# Patient Record
Sex: FEMALE | Race: WHITE | NOT HISPANIC OR LATINO | Employment: OTHER | URBAN - METROPOLITAN AREA
[De-identification: names, ages, dates, MRNs, and addresses within clinical notes are randomized per-mention and may not be internally consistent; named-entity substitution may affect disease eponyms.]

---

## 2017-01-13 ENCOUNTER — ALLSCRIPTS OFFICE VISIT (OUTPATIENT)
Dept: OTHER | Facility: OTHER | Age: 62
End: 2017-01-13

## 2017-01-13 ENCOUNTER — LAB REQUISITION (OUTPATIENT)
Dept: LAB | Facility: HOSPITAL | Age: 62
End: 2017-01-13
Payer: COMMERCIAL

## 2017-01-13 DIAGNOSIS — Z01.419 ENCOUNTER FOR GYNECOLOGICAL EXAMINATION WITHOUT ABNORMAL FINDING: ICD-10-CM

## 2017-01-13 PROCEDURE — 88175 CYTOPATH C/V AUTO FLUID REDO: CPT | Performed by: OBSTETRICS & GYNECOLOGY

## 2017-01-13 PROCEDURE — 87624 HPV HI-RISK TYP POOLED RSLT: CPT | Performed by: OBSTETRICS & GYNECOLOGY

## 2017-01-19 LAB
HPV RRNA GENITAL QL NAA+PROBE: NORMAL
LAB AP GYN PRIMARY INTERPRETATION: NORMAL
Lab: NORMAL

## 2017-03-31 ENCOUNTER — APPOINTMENT (OUTPATIENT)
Dept: LAB | Facility: CLINIC | Age: 62
End: 2017-03-31
Payer: COMMERCIAL

## 2017-03-31 DIAGNOSIS — Z01.419 ENCOUNTER FOR GYNECOLOGICAL EXAMINATION WITHOUT ABNORMAL FINDING: ICD-10-CM

## 2017-03-31 LAB
EST. AVERAGE GLUCOSE BLD GHB EST-MCNC: 123 MG/DL
HBA1C MFR BLD: 5.9 % (ref 4.2–6.3)
TSH SERPL DL<=0.05 MIU/L-ACNC: 2.97 UIU/ML (ref 0.36–3.74)

## 2017-03-31 PROCEDURE — 83036 HEMOGLOBIN GLYCOSYLATED A1C: CPT

## 2017-03-31 PROCEDURE — 84443 ASSAY THYROID STIM HORMONE: CPT

## 2017-03-31 PROCEDURE — 36415 COLL VENOUS BLD VENIPUNCTURE: CPT

## 2017-06-01 ENCOUNTER — APPOINTMENT (OUTPATIENT)
Dept: LAB | Facility: CLINIC | Age: 62
End: 2017-06-01
Payer: COMMERCIAL

## 2017-06-01 ENCOUNTER — ALLSCRIPTS OFFICE VISIT (OUTPATIENT)
Dept: OTHER | Facility: OTHER | Age: 62
End: 2017-06-01

## 2017-06-01 DIAGNOSIS — L40.9 PSORIASIS: ICD-10-CM

## 2017-06-01 DIAGNOSIS — Z12.31 ENCOUNTER FOR SCREENING MAMMOGRAM FOR MALIGNANT NEOPLASM OF BREAST: ICD-10-CM

## 2017-06-01 LAB
CRP SERPL QL: 8.2 MG/L
ERYTHROCYTE [SEDIMENTATION RATE] IN BLOOD: 17 MM/HOUR (ref 0–20)

## 2017-06-01 PROCEDURE — 86140 C-REACTIVE PROTEIN: CPT

## 2017-06-01 PROCEDURE — 86038 ANTINUCLEAR ANTIBODIES: CPT

## 2017-06-01 PROCEDURE — 86430 RHEUMATOID FACTOR TEST QUAL: CPT

## 2017-06-01 PROCEDURE — 36415 COLL VENOUS BLD VENIPUNCTURE: CPT

## 2017-06-01 PROCEDURE — 85652 RBC SED RATE AUTOMATED: CPT

## 2017-06-02 LAB
RHEUMATOID FACT SER QL LA: NEGATIVE
RYE IGE QN: NEGATIVE

## 2017-06-13 DIAGNOSIS — Z12.31 ENCOUNTER FOR SCREENING MAMMOGRAM FOR MALIGNANT NEOPLASM OF BREAST: ICD-10-CM

## 2017-06-13 DIAGNOSIS — Z01.419 ENCOUNTER FOR GYNECOLOGICAL EXAMINATION WITHOUT ABNORMAL FINDING: ICD-10-CM

## 2017-06-15 ENCOUNTER — HOSPITAL ENCOUNTER (OUTPATIENT)
Dept: RADIOLOGY | Facility: HOSPITAL | Age: 62
Discharge: HOME | End: 2017-06-15
Payer: COMMERCIAL

## 2017-06-15 DIAGNOSIS — Z01.419 ENCOUNTER FOR GYNECOLOGICAL EXAMINATION WITHOUT ABNORMAL FINDING: ICD-10-CM

## 2017-06-15 DIAGNOSIS — Z12.31 ENCOUNTER FOR SCREENING MAMMOGRAM FOR MALIGNANT NEOPLASM OF BREAST: ICD-10-CM

## 2017-06-15 PROCEDURE — G0202 SCR MAMMO BI INCL CAD: HCPCS

## 2017-07-26 ENCOUNTER — HOSPITAL ENCOUNTER (OUTPATIENT)
Dept: SLEEP CENTER | Facility: CLINIC | Age: 62
Discharge: HOME | End: 2017-07-26
Payer: COMMERCIAL

## 2017-07-26 ENCOUNTER — TRANSCRIBE ORDERS (OUTPATIENT)
Dept: SLEEP CENTER | Facility: CLINIC | Age: 62
End: 2017-07-26

## 2017-07-26 DIAGNOSIS — J44.9 OSA AND COPD OVERLAP SYNDROME (HCC): Primary | ICD-10-CM

## 2017-07-26 DIAGNOSIS — G47.33 OSA AND COPD OVERLAP SYNDROME (HCC): Primary | ICD-10-CM

## 2017-07-26 DIAGNOSIS — G47.33 OSA (OBSTRUCTIVE SLEEP APNEA): ICD-10-CM

## 2017-09-11 ENCOUNTER — ALLSCRIPTS OFFICE VISIT (OUTPATIENT)
Dept: OTHER | Facility: OTHER | Age: 62
End: 2017-09-11

## 2017-09-11 DIAGNOSIS — L40.50 ARTHROPATHIC PSORIASIS (HCC): ICD-10-CM

## 2017-11-17 ENCOUNTER — ALLSCRIPTS OFFICE VISIT (OUTPATIENT)
Dept: OTHER | Facility: OTHER | Age: 62
End: 2017-11-17

## 2017-11-18 NOTE — PROGRESS NOTES
Assessment    1  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) (L40 50)   2  Chronic diastolic congestive heart failure (428 32,428 0) (I50 32)   3  Dyslipidemia (272 4) (E78 5)   · Atorvastatin 40mg   4  Hypertension (401 9) (I10)   · Controlled, with Amlodipine, Metoprolol, and Quinapril  5  Obesity (BMI 30-39 9) (278 00) (E66 9)   6  Edema, pitting (782 3) (R60 9)   7  Hydrocodone use disorder, moderate, in controlled environment (305 50) (F11 20)    Plan  Arthropathic psoriasis    · Ibuprofen 400 MG Oral Tablet; TAKE 1 TABLET 3 TIMES DAILY AS NEEDED   · Follow-up visit in 4 Months Evaluation and Treatment  Follow-up  Status: Hold For - Scheduling Requested for: [id number]  Chronic diastolic congestive heart failure, Dyslipidemia    · Aspirin 81 MG Oral Tablet Delayed Release; TAKE 1 TABLET DAILY AS DIRECTED  Dyslipidemia    · Atorvastatin Calcium 40 MG Oral Tablet; TAKE (1) TABLET IN THE EVENING FORCHOLESTEROL  Hypertension    · AmLODIPine Besylate 5 MG Oral Tablet (Norvasc); TAKE 1 TABLET TWICE DAILY   · Metoprolol Tartrate 50 MG Oral Tablet; TAKE 1 TABLET TWICE DAILY   · Quinapril HCl - 40 MG Oral Tablet; take (1) tablet daily  Need for prophylactic vaccination and inoculation against influenza    · Flulaval Quadrivalent Intramuscular Suspension  Osteoarthritis of knee    · Hydrocodone-Acetaminophen 5-325 MG Oral Tablet; take 1 tablet every 12 hours asneeded for pain; Do Not Fill Before: [id number]    Discussion/Summary  Discussion Summary:   1   Psoriatic arthritis, improvedpain asymmetric on right elbow, hands and kneehelps although counseled pt on need to take only occasionally due to her history of heart failure and chance of renal injuryXR bilat ordered to assess severity of arthropathy  HLDcont atorvastatin  XUO576/90, stableamlodipine, quinipril, also on metoprolol for hx of HF  Pitting edema R>Gilda past with negative workup for DVTand wanesamlodipine which could be causemonitor on next visit  Chronic opioid use disorder in setting of controlled environmentmaintained on low dose hydrocodone with q12 schedulingonly taking evening dose when neededRx given this visit, pt instructed to use sparingly  Obesity BMI >30pound weightloss since last visitand counseled pt on dietary changes and maintaining 150 mins exercise (walking) per week  Diastolic heart failure without recent exacerbationpt has no symptoms to suggest worsening statusmonitorher against the use of overuse of ibuprofen given hx of HF  HMvaccine given this visitdone 2014UTDup in 4 months or sooner if issues arise  Counseling Documentation With Imm: The patient was counseled regarding diagnostic results,-- instructions for management,-- risk factor reductions,-- prognosis,-- patient and family education,-- risks and benefits of treatment options,-- importance of compliance with treatment  total time of encounter was 55 minutes  Patient Education: Educational resources provided:   Medication SE Review and Pt Understands Tx: Possible side effects of new medications were reviewed with the patient/guardian today  The treatment plan was reviewed with the patient/guardian  The patient/guardian understands and agrees with the treatment plan      Chief Complaint  Chief Complaint Chronic Condition [facility] Distance: Patient is here today for follow up of chronic conditions described in HPI  History of Present Illness  HPI: 58 yo F here for follow up, psoriatic arthritis stable with use of tylenol and occasional ibuprofen  Has occasional right elbow pain, unchanged with activity and waxes and wanes  Uses elbow brace for relief  Severity of sx is mild to moderate  Hospital Based Practices Required Assessment:  Pain Assessment  the patient states they have pain  The pain is located in the right elbow  The patient describes the pain as burning  (on a scale of 0 to 10, the patient rates the pain at 6 )   Prefered Language is  english  Primary Language is  english     Statin Usage Guidelines Clinical Pathway: The patient is being seen for a routine clinic follow-up of hyperlipidemia  No associated symptoms are reported  no chest pain no exertional calf cramps no myalgias no nausea skin lesions Current treatment includes statins  Pertinent medical history:  hypertension, but-- no diabetes-- and-- no vitamin D deficiency  Risk factors:  obesity-- and-- poor diet, but-- no inactivity  Obesity (Follow-Up): The patient is being seen for follow-up of obesity  The patient reports doing well and a weight loss of 2 pounds  She has had no significant interval events  Interval symptoms:  stable poor eating habits,-- denies depressed mood,-- denies anxiety,-- denies dyspnea,-- denies fatigue,-- denies back pain-- and-- improved joint pain  Medications:  the patient is adherent to her medication regimen, but-- she denies medication side effects  Disease management:  the patient is doing well with her goals  Diet plan: low carbohydrate diet, limited portion sizes, limited junk food and limited high sugar drinks  Hypertension (Follow-Up): The patient presents for follow-up of essential hypertension  The patient states she has been doing well with her blood pressure control since the last visit  Comorbid Illnesses: cardiac failure  She has no significant interval events  Symptoms: denies impaired vision,-- denies dyspnea,-- denies chest pain,-- denies intermittent leg claudication-- and-- stable lower extremity edema  Associated symptoms include no focal neurologic deficits  Home monitoring: The patient is not checking blood pressure at home  Blood pressure control has been good  Lifestyle: Weight Issues: She has weight concerns  Exercise: She exercises regularly  Arthritis, Psoriatic (Brief): Symptoms:  joint pain-- and-- tenderness, but-- no joint swelling,-- no warmth,-- no dactylitis-- and-- no restricted movement  Associated symptoms:  no fever,-- no fatigue-- and-- no malaise   Current treatment includes nonsteroidal anti-inflammatory drugs  By report, there is good compliance with treatment and good tolerance of treatment  Initial presentation included joint pain  Since diagnosis the disease has been stable  Recently, the disease has been stable  Associated conditions include dermal psoriasis  Disease complications:  no arthritis mutilans,-- no atlantoaxial subluxation,-- no disability,-- no enthesitis-- and-- no uveitis  She was previously evaluated in this clinic  Previous presentation included joint pain and joint swelling  Past evaluation has included C-reactive protein and negative LACEY  Review of Systems  Complete-Female:  Constitutional: recent weight loss, but-- No fever, no chills, feels well, no tiredness, no recent weight gain or weight loss,-- no fever,-- not feeling poorly,-- no recent weight gain,-- no chills-- and-- not feeling tired  Eyes: no eye pain,-- no eyesight problems,-- eyes not red-- and-- no purulent discharge from the eyes  ENT: no sore throat,-- no hearing loss,-- no nasal discharge-- and-- no hoarseness  Cardiovascular: lower extremity edema, but-- no chest pain,-- the heart rate was not fast-- and-- no palpitations  Respiratory: no shortness of breath,-- no cough,-- no wheezing-- and-- no shortness of breath during exertion  Gastrointestinal: no abdominal pain,-- no nausea,-- no vomiting-- and-- no diarrhea  Genitourinary: No complaints of dysuria, no incontinence, no pelvic pain, no dysmenorrhea, no vaginal discharge or bleeding-- and-- no pelvic pain  Musculoskeletal: arthralgias,-- limb pain-- and-- limb swelling, but-- no joint swelling,-- no myalgias-- and-- no joint stiffness  Neurological: No complaints of headache, no confusion, no convulsions, no numbness, no dizziness or fainting, no tingling, no limb weakness, no difficulty walking,-- no headache,-- no numbness,-- no confusion,-- no limb weakness-- and-- no difficulty walking    Endocrine: no muscle weakness  no feelings of weakness   ROS Reviewed:   ROS reviewed  Active Problems  1  Allergic rhinitis (477 9) (J30 9)   2  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) (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)   3  Chronic diastolic congestive heart failure (428 32,428 0) (I50 32)   4  Dyslipidemia (272 4) (E78 5)   5  Edema, pitting (782 3) (R60 9)   6  Encounter for screening mammogram for malignant neoplasm of breast (V76 12) (Z12 31)   7  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)   8  Hydrocodone use disorder, moderate, in controlled environment (305 50) (F11 20)   9  Hypertension (401 9) (I10)   10  Need for prophylactic vaccination and inoculation against influenza (V04 81) (Z23)   11  Obesity (BMI 30-39 9) (278 00) (E66 9)   12  Obstructive sleep apnea, adult (327 23) (G47 33)   13  Women's annual routine gynecological examination (V72 31) (Z01 419)    Past Medical History  1  History of Chronic ankle pain, unspecified laterality (294 55,854 88) (M25 579,G89 29)   2  History of Chronic hepatitis C (070 54) (B18 2)   3  History of Complete Left Bundle Branch Block (426 3)   4  History of Encounter for vaccination (V05 9) (Z23)   5  History of colonoscopy (V45 89) (Z98 890)   6  History of eczema (V13 3) (Z87 2)   7  History of hyperlipidemia (V12 29) (Z86 39)   8  History of hypertension (V12 59) (Z86 79)   9  History of sleep apnea (V13 89) (Z86 69)   10  History of Need for diphtheria-tetanus-pertussis (Tdap) vaccine (V06 1) (Z23)   11  Need for prophylactic vaccination and inoculation against influenza (V04 81) (Z23)   12  History of Need for Zostavax administration (V04 89) (Z23)   13  History of Osteoarthritis of knee (715 36) (M17 10)   14  History of Rectal polyp (569 0) (K62 1)  Active Problems And Past Medical History Reviewed: The active problems and past medical history were reviewed and updated today  Surgical History  Surgical History Reviewed: The surgical history was reviewed and updated today         Family History  Mother    1  Family history of Heart disease   2  Family history of Hypertension   3  Family history of Pacemaker  Father    4  Family history of Heart disease  Sister    5  Family history of Heart disease  Brother    6  Family history of Alcohol abuse   7  Family history of Heart disease   8  Family history of Liver cirrhosis  Family History Reviewed: The family history was reviewed and updated today  Social History     · Former smoker (D07 73) (Y65 641)   · History of Former smoker (V15 82) (Q06 974)   · Home Environment (V60 9)  Social History Reviewed: The social history was reviewed and updated today  The social history was reviewed and is unchanged  Current Meds   1  AmLODIPine Besylate 5 MG Oral Tablet; TAKE 1 TABLET TWICE DAILY; Therapy: 00Kmo9175 to (Evaluate:09Dev2141)  Requested for: 32Xyn0439; Last Rx:37Nxd9010 Ordered   2  Aspirin 81 MG Oral Tablet Delayed Release; TAKE 1 TABLET DAILY AS DIRECTED; Therapy: 10Dxq6430 to (Evaluate:10Mar2018)  Requested for: 60AAG0693 Recorded   3  Atorvastatin Calcium 40 MG Oral Tablet; TAKE (1) TABLET IN THE EVENING FOR CHOLESTEROL; Therapy: 80Emd7855 to (Last MJ:21SKP4700)  Requested for: 90Zxt6709 Ordered   4  Fluticasone Propionate 50 MCG/ACT Nasal Suspension; USE 2 SPRAYS IN EACH NOSTRIL ONCE DAILY; Therapy: 29KMV8760 to (Evaluate:62Usq6524); Last Rx:19Sak4610 Ordered   5  Hydrocodone-Acetaminophen 5-325 MG Oral Tablet; take 1 tablet every 12 hours as needed for pain; Therapy: 27Dvf5955 to (Evaluate:93Rjg9240)  Requested for: 48YTW9123; Last Rx:06Nov2017 Ordered   6  Ibuprofen 400 MG Oral Tablet; TAKE 1 TABLET 3 TIMES DAILY AS NEEDED; Therapy: 93ARR3663 to (Evaluate:59Gko3099)  Requested for: 39TJJ5789 Recorded   7  Loratadine 10 MG Oral Tablet; TAKE 1 TABLET DAILY; Therapy: 39FAB6855 to (Last Rx:28Apr2016)  Requested for: 28Apr2016 Ordered   8  Metoprolol Tartrate 50 MG Oral Tablet; TAKE 1 TABLET TWICE DAILY;  Therapy: 79Fxh6282 to (Evaluate:71Fhk6546)  Requested for: 85Uge3557; Last Rx:91Cap2469 Ordered   9  Quinapril HCl - 40 MG Oral Tablet; take (1) tablet daily; Therapy: 96Xoq7676 to (Last [place])  Requested for: 00Jgh6327 Ordered  Medication List Reviewed: The medication list was reviewed and updated today  Allergies  1  No Known Drug Allergies    Vitals  Vital Signs    Recorded: 05ASP8587 08:56AM   Temperature 97 2 F   Heart Rate 60   Systolic 959   Diastolic 90   Height 5 ft    Weight 199 lb 4 72 oz   BMI Calculated 38 92   BSA Calculated 1 86       Physical Exam   Constitutional  General appearance: No acute distress, well appearing and well nourished  well developed,-- appears healthy,-- comfortable,-- well nourished,-- rested,-- not exhausted-- and-- well hydrated  Eyes  Conjunctiva and lids: No swelling, erythema or discharge  Conjunctiva Findings: normal in both eyes,-- no hyperemia-- and-- no watery discharge  -- wears corrective lenses  Ears, Nose, Mouth, and Throat  External inspection of ears and nose: Normal   The right external ear was normal  The left external ear was normal   Pulmonary  Respiratory effort: No increased work of breathing or signs of respiratory distress  Respiratory rate: normal  Assessment of respiratory effort revealed normal rhythm and effort,-- no accessory muscle use-- and-- respirations were not shallow  Auscultation of lungs: Clear to auscultation  Auscultation of the lungs revealed no expiratory wheezing,-- normal expiratory time-- and-- no inspiratory wheezing  no rales or crackles were heard bilaterally  no rhonchi  no friction rub  no wheezing  Cardiovascular  Auscultation of heart: Normal rate and rhythm, normal S1 and S2, without murmurs  The heart rate was normal  The rhythm was regular  Heart sounds: normal S1,-- normal S2-- and-- no gallop heard  no murmurs were heard    Examination of extremities for edema and/or varicosities: Normal   right ankle pitting edema-- and-- left ankle pitting edema, but-- pitting edema present  pitting edema R>L +1-2  -- no JVD  Abdomen  Abdomen: Non-tender, no masses  The abdomen was flat  Bowel sounds were normal  The abdomen was soft and nontender  Musculoskeletal  Gait and station: Normal   Gait evaluation demonstrated a normal gait,-- no a wide-based and ataxic gait,-- no shuffling,-- no inusfficiencies for an exercise program-- and-- no antalgia  Digits and nails: Normal without clubbing or cyanosis  Examination of the extremities revealed no fingernail clubbing,-- well perfused fingers,-- no upper extremity petechiae,-- no lower extremity petechiae,-- no syndactyly of the toes,-- no syndactyly of the fingers,-- no upper extremity ischemia-- and-- no lower extremity ischemia  -- brachiaradialis tendon insertion point is nontender, no swelling of MCP or PIP or DIPJ, distal neurovascular status intact, no nail pitting present  Skin  Skin and subcutaneous tissue: Abnormal   dry skin, but-- no cyanosis  Clinical impression:  small <2cm dry patches of erythema over extensor surfaces over upper extremities and elbow c/w psoriasis  Scalp and Hair:  normal hair texture and distribution  -- see above  Neurologic  Cranial nerves: Cranial nerves 2-12 intact  Sensation: No sensory loss  Sensory exam: intact to light touch-- and-- normal proprioception  Psychiatric  Orientation to person, place, and time: Normal   Mental Status: oriented to person, place, and time,-- oriented to person,-- oriented to place,-- oriented to time,-- normal attention skills,-- normal language skills-- and-- normal fund of knowledge  Mood and affect: Normal   Mood and Affect: appropriate mood-- and-- appropriate affect          Provider Comments  Provider Comments:   follow up hand XR on next visit      Attending Note  Attending Note: Attending Note: I interviewed and examined the patient,-- I discussed the case with the Resident and reviewed the Resident's note,-- I supervised the Resident-- and-- I agree with the Resident management plan as it was presented to me  Level of Participation: I was present in clinic, but did not examine the patient  Patient's History: well, denies dyspnea  Uses NSAID very infrequently, understands potential negative impact on HFpEF sx/outcomes  Has a small amount of leg edema, no major change here, however  Key Parts of the Exam: pleasant as usualno [id number]  Comments/Additional Findings: again, cautioned about NSAID use, pt understands  I agree with the Resident's note        Signatures   Electronically signed by : Cyril Madera DO; Nov 17 2017 10:17AM EST                       (Author)    Electronically signed by : Maggie Hooper DO; Nov 17 2017 10:37AM EST                       (Co-participant)

## 2018-01-12 VITALS
DIASTOLIC BLOOD PRESSURE: 74 MMHG | BODY MASS INDEX: 32.96 KG/M2 | HEIGHT: 63 IN | WEIGHT: 186 LBS | HEART RATE: 103 BPM | SYSTOLIC BLOOD PRESSURE: 112 MMHG

## 2018-01-13 VITALS
TEMPERATURE: 97.4 F | HEIGHT: 62 IN | DIASTOLIC BLOOD PRESSURE: 82 MMHG | SYSTOLIC BLOOD PRESSURE: 126 MMHG | BODY MASS INDEX: 36.19 KG/M2 | HEART RATE: 88 BPM | WEIGHT: 196.65 LBS

## 2018-01-13 VITALS
SYSTOLIC BLOOD PRESSURE: 130 MMHG | HEIGHT: 60 IN | TEMPERATURE: 97.2 F | WEIGHT: 199.3 LBS | BODY MASS INDEX: 39.13 KG/M2 | HEART RATE: 60 BPM | DIASTOLIC BLOOD PRESSURE: 90 MMHG

## 2018-01-14 VITALS
HEART RATE: 70 BPM | SYSTOLIC BLOOD PRESSURE: 112 MMHG | TEMPERATURE: 97.7 F | BODY MASS INDEX: 37.08 KG/M2 | DIASTOLIC BLOOD PRESSURE: 80 MMHG | WEIGHT: 201.5 LBS | HEIGHT: 62 IN

## 2018-02-08 ENCOUNTER — TELEPHONE (OUTPATIENT)
Dept: INTERNAL MEDICINE CLINIC | Facility: CLINIC | Age: 63
End: 2018-02-08

## 2018-02-08 DIAGNOSIS — F11.90 CHRONIC, CONTINUOUS USE OF OPIOIDS: Primary | ICD-10-CM

## 2018-02-08 DIAGNOSIS — F11.90 CHRONIC, CONTINUOUS USE OF OPIOIDS: ICD-10-CM

## 2018-02-08 RX ORDER — HYDROCODONE BITARTRATE AND ACETAMINOPHEN 5; 325 MG/1; MG/1
1 TABLET ORAL 2 TIMES DAILY PRN
Qty: 60 TABLET | Refills: 0 | Status: SHIPPED | OUTPATIENT
Start: 2018-02-08 | End: 2018-02-08 | Stop reason: SDUPTHER

## 2018-02-08 RX ORDER — HYDROCODONE BITARTRATE AND ACETAMINOPHEN 5; 325 MG/1; MG/1
1 TABLET ORAL 2 TIMES DAILY PRN
Qty: 60 TABLET | Refills: 0 | Status: SHIPPED | OUTPATIENT
Start: 2018-02-08 | End: 2018-03-05 | Stop reason: SDUPTHER

## 2018-02-08 NOTE — TELEPHONE ENCOUNTER
DISREGARD THIS MESSAGE, REFILL CANNOT BE DONE THROUGHT THIS MESSAGE SINCE IT IS NOT LINKED TO THE MED  I CREATED NEW REFIL REQUEST

## 2018-03-02 RX ORDER — METOPROLOL TARTRATE 50 MG/1
1 TABLET, FILM COATED ORAL 2 TIMES DAILY
COMMUNITY
Start: 2012-02-23 | End: 2018-03-05 | Stop reason: SDUPTHER

## 2018-03-02 RX ORDER — LORATADINE 10 MG/1
1 TABLET ORAL DAILY
COMMUNITY
Start: 2015-10-26 | End: 2018-03-05 | Stop reason: SDUPTHER

## 2018-03-02 RX ORDER — IBUPROFEN 400 MG/1
1 TABLET ORAL 3 TIMES DAILY PRN
COMMUNITY
Start: 2017-06-01 | End: 2018-03-05 | Stop reason: SDUPTHER

## 2018-03-02 RX ORDER — QUINAPRIL 40 MG/1
1 TABLET ORAL DAILY
COMMUNITY
Start: 2012-02-23 | End: 2018-03-05 | Stop reason: SDUPTHER

## 2018-03-02 RX ORDER — AMLODIPINE BESYLATE 5 MG/1
1 TABLET ORAL 2 TIMES DAILY
COMMUNITY
Start: 2013-08-07 | End: 2018-03-05 | Stop reason: SDUPTHER

## 2018-03-02 RX ORDER — FLUTICASONE PROPIONATE 50 MCG
2 SPRAY, SUSPENSION (ML) NASAL DAILY
COMMUNITY
Start: 2015-04-06 | End: 2018-03-05 | Stop reason: SDUPTHER

## 2018-03-02 RX ORDER — ATORVASTATIN CALCIUM 40 MG/1
TABLET, FILM COATED ORAL
COMMUNITY
Start: 2013-04-24 | End: 2018-03-05 | Stop reason: SDUPTHER

## 2018-03-04 PROBLEM — E66.9 OBESITY (BMI 30-39.9): Status: ACTIVE | Noted: 2017-09-11

## 2018-03-04 PROBLEM — I50.32 CHRONIC DIASTOLIC CONGESTIVE HEART FAILURE (HCC): Status: ACTIVE | Noted: 2017-09-11

## 2018-03-04 PROBLEM — L40.50 ARTHROPATHIC PSORIASIS (HCC): Status: ACTIVE | Noted: 2017-09-11

## 2018-03-05 ENCOUNTER — OFFICE VISIT (OUTPATIENT)
Dept: INTERNAL MEDICINE CLINIC | Facility: CLINIC | Age: 63
End: 2018-03-05
Payer: COMMERCIAL

## 2018-03-05 VITALS
TEMPERATURE: 97.3 F | SYSTOLIC BLOOD PRESSURE: 130 MMHG | HEIGHT: 62 IN | WEIGHT: 200.62 LBS | BODY MASS INDEX: 36.92 KG/M2 | DIASTOLIC BLOOD PRESSURE: 90 MMHG | HEART RATE: 84 BPM

## 2018-03-05 DIAGNOSIS — F11.90 CHRONIC, CONTINUOUS USE OF OPIOIDS: ICD-10-CM

## 2018-03-05 DIAGNOSIS — Z12.11 SCREEN FOR COLON CANCER: ICD-10-CM

## 2018-03-05 DIAGNOSIS — T78.40XA ALLERGIC: ICD-10-CM

## 2018-03-05 DIAGNOSIS — L40.50 ARTHROPATHIC PSORIASIS (HCC): Primary | ICD-10-CM

## 2018-03-05 DIAGNOSIS — E66.9 OBESITY (BMI 30-39.9): ICD-10-CM

## 2018-03-05 DIAGNOSIS — F11.20: ICD-10-CM

## 2018-03-05 DIAGNOSIS — I10 ESSENTIAL HYPERTENSION: ICD-10-CM

## 2018-03-05 DIAGNOSIS — I50.32 CHRONIC DIASTOLIC CONGESTIVE HEART FAILURE (HCC): ICD-10-CM

## 2018-03-05 PROCEDURE — 99213 OFFICE O/P EST LOW 20 MIN: CPT | Performed by: INTERNAL MEDICINE

## 2018-03-05 PROCEDURE — 3008F BODY MASS INDEX DOCD: CPT | Performed by: INTERNAL MEDICINE

## 2018-03-05 RX ORDER — FLUTICASONE PROPIONATE 50 MCG
2 SPRAY, SUSPENSION (ML) NASAL DAILY
Qty: 16 G | Refills: 0 | Status: SHIPPED | OUTPATIENT
Start: 2018-03-05 | End: 2020-10-23

## 2018-03-05 RX ORDER — IBUPROFEN 400 MG/1
400 TABLET ORAL EVERY 8 HOURS PRN
Qty: 30 TABLET | Refills: 0 | Status: SHIPPED | OUTPATIENT
Start: 2018-03-05 | End: 2019-08-16

## 2018-03-05 RX ORDER — HYDROCODONE BITARTRATE AND ACETAMINOPHEN 5; 325 MG/1; MG/1
1 TABLET ORAL 2 TIMES DAILY PRN
Qty: 60 TABLET | Refills: 0 | Status: SHIPPED | OUTPATIENT
Start: 2018-03-08 | End: 2018-03-05 | Stop reason: SDUPTHER

## 2018-03-05 RX ORDER — QUINAPRIL 40 MG/1
40 TABLET ORAL DAILY
Qty: 90 TABLET | Refills: 1 | Status: SHIPPED | OUTPATIENT
Start: 2018-03-05 | End: 2018-09-19 | Stop reason: SDUPTHER

## 2018-03-05 RX ORDER — HYDROCODONE BITARTRATE AND ACETAMINOPHEN 5; 325 MG/1; MG/1
1 TABLET ORAL 2 TIMES DAILY PRN
Qty: 60 TABLET | Refills: 0 | Status: SHIPPED | OUTPATIENT
Start: 2018-03-08 | End: 2018-04-06 | Stop reason: SDUPTHER

## 2018-03-05 RX ORDER — AMLODIPINE BESYLATE 5 MG/1
5 TABLET ORAL 2 TIMES DAILY
Qty: 180 TABLET | Refills: 1 | Status: SHIPPED | OUTPATIENT
Start: 2018-03-05 | End: 2019-08-02 | Stop reason: SDUPTHER

## 2018-03-05 RX ORDER — METOPROLOL TARTRATE 50 MG/1
50 TABLET, FILM COATED ORAL 2 TIMES DAILY
Qty: 180 TABLET | Refills: 1 | Status: SHIPPED | OUTPATIENT
Start: 2018-03-05 | End: 2019-08-02 | Stop reason: SDUPTHER

## 2018-03-05 RX ORDER — ATORVASTATIN CALCIUM 40 MG/1
40 TABLET, FILM COATED ORAL DAILY
Qty: 90 TABLET | Refills: 1 | Status: SHIPPED | OUTPATIENT
Start: 2018-03-05 | End: 2018-09-19 | Stop reason: SDUPTHER

## 2018-03-05 RX ORDER — HYDROCODONE BITARTRATE AND ACETAMINOPHEN 5; 325 MG/1; MG/1
1 TABLET ORAL 2 TIMES DAILY PRN
Qty: 60 TABLET | Refills: 0 | Status: CANCELLED | OUTPATIENT
Start: 2018-03-08 | End: 2018-04-07

## 2018-03-05 RX ORDER — LORATADINE 10 MG/1
10 TABLET ORAL DAILY
Qty: 90 TABLET | Refills: 0 | Status: SHIPPED | OUTPATIENT
Start: 2018-03-05 | End: 2019-08-02 | Stop reason: SDUPTHER

## 2018-03-05 NOTE — PATIENT INSTRUCTIONS
Arthritis   AMBULATORY CARE:   Arthritis  is a disease that causes inflammation in one or more joints  There are many types of arthritis, such as osteoarthritis, rheumatoid arthritis, and septic arthritis  Some types cause inflammation in the joints  Other types wear away the cartilage between joints  This makes the bones of the joint rub together when you move the joint  Your symptoms may be constant, or symptoms may come and go  Arthritis often gets worse over time and can cause permanent joint damage  Common signs and symptoms of arthritis:   · Pain, swelling, or stiffness in the joint    · Limited range of motion in the joint    · Warmth or redness over the joint    · Tenderness when you touch the joint    · Stiff joints in the morning that loosen with movement    · A creaking or grinding sound when you move the joint    · Fever  Seek care immediately if:   · You have a fever and severe joint pain or swelling  · You cannot move the affected joint  · You have severe joint pain you cannot tolerate  Contact your healthcare provider if:   · Your pain or swelling does not get better with treatment  · You have questions or concerns about your condition or care  Treatment  will depend on the type of arthritis you have and if it is severe  You may need any of the following:  · Acetaminophen  decreases pain and fever  It is available without a doctor's order  Ask how much to take and how often to take it  Follow directions  Acetaminophen can cause liver damage if not taken correctly  · NSAIDs , such as ibuprofen, help decrease swelling, pain, and fever  This medicine is available with or without a doctor's order  NSAIDs can cause stomach bleeding or kidney problems in certain people  If you take blood thinner medicine, always ask your healthcare provider if NSAIDs are safe for you  Always read the medicine label and follow directions  · Steroid medicine  helps reduce swelling and pain       · Surgery may be needed to repair or replace a damaged joint  Manage arthritis:   · Rest your painful joint so it can heal   Your healthcare provider may recommend crutches or a walker if the affected joint is in a leg  · Apply ice or heat to the joint  Both can help decrease swelling and pain  Ice may also help prevent tissue damage  Use an ice pack, or put crushed ice in a plastic bag  Cover it with a towel and place it on your joint for 15 to 20 minutes every hour or as directed  You can apply heat for 20 minutes every 2 hours  Heat treatment includes hot packs or heat lamps  · Elevate your joint  Elevation helps reduce swelling and pain  Raise your joint above the level of your heart as often as you can  Prop your painful joint on pillows to keep it above your heart comfortably  · Go to physical or occupational therapy as directed  A physical therapist can teach you exercises to improve flexibility and range of motion  You may also be shown non-weight-bearing exercises that are safe for your joints, such as swimming  Exercise can help keep your joints flexible and reduce pain  An occupational therapist can help you learn to do your daily activities when your joints are stiff or sore  · Maintain a healthy weight  Extra weight puts increased pressure on your joints  Ask your healthcare provider what you should weigh  If you need to lose weight, he can help you create a weight loss program  Weight loss can help reduce pain and increase your ability to do your activities  The amount of exercise you do may vary each day, depending on your symptoms  · Wear flat or low-heeled shoes  This will help decrease pain and reduce pressure on your ankle, knee, and hip joints  · Use support devices as directed  You may be given splints to wear on your hands to help your joints rest and to decrease inflammation  While you sleep, use a pillow that is firm enough to support your neck and head    Other equipment  that may help you move and prevent falls:  · Orthotic shoes or insoles  help support your feet when you walk  · Crutches, a cane, or a walker  may help decrease your risk for falling  They also decrease stress on affected joints  · Devices to prevent falls  include raised toilet seats and bathtub bars to help you get up from sitting  Handrails can be placed in areas where you need balance and support  Follow up with your healthcare provider or rheumatologist as directed:  Write down your questions so you remember to ask them during your visits  © 2017 [facility] Information is for End User's use only and may not be sold, redistributed or otherwise used for commercial purposes  All illustrations and images included in CareNotes® are the copyrighted property of [employer] , [employer]  or Efren Noland  The above information is an  only  It is not intended as medical advice for individual conditions or treatments  Talk to your doctor, nurse or pharmacist before following any medical regimen to see if it is safe and effective for you

## 2018-03-05 NOTE — PROGRESS NOTES
INTERNAL MEDICINE FOLLOW-UP OFFICE VISIT  [facility]  [address] [place] [address]    NAME: William Rockwell  AGE: 57 y o  SEX: female    DATE OF ENCOUNTER: 3/5/2018    Assessment and Plan   Will give a refill for hydrocodone with attending to sign-to be filled on March 8 2018, 30 day supply given  Patient states she uses this medication approximately every other day when needed  Counseled patient on increasing her exercise and avoiding high salt diet  Likely cause of her lower extremity edema is increased dietary salt intake and venous insufficiency versus amlodipine  Diagnoses and all orders for this visit:    Arthropathic psoriasis ([address] )  -     ibuprofen (MOTRIN) 400 mg tablet; Take 1 tablet (400 mg total) by mouth every 8 (eight) hours as needed for moderate pain for up to 30 days    Chronic diastolic congestive heart failure (HCC)  -     amLODIPine (NORVASC) 5 mg tablet; Take 1 tablet (5 mg total) by mouth 2 (two) times a day for 90 days  -     aspirin 81 MG tablet; Take 1 tablet (81 mg total) by mouth daily for 90 days  -     atorvastatin (LIPITOR) 40 mg tablet; Take 1 tablet (40 mg total) by mouth daily for 90 days    Essential hypertension  -     metoprolol tartrate (LOPRESSOR) 50 mg tablet; Take 1 tablet (50 mg total) by mouth 2 (two) times a day for 90 days  -     quinapril (ACCUPRIL) 40 MG tablet; Take 1 tablet (40 mg total) by mouth daily for 90 days    Hydrocodone use disorder, moderate, in controlled environment (HCC)    Obesity (BMI 30-39  9)    Allergic  -     fluticasone (FLONASE) 50 mcg/act nasal spray; 2 sprays into each nostril daily  -     loratadine (CLARITIN) 10 mg tablet; Take 1 tablet (10 mg total) by mouth daily for 90 days    Chronic, continuous use of opioids  -     HYDROcodone-acetaminophen (NORCO) 5-325 mg per tablet;  Take 1 tablet by mouth 2 (two) times a day as needed for pain for up to 30 days Earliest Fill Date: 3/8/18 [facility] checked 2/8/18 Max Daily Amount: 2 tablets    Screen for colon cancer    Other orders  -     Discontinue: amLODIPine (NORVASC) 5 mg tablet; Take 1 tablet by mouth 2 (two) times a day  -     Discontinue: aspirin 81 MG tablet; Take 1 tablet by mouth daily  -     Discontinue: atorvastatin (LIPITOR) 40 mg tablet; Take by mouth  -     Discontinue: fluticasone (FLONASE) 50 mcg/act nasal spray; 2 sprays into each nostril daily  -     Discontinue: ibuprofen (MOTRIN) 400 mg tablet; Take 1 tablet by mouth 3 (three) times a day as needed  -     Discontinue: loratadine (CLARITIN) 10 mg tablet; Take 1 tablet by mouth daily  -     Discontinue: metoprolol tartrate (LOPRESSOR) 50 mg tablet; Take 1 tablet by mouth 2 (two) times a day  -     Discontinue: quinapril (ACCUPRIL) 40 MG tablet; Take 1 tablet by mouth daily          - Counseling Documentation: patient was counseled regarding: instructions for management    Chief Complaint     Chief Complaint   Patient presents with    Follow-up     routine       History of Present Illness     58-year-old female history psoriatic arthritis, lower extremity edema, chronic CHF with diastolic dysfunction, hypertension, presents for follow-up of her chronic conditions  Patient overall doing well denies any issues breathing, no chest pain, no dyspnea on exertion  She does report increased right hand and located at the 1st MCP J   Pain is worse progressively throughout the day  She has taken Norco and ibuprofen infrequently, only when pain is bothering her  Associated symptoms include right elbow pain  There is no pain in the left upper extremity  Does note exacerbation of her lower extremity edema with right greater than left  Negative workup for DVT in the past   Compliant with her medications  Requesting refill of all her meds           The following portions of the patient's history were reviewed and updated as appropriate: allergies, current medications, past family history, past medical history, past social history, past surgical history and problem list     Review of Systems     Review of Systems   Constitutional: Negative for activity change, chills and fever  HENT: Negative for rhinorrhea  Eyes: Negative for discharge  Respiratory: Negative for cough, shortness of breath and wheezing  Cardiovascular: Positive for leg swelling  Negative for chest pain  Gastrointestinal: Negative  Musculoskeletal: Positive for arthralgias  Negative for gait problem, joint swelling and myalgias  Skin: Positive for rash  Neurological: Positive for numbness  Psychiatric/Behavioral: Negative  Active Problem List     Patient Active Problem List   Diagnosis    Allergic rhinitis    Arthropathic psoriasis (HCC)    Chronic diastolic congestive heart failure (HCC)    Dyslipidemia    Hydrocodone use disorder, moderate, in controlled environment ([facility] [address] )    Hypertension    Obesity (BMI 30-39  9)    Obstructive sleep apnea, adult       Objective     /90   Pulse 84   Temp (!) 97 3 °F (36 3 °C)   Ht 5' 2" (1 575 m)   Wt 91 kg (200 lb 9 9 oz)   BMI 36 69 kg/m²     Physical Exam   Constitutional: She is oriented to person, place, and time  She appears well-developed and well-nourished  No distress  HENT:   Head: Normocephalic  Eyes: Conjunctivae and EOM are normal  Pupils are equal, round, and reactive to light  Right eye exhibits no discharge  Left eye exhibits no discharge  Neck: Normal range of motion  Neck supple  Cardiovascular: Normal rate, regular rhythm, normal heart sounds and intact distal pulses  Exam reveals no gallop and no friction rub  No murmur heard  Pulmonary/Chest: Effort normal and breath sounds normal  No respiratory distress  She has no wheezes  She has no rales  Abdominal: She exhibits no distension  Musculoskeletal: She exhibits edema     Plus two nonpitting lower extremity edema right greater than left    Tenderness at 1st metacarpal of right hand, mild tenderness with palpation at brachioradialis tendon    Neurological: She is alert and oriented to person, place, and time  Coordination normal    Skin: Skin is warm and dry  Rash noted  She is not diaphoretic  Small psoriatic plaques over extensor surfaces of former bilateral, several plaques of lower extremity   Psychiatric: She has a normal mood and affect   Her behavior is normal        Pertinent Laboratory/Diagnostic Studies:  Rheumatologic Studies:   Lab Results   Component Value Date/Time    ESR 17 06/01/2017 09:08 AM    CRP 8 2 (H) 06/01/2017 09:08 AM    LACEY Negative 06/01/2017 09:08 AM    RF Negative 06/01/2017 09:08 AM         Current Medications     Current Outpatient Prescriptions:     amLODIPine (NORVASC) 5 mg tablet, Take 1 tablet (5 mg total) by mouth 2 (two) times a day for 90 days, Disp: 180 tablet, Rfl: 1    aspirin 81 MG tablet, Take 1 tablet (81 mg total) by mouth daily for 90 days, Disp: 90 tablet, Rfl: 1    atorvastatin (LIPITOR) 40 mg tablet, Take 1 tablet (40 mg total) by mouth daily for 90 days, Disp: 90 tablet, Rfl: 1    fluticasone (FLONASE) 50 mcg/act nasal spray, 2 sprays into each nostril daily, Disp: 16 g, Rfl: 0    [START ON 3/8/2018] HYDROcodone-acetaminophen (NORCO) 5-325 mg per tablet, Take 1 tablet by mouth 2 (two) times a day as needed for pain for up to 30 days Earliest Fill Date: 3/8/18 PDMP checked 2/8/18 Max Daily Amount: 2 tablets, Disp: 60 tablet, Rfl: 0    ibuprofen (MOTRIN) 400 mg tablet, Take 1 tablet (400 mg total) by mouth every 8 (eight) hours as needed for moderate pain for up to 30 days, Disp: 30 tablet, Rfl: 0    loratadine (CLARITIN) 10 mg tablet, Take 1 tablet (10 mg total) by mouth daily for 90 days, Disp: 90 tablet, Rfl: 0    metoprolol tartrate (LOPRESSOR) 50 mg tablet, Take 1 tablet (50 mg total) by mouth 2 (two) times a day for 90 days, Disp: 180 tablet, Rfl: 1    quinapril (ACCUPRIL) 40 MG tablet, Take 1 tablet (40 mg total) by mouth daily for 90 days, Disp: 90 tablet, Rfl: 1    Health Maintenance     Health Maintenance   Topic Date Due    Hepatitis C Screening  1955    COLONOSCOPY  1955    Depression Screening PHQ-9  05/07/1965    DTaP,Tdap,and Td Vaccines (2 - Td) 05/26/2016    HIV SCREENING  06/18/2017    MAMMOGRAM  06/15/2019    PAP SMEAR  01/13/2020    INFLUENZA VACCINE  Completed     Immunization History   Administered Date(s) Administered    Influenza Quadrivalent, 6-35 Months IM 12/05/2016, 11/17/2017    Influenza TIV (IM) 01/15/2014, 11/13/2014, 10/26/2015    Tdap 04/28/2016    Zoster 10/26/2015       Citlaly Oteroum    3/5/2018 9:09 AM

## 2018-04-06 DIAGNOSIS — F11.90 CHRONIC, CONTINUOUS USE OF OPIOIDS: ICD-10-CM

## 2018-04-06 RX ORDER — HYDROCODONE BITARTRATE AND ACETAMINOPHEN 5; 325 MG/1; MG/1
1 TABLET ORAL 2 TIMES DAILY PRN
Qty: 60 TABLET | Refills: 0 | Status: SHIPPED | OUTPATIENT
Start: 2018-04-07 | End: 2018-05-07 | Stop reason: SDUPTHER

## 2018-05-07 DIAGNOSIS — F11.90 CHRONIC, CONTINUOUS USE OF OPIOIDS: ICD-10-CM

## 2018-05-07 RX ORDER — HYDROCODONE BITARTRATE AND ACETAMINOPHEN 5; 325 MG/1; MG/1
TABLET ORAL
Qty: 60 TABLET | Refills: 0 | Status: SHIPPED | OUTPATIENT
Start: 2018-05-07 | End: 2018-06-08 | Stop reason: SDUPTHER

## 2018-05-07 RX ORDER — HYDROCODONE BITARTRATE AND ACETAMINOPHEN 5; 325 MG/1; MG/1
1 TABLET ORAL EVERY 12 HOURS PRN
COMMUNITY
Start: 2012-02-23 | End: 2018-05-07 | Stop reason: SDUPTHER

## 2018-05-07 RX ORDER — HYDROCODONE BITARTRATE AND ACETAMINOPHEN 5; 325 MG/1; MG/1
1 TABLET ORAL 2 TIMES DAILY PRN
Qty: 60 TABLET | Refills: 0 | Status: CANCELLED | OUTPATIENT
Start: 2018-05-07 | End: 2018-06-06

## 2018-06-08 ENCOUNTER — TELEPHONE (OUTPATIENT)
Dept: INTERNAL MEDICINE CLINIC | Facility: CLINIC | Age: 63
End: 2018-06-08

## 2018-06-08 DIAGNOSIS — F11.90 CHRONIC, CONTINUOUS USE OF OPIOIDS: ICD-10-CM

## 2018-06-08 RX ORDER — HYDROCODONE BITARTRATE AND ACETAMINOPHEN 5; 325 MG/1; MG/1
TABLET ORAL
Qty: 60 TABLET | Refills: 0 | Status: SHIPPED | OUTPATIENT
Start: 2018-06-08 | End: 2018-07-06 | Stop reason: SDUPTHER

## 2018-06-08 NOTE — TELEPHONE ENCOUNTER
PT CALLED FOR REFILL        Medication   HYDROcodone-acetaminophen (NORCO) 5-325 mg per tablet [[id number]]   HYDROcodone-acetaminophen (NORCO) 5-325 mg per tablet [[id number]]   Order Details   Dose, Route, Frequency: As Directed Note to Pharmacy:   PDMP checked      Dispense Quantity:  60 tablet Refills:  0 Fills remaining:  --           Sig: One q 12hrs prn pain

## 2018-06-11 DIAGNOSIS — F11.90 CHRONIC, CONTINUOUS USE OF OPIOIDS: ICD-10-CM

## 2018-06-11 RX ORDER — HYDROCODONE BITARTRATE AND ACETAMINOPHEN 5; 325 MG/1; MG/1
TABLET ORAL
Qty: 60 TABLET | Refills: 0 | Status: CANCELLED | OUTPATIENT
Start: 2018-06-11

## 2018-06-11 NOTE — TELEPHONE ENCOUNTER
THIS RX WAS SENT ELECTRONICALLY ON 6/8/18   I CONFIRMED WITH THE PHARMACIST AT [facility] AND I MADE PT  AWARE

## 2018-06-15 ENCOUNTER — TRANSCRIBE ORDERS (OUTPATIENT)
Dept: RADIOLOGY | Facility: HOSPITAL | Age: 63
End: 2018-06-15

## 2018-06-15 ENCOUNTER — HOSPITAL ENCOUNTER (OUTPATIENT)
Dept: RADIOLOGY | Facility: HOSPITAL | Age: 63
Discharge: HOME | End: 2018-06-15
Payer: COMMERCIAL

## 2018-06-15 DIAGNOSIS — L40.50 ARTHROPATHIC PSORIASIS (HCC): ICD-10-CM

## 2018-06-15 PROCEDURE — 73130 X-RAY EXAM OF HAND: CPT

## 2018-06-26 ENCOUNTER — HOSPITAL ENCOUNTER (OUTPATIENT)
Dept: RADIOLOGY | Facility: HOSPITAL | Age: 63
Discharge: HOME | End: 2018-06-26
Payer: COMMERCIAL

## 2018-06-26 DIAGNOSIS — Z12.31 ENCOUNTER FOR SCREENING MAMMOGRAM FOR MALIGNANT NEOPLASM OF BREAST: ICD-10-CM

## 2018-06-26 PROCEDURE — 77067 SCR MAMMO BI INCL CAD: CPT

## 2018-07-06 ENCOUNTER — TELEPHONE (OUTPATIENT)
Dept: INTERNAL MEDICINE CLINIC | Facility: CLINIC | Age: 63
End: 2018-07-06

## 2018-07-06 DIAGNOSIS — F11.90 CHRONIC, CONTINUOUS USE OF OPIOIDS: ICD-10-CM

## 2018-07-06 RX ORDER — HYDROCODONE BITARTRATE AND ACETAMINOPHEN 5; 325 MG/1; MG/1
TABLET ORAL
Qty: 60 TABLET | Refills: 0 | Status: SHIPPED | OUTPATIENT
Start: 2018-07-08 | End: 2018-08-07 | Stop reason: SDUPTHER

## 2018-07-06 RX ORDER — HYDROCODONE BITARTRATE AND ACETAMINOPHEN 5; 325 MG/1; MG/1
TABLET ORAL
Qty: 60 TABLET | Refills: 0 | Status: CANCELLED | OUTPATIENT
Start: 2018-07-06

## 2018-07-06 NOTE — TELEPHONE ENCOUNTER
PT CALLED FOR REFILL- PLEASE SEND ELECTRONICALLY IF POSSIBLE      HYDROcodone-acetaminophen (NORCO) 5-325 mg per tablet

## 2018-07-06 NOTE — TELEPHONE ENCOUNTER
[facility] IS NOT OPEN ON SUNDAYS SO I GAVE THE PHARMACIST VERBAL ORDER THAT PT   CAN  ON SAT  7/7/18

## 2018-07-24 ENCOUNTER — OFFICE VISIT (OUTPATIENT)
Dept: SLEEP CENTER | Facility: CLINIC | Age: 63
End: 2018-07-24
Payer: COMMERCIAL

## 2018-07-24 VITALS
BODY MASS INDEX: 35.19 KG/M2 | WEIGHT: 198.6 LBS | DIASTOLIC BLOOD PRESSURE: 88 MMHG | HEIGHT: 63 IN | HEART RATE: 88 BPM | SYSTOLIC BLOOD PRESSURE: 134 MMHG

## 2018-07-24 DIAGNOSIS — G47.33 OSA AND COPD OVERLAP SYNDROME (HCC): ICD-10-CM

## 2018-07-24 DIAGNOSIS — J44.9 OSA AND COPD OVERLAP SYNDROME (HCC): ICD-10-CM

## 2018-07-24 PROCEDURE — 99214 OFFICE O/P EST MOD 30 MIN: CPT | Performed by: INTERNAL MEDICINE

## 2018-07-24 NOTE — PROGRESS NOTES
Progress Note - Sleep Center   Cheryl Chase GENE:[birth year] MRN: [id number]      Reason for Visit:  70 y  o female here for annual follow-up    Assessment:  Doing well on current therapy of 6 cm for mild LUIZA (AHI = 5)  Her allergic rhinitis has improved as well  She no longer requires fluticasone nasal spray  Plan:  Continue same  I encouraged her to continue her efforts at weight loss and increased exercise  Follow up: One year    History of Present Illness:  History of LUIZA on PAP therapy  Fully compliant and deriving benefit  Review of Systems      Genitourinary need to urinate more than twice a night   Cardiology ankle/leg swelling   Gastrointestinal none   Neurology none   Constitutional none   Integumentary rash or dry skin   Psychiatry none   Musculoskeletal none   Pulmonary none   ENT none   Endocrine none   Hematological none         Historical Information    Past Medical History:   Past Medical History:   Diagnosis Date    Chronic ankle pain     Unspec laterality, Last assessed - 6/22/16    Chronic hepatitis C (HCC)     Complete left bundle branch block (LBBB)     COPD (chronic obstructive pulmonary disease) (HCC)     Eczema     B/L elbows, start Hydrocortisone;  Last assessed - 8/5/15    HLD (hyperlipidemia)     Hypertension     Osteoarthritis, knee     Rectal polyp     Sleep apnea          Past Surgical History:   Past Surgical History:   Procedure Laterality Date    COLONOSCOPY         Social History:   Social History     Social History    Marital status: Single     Spouse name: N/A    Number of children: N/A    Years of education: N/A     Social History Main Topics    Smoking status: Former Smoker     Quit date: 2/25/1999    Smokeless tobacco: Never Used    Alcohol use No    Drug use: No    Sexual activity: Yes     Other Topics Concern    None     Social History Narrative    Home environment-safe               Family History:   Family History   Problem Relation Age of Onset    Heart disease Mother         Pacemaker    Hypertension Mother     Heart disease Father     Heart disease Sister     Hypertension Sister     Alcohol abuse Brother     Heart disease Brother     Cirrhosis Brother         Liver     Hypertension Brother        Medications/Allergies:      Current Outpatient Prescriptions:     fluticasone (FLONASE) 50 mcg/act nasal spray, 2 sprays into each nostril daily, Disp: 16 g, Rfl: 0    HYDROcodone-acetaminophen (NORCO) 5-325 mg per tablet, One q 12hrs prn pain, Disp: 60 tablet, Rfl: 0    amLODIPine (NORVASC) 5 mg tablet, Take 1 tablet (5 mg total) by mouth 2 (two) times a day for 90 days, Disp: 180 tablet, Rfl: 1    aspirin 81 MG tablet, Take 1 tablet (81 mg total) by mouth daily for 90 days, Disp: 90 tablet, Rfl: 1    atorvastatin (LIPITOR) 40 mg tablet, Take 1 tablet (40 mg total) by mouth daily for 90 days, Disp: 90 tablet, Rfl: 1    ibuprofen (MOTRIN) 400 mg tablet, Take 1 tablet (400 mg total) by mouth every 8 (eight) hours as needed for moderate pain for up to 30 days, Disp: 30 tablet, Rfl: 0    loratadine (CLARITIN) 10 mg tablet, Take 1 tablet (10 mg total) by mouth daily for 90 days, Disp: 90 tablet, Rfl: 0    metoprolol tartrate (LOPRESSOR) 50 mg tablet, Take 1 tablet (50 mg total) by mouth 2 (two) times a day for 90 days, Disp: 180 tablet, Rfl: 1    quinapril (ACCUPRIL) 40 MG tablet, Take 1 tablet (40 mg total) by mouth daily for 90 days, Disp: 90 tablet, Rfl: 1          Objective      Vital Signs:   Vitals:    07/24/18 0800   BP: 134/88   Pulse: 88     [place] Sleepiness Scale: Total score: 3        Physical Exam:    General: Alert, appropriate, cooperative, overweight    Head: NC/AT    Skin: Warm, dry    Neuro: No motor abnormalities, cranial nerves appear intact    Extremity: No clubbing, cyanosis    PAP setting:  As above  DME Provider: [employer]    Counseling / Coordination of Care  Total clinic time spent today 15 minutes  Greater than 50% of total time was spent with the patient and / or family counseling and / or coordination of care  A description of the counseling / coordination of care: Discussed equipment and response to treatment  TRUNG Field    Board Certified Sleep Specialist

## 2018-08-07 DIAGNOSIS — F11.90 CHRONIC, CONTINUOUS USE OF OPIOIDS: ICD-10-CM

## 2018-08-07 DIAGNOSIS — G47.33 OSA (OBSTRUCTIVE SLEEP APNEA): Primary | ICD-10-CM

## 2018-08-08 RX ORDER — HYDROCODONE BITARTRATE AND ACETAMINOPHEN 5; 325 MG/1; MG/1
TABLET ORAL
Qty: 60 TABLET | Refills: 0 | Status: SHIPPED | OUTPATIENT
Start: 2018-08-08 | End: 2018-09-07 | Stop reason: SDUPTHER

## 2018-09-07 DIAGNOSIS — F11.90 CHRONIC, CONTINUOUS USE OF OPIOIDS: ICD-10-CM

## 2018-09-07 DIAGNOSIS — L40.50 PSORIATIC ARTHRITIS (HCC): Primary | ICD-10-CM

## 2018-09-07 DIAGNOSIS — L40.50 PSORIATIC ARTHRITIS (HCC): ICD-10-CM

## 2018-09-07 RX ORDER — HYDROCODONE BITARTRATE AND ACETAMINOPHEN 5; 325 MG/1; MG/1
TABLET ORAL
Qty: 60 TABLET | Refills: 0 | Status: SHIPPED | OUTPATIENT
Start: 2018-09-07 | End: 2018-09-19 | Stop reason: SDUPTHER

## 2018-09-07 RX ORDER — HYDROCODONE BITARTRATE AND ACETAMINOPHEN 5; 325 MG/1; MG/1
TABLET ORAL
Qty: 60 TABLET | Refills: 0 | Status: CANCELLED | OUTPATIENT
Start: 2018-09-07

## 2018-09-07 RX ORDER — HYDROCODONE BITARTRATE AND ACETAMINOPHEN 5; 325 MG/1; MG/1
TABLET ORAL
Qty: 60 TABLET | Refills: 0 | Status: SHIPPED | OUTPATIENT
Start: 2018-09-07 | End: 2018-09-07 | Stop reason: SDUPTHER

## 2018-09-07 NOTE — TELEPHONE ENCOUNTER
SPOKE TO PT  ADVISED HER TO HAVE [facility] CALL [facility] AND HAVE THE SCRIPT TRANSFERRED OVER      THIS IS FOR HER [place] SCRIPT THAT WAS SENT TODAY

## 2018-09-07 NOTE — TELEPHONE ENCOUNTER
PT  CALLED BACK AND SAID [facility] DOES NOT HAVE ANY [address] RIGHT NOW AND THEY CANNOT FORWARD THE SCRIPT TO ANOTHER PHARMACY   PT  WOULD LIKE IT SENT TO [id number] [employer]

## 2018-09-19 DIAGNOSIS — I10 ESSENTIAL HYPERTENSION: ICD-10-CM

## 2018-09-19 DIAGNOSIS — I50.32 CHRONIC DIASTOLIC CONGESTIVE HEART FAILURE (HCC): ICD-10-CM

## 2018-09-19 RX ORDER — HYDROCODONE BITARTRATE AND ACETAMINOPHEN 5; 325 MG/1; MG/1
TABLET ORAL
Qty: 60 TABLET | Refills: 0 | Status: SHIPPED | OUTPATIENT
Start: 2018-09-19 | End: 2018-10-18 | Stop reason: SDUPTHER

## 2018-09-19 NOTE — TELEPHONE ENCOUNTER
DR Suhail Dee THIS SCRIPT WAS NOT FILLED EARLIER  CAN YOU PLEASE PRINT   PT  IS NOW REQUESTING TO PICK IT UP IN THE OFFICE

## 2018-09-19 NOTE — TELEPHONE ENCOUNTER
CAN YOU PLEASE REFILL THIS SCRIPT TO [facility] [address]  I CALLED AND CANCELLED BOTH SCRIPTS AT [facility] AND [facility]

## 2018-09-19 NOTE — TELEPHONE ENCOUNTER
Oralia Jamison called saying [facility] doesn't have any Hydrocodone either   They would [facility] [address]  Please let patient know when this is done

## 2018-09-24 RX ORDER — QUINAPRIL 40 MG/1
40 TABLET ORAL DAILY
Qty: 90 TABLET | Refills: 0 | Status: SHIPPED | OUTPATIENT
Start: 2018-09-24 | End: 2018-09-26 | Stop reason: SDUPTHER

## 2018-09-24 RX ORDER — ATORVASTATIN CALCIUM 40 MG/1
40 TABLET, FILM COATED ORAL DAILY
Qty: 90 TABLET | Refills: 0 | Status: SHIPPED | OUTPATIENT
Start: 2018-09-24 | End: 2018-09-26 | Stop reason: SDUPTHER

## 2018-09-26 DIAGNOSIS — I10 ESSENTIAL HYPERTENSION: ICD-10-CM

## 2018-09-26 DIAGNOSIS — I50.32 CHRONIC DIASTOLIC CONGESTIVE HEART FAILURE (HCC): ICD-10-CM

## 2018-09-26 RX ORDER — ATORVASTATIN CALCIUM 40 MG/1
TABLET, FILM COATED ORAL
Qty: 90 TABLET | Refills: 1 | Status: SHIPPED | OUTPATIENT
Start: 2018-09-26 | End: 2019-08-02 | Stop reason: SDUPTHER

## 2018-09-26 RX ORDER — QUINAPRIL 40 MG/1
TABLET ORAL
Qty: 90 TABLET | Refills: 1 | Status: SHIPPED | OUTPATIENT
Start: 2018-09-26 | End: 2019-08-02 | Stop reason: SDUPTHER

## 2018-10-18 DIAGNOSIS — L40.50 PSORIATIC ARTHRITIS (HCC): ICD-10-CM

## 2018-10-18 NOTE — TELEPHONE ENCOUNTER
Patient is requesting a refill  Also requested for the medication to be sent to [facility] instead of her coming to pick it up here?

## 2018-10-19 RX ORDER — HYDROCODONE BITARTRATE AND ACETAMINOPHEN 5; 325 MG/1; MG/1
TABLET ORAL
Qty: 60 TABLET | Refills: 0 | Status: SHIPPED | OUTPATIENT
Start: 2018-10-19 | End: 2018-11-16 | Stop reason: SDUPTHER

## 2018-10-19 NOTE — TELEPHONE ENCOUNTER
PT  MADE AWARE SCRIPT READY, IT WOULD NOT ALLOW US TO SEND ELECTRONICALLY   PT  OK AND WILL COME HERE TO P/U

## 2018-10-23 ENCOUNTER — IMMUNIZATION (OUTPATIENT)
Dept: INTERNAL MEDICINE CLINIC | Facility: CLINIC | Age: 63
End: 2018-10-23
Payer: COMMERCIAL

## 2018-10-23 DIAGNOSIS — Z23 NEED FOR INFLUENZA VACCINATION: Primary | ICD-10-CM

## 2018-10-23 PROCEDURE — G0008 ADMIN INFLUENZA VIRUS VAC: HCPCS | Performed by: INTERNAL MEDICINE

## 2018-10-23 PROCEDURE — 90662 IIV NO PRSV INCREASED AG IM: CPT | Performed by: INTERNAL MEDICINE

## 2018-10-23 NOTE — PROGRESS NOTES
PT  CAME INTO THE OFFICE FOR A NURSE VISIT FOR FLU VACCINE   FLUZONE HIGH DOSE GIVEN IN RIGHT DELTOID PER PROTOCOL

## 2018-11-16 DIAGNOSIS — L40.50 PSORIATIC ARTHRITIS (HCC): ICD-10-CM

## 2018-11-16 NOTE — TELEPHONE ENCOUNTER
Pt called to request her refill that is due on Monday  She is aware that it will not be refilled until Monday

## 2018-11-19 DIAGNOSIS — Z02.83 ENCOUNTER FOR DRUG SCREENING: Primary | ICD-10-CM

## 2018-11-19 RX ORDER — HYDROCODONE BITARTRATE AND ACETAMINOPHEN 5; 325 MG/1; MG/1
TABLET ORAL
Qty: 60 TABLET | Refills: 0 | Status: SHIPPED | OUTPATIENT
Start: 2018-11-19 | End: 2018-12-19 | Stop reason: SDUPTHER

## 2018-11-19 NOTE — TELEPHONE ENCOUNTER
PT  MADE AWARE SCRIPT READY FOR P/U IN THE CLINIC  WILL ALSO DO NEW NARCOTICS CONTRACT AND UDS   PT  FINE WITH THAT

## 2018-11-21 ENCOUNTER — APPOINTMENT (OUTPATIENT)
Dept: LAB | Facility: CLINIC | Age: 63
End: 2018-11-21
Payer: COMMERCIAL

## 2018-11-21 PROCEDURE — 80307 DRUG TEST PRSMV CHEM ANLYZR: CPT

## 2018-11-22 LAB — OXYCODONE+OXYMORPHONE UR QL SCN: NEGATIVE NG/ML

## 2018-11-27 LAB
AMPHETAMINES UR QL SCN: NEGATIVE NG/ML
BARBITURATES UR QL SCN: NEGATIVE NG/ML
BENZODIAZ UR QL SCN: NEGATIVE NG/ML
BZE UR QL: NEGATIVE NG/ML
CANNABINOIDS UR QL SCN: NEGATIVE NG/ML
METHADONE UR QL SCN: NEGATIVE NG/ML
OPIATES UR QL: NEGATIVE
PCP UR QL: NEGATIVE NG/ML
PROPOXYPH UR QL: NEGATIVE NG/ML

## 2018-11-28 DIAGNOSIS — Z02.83 ENCOUNTER FOR DRUG SCREENING: Primary | ICD-10-CM

## 2018-12-19 ENCOUNTER — PATIENT OUTREACH (OUTPATIENT)
Dept: OTHER | Facility: HOSPITAL | Age: 63
End: 2018-12-19

## 2018-12-19 ENCOUNTER — OFFICE VISIT (OUTPATIENT)
Dept: SLEEP CENTER | Facility: CLINIC | Age: 63
End: 2018-12-19
Payer: COMMERCIAL

## 2018-12-19 VITALS
BODY MASS INDEX: 36.32 KG/M2 | DIASTOLIC BLOOD PRESSURE: 82 MMHG | HEART RATE: 72 BPM | HEIGHT: 63 IN | SYSTOLIC BLOOD PRESSURE: 130 MMHG | WEIGHT: 205 LBS

## 2018-12-19 DIAGNOSIS — G47.33 OSA (OBSTRUCTIVE SLEEP APNEA): Primary | ICD-10-CM

## 2018-12-19 DIAGNOSIS — L40.50 PSORIATIC ARTHRITIS (HCC): ICD-10-CM

## 2018-12-19 PROCEDURE — 99214 OFFICE O/P EST MOD 30 MIN: CPT | Performed by: INTERNAL MEDICINE

## 2018-12-19 RX ORDER — HYDROCODONE BITARTRATE AND ACETAMINOPHEN 5; 325 MG/1; MG/1
TABLET ORAL
Qty: 60 TABLET | Refills: 0 | Status: SHIPPED | OUTPATIENT
Start: 2018-12-19 | End: 2019-01-18 | Stop reason: SDUPTHER

## 2018-12-19 RX ORDER — HYDROCODONE BITARTRATE AND ACETAMINOPHEN 5; 325 MG/1; MG/1
TABLET ORAL
Qty: 60 TABLET | Refills: 0 | Status: SHIPPED | OUTPATIENT
Start: 2018-12-19 | End: 2018-12-19 | Stop reason: SDUPTHER

## 2018-12-19 NOTE — PROGRESS NOTES
Progress Note - Sleep Center   Inell Button HCK:[birth year] MRN: [id number]      Reason for Visit:  70 y  o female here for PAP compliance check    Assessment:  Having good results with new PAP device  Sleep quality is improved  Compliance data show utilization for greater than 70% of nights, for greater than or equal to 4 hours per night  The patient has had significant weight gain and lower extremity edema consistent with heart failure  I discussed the case with her primary physician in [address] [place] [address], Dr Isabelle Perez  Plan:  Adequate compliance    Follow up: One year    History of Present Illness:  History of LUIZA on PAP therapy  Difficulty with compliance  Review of Systems      Genitourinary need to urinate more than twice a night   Cardiology ankle/leg swelling   Gastrointestinal none   Neurology none   Constitutional weight change   Integumentary none   Psychiatry none   Musculoskeletal joint pain, muscle aches and back pain   Pulmonary none   ENT none   Endocrine frequent urination   Hematological none         I have reviewed and updated the review of systems as necessary    Historical Information    Past Medical History:   Past Medical History:   Diagnosis Date    Chronic ankle pain     Unspec laterality, Last assessed - 6/22/16    Chronic hepatitis C (HCC)     Complete left bundle branch block (LBBB)     COPD (chronic obstructive pulmonary disease) (HCC)     Eczema     B/L elbows, start Hydrocortisone;  Last assessed - 8/5/15    HLD (hyperlipidemia)     Hypertension     Osteoarthritis, knee     Rectal polyp     Sleep apnea          Past Surgical History:   Past Surgical History:   Procedure Laterality Date    COLONOSCOPY               Family History:   Family History   Problem Relation Age of Onset    Heart disease Mother         Pacemaker    Hypertension Mother     Heart disease Father     Heart disease Sister     Hypertension Sister     Alcohol abuse Brother     Heart disease Brother     Cirrhosis Brother         Liver     Hypertension Brother        Medications/Allergies:      Current Outpatient Prescriptions:     amLODIPine (NORVASC) 5 mg tablet, Take 1 tablet (5 mg total) by mouth 2 (two) times a day for 90 days, Disp: 180 tablet, Rfl: 1    aspirin 81 MG tablet, Take 1 tablet (81 mg total) by mouth daily for 90 days, Disp: 90 tablet, Rfl: 1    atorvastatin (LIPITOR) 40 mg tablet, TAKE (1) TABLET DAILY (FOR CHOLESTEROL), Disp: 90 tablet, Rfl: 1    fluticasone (FLONASE) 50 mcg/act nasal spray, 2 sprays into each nostril daily, Disp: 16 g, Rfl: 0    HYDROcodone-acetaminophen (NORCO) 5-325 mg per tablet, One q 12hrs prn pain, Disp: 60 tablet, Rfl: 0    ibuprofen (MOTRIN) 400 mg tablet, Take 1 tablet (400 mg total) by mouth every 8 (eight) hours as needed for moderate pain for up to 30 days, Disp: 30 tablet, Rfl: 0    metoprolol tartrate (LOPRESSOR) 50 mg tablet, Take 1 tablet (50 mg total) by mouth 2 (two) times a day for 90 days, Disp: 180 tablet, Rfl: 1    quinapril (ACCUPRIL) 40 MG tablet, TAKE (1) TABLET DAILY, Disp: 90 tablet, Rfl: 1    loratadine (CLARITIN) 10 mg tablet, Take 1 tablet (10 mg total) by mouth daily for 90 days, Disp: 90 tablet, Rfl: 0      Objective    Vital Signs:   Vitals:    18 1100   BP: 130/82   Pulse: 72     [place] Sleepiness Scale: Total score: 3        Physical Exam:    General: Alert, appropriate, cooperative, overweight    Head: NC/AT    Skin: Warm, dry    Neuro: No motor abnormalities, cranial nerves appear intact    Extremity: No clubbing, cyanosis    PAP settin cm  DME Provider: [employer]  Test results:  Mild LUIZA (AHI = 5)    Counseling / Coordination of Care  Total clinic time spent today 20 min  A description of the counseling / coordination of care: Discussed means to improve compliance  TRUNG Martin    Board Certified Sleep Specialist

## 2019-01-18 DIAGNOSIS — L40.50 PSORIATIC ARTHRITIS (HCC): ICD-10-CM

## 2019-01-18 RX ORDER — HYDROCODONE BITARTRATE AND ACETAMINOPHEN 5; 325 MG/1; MG/1
TABLET ORAL
Qty: 60 TABLET | Refills: 0 | Status: SHIPPED | OUTPATIENT
Start: 2019-01-18 | End: 2019-02-18 | Stop reason: SDUPTHER

## 2019-01-25 RX ORDER — AMLODIPINE BESYLATE 5 MG/1
5 TABLET ORAL 2 TIMES DAILY
Refills: 0 | COMMUNITY
Start: 2019-01-03 | End: 2019-08-16

## 2019-02-01 ENCOUNTER — OFFICE VISIT (OUTPATIENT)
Dept: INTERNAL MEDICINE CLINIC | Facility: CLINIC | Age: 64
End: 2019-02-01

## 2019-02-01 VITALS
BODY MASS INDEX: 35.94 KG/M2 | TEMPERATURE: 97.8 F | DIASTOLIC BLOOD PRESSURE: 84 MMHG | SYSTOLIC BLOOD PRESSURE: 122 MMHG | HEIGHT: 63 IN | HEART RATE: 72 BPM | WEIGHT: 202.82 LBS

## 2019-02-01 DIAGNOSIS — I10 ESSENTIAL HYPERTENSION: ICD-10-CM

## 2019-02-01 DIAGNOSIS — Z12.2 ENCOUNTER FOR SCREENING FOR LUNG CANCER: ICD-10-CM

## 2019-02-01 DIAGNOSIS — M79.89 SWELLING OF LOWER EXTREMITY: Primary | ICD-10-CM

## 2019-02-01 DIAGNOSIS — Z23 NEED FOR PNEUMOCOCCAL VACCINATION: ICD-10-CM

## 2019-02-01 PROCEDURE — G0009 ADMIN PNEUMOCOCCAL VACCINE: HCPCS | Performed by: INTERNAL MEDICINE

## 2019-02-01 PROCEDURE — 90670 PCV13 VACCINE IM: CPT | Performed by: INTERNAL MEDICINE

## 2019-02-01 PROCEDURE — 99213 OFFICE O/P EST LOW 20 MIN: CPT | Performed by: INTERNAL MEDICINE

## 2019-02-01 PROCEDURE — 3079F DIAST BP 80-89 MM HG: CPT | Performed by: INTERNAL MEDICINE

## 2019-02-01 PROCEDURE — 3074F SYST BP LT 130 MM HG: CPT | Performed by: INTERNAL MEDICINE

## 2019-02-01 NOTE — ASSESSMENT & PLAN NOTE
History of hypertension  Patient currently managed on amlodipine 5 mg twice daily, quinapril 40 mg daily, metoprolol tartrate 50 mg twice daily  Blood pressure well controlled today  Lower extremities more likely associated with venous insufficiency as compared to patient's amlodipine regimen as she reports that her lower extremity swelling is intermittent and improves when she wakes up in the morning but is worse at the end of the day

## 2019-02-01 NOTE — PROGRESS NOTES
ASSESSMENT/PLAN:  Problem List Items Addressed This Visit        Cardiovascular and Mediastinum    Hypertension     History of hypertension  Patient currently managed on amlodipine 5 mg twice daily, quinapril 40 mg daily, metoprolol tartrate 50 mg twice daily  Blood pressure well controlled today  Lower extremities more likely associated with venous insufficiency as compared to patient's amlodipine regimen as she reports that her lower extremity swelling is intermittent and improves when she wakes up in the morning but is worse at the end of the day  Other Visit Diagnoses     Swelling of lower extremity    -  Primary    Likely related to venous insufficiency  Continue with compression stockings as needed  Watch salt intake  Need for pneumococcal vaccination        Relevant Orders    PNEUMOCOCCAL CONJUGATE VACCINE 13-VALENT GREATER THAN 6 MONTHS (Completed)    Encounter for screening for lung cancer        Relevant Orders    CT lung screening program          Health Maintenance:  Influenza Vaccine:   Up-to-date-October 23, 2018  Pneumonia Vaccine: To receive PCV 13 today  TDaP Vaccine:   Up-to-date-April 8, 2016  Colonoscopy:   Up-to-date-April 10, 2014 with 10 year follow-up  CT lung cancer screen:  Former smoker - will send for screening CT scan  Mammogram:   BI-RADS 2 on June 26, 2018  Osteoporosis screening:  Patient will require DEXA discussion at her next visit with PCP    Chronic Medical Conditions Monitoring:  Chronic diastolic heart failure-echocardiogram completed 2016 showing mild hypokinesis of the mid anteroseptal, basal inferior, basal mid inferior lateral walls with grade 1 diastolic dysfunction 87% ejection fraction    Schedule a follow-up appointment in 6-8months as needed       CHIEF COMPLAINT:   Lower extremity swelling    HISTORY OF PRESENT ILLNESS:  Sonya Ortiz is a 70 y o  with PMH significant for hypertension, former tobacco use, hyperlipidemia, heart failure with preserved ejection fraction, obstructive sleep apnea on CPAP  Aria Chappell is in clinic today for complaints of bilateral lower extremity right lower extremity worse than the right  Patient states that this swelling in her lower extremity is worse at the end of the day compared to when she wakes up in the morning  She reports that the swelling is associated with intermittent pains of the lower extremity  She does report that this swelling is better today compared to previous days  She denies any redness or tenderness of her lower extremity  She denies any history of blood clots  This has been worked up multiple times in the past for negative DVTs with ultrasounds  She denies any significant shortness of breath or orthopnea associated with lower extremity swelling  She does report intermittent increased use of salt in her diet which is somewhat associated with increased lower extremity swelling  This morning she denies any cough, fever, chills, nausea, vomiting, shortness of breath, abdominal pain, diarrhea  Review of Systems   Constitutional: Negative for activity change, appetite change, chills, diaphoresis, fatigue and fever  Eyes: Negative for discharge and itching  Respiratory: Negative for cough, chest tightness, shortness of breath and wheezing  Cardiovascular: Negative for chest pain, palpitations and leg swelling  Gastrointestinal: Negative for abdominal distention, abdominal pain, constipation, diarrhea, nausea and vomiting  Endocrine: Negative for cold intolerance and heat intolerance  Musculoskeletal: Negative for arthralgias, back pain, gait problem and myalgias  Skin: Negative for color change, pallor, rash and wound  Allergic/Immunologic: Negative for environmental allergies and food allergies  Neurological: Negative for dizziness, weakness, light-headedness and headaches  Hematological: Negative for adenopathy     Psychiatric/Behavioral: Negative for agitation, behavioral problems, confusion, decreased concentration and dysphoric mood  OBJECTIVE:  Vitals:    02/01/19 0804   BP: 122/84   BP Location: Left arm   Patient Position: Sitting   Cuff Size: Standard   Pulse: 72   Temp: 97 8 °F (36 6 °C)   TempSrc: Oral   Weight: 92 kg (202 lb 13 2 oz)   Height: 5' 3" (1 6 m)     Physical Exam   Constitutional: She is oriented to person, place, and time  She appears well-developed and well-nourished  No distress  HENT:   Head: Normocephalic and atraumatic  Eyes: Pupils are equal, round, and reactive to light  Conjunctivae and EOM are normal  Right eye exhibits no discharge  Left eye exhibits no discharge  Neck: Normal range of motion  Neck supple  No JVD present  Cardiovascular: Normal rate, regular rhythm, normal heart sounds and intact distal pulses  Exam reveals no gallop and no friction rub  No murmur heard  Pulmonary/Chest: Effort normal and breath sounds normal  No respiratory distress  She has no wheezes  She has no rales  She exhibits no tenderness  Abdominal: Soft  Bowel sounds are normal  She exhibits no distension  There is no tenderness  Musculoskeletal: Normal range of motion  She exhibits edema  She exhibits no tenderness or deformity  Neurological: She is alert and oriented to person, place, and time  No cranial nerve deficit  Skin: Skin is warm and dry  No rash noted  She is not diaphoretic  No erythema  Psychiatric: She has a normal mood and affect  Her behavior is normal    Vitals reviewed          Current Outpatient Prescriptions:     amLODIPine (NORVASC) 5 mg tablet, Take 5 mg by mouth 2 (two) times a day, Disp: , Rfl: 0    atorvastatin (LIPITOR) 40 mg tablet, TAKE (1) TABLET DAILY (FOR CHOLESTEROL), Disp: 90 tablet, Rfl: 1    fluticasone (FLONASE) 50 mcg/act nasal spray, 2 sprays into each nostril daily, Disp: 16 g, Rfl: 0    HYDROcodone-acetaminophen (NORCO) 5-325 mg per tablet, One q 12hrs prn pain, Disp: 60 tablet, Rfl: 0    quinapril (ACCUPRIL) 40 MG tablet, TAKE (1) TABLET DAILY, Disp: 90 tablet, Rfl: 1    amLODIPine (NORVASC) 5 mg tablet, Take 1 tablet (5 mg total) by mouth 2 (two) times a day for 90 days, Disp: 180 tablet, Rfl: 1    aspirin 81 MG tablet, Take 1 tablet (81 mg total) by mouth daily for 90 days, Disp: 90 tablet, Rfl: 1    ibuprofen (MOTRIN) 400 mg tablet, Take 1 tablet (400 mg total) by mouth every 8 (eight) hours as needed for moderate pain for up to 30 days, Disp: 30 tablet, Rfl: 0    loratadine (CLARITIN) 10 mg tablet, Take 1 tablet (10 mg total) by mouth daily for 90 days, Disp: 90 tablet, Rfl: 0    metoprolol tartrate (LOPRESSOR) 50 mg tablet, Take 1 tablet (50 mg total) by mouth 2 (two) times a day for 90 days, Disp: 180 tablet, Rfl: 1    Past Medical History:   Diagnosis Date    Chronic ankle pain     Unspec laterality, Last assessed - 6/22/16    Chronic hepatitis C (HCC)     Complete left bundle branch block (LBBB)     COPD (chronic obstructive pulmonary disease) (HCC)     Eczema     B/L elbows, start Hydrocortisone; Last assessed - 8/5/15    HLD (hyperlipidemia)     Hypertension     Osteoarthritis, knee     Rectal polyp     Sleep apnea      Past Surgical History:   Procedure Laterality Date    COLONOSCOPY       Social History     Social History    Marital status: Single     Spouse name: N/A    Number of children: N/A    Years of education: N/A     Occupational History    Not on file       Social History Main Topics    Smoking status: Former Smoker     Quit date: 2/25/1999    Smokeless tobacco: Never Used    Alcohol use No    Drug use: No    Sexual activity: Yes     Other Topics Concern    Not on file     Social History Narrative    Home environment-safe             Family History   Problem Relation Age of Onset    Heart disease Mother         Pacemaker    Hypertension Mother     Heart disease Father     Heart disease Sister     Hypertension Sister     Alcohol abuse Brother  Heart disease Brother     Cirrhosis Brother         Liver     Hypertension Brother        ==  MD Abdoul Stephen[address]eva [address] Internal Medicine PGY-3    [facility]  [address] [facility] , [address] [address] [facility] [address] [facility]  Office: [phone]  Fax: [phone]

## 2019-02-01 NOTE — PATIENT INSTRUCTIONS

## 2019-02-18 DIAGNOSIS — L40.50 PSORIATIC ARTHRITIS (HCC): ICD-10-CM

## 2019-02-18 RX ORDER — HYDROCODONE BITARTRATE AND ACETAMINOPHEN 5; 325 MG/1; MG/1
TABLET ORAL
Qty: 60 TABLET | Refills: 0 | Status: SHIPPED | OUTPATIENT
Start: 2019-02-18 | End: 2019-03-22 | Stop reason: SDUPTHER

## 2019-03-18 DIAGNOSIS — L40.50 PSORIATIC ARTHRITIS (HCC): ICD-10-CM

## 2019-03-18 RX ORDER — HYDROCODONE BITARTRATE AND ACETAMINOPHEN 5; 325 MG/1; MG/1
TABLET ORAL
Qty: 60 TABLET | Refills: 0 | Status: CANCELLED | OUTPATIENT
Start: 2019-03-18

## 2019-03-19 DIAGNOSIS — L40.50 PSORIATIC ARTHRITIS (HCC): ICD-10-CM

## 2019-03-19 RX ORDER — HYDROCODONE BITARTRATE AND ACETAMINOPHEN 5; 325 MG/1; MG/1
TABLET ORAL
Qty: 60 TABLET | Refills: 0 | Status: CANCELLED | OUTPATIENT
Start: 2019-03-19

## 2019-03-22 DIAGNOSIS — L40.50 PSORIATIC ARTHRITIS (HCC): ICD-10-CM

## 2019-03-22 RX ORDER — HYDROCODONE BITARTRATE AND ACETAMINOPHEN 5; 325 MG/1; MG/1
TABLET ORAL
Qty: 60 TABLET | Refills: 0 | Status: SHIPPED | OUTPATIENT
Start: 2019-03-24 | End: 2019-04-24 | Stop reason: SDUPTHER

## 2019-04-24 DIAGNOSIS — L40.50 PSORIATIC ARTHRITIS (HCC): ICD-10-CM

## 2019-04-24 RX ORDER — HYDROCODONE BITARTRATE AND ACETAMINOPHEN 5; 325 MG/1; MG/1
TABLET ORAL
Qty: 60 TABLET | Refills: 0 | Status: SHIPPED | OUTPATIENT
Start: 2019-04-24 | End: 2019-05-24 | Stop reason: SDUPTHER

## 2019-05-24 DIAGNOSIS — L40.50 PSORIATIC ARTHRITIS (HCC): ICD-10-CM

## 2019-05-24 RX ORDER — HYDROCODONE BITARTRATE AND ACETAMINOPHEN 5; 325 MG/1; MG/1
TABLET ORAL
Qty: 60 TABLET | Refills: 0 | Status: SHIPPED | OUTPATIENT
Start: 2019-05-24 | End: 2019-06-20 | Stop reason: SDUPTHER

## 2019-06-04 ENCOUNTER — TRANSCRIBE ORDERS (OUTPATIENT)
Dept: ADMINISTRATIVE | Facility: HOSPITAL | Age: 64
End: 2019-06-04

## 2019-06-04 DIAGNOSIS — Z12.39 BREAST SCREENING, UNSPECIFIED: Primary | ICD-10-CM

## 2019-06-20 DIAGNOSIS — L40.50 PSORIATIC ARTHRITIS (HCC): ICD-10-CM

## 2019-06-21 RX ORDER — HYDROCODONE BITARTRATE AND ACETAMINOPHEN 5; 325 MG/1; MG/1
TABLET ORAL
Qty: 60 TABLET | Refills: 0 | Status: SHIPPED | OUTPATIENT
Start: 2019-06-23 | End: 2019-07-22 | Stop reason: SDUPTHER

## 2019-06-27 ENCOUNTER — HOSPITAL ENCOUNTER (OUTPATIENT)
Dept: RADIOLOGY | Facility: HOSPITAL | Age: 64
Discharge: HOME | End: 2019-06-27
Payer: COMMERCIAL

## 2019-06-27 VITALS — WEIGHT: 202 LBS | HEIGHT: 63 IN | BODY MASS INDEX: 35.79 KG/M2

## 2019-06-27 DIAGNOSIS — Z12.39 BREAST SCREENING, UNSPECIFIED: ICD-10-CM

## 2019-06-27 PROCEDURE — 77067 SCR MAMMO BI INCL CAD: CPT

## 2019-07-22 DIAGNOSIS — L40.50 PSORIATIC ARTHRITIS (HCC): ICD-10-CM

## 2019-07-23 RX ORDER — HYDROCODONE BITARTRATE AND ACETAMINOPHEN 5; 325 MG/1; MG/1
TABLET ORAL
Qty: 60 TABLET | Refills: 0 | Status: SHIPPED | OUTPATIENT
Start: 2019-07-23 | End: 2019-08-20 | Stop reason: SDUPTHER

## 2019-07-26 ENCOUNTER — OFFICE VISIT (OUTPATIENT)
Dept: SLEEP CENTER | Facility: CLINIC | Age: 64
End: 2019-07-26
Payer: COMMERCIAL

## 2019-07-26 VITALS
HEIGHT: 63 IN | BODY MASS INDEX: 35.61 KG/M2 | WEIGHT: 201 LBS | DIASTOLIC BLOOD PRESSURE: 76 MMHG | SYSTOLIC BLOOD PRESSURE: 130 MMHG | HEART RATE: 72 BPM

## 2019-07-26 DIAGNOSIS — G47.33 OSA (OBSTRUCTIVE SLEEP APNEA): Primary | ICD-10-CM

## 2019-07-26 PROCEDURE — 99213 OFFICE O/P EST LOW 20 MIN: CPT | Performed by: INTERNAL MEDICINE

## 2019-07-26 NOTE — PROGRESS NOTES
Progress Note - Sleep Center   Rocky Guzman BGW: MRN: [id number]      Reason for Visit:  75 y  o female here for annual follow-up    Assessment:  Doing well on current therapy of CPAP 6 cm for obstructive sleep apnea  Plan:  Continue same    Follow up: One year    History of Present Illness:  History of LUIZA on PAP therapy  Fully compliant and deriving benefit  Review of Systems      Genitourinary none   Cardiology ankle/leg swelling   Gastrointestinal none   Neurology balance problems   Constitutional none   Integumentary rash or dry skin   Psychiatry none   Musculoskeletal none   Pulmonary none   ENT none   Endocrine none   Hematological none         I have reviewed and updated the review of systems as necessary      Historical Information    Past Medical History:   Past Medical History:   Diagnosis Date    Chronic ankle pain     Unspec laterality, Last assessed - 16    Chronic hepatitis C (HCC)     Complete left bundle branch block (LBBB)     COPD (chronic obstructive pulmonary disease) (HCC)     Eczema     B/L elbows, start Hydrocortisone; Last assessed - 8/5/15    HLD (hyperlipidemia)     Hypertension     Osteoarthritis, knee     Rectal polyp     Sleep apnea          Past Surgical History:   Past Surgical History:   Procedure Laterality Date    COLONOSCOPY         Social History:   Social History     Socioeconomic History    Marital status: Single     Spouse name: None    Number of children: None    Years of education: None    Highest education level: None   Occupational History    None   Social Needs    Financial resource strain: None    Food insecurity:     Worry: None     Inability: None    Transportation needs:     Medical: None     Non-medical: None   Tobacco Use    Smoking status: Former Smoker     Last attempt to quit: 1999     Years since quittin 4    Smokeless tobacco: Never Used   Substance and Sexual Activity    Alcohol use: No    Drug use:  No  Sexual activity: Yes   Lifestyle    Physical activity:     Days per week: None     Minutes per session: None    Stress: None   Relationships    Social connections:     Talks on phone: None     Gets together: None     Attends Mandaeism service: None     Active member of club or organization: None     Attends meetings of clubs or organizations: None     Relationship status: None    Intimate partner violence:     Fear of current or ex partner: None     Emotionally abused: None     Physically abused: None     Forced sexual activity: None   Other Topics Concern    None   Social History Narrative    Home environment-safe           Family History:   Family History   Problem Relation Age of Onset    Heart disease Mother         Pacemaker    Hypertension Mother     Heart disease Father     Heart disease Sister     Hypertension Sister     Alcohol abuse Brother     Heart disease Brother     Cirrhosis Brother         Liver     Hypertension Brother     No Known Problems Daughter     No Known Problems Maternal Grandmother     No Known Problems Paternal Grandfather     No Known Problems Daughter     No Known Problems Sister     No Known Problems Maternal Aunt     No Known Problems Cousin        Medications/Allergies:      Current Outpatient Medications:     amLODIPine (NORVASC) 5 mg tablet, Take 5 mg by mouth 2 (two) times a day, Disp: , Rfl: 0    aspirin 81 MG tablet, Take 1 tablet (81 mg total) by mouth daily for 90 days, Disp: 90 tablet, Rfl: 1    atorvastatin (LIPITOR) 40 mg tablet, TAKE (1) TABLET DAILY (FOR CHOLESTEROL), Disp: 90 tablet, Rfl: 1    fluticasone (FLONASE) 50 mcg/act nasal spray, 2 sprays into each nostril daily, Disp: 16 g, Rfl: 0    HYDROcodone-acetaminophen (NORCO) 5-325 mg per tablet, One q 12hrs prn pain, Disp: 60 tablet, Rfl: 0    loratadine (CLARITIN) 10 mg tablet, Take 1 tablet (10 mg total) by mouth daily for 90 days, Disp: 90 tablet, Rfl: 0    metoprolol tartrate (LOPRESSOR) 50 mg tablet, Take 1 tablet (50 mg total) by mouth 2 (two) times a day for 90 days, Disp: 180 tablet, Rfl: 1    quinapril (ACCUPRIL) 40 MG tablet, TAKE (1) TABLET DAILY, Disp: 90 tablet, Rfl: 1    amLODIPine (NORVASC) 5 mg tablet, Take 1 tablet (5 mg total) by mouth 2 (two) times a day for 90 days, Disp: 180 tablet, Rfl: 1    ibuprofen (MOTRIN) 400 mg tablet, Take 1 tablet (400 mg total) by mouth every 8 (eight) hours as needed for moderate pain for up to 30 days, Disp: 30 tablet, Rfl: 0          Objective      Vital Signs:   Vitals:    07/26/19 0800   BP: 130/76   Pulse: 72     [place] Sleepiness Scale: Total score: 3        Physical Exam:    General: Alert, appropriate, cooperative, overweight    Head: NC/AT    Skin: Warm, dry    Neuro: No motor abnormalities, cranial nerves appear intact    Extremity: No clubbing, cyanosis      DME Provider: [facility]        Counseling / Coordination of Care   I have spent 20 minutes with the patient today in which greater than 50% of this time was spent in counseling/coordination of care regarding: equipment and compliance  Board Certified Sleep Specialist    Portions of the record may have been created with voice recognition software  Occasional wrong word or "sound a like" substitutions may have occurred due to the inherent limitations of voice recognition software  Read the chart carefully and recognize, using context, where substitutions have occurred

## 2019-08-02 DIAGNOSIS — I50.32 CHRONIC DIASTOLIC CONGESTIVE HEART FAILURE (HCC): ICD-10-CM

## 2019-08-02 DIAGNOSIS — I10 ESSENTIAL HYPERTENSION: ICD-10-CM

## 2019-08-02 DIAGNOSIS — T78.40XA ALLERGIC: ICD-10-CM

## 2019-08-02 RX ORDER — ATORVASTATIN CALCIUM 40 MG/1
40 TABLET, FILM COATED ORAL DAILY
Qty: 90 TABLET | Refills: 1 | Status: SHIPPED | OUTPATIENT
Start: 2019-08-02 | End: 2020-01-28 | Stop reason: SDUPTHER

## 2019-08-02 RX ORDER — AMLODIPINE BESYLATE 5 MG/1
5 TABLET ORAL 2 TIMES DAILY
Qty: 180 TABLET | Refills: 1 | Status: SHIPPED | OUTPATIENT
Start: 2019-08-02 | End: 2019-08-16

## 2019-08-02 RX ORDER — LORATADINE 10 MG/1
10 TABLET ORAL DAILY
Qty: 90 TABLET | Refills: 0 | Status: SHIPPED | OUTPATIENT
Start: 2019-08-02 | End: 2019-08-16

## 2019-08-02 RX ORDER — METOPROLOL TARTRATE 50 MG/1
50 TABLET, FILM COATED ORAL 2 TIMES DAILY
Qty: 180 TABLET | Refills: 1 | Status: SHIPPED | OUTPATIENT
Start: 2019-08-02 | End: 2020-01-28 | Stop reason: SDUPTHER

## 2019-08-02 RX ORDER — QUINAPRIL 40 MG/1
40 TABLET ORAL DAILY
Qty: 90 TABLET | Refills: 1 | Status: SHIPPED | OUTPATIENT
Start: 2019-08-02 | End: 2020-01-28 | Stop reason: SDUPTHER

## 2019-08-16 ENCOUNTER — OFFICE VISIT (OUTPATIENT)
Dept: INTERNAL MEDICINE CLINIC | Facility: CLINIC | Age: 64
End: 2019-08-16

## 2019-08-16 ENCOUNTER — APPOINTMENT (OUTPATIENT)
Dept: LAB | Facility: CLINIC | Age: 64
End: 2019-08-16
Payer: COMMERCIAL

## 2019-08-16 VITALS
BODY MASS INDEX: 35 KG/M2 | SYSTOLIC BLOOD PRESSURE: 140 MMHG | DIASTOLIC BLOOD PRESSURE: 70 MMHG | HEIGHT: 63 IN | WEIGHT: 197.53 LBS

## 2019-08-16 DIAGNOSIS — R60.0 LOWER EXTREMITY EDEMA: ICD-10-CM

## 2019-08-16 DIAGNOSIS — F11.10: ICD-10-CM

## 2019-08-16 DIAGNOSIS — Z20.5 EXPOSURE TO HEPATITIS C: ICD-10-CM

## 2019-08-16 DIAGNOSIS — I10 ESSENTIAL HYPERTENSION: Primary | ICD-10-CM

## 2019-08-16 DIAGNOSIS — E66.9 OBESITY (BMI 30-39.9): ICD-10-CM

## 2019-08-16 DIAGNOSIS — E78.5 DYSLIPIDEMIA: ICD-10-CM

## 2019-08-16 DIAGNOSIS — Z13.820 OSTEOPOROSIS SCREENING: ICD-10-CM

## 2019-08-16 DIAGNOSIS — I10 ESSENTIAL HYPERTENSION: ICD-10-CM

## 2019-08-16 DIAGNOSIS — G47.33 OBSTRUCTIVE SLEEP APNEA, ADULT: ICD-10-CM

## 2019-08-16 LAB
ALBUMIN SERPL BCP-MCNC: 4.5 G/DL (ref 3.5–5)
ALP SERPL-CCNC: 121 U/L (ref 46–116)
ALT SERPL W P-5'-P-CCNC: 54 U/L (ref 12–78)
ANION GAP SERPL CALCULATED.3IONS-SCNC: 10 MMOL/L (ref 4–13)
AST SERPL W P-5'-P-CCNC: 50 U/L (ref 5–45)
BILIRUB SERPL-MCNC: 0.8 MG/DL (ref 0.2–1)
BUN SERPL-MCNC: 17 MG/DL (ref 5–25)
CALCIUM SERPL-MCNC: 7.5 MG/DL (ref 8.3–10.1)
CHLORIDE SERPL-SCNC: 108 MMOL/L (ref 100–108)
CHOLEST SERPL-MCNC: 155 MG/DL (ref 50–200)
CO2 SERPL-SCNC: 26 MMOL/L (ref 21–32)
CREAT SERPL-MCNC: 0.74 MG/DL (ref 0.6–1.3)
ERYTHROCYTE [DISTWIDTH] IN BLOOD BY AUTOMATED COUNT: 15.1 % (ref 11.6–15.1)
GFR SERPL CREATININE-BSD FRML MDRD: 85 ML/MIN/1.73SQ M
GLUCOSE P FAST SERPL-MCNC: 106 MG/DL (ref 65–99)
HCT VFR BLD AUTO: 39.9 % (ref 34.8–46.1)
HDLC SERPL-MCNC: 44 MG/DL (ref 40–60)
HGB BLD-MCNC: 13 G/DL (ref 11.5–15.4)
LDLC SERPL CALC-MCNC: 88 MG/DL (ref 0–100)
MCH RBC QN AUTO: 29.5 PG (ref 26.8–34.3)
MCHC RBC AUTO-ENTMCNC: 32.6 G/DL (ref 31.4–37.4)
MCV RBC AUTO: 91 FL (ref 82–98)
PLATELET # BLD AUTO: 354 THOUSANDS/UL (ref 149–390)
PMV BLD AUTO: 10.2 FL (ref 8.9–12.7)
POTASSIUM SERPL-SCNC: 2.9 MMOL/L (ref 3.5–5.3)
PROT SERPL-MCNC: 7.6 G/DL (ref 6.4–8.2)
RBC # BLD AUTO: 4.41 MILLION/UL (ref 3.81–5.12)
SODIUM SERPL-SCNC: 144 MMOL/L (ref 136–145)
TRIGL SERPL-MCNC: 115 MG/DL
TSH SERPL DL<=0.05 MIU/L-ACNC: 1.25 UIU/ML (ref 0.36–3.74)
WBC # BLD AUTO: 8.84 THOUSAND/UL (ref 4.31–10.16)

## 2019-08-16 PROCEDURE — 36415 COLL VENOUS BLD VENIPUNCTURE: CPT

## 2019-08-16 PROCEDURE — 3008F BODY MASS INDEX DOCD: CPT | Performed by: INTERNAL MEDICINE

## 2019-08-16 PROCEDURE — 87340 HEPATITIS B SURFACE AG IA: CPT

## 2019-08-16 PROCEDURE — 80061 LIPID PANEL: CPT

## 2019-08-16 PROCEDURE — 1036F TOBACCO NON-USER: CPT | Performed by: INTERNAL MEDICINE

## 2019-08-16 PROCEDURE — 85027 COMPLETE CBC AUTOMATED: CPT

## 2019-08-16 PROCEDURE — 86705 HEP B CORE ANTIBODY IGM: CPT

## 2019-08-16 PROCEDURE — 86803 HEPATITIS C AB TEST: CPT

## 2019-08-16 PROCEDURE — 80053 COMPREHEN METABOLIC PANEL: CPT

## 2019-08-16 PROCEDURE — 84443 ASSAY THYROID STIM HORMONE: CPT

## 2019-08-16 PROCEDURE — 86704 HEP B CORE ANTIBODY TOTAL: CPT

## 2019-08-16 PROCEDURE — 87522 HEPATITIS C REVRS TRNSCRPJ: CPT

## 2019-08-16 PROCEDURE — 99213 OFFICE O/P EST LOW 20 MIN: CPT | Performed by: INTERNAL MEDICINE

## 2019-08-16 PROCEDURE — 4040F PNEUMOC VAC/ADMIN/RCVD: CPT | Performed by: INTERNAL MEDICINE

## 2019-08-16 PROCEDURE — 1160F RVW MEDS BY RX/DR IN RCRD: CPT | Performed by: INTERNAL MEDICINE

## 2019-08-16 PROCEDURE — 3725F SCREEN DEPRESSION PERFORMED: CPT | Performed by: INTERNAL MEDICINE

## 2019-08-16 NOTE — PROGRESS NOTES
Assessment/Plan:     Diagnoses and all orders for this visit:    Essential hypertension  -     Lipid Panel with Direct LDL reflex; Future  -     Comprehensive metabolic panel; Future  -     CBC and Platelet; Future  -     TSH, 3rd generation with Free T4 reflex; Future    Lower extremity edema  -     Lipid Panel with Direct LDL reflex; Future  -     Comprehensive metabolic panel; Future  -     CBC and Platelet; Future  -     TSH, 3rd generation with Free T4 reflex; Future    Dyslipidemia  -     Lipid Panel with Direct LDL reflex; Future  -     Comprehensive metabolic panel; Future  -     CBC and Platelet; Future  -     TSH, 3rd generation with Free T4 reflex; Future    Hydrocodone use disorder, mild, in controlled environment ([facility] [address] )  -     Comprehensive metabolic panel; Future  -     CBC and Platelet; Future    Obstructive sleep apnea, adult  -follows with sleep medicine, 100% compliant with CPAP therapy and tolerating well  Obesity (BMI 30-39 9)  -stable, exercising and walking daily    Osteoporosis screening  -     DXA bone density spine hip and pelvis; Future    Exposure to hepatitis C  -     Chronic Hepatitis Panel; Future  -     Hepatitis C Ab W/Refl To HCV RNA, Qn, PCR; Future   has chronic hep C, will obtain initial antibody testing with reflex to viral RNA quant and referral to GI at that point if positive           Subjective:      Patient ID: Lindsey Davies is a 75 y o  female  57-year-old female presents for routine follow-up  No complaints today  Seen recently with notable lower extremity edema that is symmetric, worse at the end of the day and gravity dependent  Appear she was discontinued off amlodipine  States swelling has improved somewhat today, waxes and wanes and does not bother her that much  She has a history of LUIZA, she is compliant with CPAP therapy 100% and she follows with Sleep Medicine  She denies any shortness of breath, orthopnea, nighttime awakenings    She is tolerating therapy well  She is requesting hepatitis-C testing and therapy if possible being that her  has chronic hepatitis-C that has been untreated and she believes she is also positive by association  She has had no relevant testing recently  She has no abdominal pain, jaundice, decreased appetite  Otherwise she is doing well she has osteoarthritis of the hands and knees but is well managed on the occasional nonsteroidal anti-inflammatory and hydrocodone p r n  Sherre Shylabaudilioer She is walking frequently with her  who is now oxygen dependent  The following portions of the patient's history were reviewed and updated as appropriate: allergies, current medications, past family history, past medical history, past social history, past surgical history and problem list     Review of Systems   Constitutional: Negative for fatigue and fever  Cardiovascular: Positive for leg swelling  Negative for chest pain  Gastrointestinal: Negative  Musculoskeletal: Positive for arthralgias  Negative for gait problem and myalgias  All other systems reviewed and are negative  Objective:      /70 (BP Location: Right arm, Patient Position: Sitting, Cuff Size: Standard)   Ht 5' 3" (1 6 m)   Wt 89 6 kg (197 lb 8 5 oz)   BMI 34 99 kg/m²       Physical Exam:   Vital signs reviewed  General: healthy well appearing overweight female, no acute distress, comfortable and conversant  Head: normocephalic  Eyes: normal conjunctivae, sclera anicteric   Neck: supple  Lungs: clear bilaterally, no labored breathing/accessory muscle use  Heart: regular rate and rhythm without murmur  Abdomen: no distension,soft nontender  Extremities: no cyanosis, trace pitting in the setting of mild chronic lymphedema, warm to touch  Neuro: grossly intact with no obvious focal deficits, awake alert, speech is fluent   Skin: warm, dry, no rash noted        Cara Holstein, D O           Chief Resident, PGY-3  Internal Medicine 8/16/2019 11:07 AM

## 2019-08-17 LAB
HBV CORE AB SER QL: REACTIVE
HBV CORE IGM SER QL: ABNORMAL
HBV SURFACE AG SER QL: ABNORMAL
HCV AB SER QL: ABNORMAL

## 2019-08-20 DIAGNOSIS — L40.50 PSORIATIC ARTHRITIS (HCC): ICD-10-CM

## 2019-08-20 DIAGNOSIS — E87.6 HYPOKALEMIA: Primary | ICD-10-CM

## 2019-08-20 DIAGNOSIS — R76.8 HEPATITIS B CORE ANTIBODY POSITIVE: ICD-10-CM

## 2019-08-20 DIAGNOSIS — B19.20 HEPATITIS C INFECTION: ICD-10-CM

## 2019-08-20 LAB
HCV RNA SERPL NAA+PROBE-ACNC: NORMAL IU/ML
TEST INFORMATION: NORMAL

## 2019-08-20 RX ORDER — POTASSIUM CHLORIDE 1500 MG/1
20 TABLET, FILM COATED, EXTENDED RELEASE ORAL DAILY
Qty: 4 TABLET | Refills: 0 | Status: SHIPPED | OUTPATIENT
Start: 2019-08-20 | End: 2020-10-23

## 2019-08-22 ENCOUNTER — APPOINTMENT (OUTPATIENT)
Dept: LAB | Facility: CLINIC | Age: 64
End: 2019-08-22
Payer: COMMERCIAL

## 2019-08-22 ENCOUNTER — TELEPHONE (OUTPATIENT)
Dept: MULTI SPECIALTY CLINIC | Facility: CLINIC | Age: 64
End: 2019-08-22

## 2019-08-22 DIAGNOSIS — R76.8 HEPATITIS B CORE ANTIBODY POSITIVE: ICD-10-CM

## 2019-08-22 DIAGNOSIS — B19.20 HEPATITIS C INFECTION: ICD-10-CM

## 2019-08-22 PROCEDURE — 36415 COLL VENOUS BLD VENIPUNCTURE: CPT

## 2019-08-22 PROCEDURE — 86706 HEP B SURFACE ANTIBODY: CPT

## 2019-08-22 PROCEDURE — 87902 NFCT AGT GNTYP ALYS HEP C: CPT

## 2019-08-22 RX ORDER — HYDROCODONE BITARTRATE AND ACETAMINOPHEN 5; 325 MG/1; MG/1
TABLET ORAL
Qty: 60 TABLET | Refills: 0 | Status: SHIPPED | OUTPATIENT
Start: 2019-08-22 | End: 2019-09-19 | Stop reason: SDUPTHER

## 2019-08-23 LAB — HBV SURFACE AB SER-ACNC: 626.93 MIU/ML

## 2019-08-30 LAB
HCV GENTYP SERPL NAA+PROBE: NORMAL
HCV PLEASE NOTE: NORMAL

## 2019-09-19 DIAGNOSIS — L40.50 PSORIATIC ARTHRITIS (HCC): ICD-10-CM

## 2019-09-20 RX ORDER — HYDROCODONE BITARTRATE AND ACETAMINOPHEN 5; 325 MG/1; MG/1
TABLET ORAL
Qty: 60 TABLET | Refills: 0 | Status: SHIPPED | OUTPATIENT
Start: 2019-09-20 | End: 2019-10-16 | Stop reason: SDUPTHER

## 2019-10-03 ENCOUNTER — TRANSCRIBE ORDERS (OUTPATIENT)
Dept: RADIOLOGY | Facility: CLINIC | Age: 64
End: 2019-10-03

## 2019-10-04 ENCOUNTER — HOSPITAL ENCOUNTER (OUTPATIENT)
Dept: RADIOLOGY | Age: 64
Discharge: HOME | End: 2019-10-04
Payer: COMMERCIAL

## 2019-10-04 DIAGNOSIS — Z13.820 OSTEOPOROSIS SCREENING: ICD-10-CM

## 2019-10-04 PROCEDURE — 77080 DXA BONE DENSITY AXIAL: CPT

## 2019-10-16 ENCOUNTER — TELEPHONE (OUTPATIENT)
Dept: INTERNAL MEDICINE CLINIC | Facility: CLINIC | Age: 64
End: 2019-10-16

## 2019-10-16 DIAGNOSIS — L40.50 PSORIATIC ARTHRITIS (HCC): ICD-10-CM

## 2019-10-16 DIAGNOSIS — Z02.83 ENCOUNTER FOR DRUG SCREENING: Primary | ICD-10-CM

## 2019-10-16 NOTE — TELEPHONE ENCOUNTER
PT  MADE AWARE SHE NEEDS TO COME IN FOR UDS AND RE-SIGN HER NARCOTICS CONTRACT   MADE HER AWARE SOME INSURANCE COMPANIES ARE NOW REQUIRING UDS EVERY 6 MONTHS AND HER LAST ONE WAS 11/2018

## 2019-10-17 ENCOUNTER — APPOINTMENT (OUTPATIENT)
Dept: LAB | Facility: CLINIC | Age: 64
End: 2019-10-17
Payer: COMMERCIAL

## 2019-10-17 PROCEDURE — 80361 OPIATES 1 OR MORE: CPT

## 2019-10-17 PROCEDURE — 80307 DRUG TEST PRSMV CHEM ANLYZR: CPT

## 2019-10-18 LAB
AMPHETAMINES UR QL SCN: NEGATIVE NG/ML
BARBITURATES UR QL SCN: NEGATIVE NG/ML
BENZODIAZ UR QL: NEGATIVE NG/ML
BZE UR QL: NEGATIVE NG/ML
CANNABINOIDS UR QL SCN: NEGATIVE NG/ML
METHADONE UR QL SCN: NEGATIVE NG/ML
OPIATES UR QL: NEGATIVE NG/ML
PCP UR QL: NEGATIVE NG/ML
PROPOXYPH UR QL SCN: NEGATIVE NG/ML

## 2019-10-18 RX ORDER — HYDROCODONE BITARTRATE AND ACETAMINOPHEN 5; 325 MG/1; MG/1
TABLET ORAL
Qty: 60 TABLET | Refills: 0 | Status: SHIPPED | OUTPATIENT
Start: 2019-10-18 | End: 2019-10-23 | Stop reason: ALTCHOICE

## 2019-10-23 PROBLEM — Z91.148 VIOLATION OF CONTROLLED SUBSTANCE AGREEMENT: Status: ACTIVE | Noted: 2019-10-23

## 2019-10-23 PROBLEM — Z91.14 VIOLATION OF CONTROLLED SUBSTANCE AGREEMENT: Status: ACTIVE | Noted: 2019-10-23

## 2019-10-23 LAB
HYDROCODONE UR QL: NEGATIVE NG/ML
HYDROMORPHONE UR QL: NEGATIVE NG/ML

## 2019-10-23 NOTE — TELEPHONE ENCOUNTER
PT  CAME INTO THE OFFICE 10/23/19 FOR PILL COUNT AND TO SIGN NARCOTICS CONTRACT  I ASKED HER ABOUT HER UDS BEING NEGATIVE AND SHE STATED "I DONT KNOW WHY BECAUSE I TAKE THEM AT LEAST ONCE A DAY " SHE HANDED ME HER PILL BOTTLE AND STATED "THEY ARE NOT ALL THERE BECAUSE MY SON CAME SO FAST TO PICK ME UP I DIDN'T HAVE TIME TO GRAB THEM ALL"  SHE THEN STATED "THERE ARE ABOUT 25 IN THE BOTTLE"  SHE PICKED UP THE SCRIPT ON 10/18/19 AND TAKES ONE PILL TWICE A DAY  TODAY SHE SHOULD HAVE ROUGHLY 50 PILLS LEFT  I COUNTED THE PILLS ALONG WITH VILI AND WE BOTH COUNTED A TOTAL OF 27  PER DR Ann Vasquez SHE MUST GO HOME AND GET THOSE 27 PILLS, SHE STATED "I DONT HAVE THEM ALL" I ASKED HOW MANY SHE HAD AT HOME AND SHE STATED "MAYBE 10"  I ASKED THE PT  IS SHE TOOK THOSE EXTRA PILLS IN THE LAST 5 DAYS  SHE STATED "YES I KNOW I MADE A MISTAKE AND I WONT CALL ANYMORE FOR THE PILLS, I KNOW I'M CUT OFF"  I SPOKE WITH DR Ann Vasquez AND HE STATED PT  CAN TAKE ONE PILL DAILY OF HER REMAINING PILLS THEN SHE IS DONE  WE WILL NO LONGER PRESCRIBE NARCOTICS AS SHE VIOLATED HER CONTRACT  I MADE PT  AWARE OF THIS AND SHE STATED "OK, I HAVE BEEN WANTING TO GET OFF OF THEM ANYWAY"

## 2019-10-23 NOTE — TELEPHONE ENCOUNTER
Violation of CSMA noted, will taper pt off with current supply if needed   Opioid removed from med list     Dr Carson Saxena

## 2019-10-23 NOTE — TELEPHONE ENCOUNTER
PT  HAD UDS DONE ON 10/18/19 AND IT CAME BACK NEGATIVE  PER DR Eugenia Rowland SHE NEEDS TO COME IN FOR A PILL COUNT (ALSO STILL NEEDS TO SIGN CONTRACT) AND ASK WHY SHE WOULD BE NEGATIVE   I CALLED AND LMOM FOR HER TO CALL ME BACK

## 2020-01-28 DIAGNOSIS — I10 ESSENTIAL HYPERTENSION: ICD-10-CM

## 2020-01-28 DIAGNOSIS — I50.32 CHRONIC DIASTOLIC CONGESTIVE HEART FAILURE (HCC): ICD-10-CM

## 2020-01-28 RX ORDER — METOPROLOL TARTRATE 50 MG/1
50 TABLET, FILM COATED ORAL 2 TIMES DAILY
Qty: 180 TABLET | Refills: 1 | Status: SHIPPED | OUTPATIENT
Start: 2020-01-28 | End: 2020-07-20

## 2020-01-28 RX ORDER — ATORVASTATIN CALCIUM 40 MG/1
40 TABLET, FILM COATED ORAL DAILY
Qty: 90 TABLET | Refills: 1 | Status: SHIPPED | OUTPATIENT
Start: 2020-01-28 | End: 2020-07-20

## 2020-01-28 RX ORDER — QUINAPRIL 40 MG/1
40 TABLET ORAL DAILY
Qty: 90 TABLET | Refills: 1 | Status: SHIPPED | OUTPATIENT
Start: 2020-01-28 | End: 2020-07-20

## 2020-01-28 NOTE — TELEPHONE ENCOUNTER
Patient requesting refill for medication , patient stated she does still have some pills remaining she does not know the exact amount

## 2020-04-23 DIAGNOSIS — I50.32 CHRONIC DIASTOLIC CONGESTIVE HEART FAILURE (HCC): ICD-10-CM

## 2020-04-24 RX ORDER — AMLODIPINE BESYLATE 5 MG/1
5 TABLET ORAL 2 TIMES DAILY
Qty: 180 TABLET | Refills: 1 | OUTPATIENT
Start: 2020-04-24 | End: 2021-02-07

## 2020-06-03 ENCOUNTER — TRANSCRIBE ORDERS (OUTPATIENT)
Dept: ADMINISTRATIVE | Facility: HOSPITAL | Age: 65
End: 2020-06-03

## 2020-06-03 DIAGNOSIS — Z12.31 VISIT FOR SCREENING MAMMOGRAM: Primary | ICD-10-CM

## 2020-07-18 DIAGNOSIS — I10 ESSENTIAL HYPERTENSION: ICD-10-CM

## 2020-07-18 DIAGNOSIS — I50.32 CHRONIC DIASTOLIC CONGESTIVE HEART FAILURE (HCC): ICD-10-CM

## 2020-07-19 DIAGNOSIS — I10 ESSENTIAL HYPERTENSION: ICD-10-CM

## 2020-07-20 ENCOUNTER — TELEPHONE (OUTPATIENT)
Dept: INTERNAL MEDICINE CLINIC | Facility: CLINIC | Age: 65
End: 2020-07-20

## 2020-07-20 DIAGNOSIS — G47.33 OBSTRUCTIVE SLEEP APNEA, ADULT: Primary | ICD-10-CM

## 2020-07-20 RX ORDER — ATORVASTATIN CALCIUM 40 MG/1
40 TABLET, FILM COATED ORAL DAILY
Qty: 90 TABLET | Refills: 1 | Status: SHIPPED | OUTPATIENT
Start: 2020-07-20 | End: 2021-01-11 | Stop reason: SDUPTHER

## 2020-07-20 RX ORDER — QUINAPRIL 40 MG/1
TABLET ORAL
Qty: 90 TABLET | Refills: 1 | Status: SHIPPED | OUTPATIENT
Start: 2020-07-20 | End: 2021-01-11 | Stop reason: SDUPTHER

## 2020-07-20 RX ORDER — METOPROLOL TARTRATE 50 MG/1
TABLET, FILM COATED ORAL
Qty: 180 TABLET | Refills: 1 | Status: SHIPPED | OUTPATIENT
Start: 2020-07-20 | End: 2021-01-11 | Stop reason: SDUPTHER

## 2020-07-20 NOTE — TELEPHONE ENCOUNTER
Sumaya Penny called to requested an order for Sleep Medicine  Diagnosis code [id number]  Patient's appointment is 7/29/20

## 2020-09-24 ENCOUNTER — TELEPHONE (OUTPATIENT)
Dept: INTERNAL MEDICINE CLINIC | Facility: CLINIC | Age: 65
End: 2020-09-24

## 2020-10-23 ENCOUNTER — OFFICE VISIT (OUTPATIENT)
Dept: INTERNAL MEDICINE CLINIC | Facility: CLINIC | Age: 65
End: 2020-10-23

## 2020-10-23 VITALS
WEIGHT: 203 LBS | TEMPERATURE: 97 F | OXYGEN SATURATION: 97 % | HEIGHT: 63 IN | SYSTOLIC BLOOD PRESSURE: 140 MMHG | DIASTOLIC BLOOD PRESSURE: 82 MMHG | HEART RATE: 83 BPM | BODY MASS INDEX: 35.97 KG/M2

## 2020-10-23 DIAGNOSIS — I50.32 CHRONIC DIASTOLIC CONGESTIVE HEART FAILURE (HCC): ICD-10-CM

## 2020-10-23 DIAGNOSIS — H26.9 CATARACT OF BOTH EYES, UNSPECIFIED CATARACT TYPE: ICD-10-CM

## 2020-10-23 DIAGNOSIS — E78.5 DYSLIPIDEMIA: ICD-10-CM

## 2020-10-23 DIAGNOSIS — I10 ESSENTIAL HYPERTENSION: ICD-10-CM

## 2020-10-23 DIAGNOSIS — G47.33 OBSTRUCTIVE SLEEP APNEA, ADULT: Primary | ICD-10-CM

## 2020-10-23 PROCEDURE — 99214 OFFICE O/P EST MOD 30 MIN: CPT | Performed by: INTERNAL MEDICINE

## 2020-12-30 ENCOUNTER — OFFICE VISIT (OUTPATIENT)
Dept: INTERNAL MEDICINE CLINIC | Facility: CLINIC | Age: 65
End: 2020-12-30

## 2020-12-30 VITALS
HEART RATE: 84 BPM | WEIGHT: 209.4 LBS | BODY MASS INDEX: 37.1 KG/M2 | TEMPERATURE: 96.9 F | DIASTOLIC BLOOD PRESSURE: 100 MMHG | SYSTOLIC BLOOD PRESSURE: 160 MMHG | HEIGHT: 63 IN

## 2020-12-30 DIAGNOSIS — H91.93 DECREASED HEARING OF BOTH EARS: ICD-10-CM

## 2020-12-30 DIAGNOSIS — I50.32 CHRONIC DIASTOLIC CONGESTIVE HEART FAILURE (HCC): ICD-10-CM

## 2020-12-30 DIAGNOSIS — I10 ESSENTIAL HYPERTENSION: Primary | ICD-10-CM

## 2020-12-30 DIAGNOSIS — G47.33 OBSTRUCTIVE SLEEP APNEA, ADULT: ICD-10-CM

## 2020-12-30 DIAGNOSIS — E78.5 DYSLIPIDEMIA: ICD-10-CM

## 2020-12-30 DIAGNOSIS — E66.9 OBESITY (BMI 30-39.9): ICD-10-CM

## 2020-12-30 PROCEDURE — 99214 OFFICE O/P EST MOD 30 MIN: CPT | Performed by: INTERNAL MEDICINE

## 2020-12-30 RX ORDER — SENNOSIDES 8.6 MG
650 CAPSULE ORAL EVERY 8 HOURS PRN
COMMUNITY

## 2020-12-30 RX ORDER — OMEPRAZOLE 40 MG/1
40 CAPSULE, DELAYED RELEASE ORAL DAILY
COMMUNITY

## 2021-01-11 DIAGNOSIS — I10 ESSENTIAL HYPERTENSION: ICD-10-CM

## 2021-01-11 DIAGNOSIS — I50.32 CHRONIC DIASTOLIC CONGESTIVE HEART FAILURE (HCC): ICD-10-CM

## 2021-01-11 NOTE — TELEPHONE ENCOUNTER
Name of medication, dose, quantity and frequency:    Requested Prescriptions     Pending Prescriptions Disp Refills    metoprolol tartrate (LOPRESSOR) 50 mg tablet 180 tablet 1     Sig: Take 1 tablet (50 mg total) by mouth 2 (two) times a day    atorvastatin (LIPITOR) 40 mg tablet 90 tablet 1     Sig: Take 1 tablet (40 mg total) by mouth daily For cholesterol    quinapril (ACCUPRIL) 40 MG tablet 90 tablet 1     Sig: Take 1 tablet (40 mg total) by mouth daily       Number of refills left: 0    Amount of medication left:    Pharmacy verified and updated: yes    Additional information:      Received fax requesting refills

## 2021-01-12 RX ORDER — METOPROLOL TARTRATE 50 MG/1
50 TABLET, FILM COATED ORAL 2 TIMES DAILY
Qty: 180 TABLET | Refills: 1 | Status: SHIPPED | OUTPATIENT
Start: 2021-01-12 | End: 2021-07-05

## 2021-01-12 RX ORDER — QUINAPRIL 40 MG/1
40 TABLET ORAL DAILY
Qty: 90 TABLET | Refills: 1 | Status: SHIPPED | OUTPATIENT
Start: 2021-01-12 | End: 2021-07-05

## 2021-01-12 RX ORDER — ATORVASTATIN CALCIUM 40 MG/1
40 TABLET, FILM COATED ORAL DAILY
Qty: 90 TABLET | Refills: 1 | Status: SHIPPED | OUTPATIENT
Start: 2021-01-12 | End: 2021-07-05

## 2021-01-22 ENCOUNTER — TRANSCRIBE ORDERS (OUTPATIENT)
Dept: ADMINISTRATIVE | Facility: HOSPITAL | Age: 66
End: 2021-01-22

## 2021-01-22 DIAGNOSIS — Z12.31 ENCOUNTER FOR SCREENING MAMMOGRAM FOR MALIGNANT NEOPLASM OF BREAST: Primary | ICD-10-CM

## 2021-02-09 ENCOUNTER — OFFICE VISIT (OUTPATIENT)
Dept: INTERNAL MEDICINE CLINIC | Facility: CLINIC | Age: 66
End: 2021-02-09

## 2021-02-09 VITALS
WEIGHT: 209.8 LBS | BODY MASS INDEX: 37.16 KG/M2 | HEART RATE: 72 BPM | SYSTOLIC BLOOD PRESSURE: 174 MMHG | TEMPERATURE: 96.9 F | DIASTOLIC BLOOD PRESSURE: 102 MMHG

## 2021-02-09 DIAGNOSIS — I10 ESSENTIAL HYPERTENSION: ICD-10-CM

## 2021-02-09 DIAGNOSIS — E87.6 HYPOKALEMIA: Primary | ICD-10-CM

## 2021-02-09 PROCEDURE — 99214 OFFICE O/P EST MOD 30 MIN: CPT | Performed by: INTERNAL MEDICINE

## 2021-02-09 PROCEDURE — 3288F FALL RISK ASSESSMENT DOCD: CPT | Performed by: INTERNAL MEDICINE

## 2021-02-09 PROCEDURE — 1101F PT FALLS ASSESS-DOCD LE1/YR: CPT | Performed by: INTERNAL MEDICINE

## 2021-02-09 RX ORDER — HYDROCHLOROTHIAZIDE 12.5 MG/1
12.5 TABLET ORAL DAILY
Qty: 30 TABLET | Refills: 0 | Status: SHIPPED | OUTPATIENT
Start: 2021-02-09 | End: 2021-03-12 | Stop reason: SDUPTHER

## 2021-02-09 NOTE — PROGRESS NOTES
[facility] Visit Note  Farzana Faustin 77 y o  female   MRN: [id number]    Assessment and Plan      Essential hypertension  Patient currently on Quinapril 40mg daily and Metoprolol 50mg BID  BP on presentation 172/102 after patient walked here and with caffeine consumption this morning  Rechecked manually with BP  170/90  She presented to the clinic today with her blood pressure  log  Which shows systolics 230D to 050F with diastolics 89V to 028  Patient had a BMP 2 years ago  Which was significant for hypokalemia with potassium 2 9, unsure if this was replaced  Patient was also encouraged to diet and exercise at this  Will help with her blood pressure control   plan  · Will order repeat BMP today  · Will add hydrochlorothiazide 12 5 mg daily and if patient is still hypokalemic on repeat BMP will start patient on spironolactone instead  · Follow-up results of BMP  · Follow-up with PCP in the next 4-5 weeks     Obstructive sleep apnea,  Patient compliant with CPAP at night     Chronic diastolic congestive heart failure ([facility] [address] )  Grade 1 Diastolic Dysfunction on Echo from 4673 with systolic function on lower limit of normal with EF of 50%  Euvolemic on exam today  Denies any recent leg swelling  No orthopnea/PND  plan  · Continue Metoprolol 50 BID and Quinapril 40mg daily  · Considerrepeat echocardiogram in future     Dyslipidemia  · Continue Atorvastatin 40mg daily     Obesity  BMI Counseling: Body mass index is 37 16 kg/m²  The BMI is above normal  Nutrition recommendations include reducing portion sizes, decreasing overall calorie intake, 3-5 servings of fruits/vegetables daily, reducing fast food intake, consuming healthier snacks, decreasing soda and/or juice intake and moderation in carbohydrate intake  Exercise recommendations include exercising 3-5 times per week    Decreased hearing of both ears reported that  her son has been telling her that she says "huh" and "what" a lot at her last visit,  She was examined and was given a script for Debrox which she has been using  Patient states that she can hear everyone else except her son and suspects that this is because her son talks very "quiet/soft" on my examination today patient had waxing bilateral ear but very today patent ear canal  Patient was able to interact Fully throughout my  Encounter,  And I have no concerns for hearing loss at this time  She likely has age-related sensorineural hearing loss  I encouraged patient is to continue using her Debrox, her ear is not impacted with cerumen so there will not be disimpaction at this visit  I will not refer patient for audiology at this time  Will continue to monitor      Health Maintenance:   Colonoscopy 2014 - Diverticulosis and Grade 1 Internal hemorrhoids - repeat in 2024  Influenza Vaccine:   Up-to-date-October 23, 2018  Pneumonia Vaccine:  PCV 13 2/1/2019, needs PPSV 23  TDaP Vaccine:   Up-to-date-April 8, 2016  Mammogram:   BI-RADS 2 on June 27, 2019 - repeat ordered  DEXA scan October 4, 2019 - Osteopenia with T score -1 1    Schedule a follow-up appointment with PCP    Chief Complaint:  Follow-up of blood pressure/concern about hearing loss  Subjective     History of Present Illness:   Ms Ana Blanco is a 68yo female with PMH LUIZA on CPAP, Grade 1 Diastolic CHF with EF 14%, hypertension, and dyslipidemia who presents for  Follow-up of her blood pressure  And  Concerns for hearing loss  At her last visit, patient reported that recently her son has been telling her that she says "huh" and "what" a lot  She was examined and was given a script for Debrox which she has been using  Patient states that she can hear everyone else except her son and suspects that this is because her son talks very "quiet/soft"  She also presented with her blood pressure logs which has been ranging between systolic of 129 to 967B  And diastolic 13H to 054   Today her blood pressure on arrival is 172/102 repeat blood pressure check is 170/90  Patient was previously on amlodipine in addition to her other blood pressure medications but was discontinued due to bilateral lower extremity edema  Patient was able to interact Fully throughout my  Encounter,  On examination, She has some  Wax in her ear with very  Clear /visible ear canal  Previous smoker, quit many years ago  Only drinks alcohol for special occasions  Lives at home by herself  Able to complete all ADL's on her own  Walks everywhere she can, and otherwise takes lantavan for transportation  She denies headaches, lightheadedness, chest pain, shortness of breath, palpitation, abdominal pain, N/ V/C/D patient looks stable not in any obvious distress      Review of Systems  Twelve point review of system unremarkable except that listed in my HPI above      Current Outpatient Medications:     acetaminophen (TYLENOL) 650 mg CR tablet, Take 650 mg by mouth every 8 (eight) hours as needed, Disp: , Rfl:     atorvastatin (LIPITOR) 40 mg tablet, Take 1 tablet (40 mg total) by mouth daily For cholesterol, Disp: 90 tablet, Rfl: 1    Blood Pressure Monitoring (Blood Pressure Monitor/M Cuff) MISC, Use daily, Disp: 1 each, Rfl: 0    metoprolol tartrate (LOPRESSOR) 50 mg tablet, Take 1 tablet (50 mg total) by mouth 2 (two) times a day, Disp: 180 tablet, Rfl: 1    omeprazole (PriLOSEC) 40 MG capsule, Take 40 mg by mouth daily, Disp: , Rfl:     quinapril (ACCUPRIL) 40 MG tablet, Take 1 tablet (40 mg total) by mouth daily, Disp: 90 tablet, Rfl: 1    hydrochlorothiazide (HYDRODIURIL) 12 5 mg tablet, Take 1 tablet (12 5 mg total) by mouth daily, Disp: 30 tablet, Rfl: 0  Past Medical History:   Diagnosis Date    Chronic ankle pain     Unspec laterality, Last assessed - 6/22/16    Chronic hepatitis C (HCC)     Complete left bundle branch block (LBBB)     COPD (chronic obstructive pulmonary disease) (HCC)     Eczema     B/L elbows, start Hydrocortisone;  Last assessed - 8/5/15    HLD (hyperlipidemia)     Hypertension     Osteoarthritis, knee     Rectal polyp     Sleep apnea      Past Surgical History:   Procedure Laterality Date    COLONOSCOPY      EYE SURGERY       Social History     Socioeconomic History    Marital status: Single     Spouse name: Not on file    Number of children: Not on file    Years of education: Not on file    Highest education level: Not on file   Occupational History    Not on file   Social Needs    Financial resource strain: Not on file    Food insecurity     Worry: Not on file     Inability: Not on file    Transportation needs     Medical: Not on file     Non-medical: Not on file   Tobacco Use    Smoking status: Former Smoker     Quit date: 1999     Years since quittin 9    Smokeless tobacco: Never Used   Substance and Sexual Activity    Alcohol use: No    Drug use: No    Sexual activity: Yes   Lifestyle    Physical activity     Days per week: Not on file     Minutes per session: Not on file    Stress: Not on file   Relationships    Social connections     Talks on phone: Not on file     Gets together: Not on file     Attends Quaker service: Not on file     Active member of club or organization: Not on file     Attends meetings of clubs or organizations: Not on file     Relationship status: Not on file    Intimate partner violence     Fear of current or ex partner: Not on file     Emotionally abused: Not on file     Physically abused: Not on file     Forced sexual activity: Not on file   Other Topics Concern    Not on file   Social History Narrative    Home environment-safe         Family History   Problem Relation Age of Onset    Heart disease Mother         Pacemaker    Hypertension Mother     Heart disease Father     Heart disease Sister     Hypertension Sister     Alcohol abuse Brother     Heart disease Brother     Cirrhosis Brother         Liver     Hypertension Brother     No Known Problems Daughter     No Known Problems Maternal Grandmother     No Known Problems Paternal Grandfather     No Known Problems Daughter     No Known Problems Sister     No Known Problems Maternal Aunt     No Known Problems Cousin      No Known Allergies    Objective     Vitals:    02/09/21 0926   BP: (!) 174/102   BP Location: Left arm   Patient Position: Sitting   Cuff Size: Standard   Pulse: 72   Temp: (!) 96 9 °F (36 1 °C)   TempSrc: Temporal   Weight: 95 2 kg (209 lb 12 8 oz)       Physical exam:     GENERAL: NAD, obese   HEENT:  NC/AT, PERRL, EOMI, no scleral icterus  CARDIAC:  RRR, +S1/S2, no S3/S4 heard, no m/g/r  PULMONARY:  CTA B/L, no wheezing/rales/rhonci, non-labored breathing  ABDOMEN:  Soft, NT/ND,no rebound/guarding/rigidity  Extremities:  No edema, cyanosis, or clubbing  NEUROLOGIC: Grossly intact  SKIN:  No rashes or erythema noted on exposed skin  Psych: Normal affect        ==  PLEASE NOTE:  This encounter was completed utilizing the [employer] Direct Speech Voice Recognition Software  Grammatical errors, random word insertions, pronoun errors and incomplete sentences are occasional consequences of the system due to software limitations, ambient noise and hardware issues  These may be missed by proof reading prior to affixing electronic signature  Any questions or concerns about the content, text or information contained within the body of this dictation should be directly addressed to the physician for clarification  Please do not hesitate to call me directly if you have any any questions or concerns

## 2021-02-10 ENCOUNTER — LAB (OUTPATIENT)
Dept: LAB | Facility: HOSPITAL | Age: 66
End: 2021-02-10
Payer: COMMERCIAL

## 2021-02-10 DIAGNOSIS — E87.6 HYPOKALEMIA: ICD-10-CM

## 2021-02-10 LAB
ANION GAP SERPL CALCULATED.3IONS-SCNC: 6 MMOL/L (ref 4–13)
BUN SERPL-MCNC: 8 MG/DL (ref 5–25)
CALCIUM SERPL-MCNC: 9.6 MG/DL (ref 8.3–10.1)
CHLORIDE SERPL-SCNC: 106 MMOL/L (ref 100–108)
CO2 SERPL-SCNC: 28 MMOL/L (ref 21–32)
CREAT SERPL-MCNC: 0.72 MG/DL (ref 0.6–1.3)
GFR SERPL CREATININE-BSD FRML MDRD: 87 ML/MIN/1.73SQ M
GLUCOSE P FAST SERPL-MCNC: 108 MG/DL (ref 65–99)
POTASSIUM SERPL-SCNC: 3.6 MMOL/L (ref 3.5–5.3)
SODIUM SERPL-SCNC: 140 MMOL/L (ref 136–145)

## 2021-02-10 PROCEDURE — 36415 COLL VENOUS BLD VENIPUNCTURE: CPT

## 2021-02-10 PROCEDURE — 80048 BASIC METABOLIC PNL TOTAL CA: CPT

## 2021-03-03 DIAGNOSIS — I10 ESSENTIAL HYPERTENSION: ICD-10-CM

## 2021-03-06 RX ORDER — HYDROCHLOROTHIAZIDE 12.5 MG/1
TABLET ORAL
Qty: 30 TABLET | Refills: 0 | OUTPATIENT
Start: 2021-03-06

## 2021-03-08 DIAGNOSIS — I10 ESSENTIAL HYPERTENSION: ICD-10-CM

## 2021-03-08 RX ORDER — HYDROCHLOROTHIAZIDE 12.5 MG/1
12.5 TABLET ORAL DAILY
Qty: 90 TABLET | Refills: 2 | Status: SHIPPED | OUTPATIENT
Start: 2021-03-08 | End: 2021-11-09 | Stop reason: SDUPTHER

## 2021-03-12 ENCOUNTER — OFFICE VISIT (OUTPATIENT)
Dept: INTERNAL MEDICINE CLINIC | Facility: CLINIC | Age: 66
End: 2021-03-12

## 2021-03-12 VITALS
DIASTOLIC BLOOD PRESSURE: 82 MMHG | SYSTOLIC BLOOD PRESSURE: 128 MMHG | TEMPERATURE: 97.1 F | WEIGHT: 210.2 LBS | HEART RATE: 73 BPM | BODY MASS INDEX: 37.24 KG/M2

## 2021-03-12 DIAGNOSIS — Z12.31 VISIT FOR SCREENING MAMMOGRAM: ICD-10-CM

## 2021-03-12 DIAGNOSIS — E66.9 OBESITY (BMI 30-39.9): ICD-10-CM

## 2021-03-12 DIAGNOSIS — I10 ESSENTIAL HYPERTENSION: Primary | ICD-10-CM

## 2021-03-12 PROCEDURE — 1036F TOBACCO NON-USER: CPT | Performed by: INTERNAL MEDICINE

## 2021-03-12 PROCEDURE — 3074F SYST BP LT 130 MM HG: CPT | Performed by: INTERNAL MEDICINE

## 2021-03-12 PROCEDURE — 1160F RVW MEDS BY RX/DR IN RCRD: CPT | Performed by: INTERNAL MEDICINE

## 2021-03-12 PROCEDURE — 3079F DIAST BP 80-89 MM HG: CPT | Performed by: INTERNAL MEDICINE

## 2021-03-12 PROCEDURE — 99213 OFFICE O/P EST LOW 20 MIN: CPT | Performed by: INTERNAL MEDICINE

## 2021-03-12 NOTE — PROGRESS NOTES
[facility]  INTERNAL MEDICINE OFFICE VISIT     PATIENT INFORMATION     Radha Cruz   77 y o  female   MRN: [id number]    ASSESSMENT/PLAN     Diagnoses and all orders for this visit:    Essential hypertension  /82 today  BP log with highest   Continue HCTZ 12 5 daily, Metoprolol 50mg BID, Quinapril 40mg daily  Discussed diet and lifestyle changes - will refer to weight management  Continue to monitor BP at home - reviewed best way to measure at home  Repeat BMP to evaluate Potassium now on HCTZ  Call office when refills needed    Obesity (BMI 30-39 9)  -     Ambulatory referral to Weight Management; Future    Visit for screening mammogram  Printed rx for mammogram scheduled 4/8/21    Health Maintenance  Patient given information for registration and scheduling COVID19 Vaccine    Schedule a follow-up appointment in 10 weeks  HEALTH MAINTENANCE     Immunization History   Administered Date(s) Administered    INFLUENZA 10/26/2015, 12/05/2016, 11/17/2017    Influenza Quadrivalent, 6-35 Months IM 12/05/2016, 11/17/2017    Influenza, high dose seasonal 0 7 mL 10/23/2018    Influenza, seasonal, injectable 01/15/2014, 11/13/2014, 10/26/2015    Pneumococcal Conjugate 13-Valent 02/01/2019    Tdap 04/28/2016    Zoster 10/26/2015     CHIEF COMPLAINT     Chief Complaint   Patient presents with    Follow-up      HISTORY OF PRESENT ILLNESS     Ms Erin Alicia is a 66yo female with PMH LUIZA on CPAP, Grade 1 Diastolic CHF with EF 63%, hypertension, and dyslipidemia who presents for 1 month hypertension follow up  Reports that she has been logging BP at home, and has log with her  Very happy with how improved her blood pressure is with addition of water pill  States that her legs are no longer swollen  Reports that she now wants to go to see weight loss clinic to discuss weight loss  Reports that with better weather she wants to start riding her bike outside   Feeling well overall  Wants information on COVID vaccine  REVIEW OF SYSTEMS     Review of Systems   Constitutional: Negative for chills and fever  HENT: Negative for rhinorrhea, sore throat and trouble swallowing  Eyes: Negative for pain and visual disturbance  Respiratory: Negative for cough and shortness of breath  Cardiovascular: Negative for chest pain, palpitations and leg swelling  Gastrointestinal: Negative for abdominal pain, constipation and diarrhea  Genitourinary: Negative for difficulty urinating and dysuria  Musculoskeletal: Negative for arthralgias and back pain  Skin: Negative for color change and rash  Neurological: Negative for dizziness, light-headedness and headaches  Psychiatric/Behavioral: Negative for confusion  The patient is not nervous/anxious  OBJECTIVE     Vitals:    03/12/21 0746   BP: 128/82   BP Location: Left arm   Patient Position: Sitting   Cuff Size: Standard   Pulse: 73   Temp: (!) 97 1 °F (36 2 °C)   TempSrc: Temporal   Weight: 95 3 kg (210 lb 3 2 oz)     Physical Exam  Vitals signs reviewed  Constitutional:       General: She is not in acute distress  Appearance: Normal appearance  She is obese  She is not ill-appearing or diaphoretic  HENT:      Head: Normocephalic and atraumatic  Right Ear: External ear normal       Left Ear: External ear normal       Nose: Nose normal       Mouth/Throat:      Mouth: Mucous membranes are moist       Pharynx: Oropharynx is clear  Eyes:      General:         Right eye: No discharge  Left eye: No discharge  Conjunctiva/sclera: Conjunctivae normal    Cardiovascular:      Rate and Rhythm: Normal rate and regular rhythm  Heart sounds: Normal heart sounds  No murmur  Pulmonary:      Effort: Pulmonary effort is normal       Breath sounds: Normal breath sounds  Abdominal:      General: Bowel sounds are normal       Palpations: Abdomen is soft  Tenderness: There is no abdominal tenderness  Musculoskeletal: Normal range of motion  General: No swelling or tenderness  Right lower leg: No edema  Left lower leg: No edema  Skin:     General: Skin is warm and dry  Neurological:      General: No focal deficit present  Mental Status: She is alert and oriented to person, place, and time  Motor: No weakness  Psychiatric:         Mood and Affect: Mood normal          Behavior: Behavior normal          Thought Content: Thought content normal        CURRENT MEDICATIONS     Current Outpatient Medications:     acetaminophen (TYLENOL) 650 mg CR tablet, Take 650 mg by mouth every 8 (eight) hours as needed, Disp: , Rfl:     atorvastatin (LIPITOR) 40 mg tablet, Take 1 tablet (40 mg total) by mouth daily For cholesterol, Disp: 90 tablet, Rfl: 1    Blood Pressure Monitoring (Blood Pressure Monitor/M Cuff) MISC, Use daily, Disp: 1 each, Rfl: 0    hydrochlorothiazide (HYDRODIURIL) 12 5 mg tablet, Take 1 tablet (12 5 mg total) by mouth daily, Disp: 90 tablet, Rfl: 2    metoprolol tartrate (LOPRESSOR) 50 mg tablet, Take 1 tablet (50 mg total) by mouth 2 (two) times a day, Disp: 180 tablet, Rfl: 1    omeprazole (PriLOSEC) 40 MG capsule, Take 40 mg by mouth daily, Disp: , Rfl:     quinapril (ACCUPRIL) 40 MG tablet, Take 1 tablet (40 mg total) by mouth daily, Disp: 90 tablet, Rfl: 1    PAST MEDICAL & SURGICAL HISTORY     Past Medical History:   Diagnosis Date    Chronic ankle pain     Unspec laterality, Last assessed - 6/22/16    Chronic hepatitis C (HCC)     Complete left bundle branch block (LBBB)     COPD (chronic obstructive pulmonary disease) (HCC)     Eczema     B/L elbows, start Hydrocortisone;  Last assessed - 8/5/15    HLD (hyperlipidemia)     Hypertension     Osteoarthritis, knee     Rectal polyp     Sleep apnea      Past Surgical History:   Procedure Laterality Date    COLONOSCOPY      EYE SURGERY       SOCIAL & FAMILY HISTORY     Social History     Socioeconomic History    Marital status: Single     Spouse name: Not on file    Number of children: Not on file    Years of education: Not on file    Highest education level: Not on file   Occupational History    Not on file   Social Needs    Financial resource strain: Not on file    Food insecurity     Worry: Not on file     Inability: Not on file    Transportation needs     Medical: Not on file     Non-medical: Not on file   Tobacco Use    Smoking status: Former Smoker     Quit date: 1999     Years since quittin 0    Smokeless tobacco: Never Used   Substance and Sexual Activity    Alcohol use: No    Drug use: No    Sexual activity: Yes   Lifestyle    Physical activity     Days per week: Not on file     Minutes per session: Not on file    Stress: Not on file   Relationships    Social connections     Talks on phone: Not on file     Gets together: Not on file     Attends Sikhism service: Not on file     Active member of club or organization: Not on file     Attends meetings of clubs or organizations: Not on file     Relationship status: Not on file    Intimate partner violence     Fear of current or ex partner: Not on file     Emotionally abused: Not on file     Physically abused: Not on file     Forced sexual activity: Not on file   Other Topics Concern    Not on file   Social History Narrative    Home environment-safe         Social History     Substance and Sexual Activity   Alcohol Use No     Social History     Substance and Sexual Activity   Drug Use No     Social History     Tobacco Use   Smoking Status Former Smoker    Quit date: 1999    Years since quittin 0   Smokeless Tobacco Never Used     Family History   Problem Relation Age of Onset    Heart disease Mother         Pacemaker    Hypertension Mother     Heart disease Father     Heart disease Sister     Hypertension Sister     Alcohol abuse Brother     Heart disease Brother     Cirrhosis Brother         Liver     Hypertension Brother     No Known Problems Daughter     No Known Problems Maternal Grandmother     No Known Problems Paternal Grandfather     No Known Problems Daughter     No Known Problems Sister     No Known Problems Maternal Aunt     No Known Problems Cousin      ==  Boy Moreno,   Internal Medicine Resident, PGY-1  [address] [facility] [address] [address] [facility] [address]  [address]   [facility] , [address] [address] [facility]  Office: [phone]  Fax: [phone]

## 2021-03-16 ENCOUNTER — APPOINTMENT (OUTPATIENT)
Dept: LAB | Facility: HOSPITAL | Age: 66
End: 2021-03-16
Payer: COMMERCIAL

## 2021-03-16 DIAGNOSIS — E87.5 HYPERKALEMIA: Primary | ICD-10-CM

## 2021-03-16 DIAGNOSIS — I10 ESSENTIAL HYPERTENSION: ICD-10-CM

## 2021-03-16 LAB
ANION GAP SERPL CALCULATED.3IONS-SCNC: 5 MMOL/L (ref 4–13)
BUN SERPL-MCNC: 13 MG/DL (ref 5–25)
CALCIUM SERPL-MCNC: 8.9 MG/DL (ref 8.3–10.1)
CHLORIDE SERPL-SCNC: 101 MMOL/L (ref 100–108)
CO2 SERPL-SCNC: 26 MMOL/L (ref 21–32)
CREAT SERPL-MCNC: 0.84 MG/DL (ref 0.6–1.3)
GFR SERPL CREATININE-BSD FRML MDRD: 72 ML/MIN/1.73SQ M
GLUCOSE P FAST SERPL-MCNC: 97 MG/DL (ref 65–99)
POTASSIUM SERPL-SCNC: 5.2 MMOL/L (ref 3.5–5.3)
SODIUM SERPL-SCNC: 132 MMOL/L (ref 136–145)

## 2021-03-16 PROCEDURE — 36415 COLL VENOUS BLD VENIPUNCTURE: CPT

## 2021-03-16 PROCEDURE — 80048 BASIC METABOLIC PNL TOTAL CA: CPT

## 2021-03-29 ENCOUNTER — APPOINTMENT (OUTPATIENT)
Dept: LAB | Facility: HOSPITAL | Age: 66
End: 2021-03-29
Payer: COMMERCIAL

## 2021-03-29 DIAGNOSIS — E87.5 HYPERKALEMIA: ICD-10-CM

## 2021-03-29 LAB
ANION GAP SERPL CALCULATED.3IONS-SCNC: 2 MMOL/L (ref 4–13)
BUN SERPL-MCNC: 11 MG/DL (ref 5–25)
CALCIUM SERPL-MCNC: 8.8 MG/DL (ref 8.3–10.1)
CHLORIDE SERPL-SCNC: 106 MMOL/L (ref 100–108)
CO2 SERPL-SCNC: 32 MMOL/L (ref 21–32)
CREAT SERPL-MCNC: 0.94 MG/DL (ref 0.6–1.3)
GFR SERPL CREATININE-BSD FRML MDRD: 63 ML/MIN/1.73SQ M
GLUCOSE P FAST SERPL-MCNC: 114 MG/DL (ref 65–99)
POTASSIUM SERPL-SCNC: 4 MMOL/L (ref 3.5–5.3)
SODIUM SERPL-SCNC: 140 MMOL/L (ref 136–145)

## 2021-03-29 PROCEDURE — 80048 BASIC METABOLIC PNL TOTAL CA: CPT

## 2021-03-29 PROCEDURE — 36415 COLL VENOUS BLD VENIPUNCTURE: CPT

## 2021-04-13 ENCOUNTER — IMMUNIZATIONS (OUTPATIENT)
Dept: FAMILY MEDICINE CLINIC | Facility: HOSPITAL | Age: 66
End: 2021-04-13

## 2021-04-13 DIAGNOSIS — Z23 ENCOUNTER FOR IMMUNIZATION: Primary | ICD-10-CM

## 2021-04-13 PROCEDURE — 91301 SARS-COV-2 / COVID-19 MRNA VACCINE (MODERNA) 100 MCG: CPT

## 2021-04-13 PROCEDURE — 0011A SARS-COV-2 / COVID-19 MRNA VACCINE (MODERNA) 100 MCG: CPT

## 2021-04-19 ENCOUNTER — CONSULT (OUTPATIENT)
Dept: BARIATRICS | Facility: CLINIC | Age: 66
End: 2021-04-19
Payer: COMMERCIAL

## 2021-04-19 VITALS
BODY MASS INDEX: 36.96 KG/M2 | DIASTOLIC BLOOD PRESSURE: 92 MMHG | TEMPERATURE: 96.1 F | HEART RATE: 83 BPM | WEIGHT: 208.6 LBS | SYSTOLIC BLOOD PRESSURE: 160 MMHG | HEIGHT: 63 IN

## 2021-04-19 DIAGNOSIS — G47.33 OBSTRUCTIVE SLEEP APNEA, ADULT: ICD-10-CM

## 2021-04-19 DIAGNOSIS — I50.32 CHRONIC DIASTOLIC CONGESTIVE HEART FAILURE (HCC): ICD-10-CM

## 2021-04-19 DIAGNOSIS — E78.5 DYSLIPIDEMIA: ICD-10-CM

## 2021-04-19 DIAGNOSIS — E66.9 OBESITY (BMI 30-39.9): Primary | ICD-10-CM

## 2021-04-19 DIAGNOSIS — I10 ESSENTIAL HYPERTENSION: ICD-10-CM

## 2021-04-19 PROCEDURE — 3080F DIAST BP >= 90 MM HG: CPT | Performed by: FAMILY MEDICINE

## 2021-04-19 PROCEDURE — 1160F RVW MEDS BY RX/DR IN RCRD: CPT | Performed by: FAMILY MEDICINE

## 2021-04-19 PROCEDURE — 3008F BODY MASS INDEX DOCD: CPT | Performed by: INTERNAL MEDICINE

## 2021-04-19 PROCEDURE — 3077F SYST BP >= 140 MM HG: CPT | Performed by: FAMILY MEDICINE

## 2021-04-19 PROCEDURE — 3008F BODY MASS INDEX DOCD: CPT | Performed by: FAMILY MEDICINE

## 2021-04-19 PROCEDURE — 99203 OFFICE O/P NEW LOW 30 MIN: CPT | Performed by: FAMILY MEDICINE

## 2021-04-19 NOTE — PROGRESS NOTES
Assessment/Plan:        Diagnoses and all orders for this visit:    Obesity (BMI 30-39 9)  -     Ambulatory referral to Weight Management    Essential hypertension    Obstructive sleep apnea, adult    Dyslipidemia    Chronic diastolic congestive heart failure ([facility] [address] )      -Discussed options of HealthyCORE-Intensive Lifestyle Intervention Program, Very Low Calorie Diet-VLCD and Conservative Program and the role of weight loss medications   -Initial weight loss goal of 5-10% weight loss for improved health  -Screening labs  -Patient is interested in pursuing Conservative Program  - start food journal  - use stationary bike 10-15min x 4 times a week    Goals:  Food log (ie ) www myfitnesspal com,sparkpeople  com,loseit com,calorieking  com,etc  baritastic  No sugary beverages  At least 64oz of water daily  Increase physical activity by 10 minutes daily  Gradually increase physical activity to a goal of 5 days per week for 30 minutes of MODERATE intensity PLUS 2 days per week of FULL BODY resistance training  3466-3336 calories per day meal plan provided    45 minute visit, >50% face-to-face time spent counseling patient on surgical and nonsurgical interventions for the treatment of excess weight  Discussed in detail nonsurgical options including intensive lifestyle intervention program, very low-calorie diet program and conservative program   Discussed the role of weight loss medications  Counseled patient on diet behavior and exercise modification for weight loss  Follow up in approximately 1 month with Non-Surgical Physician/Advanced Practitioner  Subjective:   Chief Complaint   Patient presents with    Consult     [facility] consult       Patient ID: Lluvia Candelario  is a 77 y o  female with excess weight/obesity here to pursue weight management      Past Medical History:   Diagnosis Date    Chronic ankle pain     Unspec laterality, Last assessed - 6/22/16    Chronic hepatitis C ([facility] [address] )     Complete left bundle branch block (LBBB)     COPD (chronic obstructive pulmonary disease) (HCC)     Eczema     B/L elbows, start Hydrocortisone; Last assessed - 8/5/15    HLD (hyperlipidemia)     Hypertension     Osteoarthritis, knee     Rectal polyp     Sleep apnea        HPI:  Obesity/Excess Weight:  Severity: class 2  Onset:  Last 2-3 years    Modifiers: Diet and Exercise  Contributing factors: Poor Food Choices and Insufficient Physical Activity  Associated symptoms: fatigue, increased shortness of breath and clothes do not fit    Goals: 150lbs  Hydration:  Alcohol: rarely on holidays  Smoking: former  Exercise: walking every day as her mode of transportation  Sleep: 6hr  STOP bang: +LUIZA w CPAP    Social:  Lives with alone  retired    The following portions of the patient's history were reviewed and updated as appropriate: allergies, current medications, past family history, past medical history, past social history, past surgical history and problem list     Review of Systems   Constitutional: Negative for activity change and appetite change  Respiratory: Negative  Cardiovascular: Negative  Gastrointestinal: Negative  Genitourinary: Negative  Musculoskeletal: Negative for arthralgias  Skin: Negative for rash  Psychiatric/Behavioral: Negative  Objective:    /92 (BP Location: Left arm, Patient Position: Sitting, Cuff Size: Large)   Pulse 83   Temp (!) 96 1 °F (35 6 °C)   Ht 5' 2 5" (1 588 m)   Wt 94 6 kg (208 lb 9 6 oz)   BMI 37 55 kg/m²     Physical Exam     Constitutional   General appearance: Abnormal   well developed and obese  Eyes No conjunctival pallor  Ears, Nose, Mouth, and Throat Oral mucosa moist    Pulmonary   Respiratory effort: No increased work of breathing or signs of respiratory distress  Auscultation of lungs: Clear to auscultation, equal breath sounds bilaterally, no wheezes, no rales, no rhonci      Cardiovascular   Auscultation of heart: Normal rate and rhythm, normal S1 and S2, without murmurs  Examination of extremities for edema and/or varicosities: Normal   no edema  Abdomen   Abdomen: Abnormal   The abdomen was obese  Bowel sounds were normal  The abdomen was soft and nontender     Musculoskeletal   Gait and station: Normal     Psychiatric   Orientation to person, place and time: Normal     Affect: appropriate

## 2021-05-13 ENCOUNTER — IMMUNIZATIONS (OUTPATIENT)
Dept: FAMILY MEDICINE CLINIC | Facility: HOSPITAL | Age: 66
End: 2021-05-13

## 2021-05-13 ENCOUNTER — VBI (OUTPATIENT)
Dept: ADMINISTRATIVE | Facility: OTHER | Age: 66
End: 2021-05-13

## 2021-05-13 DIAGNOSIS — Z23 ENCOUNTER FOR IMMUNIZATION: Primary | ICD-10-CM

## 2021-05-13 PROCEDURE — 91301 SARS-COV-2 / COVID-19 MRNA VACCINE (MODERNA) 100 MCG: CPT

## 2021-05-13 PROCEDURE — 0012A SARS-COV-2 / COVID-19 MRNA VACCINE (MODERNA) 100 MCG: CPT

## 2021-05-19 ENCOUNTER — OFFICE VISIT (OUTPATIENT)
Dept: INTERNAL MEDICINE CLINIC | Facility: CLINIC | Age: 66
End: 2021-05-19

## 2021-05-19 VITALS
DIASTOLIC BLOOD PRESSURE: 86 MMHG | SYSTOLIC BLOOD PRESSURE: 138 MMHG | BODY MASS INDEX: 37.47 KG/M2 | WEIGHT: 208.2 LBS | TEMPERATURE: 96.3 F | HEART RATE: 76 BPM | OXYGEN SATURATION: 94 %

## 2021-05-19 DIAGNOSIS — I50.32 CHRONIC DIASTOLIC CONGESTIVE HEART FAILURE (HCC): ICD-10-CM

## 2021-05-19 DIAGNOSIS — E66.9 OBESITY (BMI 30-39.9): ICD-10-CM

## 2021-05-19 DIAGNOSIS — G47.33 OBSTRUCTIVE SLEEP APNEA, ADULT: ICD-10-CM

## 2021-05-19 DIAGNOSIS — M72.2 PLANTAR FASCIITIS: Primary | ICD-10-CM

## 2021-05-19 DIAGNOSIS — E78.5 DYSLIPIDEMIA: ICD-10-CM

## 2021-05-19 DIAGNOSIS — J30.1 SEASONAL ALLERGIC RHINITIS DUE TO POLLEN: ICD-10-CM

## 2021-05-19 DIAGNOSIS — I10 ESSENTIAL HYPERTENSION: ICD-10-CM

## 2021-05-19 PROCEDURE — 99213 OFFICE O/P EST LOW 20 MIN: CPT | Performed by: INTERNAL MEDICINE

## 2021-05-19 NOTE — PROGRESS NOTES
[facility]  INTERNAL MEDICINE OFFICE VISIT     PATIENT INFORMATION     Aria Chappell   74 y o  female   MRN: [id number]    ASSESSMENT/PLAN     Diagnoses and all orders for this visit:    Plantar fasciitis  Patient with new complaint of L sided heel pain for approximately 1 month  Reports no trauma or injury to area  Worse with walking long distance  Has tried frozen water bottle at home  Discussed pain likely 2/2 plantar fascitis  Advised to wear supportive shoes  Continue with frozen water bottle for pain relief and intermittent use of NSAIDs for severe pain only  Will refer to podiatry for potential shoe inserts, further recommendations  -     Ambulatory referral to Podiatry; Future    Chronic diastolic congestive [facility] failure ([facility] )  Patient with most recent Echo in 2016 showing Grade 1 Diastolic Dysfunction with EF 50%  No repeat since that time  Currently managed on Metoprolol, Quinapril, and HCTZ  Continue above medications - call when refills needed  Repeat Echocardiogram  Consider cardiology consult pending results of repeat echo  -     Echo complete with contrast if indicated; Future    Seasonal allergic rhinitis due to pollen  Patient reports taking OTC allegra  Intermittently uses flonase  Symptoms controlled with these medications    Obstructive sleep apnea, adult  Continue CPAP    Essential hypertension  Continue HCTZ 12 5mg daily, Metoprolol 50 BID, Quinapril 40 QD - call when refills needed    Dyslipidemia  Continue Atorvastatin 40mg daily    Obesity (BMI 30-39  9)  Following with weight management  Had some success with diet and lifestyle changes, however, did gain weight back  Patient reports summer is hard time for her to diet, and prefers to follow up with weight management after summer    Discussed ongoing lifestyle changes - increase healthy fruits and vegetables, grilled lean meats, continued daily exercise/walking    Health Maintenance  Patient completed COVID shot - Sheila Rust x2 - will defer other immunizations due to recent covid vaccine at this visit  Mammogram scheduled 8/5/2021  Repeat DXA scan in 2024    Schedule a follow-up appointment in 3 months for follow up on chronic health conditions  HEALTH MAINTENANCE     Immunization History   Administered Date(s) Administered    INFLUENZA 10/26/2015, 12/05/2016, 11/17/2017    Influenza Quadrivalent, 6-35 Months IM 12/05/2016, 11/17/2017    Influenza, high dose seasonal 0 7 mL 10/23/2018    Influenza, injectable, quadrivalent, preservative free 0 5 mL 08/22/2019    Influenza, seasonal, injectable 01/15/2014, 11/13/2014, 10/26/2015    Pneumococcal Conjugate 13-Valent 02/01/2019    SARS-CoV-2 / COVID-19 mRNA IM (Sheila Rust) 04/13/2021, 05/13/2021    Tdap 04/28/2016    Zoster 10/26/2015     CHIEF COMPLAINT     Chief Complaint   Patient presents with    Follow-up    Foot Pain     left foot pain- started since april 2021      HISTORY OF PRESENT ILLNESS     Ms Bonnie Tapia is a 63yo female with PMH significant for LUIZA on CPAP, Grade 1 Diastolic HF with EF 95%, Hypertension, Dyslipidemia, and Osteopenia who presents today for 10 week follow up of blood pressure  Patient reports BP ranges from 908-785 systolic at home  She keeps log in phone  States that HCTZ has significantly helped with lower extremity edema  Patient with new complaint of L sided heel pain for 1 month  No inciting factor including trauma or injury  States currently no back pain, no hip or buttock pain associated  Has tried rolling foot on frozen water bottle at home with little improvement  Patient reports she had to reschedule mammogram 2/2 home inspection  Now scheduled for august      Followed up with weight management and initially had 8lb weight loss  Has now gained weight back, but patient would like to continue to follow with weight management after summer  Continues to walk daily and trying to improve diet       REVIEW OF SYSTEMS Review of Systems   Constitutional: Negative for activity change, appetite change, chills and fever  HENT: Positive for hearing loss (L sided), postnasal drip and sinus pressure  Negative for rhinorrhea, sore throat and trouble swallowing  Eyes: Negative for pain and visual disturbance  Respiratory: Negative for cough and shortness of breath  Cardiovascular: Negative for chest pain and leg swelling  Gastrointestinal: Negative for abdominal pain, constipation, diarrhea, nausea and vomiting  Endocrine: Negative for polydipsia and polyuria  Genitourinary: Negative for difficulty urinating and dysuria  Musculoskeletal: Negative for arthralgias and back pain  Skin: Negative for color change and rash  Allergic/Immunologic: Positive for environmental allergies  Neurological: Negative for dizziness, light-headedness and headaches  Hematological: Does not bruise/bleed easily  Psychiatric/Behavioral: Negative for decreased concentration  The patient is not nervous/anxious  OBJECTIVE     Vitals:    05/19/21 0750   BP: 138/86   BP Location: Right arm   Patient Position: Sitting   Cuff Size: Standard   Pulse: 76   Temp: (!) 96 3 °F (35 7 °C)   TempSrc: Temporal   SpO2: 94%   Weight: 94 4 kg (208 lb 3 2 oz)     Physical Exam  Vitals signs reviewed  Constitutional:       General: She is awake  She is not in acute distress  Appearance: Normal appearance  She is obese  She is not ill-appearing, toxic-appearing or diaphoretic  HENT:      Head: Normocephalic and atraumatic  Right Ear: External ear normal       Left Ear: External ear normal       Nose: Nose normal       Mouth/Throat:      Mouth: Mucous membranes are moist       Pharynx: Oropharynx is clear  Eyes:      General:         Right eye: No discharge  Left eye: No discharge  Conjunctiva/sclera: Conjunctivae normal    Neck:      Musculoskeletal: Normal range of motion     Cardiovascular:      Rate and Rhythm: Normal rate and regular rhythm  Heart sounds: Normal heart sounds  Pulmonary:      Effort: Pulmonary effort is normal  No respiratory distress  Breath sounds: Normal breath sounds  No wheezing  Abdominal:      General: Bowel sounds are normal  There is no distension  Palpations: Abdomen is soft  Tenderness: There is no abdominal tenderness  Musculoskeletal: Normal range of motion  Feet:      Comments: Tenderness of L heel  Skin:     General: Skin is warm and dry  Neurological:      General: No focal deficit present  Mental Status: She is alert and oriented to person, place, and time  Cranial Nerves: No cranial nerve deficit  Motor: No weakness  Psychiatric:         Mood and Affect: Mood normal          Behavior: Behavior normal  Behavior is cooperative  Thought Content:  Thought content normal        CURRENT MEDICATIONS     Current Outpatient Medications:     acetaminophen (TYLENOL) 650 mg CR tablet, Take 650 mg by mouth every 8 (eight) hours as needed, Disp: , Rfl:     atorvastatin (LIPITOR) 40 mg tablet, Take 1 tablet (40 mg total) by mouth daily For cholesterol, Disp: 90 tablet, Rfl: 1    Blood Pressure Monitoring (Blood Pressure Monitor/M Cuff) MISC, Use daily, Disp: 1 each, Rfl: 0    hydrochlorothiazide (HYDRODIURIL) 12 5 mg tablet, Take 1 tablet (12 5 mg total) by mouth daily, Disp: 90 tablet, Rfl: 2    metoprolol tartrate (LOPRESSOR) 50 mg tablet, Take 1 tablet (50 mg total) by mouth 2 (two) times a day, Disp: 180 tablet, Rfl: 1    omeprazole (PriLOSEC) 40 MG capsule, Take 40 mg by mouth daily, Disp: , Rfl:     quinapril (ACCUPRIL) 40 MG tablet, Take 1 tablet (40 mg total) by mouth daily, Disp: 90 tablet, Rfl: 1    PAST MEDICAL & SURGICAL HISTORY     Past Medical History:   Diagnosis Date    Chronic ankle pain     Unspec laterality, Last assessed - 6/22/16    Chronic hepatitis C (HCC)     Complete left bundle branch block (LBBB)     COPD (chronic obstructive pulmonary disease) (HCC)     Eczema     B/L elbows, start Hydrocortisone;  Last assessed - 8/5/15    HLD (hyperlipidemia)     Hypertension     Osteoarthritis, knee     Rectal polyp     Sleep apnea      Past Surgical History:   Procedure Laterality Date    COLONOSCOPY      EYE SURGERY       SOCIAL & FAMILY HISTORY     Social History     Socioeconomic History    Marital status: Single     Spouse name: Not on file    Number of children: Not on file    Years of education: Not on file    Highest education level: Not on file   Occupational History    Not on file   Social Needs    Financial resource strain: Not hard at all    Food insecurity     Worry: Never true     Inability: Never true   [employer] needs     Medical: No     Non-medical: No   Tobacco Use    Smoking status: Former Smoker     Quit date: 1999     Years since quittin 2    Smokeless tobacco: Never Used   Substance and Sexual Activity    Alcohol use: No    Drug use: No    Sexual activity: Yes   Lifestyle    Physical activity     Days per week: 0 days     Minutes per session: 0 min    Stress: Not on file   Relationships    Social connections     Talks on phone: Not on file     Gets together: Not on file     Attends Congregational service: Not on file     Active member of club or organization: Not on file     Attends meetings of clubs or organizations: Not on file     Relationship status: Not on file    Intimate partner violence     Fear of current or ex partner: Not on file     Emotionally abused: Not on file     Physically abused: Not on file     Forced sexual activity: Not on file   Other Topics Concern    Not on file   Social History Narrative    Home environment-safe         Social History     Substance and Sexual Activity   Alcohol Use No     Social History     Substance and Sexual Activity   Drug Use No     Social History     Tobacco Use   Smoking Status Former Smoker    Quit date: 1999    Years since quittin 2   Smokeless Tobacco Never Used     Family History   Problem Relation Age of Onset    Heart disease Mother         Pacemaker   Tomie Coop Hypertension Mother     Heart disease Father     Heart disease Sister     Hypertension Sister     Alcohol abuse Brother     Heart disease Brother     Cirrhosis Brother         Liver     Hypertension Brother     No Known Problems Daughter     No Known Problems Maternal Grandmother     No Known Problems Paternal Grandfather     No Known Problems Daughter     No Known Problems Sister     No Known Problems Maternal Aunt     No Known Problems Cousin      ==  Rony Mackey,   Internal Medicine Resident, PGY-1  [address] [facility] [address]   [facility] , [address] [address] [facility], [address] [facility]  Office: [phone]  Fax: [phone]

## 2021-05-25 ENCOUNTER — CONSULT (OUTPATIENT)
Dept: MULTI SPECIALTY CLINIC | Facility: CLINIC | Age: 66
End: 2021-05-25

## 2021-05-25 VITALS
HEIGHT: 63 IN | BODY MASS INDEX: 36.32 KG/M2 | DIASTOLIC BLOOD PRESSURE: 79 MMHG | HEART RATE: 71 BPM | TEMPERATURE: 97 F | WEIGHT: 205 LBS | SYSTOLIC BLOOD PRESSURE: 135 MMHG

## 2021-05-25 DIAGNOSIS — M72.2 PLANTAR FASCIITIS: ICD-10-CM

## 2021-05-25 DIAGNOSIS — M79.672 FOOT PAIN, LEFT: Primary | ICD-10-CM

## 2021-05-25 PROCEDURE — 3075F SYST BP GE 130 - 139MM HG: CPT | Performed by: PODIATRIST

## 2021-05-25 PROCEDURE — 3008F BODY MASS INDEX DOCD: CPT | Performed by: PODIATRIST

## 2021-05-25 PROCEDURE — 1036F TOBACCO NON-USER: CPT | Performed by: PODIATRIST

## 2021-05-25 PROCEDURE — 20552 NJX 1/MLT TRIGGER POINT 1/2: CPT | Performed by: PODIATRIST

## 2021-05-25 PROCEDURE — 99213 OFFICE O/P EST LOW 20 MIN: CPT | Performed by: PODIATRIST

## 2021-05-25 PROCEDURE — 1160F RVW MEDS BY RX/DR IN RCRD: CPT | Performed by: PODIATRIST

## 2021-05-25 PROCEDURE — 3008F BODY MASS INDEX DOCD: CPT | Performed by: FAMILY MEDICINE

## 2021-05-25 PROCEDURE — 3078F DIAST BP <80 MM HG: CPT | Performed by: PODIATRIST

## 2021-05-25 RX ORDER — MELOXICAM 15 MG/1
15 TABLET ORAL DAILY
Qty: 30 TABLET | Refills: 0 | Status: SHIPPED | OUTPATIENT
Start: 2021-05-25 | End: 2021-06-15

## 2021-05-25 NOTE — PROGRESS NOTES
Podiatry Clinic Visit  Jayda Rockwell 74 y o  female MRN: [id number]  Encounter: [id number]    Assessment/Plan        Diagnoses and all orders for this visit:    Foot pain, left  -     meloxicam (MOBIC) 15 mg tablet; Take 1 tablet (15 mg total) by mouth daily    Plantar fasciitis  -     Ambulatory referral to Podiatry  -     meloxicam (MOBIC) 15 mg tablet; Take 1 tablet (15 mg total) by mouth daily           Plan:   Patient was seen and examined with all their questions and concerns addressed   Patient was educated and instructed on stretching exercises for her left foot   Patient was educated on proper footwear   Patient was prescribed Mobic 15mg   Patient's left foot was injected as described below:   Biomechanical examination performed as described below:   Patient was re-appointed for 4-weeks          Trigger Point Injection     Date/Time 5/25/2021 8:49 AM     Performed by  [facility]     Authorized by [facility]      [place] Protocol   Consent: Verbal consent obtained  Risks and benefits: risks, benefits and alternatives were discussed  Consent given by: patient  Patient understanding: patient states understanding of the procedure being performed  Patient consent: the patient's understanding of the procedure matches consent given  Site marked: the operative site was marked  Patient identity confirmed: verbally with patient        Local anesthesia used: yes      Anesthesia: local infiltration     Anesthesia   Local anesthesia used: yes  Local Anesthetic: lidocaine 1% without epinephrine  Anesthetic total: 1 5 mL     Sedation   Patient sedated: no        Specimen: no    Culture: no   Procedure Details   Procedure Notes: Patient's left heel was marked at the site of greatest pain  It was then prepped with alcohol  Ethyl chloride spray was used as a distractant   A 25g 1 5inch needle was used to inject a mixture of 1 5cc 1% lidocaine plain, 0 75cc dexamethasone, and 0 75cc Kenalog 10 into the patient's left heel  Patient tolerance: patient tolerated the procedure well with no immediate complications           - Dr Lianet Tinajero was available/present for entirety of patient encounter and present for all procedures  History of Present Illness     HPI: Jp Prajapati is a 74 y o  female who presents complaining of a one-month history of left heel pain  The patient states that it is sharp pain and sometimes brings her to tears  She states that it is worse in the morning after her first few steps of the day, but also is painful throughout the day if she walks long distances  She states that she did not acutely injure her left foot  At home, the patient has attempted use of Tylenol for pain relief, but states that this did not really help  The patient has no further podiatric complaints at this time  Review of Systems   Constitutional: Negative  HENT: Negative  Eyes: Negative  Respiratory: Negative  Cardiovascular: Negative  Gastrointestinal: Negative  Musculoskeletal: foot pain  Skin: negative  Neurological: Negative  Historical Information   Past Medical History:   Diagnosis Date    Chronic ankle pain     Unspec laterality, Last assessed - 16    Chronic hepatitis C (HCC)     Complete left bundle branch block (LBBB)     COPD (chronic obstructive pulmonary disease) (HCC)     Eczema     B/L elbows, start Hydrocortisone;  Last assessed - 8/5/15    HLD (hyperlipidemia)     Hypertension     Osteoarthritis, knee     Rectal polyp     Sleep apnea      Past Surgical History:   Procedure Laterality Date    COLONOSCOPY      EYE SURGERY       Social History   Social History     Substance and Sexual Activity   Alcohol Use No     Social History     Substance and Sexual Activity   Drug Use No     Social History     Tobacco Use   Smoking Status Former Smoker    Quit date: 1999    Years since quittin 2   Smokeless Tobacco Never Used     Family History: Family History   Problem Relation Age of Onset    Heart disease Mother         Pacemaker   Clifm Elijah Hypertension Mother     Heart disease Father     Heart disease Sister     Hypertension Sister     Alcohol abuse Brother     Heart disease Brother     Cirrhosis Brother         Liver     Hypertension Brother     No Known Problems Daughter     No Known Problems Maternal Grandmother     No Known Problems Paternal Grandfather     No Known Problems Daughter     No Known Problems Sister     No Known Problems Maternal Aunt     No Known Problems Cousin        Meds/Allergies   (Not in a hospital admission)    No Known Allergies    Objective     Current Vitals:   Blood Pressure: 135/79 (05/25/21 0742)  Pulse: 71 (05/25/21 0742)  Temperature: (!) 97 °F (36 1 °C) (05/25/21 0742)  Temp Source: Temporal (05/25/21 0742)  Height: 5' 2 5" (158 8 cm) (05/25/21 0742)  Weight - Scale: 93 kg (205 lb) (05/25/21 0742)  /79 (BP Location: Right arm, Patient Position: Sitting, Cuff Size: Large)   Pulse 71   Temp (!) 97 °F (36 1 °C) (Temporal)   Ht 5' 2 5" (1 588 m)   Wt 93 kg (205 lb)   BMI 36 90 kg/m²     Lower Extremity Exam:    Foot Exam    Musculoskeletal:  MMT is 5/5 to all compartments of the LE, +0/4 edema B/L, Hallux limitus is present  Equinus is present  No pain with passive ROM of pedal joints  Pain on palpation of the left heel, specifically at the plantar medial calcaneal tubercle      Biomechanical Exam of LE:  Ankle dorsiflexion with knee extended is limited and unable to get pass vertical on right and limited and unable to get pass vertical on left  Ankle dorsiflexion with knee flexed is limited and unable to get pass vertical on right and limited and unable to get pass vertical on left  Malleolar positioning is WNL b/l  STJ ROM WNL b/l, heel inversion is approximately 20° on right and 20° on left; heel eversion is approximately 10° on right and 10° on left; neutral calcaneal stance position is 0°   1st ray ROM WNL b/l, able to dorsiflex/plantarflex b/l  Tibial varum is 0° b/l, Resting calcaneal stance position is valgus and approximately 3-4° on right and valgus and approximately 3-4° on left  Gait analysis: WNL  Gross deformity noted: none     Vascular:   DP pulses and PT pulses are present bilaterally  , CFT< 3sec to all digits  Pedal hair is Absent  Pulse has regular rate and rhythm  Skin temperature warm to warm B/L  Dermatological:  No open Lesions  Skin of the LE is normal texture, temperature, turgor  Interdigital maceration is not present  Neurologic:  Gross sensation is intact  Protective sensation is Intact  Vibratory sensation is Intact  Sharp sensation is present

## 2021-05-25 NOTE — PATIENT INSTRUCTIONS
Plantar Fasciitis   WHAT YOU NEED TO KNOW:   Rest, ice, and foot supports can help your plantar fascia heal  You may need medicines to decrease swelling and pain  A physical therapist can teach you exercises to help improve movement and strength, and to decrease pain  DISCHARGE INSTRUCTIONS:   Contact your healthcare provider or podiatrist if:   · Your pain and swelling increase  · You develop knee, hip, or back pain  · You have questions or concerns about your condition or care  Medicines: You may  need any of the following:  · Acetaminophen  decreases pain and fever  It is available without a doctor's order  Ask how much to give your child and how often to give it  Follow directions  Read the labels of all other medicines your child uses to see if they also contain acetaminophen, or ask your child's doctor or pharmacist  Acetaminophen can cause liver damage if not taken correctly  · NSAIDs , such as ibuprofen, help decrease swelling, pain, and fever  NSAIDs can cause stomach bleeding or kidney problems in certain people  If you take blood thinner medicine, always ask your healthcare provider if NSAIDs are safe for you  Always read the medicine label and follow directions  · Take your medicine as directed  Contact your healthcare provider if you think your medicine is not helping or if you have side effects  Tell him of her if you are allergic to any medicine  Keep a list of the medicines, vitamins, and herbs you take  Include the amounts, and when and why you take them  Bring the list or the pill bottles to follow-up visits  Carry your medicine list with you in case of an emergency  Self-care:   · Wear your splint or shoe inserts as directed  You may need to wear a splint at night to keep your foot stretched while you sleep  This will help prevent sharp pain first thing in the morning  Shoe inserts will help decrease stress on your plantar fascia when you walk or exercise       · Rest as directed  Rest as much as possible to decrease swelling and prevent more damage  Ask your healthcare provider when you can return to your normal activities  · Apply ice on your plantar fascia  Ice helps prevent tissue damage and decreases swelling and pain  Fill a water bottle with water and freeze it  Wrap a towel around the bottle or cover it with a pillow case  Roll the water bottle under your foot for 10 minutes in the morning and after work  · Massage your plantar fascia as directed  This may help decrease swelling and pain  Roll a golf ball under your foot for 10 minutes  Repeat 3 times each day  · Go to physical therapy as directed  A physical therapist teaches you exercises to help improve movement and strength, and to decrease pain  Prevent plantar fasciitis:   · Maintain a healthy weight  This will help decrease stress on your feet  Ask your healthcare provider how much you should weigh  Ask him to help you create a weight loss plan if you are overweight  · Do low-impact exercises  Low-impact exercises decrease stress on your plantar fascia  Examples include swimming or bicycling  · Start new activities slowly  Increase the intensity and time gradually  · Wear shoes that fit well and support your arch  Replace your shoes before the padding or shock absorption wears out  Do not walk or  bare feet or sandals for long periods of time  · Stretch before you exercise  Ask your healthcare provider how to stretch your plantar fascia and calf muscles  Follow up with your healthcare provider or podiatrist as directed:  Write down your questions so you remember to ask them during your visits  © Copyright [address] Information is for End User's use only and may not be sold, redistributed or otherwise used for commercial purposes   All illustrations and images included in CareNotes® are the copyrighted property of [employer] M [employer]  or Mikey Palma  The above information is an  only  It is not intended as medical advice for individual conditions or treatments  Talk to your doctor, nurse or pharmacist before following any medical regimen to see if it is safe and effective for you

## 2021-05-26 ENCOUNTER — VBI (OUTPATIENT)
Dept: ADMINISTRATIVE | Facility: OTHER | Age: 66
End: 2021-05-26

## 2021-06-15 DIAGNOSIS — M79.672 FOOT PAIN, LEFT: ICD-10-CM

## 2021-06-15 DIAGNOSIS — M72.2 PLANTAR FASCIITIS: ICD-10-CM

## 2021-06-15 RX ORDER — MELOXICAM 15 MG/1
TABLET ORAL
Qty: 30 TABLET | Refills: 0 | Status: SHIPPED | OUTPATIENT
Start: 2021-06-15 | End: 2021-07-09

## 2021-06-22 ENCOUNTER — OFFICE VISIT (OUTPATIENT)
Dept: MULTI SPECIALTY CLINIC | Facility: CLINIC | Age: 66
End: 2021-06-22

## 2021-06-22 VITALS
TEMPERATURE: 97.7 F | BODY MASS INDEX: 37.83 KG/M2 | DIASTOLIC BLOOD PRESSURE: 85 MMHG | HEART RATE: 71 BPM | SYSTOLIC BLOOD PRESSURE: 156 MMHG | WEIGHT: 210.2 LBS | OXYGEN SATURATION: 95 %

## 2021-06-22 DIAGNOSIS — M25.471 RIGHT ANKLE SWELLING: ICD-10-CM

## 2021-06-22 DIAGNOSIS — M72.2 PLANTAR FASCIITIS: Primary | ICD-10-CM

## 2021-06-22 DIAGNOSIS — M79.672 FOOT PAIN, LEFT: ICD-10-CM

## 2021-06-22 PROCEDURE — 3079F DIAST BP 80-89 MM HG: CPT | Performed by: PODIATRIST

## 2021-06-22 PROCEDURE — 1160F RVW MEDS BY RX/DR IN RCRD: CPT | Performed by: PODIATRIST

## 2021-06-22 PROCEDURE — 99213 OFFICE O/P EST LOW 20 MIN: CPT | Performed by: PODIATRIST

## 2021-06-22 PROCEDURE — 3077F SYST BP >= 140 MM HG: CPT | Performed by: PODIATRIST

## 2021-06-22 NOTE — PROGRESS NOTES
Almas Rockwell 74 y o  female MRN: [id number]  Encounter: [id number]      Assessment/Plan        Diagnoses and all orders for this visit:    Plantar fasciitis    Foot pain, left    Right ankle swelling       Plan:   Patient was seen/examined  All questions and concerns addressed   Continue stretching multiple times daily, icing, and supportive shoe gear for left foot plantar fasciitis  OTC NSAIDs for "flare ups "   Right ankle/LE swelling likely 2/2 to patient's history of CHF  Would recommend compression stockings, elevation at home once cardiac workup is complete   RTC in 3 month(s)    Dr Jay Hinds was present during this entire procedure  History of Present Illness     HPI:  Christiano Warren is a 61-year-old female presenting for follow-up evaluation of left foot plantar fasciitis  She states that she is no longer taking Mobic as her pain has essentially resolved at this time  She states that she stretches multiple times per day and ices at the end of the days  She was able to walk to clinic today without pain  She also reports right ankle swelling which started a few weeks ago, she endorses history of lower extremity swelling 2/2 history of CHF  She states that this plan resolves with elevation  Denies any trauma or injury to the ankle  Review of Systems   Constitutional: Negative  HENT: Negative  Eyes: Negative  Respiratory: Negative  Cardiovascular: Negative  Gastrointestinal: Negative  Musculoskeletal: left foot plantar fascitis, right leg swelling  Skin: Negative  Neurological: Negative  Historical Information   Past Medical History:   Diagnosis Date    Chronic ankle pain     Unspec laterality, Last assessed - 6/22/16    Chronic hepatitis C (HCC)     Complete left bundle branch block (LBBB)     COPD (chronic obstructive pulmonary disease) (HCC)     Eczema     B/L elbows, start Hydrocortisone;  Last assessed - 8/5/15    HLD (hyperlipidemia)  Hypertension     Osteoarthritis, knee     Rectal polyp     Sleep apnea      Past Surgical History:   Procedure Laterality Date    COLONOSCOPY      EYE SURGERY       Social History   Social History     Substance and Sexual Activity   Alcohol Use No     Social History     Substance and Sexual Activity   Drug Use No     Social History     Tobacco Use   Smoking Status Former Smoker    Quit date: 1999    Years since quittin 3   Smokeless Tobacco Never Used     Family History:   Family History   Problem Relation Age of Onset    Heart disease Mother         Pacemaker    Hypertension Mother     Heart disease Father     Heart disease Sister     Hypertension Sister     Alcohol abuse Brother     Heart disease Brother     Cirrhosis Brother         Liver     Hypertension Brother     No Known Problems Daughter     No Known Problems Maternal Grandmother     No Known Problems Paternal Grandfather     No Known Problems Daughter     No Known Problems Sister     No Known Problems Maternal Aunt     No Known Problems Cousin        Meds/Allergies   (Not in a hospital admission)    No Known Allergies    Objective     Current Vitals:   Blood Pressure: 156/85 (21)  Pulse: 71 (21)  Temperature: 97 7 °F (36 5 °C) (21)  Temp Source: Temporal (21)  Weight - Scale: 95 3 kg (210 lb 3 2 oz) (21)  SpO2: 95 % (21)        /85 (BP Location: Right arm, Patient Position: Sitting, Cuff Size: Standard)   Pulse 71   Temp 97 7 °F (36 5 °C) (Temporal)   Wt 95 3 kg (210 lb 3 2 oz)   SpO2 95%   BMI 37 83 kg/m²       Lower Extremity Exam:    Vascular: Right foot DP/PT +2                   Left foot DP/PT +2                   There is +1 non-pitting lower extremity edema right  Musculoskeletal: There is 5/5 strength throughout the bilateral lower extremity  limited ankle range of motion with well maintained subtalar range of motion  There is no tenderness over the sinus tarsi, lateral malleolus, medial malleolus, posterior tibial tendon, peroneal tendons, lateral ligaments and origin of the plantar fascia  Neurological: Sensation to 5 07 [place]-Kelly nylon filament: positive bilaterally      Vibratory sense to distal Foot  positive bilaterally      Sharp/Dull sense is positive bilaterally      Dermatology: Skin Condition:  normal     There is not evidence of macerated tissue between toe spaces  Nail Exam: normal nails without lesions       Open ulcerations: No     Calluses: No

## 2021-06-23 ENCOUNTER — VBI (OUTPATIENT)
Dept: ADMINISTRATIVE | Facility: OTHER | Age: 66
End: 2021-06-23

## 2021-07-03 DIAGNOSIS — I50.32 CHRONIC DIASTOLIC CONGESTIVE HEART FAILURE (HCC): ICD-10-CM

## 2021-07-03 DIAGNOSIS — I10 ESSENTIAL HYPERTENSION: ICD-10-CM

## 2021-07-05 RX ORDER — ATORVASTATIN CALCIUM 40 MG/1
40 TABLET, FILM COATED ORAL DAILY
Qty: 90 TABLET | Refills: 1 | Status: SHIPPED | OUTPATIENT
Start: 2021-07-05 | End: 2021-12-30

## 2021-07-05 RX ORDER — QUINAPRIL 40 MG/1
TABLET ORAL
Qty: 90 TABLET | Refills: 1 | Status: SHIPPED | OUTPATIENT
Start: 2021-07-05 | End: 2021-09-21 | Stop reason: CLARIF

## 2021-07-05 RX ORDER — METOPROLOL TARTRATE 50 MG/1
TABLET, FILM COATED ORAL
Qty: 180 TABLET | Refills: 1 | Status: SHIPPED | OUTPATIENT
Start: 2021-07-05 | End: 2021-09-21 | Stop reason: ALTCHOICE

## 2021-07-08 ENCOUNTER — HOSPITAL ENCOUNTER (OUTPATIENT)
Dept: NON INVASIVE DIAGNOSTICS | Facility: CLINIC | Age: 66
Discharge: HOME | End: 2021-07-08
Payer: COMMERCIAL

## 2021-07-08 DIAGNOSIS — I50.32 CHRONIC DIASTOLIC CONGESTIVE HEART FAILURE (HCC): ICD-10-CM

## 2021-07-08 PROCEDURE — 93306 TTE W/DOPPLER COMPLETE: CPT | Performed by: INTERNAL MEDICINE

## 2021-07-08 PROCEDURE — 93306 TTE W/DOPPLER COMPLETE: CPT

## 2021-07-09 DIAGNOSIS — M72.2 PLANTAR FASCIITIS: ICD-10-CM

## 2021-07-09 DIAGNOSIS — M79.672 FOOT PAIN, LEFT: ICD-10-CM

## 2021-07-09 RX ORDER — MELOXICAM 15 MG/1
TABLET ORAL
Qty: 30 TABLET | Refills: 0 | Status: SHIPPED | OUTPATIENT
Start: 2021-07-09 | End: 2021-08-06

## 2021-08-02 DIAGNOSIS — M79.672 FOOT PAIN, LEFT: ICD-10-CM

## 2021-08-02 DIAGNOSIS — M72.2 PLANTAR FASCIITIS: ICD-10-CM

## 2021-08-05 ENCOUNTER — HOSPITAL ENCOUNTER (OUTPATIENT)
Dept: RADIOLOGY | Age: 66
Discharge: HOME | End: 2021-08-05
Payer: COMMERCIAL

## 2021-08-05 VITALS — HEIGHT: 62 IN | WEIGHT: 201 LBS | BODY MASS INDEX: 36.99 KG/M2

## 2021-08-05 DIAGNOSIS — Z12.31 ENCOUNTER FOR SCREENING MAMMOGRAM FOR MALIGNANT NEOPLASM OF BREAST: ICD-10-CM

## 2021-08-05 PROCEDURE — 77067 SCR MAMMO BI INCL CAD: CPT

## 2021-08-05 PROCEDURE — 77063 BREAST TOMOSYNTHESIS BI: CPT

## 2021-08-06 RX ORDER — MELOXICAM 15 MG/1
TABLET ORAL
Qty: 30 TABLET | Refills: 0 | Status: SHIPPED | OUTPATIENT
Start: 2021-08-06

## 2021-08-10 ENCOUNTER — OFFICE VISIT (OUTPATIENT)
Dept: INTERNAL MEDICINE CLINIC | Facility: CLINIC | Age: 66
End: 2021-08-10

## 2021-08-10 ENCOUNTER — APPOINTMENT (OUTPATIENT)
Dept: LAB | Facility: CLINIC | Age: 66
End: 2021-08-10
Payer: COMMERCIAL

## 2021-08-10 VITALS
WEIGHT: 202 LBS | HEART RATE: 66 BPM | BODY MASS INDEX: 36.95 KG/M2 | SYSTOLIC BLOOD PRESSURE: 126 MMHG | DIASTOLIC BLOOD PRESSURE: 83 MMHG | TEMPERATURE: 97.2 F

## 2021-08-10 DIAGNOSIS — I50.42 CHRONIC COMBINED SYSTOLIC AND DIASTOLIC CONGESTIVE HEART FAILURE (HCC): ICD-10-CM

## 2021-08-10 DIAGNOSIS — E78.5 DYSLIPIDEMIA: ICD-10-CM

## 2021-08-10 DIAGNOSIS — Z13.1 SCREENING FOR DIABETES MELLITUS: ICD-10-CM

## 2021-08-10 DIAGNOSIS — H91.93 DECREASED HEARING OF BOTH EARS: ICD-10-CM

## 2021-08-10 DIAGNOSIS — Z13.0 SCREENING FOR IRON DEFICIENCY ANEMIA: ICD-10-CM

## 2021-08-10 DIAGNOSIS — G47.33 OBSTRUCTIVE SLEEP APNEA, ADULT: ICD-10-CM

## 2021-08-10 DIAGNOSIS — I10 ESSENTIAL HYPERTENSION: Primary | ICD-10-CM

## 2021-08-10 DIAGNOSIS — Z13.220 SCREENING FOR CHOLESTEROL LEVEL: ICD-10-CM

## 2021-08-10 DIAGNOSIS — E66.9 OBESITY (BMI 30-39.9): ICD-10-CM

## 2021-08-10 DIAGNOSIS — I10 ESSENTIAL HYPERTENSION: ICD-10-CM

## 2021-08-10 PROBLEM — Z91.14 VIOLATION OF CONTROLLED SUBSTANCE AGREEMENT: Status: RESOLVED | Noted: 2019-10-23 | Resolved: 2021-08-10

## 2021-08-10 PROBLEM — Z91.148 VIOLATION OF CONTROLLED SUBSTANCE AGREEMENT: Status: RESOLVED | Noted: 2019-10-23 | Resolved: 2021-08-10

## 2021-08-10 LAB
ANION GAP SERPL CALCULATED.3IONS-SCNC: 5 MMOL/L (ref 4–13)
BASOPHILS # BLD AUTO: 0.07 THOUSANDS/ΜL (ref 0–0.1)
BASOPHILS NFR BLD AUTO: 1 % (ref 0–1)
BUN SERPL-MCNC: 13 MG/DL (ref 5–25)
CALCIUM SERPL-MCNC: 9.5 MG/DL (ref 8.3–10.1)
CHLORIDE SERPL-SCNC: 106 MMOL/L (ref 100–108)
CHOLEST SERPL-MCNC: 159 MG/DL (ref 50–200)
CO2 SERPL-SCNC: 26 MMOL/L (ref 21–32)
CREAT SERPL-MCNC: 0.71 MG/DL (ref 0.6–1.3)
EOSINOPHIL # BLD AUTO: 0.29 THOUSAND/ΜL (ref 0–0.61)
EOSINOPHIL NFR BLD AUTO: 3 % (ref 0–6)
ERYTHROCYTE [DISTWIDTH] IN BLOOD BY AUTOMATED COUNT: 13.8 % (ref 11.6–15.1)
EST. AVERAGE GLUCOSE BLD GHB EST-MCNC: 117 MG/DL
GFR SERPL CREATININE-BSD FRML MDRD: 88 ML/MIN/1.73SQ M
GLUCOSE P FAST SERPL-MCNC: 90 MG/DL (ref 65–99)
HBA1C MFR BLD: 5.7 %
HCT VFR BLD AUTO: 39 % (ref 34.8–46.1)
HDLC SERPL-MCNC: 50 MG/DL
HGB BLD-MCNC: 12.7 G/DL (ref 11.5–15.4)
IMM GRANULOCYTES # BLD AUTO: 0.01 THOUSAND/UL (ref 0–0.2)
IMM GRANULOCYTES NFR BLD AUTO: 0 % (ref 0–2)
LDLC SERPL CALC-MCNC: 82 MG/DL (ref 0–100)
LYMPHOCYTES # BLD AUTO: 2.17 THOUSANDS/ΜL (ref 0.6–4.47)
LYMPHOCYTES NFR BLD AUTO: 25 % (ref 14–44)
MCH RBC QN AUTO: 30.1 PG (ref 26.8–34.3)
MCHC RBC AUTO-ENTMCNC: 32.6 G/DL (ref 31.4–37.4)
MCV RBC AUTO: 92 FL (ref 82–98)
MONOCYTES # BLD AUTO: 0.71 THOUSAND/ΜL (ref 0.17–1.22)
MONOCYTES NFR BLD AUTO: 8 % (ref 4–12)
NEUTROPHILS # BLD AUTO: 5.44 THOUSANDS/ΜL (ref 1.85–7.62)
NEUTS SEG NFR BLD AUTO: 63 % (ref 43–75)
NRBC BLD AUTO-RTO: 0 /100 WBCS
PLATELET # BLD AUTO: 339 THOUSANDS/UL (ref 149–390)
PMV BLD AUTO: 10.7 FL (ref 8.9–12.7)
POTASSIUM SERPL-SCNC: 3.3 MMOL/L (ref 3.5–5.3)
RBC # BLD AUTO: 4.22 MILLION/UL (ref 3.81–5.12)
SODIUM SERPL-SCNC: 137 MMOL/L (ref 136–145)
TRIGL SERPL-MCNC: 134 MG/DL
WBC # BLD AUTO: 8.69 THOUSAND/UL (ref 4.31–10.16)

## 2021-08-10 PROCEDURE — 83036 HEMOGLOBIN GLYCOSYLATED A1C: CPT

## 2021-08-10 PROCEDURE — 80061 LIPID PANEL: CPT

## 2021-08-10 PROCEDURE — 85025 COMPLETE CBC W/AUTO DIFF WBC: CPT

## 2021-08-10 PROCEDURE — 80048 BASIC METABOLIC PNL TOTAL CA: CPT

## 2021-08-10 PROCEDURE — 3079F DIAST BP 80-89 MM HG: CPT | Performed by: INTERNAL MEDICINE

## 2021-08-10 PROCEDURE — 3074F SYST BP LT 130 MM HG: CPT | Performed by: INTERNAL MEDICINE

## 2021-08-10 PROCEDURE — 36415 COLL VENOUS BLD VENIPUNCTURE: CPT

## 2021-08-10 PROCEDURE — 1036F TOBACCO NON-USER: CPT | Performed by: INTERNAL MEDICINE

## 2021-08-10 PROCEDURE — 99213 OFFICE O/P EST LOW 20 MIN: CPT | Performed by: INTERNAL MEDICINE

## 2021-08-10 PROCEDURE — 1160F RVW MEDS BY RX/DR IN RCRD: CPT | Performed by: INTERNAL MEDICINE

## 2021-08-10 NOTE — PROGRESS NOTES
[facility]  INTERNAL MEDICINE OFFICE VISIT     PATIENT INFORMATION     Nilson Vivas   74 y o  female   MRN: [id number]    ASSESSMENT/PLAN     Diagnoses and all orders for this visit:    Essential hypertension  /83 in office today  Patient reports she has been exercising through [employer] program  She continues to increase the duration of time she speed walks on the treadmill, reporting she currently walks 40 minutes  She also had been improving her diet  Reports loss of approximately 10 lbs over the past 3 months and is feeling significantly better  Continue HCTZ, Quinapril, Metoprolol at this time  Continue to monitor BP at home  Follow up with cardiology next month as scheduled  -     CBC and differential; Future  -     Basic metabolic panel; Future    Screening for diabetes mellitus  Patient without history of diabetes  Most recent A1c 2017 5 9  Will recheck A1c today for screening at this time    -     HEMOGLOBIN A1C W/ EAG ESTIMATION; Future    Screening for cholesterol level  Most recent lipid panel 8/2019 with , , HDL 44, and LDL 88  Recheck lipid panel today  Continue Atorvastatin 40mg daily  -     Lipid Panel with Direct LDL reflex;  Future    Screening for iron deficiency anemia  Patient without recent CBC since 8/2019 - will repeat CBC today  -     CBC and differential; Future    Obstructive sleep apnea, adult  Compliant on CPAP    Chronic combined systolic and diastolic congestive heart failure (HCC)  Echo repeated 7/8/2021 with EF 40-45% with hypokinesis of basal inferior wall with moderate dyssynergic motion of ventricular septum and pulmonary artery systolic pressure at upper limits of normal    Currently asymptomatic  Patient referred to cardiology for potential stress test to evaluate overall heart function  Keep scheduled appointment with cards 9/21  Continue above antihypertensive regimen  Discussed low salt diet and fluid restriction    Dyslipidemia  Continue Atorvastatin    Obesity (BMI 30-39  9)  Patient reports 10lb weight loss over the past 3 months with improved diet and increased exercise through [email or web address]  BMI Counseling: Body mass index is 36 95 kg/m²  The BMI is above normal  Nutrition recommendations include reducing portion sizes, 3-5 servings of fruits/vegetables daily and reducing fast food intake  Exercise recommendations include exercising 3-5 times per week  Schedule a follow-up appointment in 3 months for chronic conditions  HEALTH MAINTENANCE     Immunization History   Administered Date(s) Administered    INFLUENZA 10/26/2015, 12/05/2016, 11/17/2017    Influenza Quadrivalent, 6-35 Months IM 12/05/2016, 11/17/2017    Influenza, high dose seasonal 0 7 mL 10/23/2018    Influenza, injectable, quadrivalent, preservative free 0 5 mL 08/22/2019    Influenza, seasonal, injectable 01/15/2014, 11/13/2014, 10/26/2015    Pneumococcal Conjugate 13-Valent 02/01/2019    SARS-CoV-2 / COVID-19 mRNA IM ([address]) 04/13/2021, 05/13/2021    Tdap 04/28/2016    Zoster 10/26/2015     CHIEF COMPLAINT     Chief Complaint   Patient presents with    Follow-up      HISTORY OF PRESENT ILLNESS     Ms Emir Patterson is a 68yo female with PMH LUIZA on CPAP, combined systolic and diastolic HF with EF 66-87%, hypertension, and dyslipidemia who presents today for 3 month follow up  Patient has no acute complaints today  She says she has been working on her diet, incorporating Slim Fast shakes as meal replacements  She also reports increasing intake of healthy fruits and vegetables  Patient has been increasing exercise, and states that she is currently going to gym multiple times per week via [employer] program  She has lost approximately 10 pounds since LOV 3 months ago  Patient has appointment scheduled with cardiologist next month to follow up new findings on echo   States no chest pain, WEAVER, palpitations, lower extremity swelling, dizziness, lightheadedness, N/V, diaphoresis  REVIEW OF SYSTEMS     Review of Systems   Constitutional: Negative for chills, diaphoresis, fatigue and fever  HENT: Positive for hearing loss  Negative for ear pain, rhinorrhea, sore throat and trouble swallowing  Eyes: Negative for pain and visual disturbance  Respiratory: Negative for cough, shortness of breath and wheezing  Cardiovascular: Negative for chest pain, palpitations and leg swelling  Gastrointestinal: Negative for abdominal pain, constipation, diarrhea and nausea  Genitourinary: Negative for difficulty urinating and dysuria  Musculoskeletal: Negative for arthralgias and back pain  Skin: Negative for color change and rash  Neurological: Negative for dizziness, weakness, light-headedness and headaches  Psychiatric/Behavioral: Negative for confusion and decreased concentration  The patient is not nervous/anxious  OBJECTIVE     Vitals:    08/10/21 0747   BP: 126/83   BP Location: Left arm   Patient Position: Sitting   Cuff Size: Large   Pulse: 66   Temp: (!) 97 2 °F (36 2 °C)   TempSrc: Temporal   Weight: 91 6 kg (202 lb)     Physical Exam  Vitals reviewed  Constitutional:       General: She is not in acute distress  Appearance: Normal appearance  She is not ill-appearing, toxic-appearing or diaphoretic  HENT:      Head: Normocephalic and atraumatic  Right Ear: External ear normal       Left Ear: External ear normal       Nose: Nose normal       Mouth/Throat:      Mouth: Mucous membranes are moist       Pharynx: Oropharynx is clear  Eyes:      General:         Right eye: No discharge  Left eye: No discharge  Conjunctiva/sclera: Conjunctivae normal    Cardiovascular:      Rate and Rhythm: Normal rate and regular rhythm  Heart sounds: Normal heart sounds  Pulmonary:      Effort: Pulmonary effort is normal  No respiratory distress  Breath sounds: Normal breath sounds   No wheezing  Abdominal:      General: Bowel sounds are normal  There is no distension  Palpations: Abdomen is soft  Tenderness: There is no abdominal tenderness  Musculoskeletal:         General: No swelling or tenderness  Normal range of motion  Cervical back: Normal range of motion  Right lower leg: No edema  Left lower leg: No edema  Skin:     General: Skin is warm and dry  Coloration: Skin is not jaundiced  Findings: No bruising  Neurological:      General: No focal deficit present  Mental Status: She is alert and oriented to person, place, and time  Cranial Nerves: No cranial nerve deficit  Motor: No weakness  Psychiatric:         Mood and Affect: Mood normal          Behavior: Behavior normal          Thought Content:  Thought content normal        CURRENT MEDICATIONS     Current Outpatient Medications:     acetaminophen (TYLENOL) 650 mg CR tablet, Take 650 mg by mouth every 8 (eight) hours as needed, Disp: , Rfl:     atorvastatin (LIPITOR) 40 mg tablet, TAKE 1 TABLET (40 MG TOTAL) BY MOUTH DAILY FOR CHOLESTEROL, Disp: 90 tablet, Rfl: 1    Blood Pressure Monitoring (Blood Pressure Monitor/M Cuff) MISC, Use daily, Disp: 1 each, Rfl: 0    hydrochlorothiazide (HYDRODIURIL) 12 5 mg tablet, Take 1 tablet (12 5 mg total) by mouth daily, Disp: 90 tablet, Rfl: 2    meloxicam (MOBIC) 15 mg tablet, TAKE 1 TABLET BY MOUTH EVERY DAY, Disp: 30 tablet, Rfl: 0    metoprolol tartrate (LOPRESSOR) 50 mg tablet, TAKE 1 TABLET BY MOUTH TWICE A DAY, Disp: 180 tablet, Rfl: 1    omeprazole (PriLOSEC) 40 MG capsule, Take 40 mg by mouth daily, Disp: , Rfl:     quinapril (ACCUPRIL) 40 MG tablet, TAKE 1 TABLET BY MOUTH EVERY DAY, Disp: 90 tablet, Rfl: 1    PAST MEDICAL & SURGICAL HISTORY     Past Medical History:   Diagnosis Date    Chronic ankle pain     Unspec laterality, Last assessed - 6/22/16    Chronic hepatitis C (HCC)     Complete left bundle branch block (LBBB)  COPD (chronic obstructive pulmonary disease) (HCC)     Eczema     B/L elbows, start Hydrocortisone; Last assessed - 8/5/15    HLD (hyperlipidemia)     Hypertension     Osteoarthritis, knee     Rectal polyp     Sleep apnea      Past Surgical History:   Procedure Laterality Date    COLONOSCOPY      EYE SURGERY       SOCIAL & FAMILY HISTORY     Social History     Socioeconomic History    Marital status:      Spouse name: Not on file    Number of children: Not on file    Years of education: Not on file    Highest education level: Not on file   Occupational History    Not on file   Tobacco Use    Smoking status: Former Smoker     Quit date: 1999     Years since quittin 4    Smokeless tobacco: Never Used   Vaping Use    Vaping Use: Never used   Substance and Sexual Activity    Alcohol use: No    Drug use: No    Sexual activity: Yes   Other Topics Concern    Not on file   Social History Narrative    Home environment-safe         Social Determinants of Health     Financial Resource Strain: Low Risk     Difficulty of Paying Living Expenses: Not hard at all   Food Insecurity: No Food Insecurity    Worried About [facility] [employer] in the Last Year: Never true    Briseyda of Food in the Last Year: Never true   Transportation Needs: No Transportation Needs    Lack of Transportation (Medical): No    Lack of Transportation (Non-Medical):  No   Physical Activity: Sufficiently Active    Days of Exercise per Week: 7 days    Minutes of Exercise per Session: 60 min   Stress: No Stress Concern Present    Feeling of Stress : Not at all   Social Connections:     Frequency of Communication with Friends and Family:     Frequency of Social Gatherings with Friends and Family:     Attends Church Services:     Active Member of Clubs or Organizations:     Attends Club or Organization Meetings:     Marital Status:    Intimate Partner Violence: Not At Risk    Fear of Current or Ex-Partner: No    Emotionally Abused: No    Physically Abused: No    Sexually Abused: No     Social History     Substance and Sexual Activity   Alcohol Use No   Social History     Substance and Sexual Activity   Drug Use No   Social History     Tobacco Use   Smoking Status Former Smoker    Quit date: 1999    Years since quittin 4   Smokeless Tobacco Never Used      Family History   Problem Relation Age of Onset    Heart disease Mother         Pacemaker   [de-identified] Hypertension Mother     Heart disease Father     Heart disease Sister     Hypertension Sister     Alcohol abuse Brother     Heart disease Brother     Cirrhosis Brother         Liver     Hypertension Brother     No Known Problems Daughter     No Known Problems Maternal Grandmother     No Known Problems Paternal Grandfather     No Known Problems Daughter     No Known Problems Sister     No Known Problems Maternal Aunt     No Known Problems Cousin         ==  Efren Wong,   Internal Medicine Resident, PGY-2  [address] Internal Medicine [facility] [address]  [address] , [address], [address] [facility]  Office: [phone]  Fax: [phone]

## 2021-08-10 NOTE — ASSESSMENT & PLAN NOTE
Wt Readings from Last 3 Encounters:   08/10/21 91 6 kg (202 lb)   08/05/21 91 2 kg (201 lb)   06/22/21 95 3 kg (210 lb 3 2 oz)     Echo 7/8/2021 significant for EF was estimated in the range of 40 % to 45 % with hypokinesis of basal inferior wall and moderate dyssynergic motion of ventricular septum

## 2021-08-23 ENCOUNTER — TELEPHONE (OUTPATIENT)
Dept: INTERNAL MEDICINE CLINIC | Facility: CLINIC | Age: 66
End: 2021-08-23

## 2021-08-23 NOTE — TELEPHONE ENCOUNTER
Please put in order for Sleep Medicine Physican to Physican (not the study)  Diagnosis is G47 33  Appointment is for 9/1/21

## 2021-08-29 DIAGNOSIS — G47.33 OBSTRUCTIVE SLEEP APNEA, ADULT: Primary | ICD-10-CM

## 2021-08-29 DIAGNOSIS — G47.33 OBSTRUCTIVE SLEEP APNEA: ICD-10-CM

## 2021-09-01 ENCOUNTER — OFFICE VISIT (OUTPATIENT)
Dept: SLEEP CENTER | Facility: CLINIC | Age: 66
End: 2021-09-01
Payer: COMMERCIAL

## 2021-09-01 VITALS
BODY MASS INDEX: 36.62 KG/M2 | HEART RATE: 81 BPM | DIASTOLIC BLOOD PRESSURE: 82 MMHG | SYSTOLIC BLOOD PRESSURE: 130 MMHG | HEIGHT: 62 IN | WEIGHT: 199 LBS

## 2021-09-01 DIAGNOSIS — G47.33 OBSTRUCTIVE SLEEP APNEA, ADULT: ICD-10-CM

## 2021-09-01 DIAGNOSIS — G47.33 OBSTRUCTIVE SLEEP APNEA: ICD-10-CM

## 2021-09-01 PROCEDURE — 3079F DIAST BP 80-89 MM HG: CPT | Performed by: INTERNAL MEDICINE

## 2021-09-01 PROCEDURE — 99213 OFFICE O/P EST LOW 20 MIN: CPT | Performed by: INTERNAL MEDICINE

## 2021-09-01 PROCEDURE — 3008F BODY MASS INDEX DOCD: CPT | Performed by: INTERNAL MEDICINE

## 2021-09-01 PROCEDURE — 1160F RVW MEDS BY RX/DR IN RCRD: CPT | Performed by: INTERNAL MEDICINE

## 2021-09-01 PROCEDURE — 1036F TOBACCO NON-USER: CPT | Performed by: INTERNAL MEDICINE

## 2021-09-01 PROCEDURE — 3075F SYST BP GE 130 - 139MM HG: CPT | Performed by: INTERNAL MEDICINE

## 2021-09-01 NOTE — PROGRESS NOTES
Progress Note - Sleep Center   Rocky Guzman GHD:[birth year] MRN: [id number]      Reason for Visit:  74 y  o female here for annual follow-up    Assessment:  Doing well on current therapy of  CPAP 6 cm for previously diagnosed obstructive sleep apnea  we discussed the recall of her machine  Plan:  Continue same    Follow up: One year    History of Present Illness:  History of LUIZA on PAP therapy  Fully compliant and deriving benefit  Review of Systems      Genitourinary none   Cardiology ankle/leg swelling   Gastrointestinal none   Neurology balance problems   Constitutional none   Integumentary itching   Psychiatry anxiety   Musculoskeletal joint pain, back pain and legs twitching/jerking   Pulmonary none   ENT ringing in ears   Endocrine none   Hematological none         I have reviewed and updated the review of systems as necessary      Historical Information    Past Medical History:   Past Medical History:   Diagnosis Date    Chronic ankle pain     Unspec laterality, Last assessed - 6/22/16    Chronic hepatitis C (HCC)     Complete left bundle branch block (LBBB)     COPD (chronic obstructive pulmonary disease) (HCC)     Eczema     B/L elbows, start Hydrocortisone; Last assessed - 8/5/15    HLD (hyperlipidemia)     Hydrocodone use disorder, moderate, in controlled environment ([address] ) 3/11/2015    Violated CSMA, stopped    Hypertension     Osteoarthritis, knee     Rectal polyp     Sleep apnea     Violation of controlled substance agreement 10/23/2019    See notes of 10-         Past Surgical History:   Past Surgical History:   Procedure Laterality Date    COLONOSCOPY      EYE SURGERY         Social History:   Social History     Socioeconomic History    Marital status:       Spouse name: None    Number of children: None    Years of education: None    Highest education level: None   Occupational History    None   Tobacco Use    Smoking status: Former Smoker     Quit date: 1999     Years since quittin 5    Smokeless tobacco: Never Used   Vaping Use    Vaping Use: Never used   Substance and Sexual Activity    Alcohol use: No    Drug use: No    Sexual activity: Yes   Other Topics Concern    None   Social History Narrative    Home environment-safe         Social Determinants of Health     Financial Resource Strain: Low Risk     Difficulty of Paying Living Expenses: Not hard at all   Food Insecurity: No Food Insecurity    Worried About Running Out of Food in the Last Year: Never true    Briseyda of Food in the Last Year: Never true   Transportation Needs: No Transportation Needs    Lack of Transportation (Medical): No    Lack of Transportation (Non-Medical):  No   Physical Activity: Sufficiently Active    Days of Exercise per Week: 7 days    Minutes of Exercise per Session: 60 min   Stress: No Stress Concern Present    Feeling of Stress : Not at all   Social Connections:     Frequency of Communication with Friends and Family:     Frequency of Social Gatherings with Friends and Family:     Attends L[employer] Services:     Active Member of Clubs or Organizations:     Attends Club or Organization Meetings:     Marital Status:    Intimate Partner Violence: Not At Risk    Fear of Current or Ex-Partner: No    Emotionally Abused: No    Physically Abused: No    Sexually Abused: No       Family History:   Family History   Problem Relation Age of Onset    Heart disease Mother         Pacemaker    Hypertension Mother     Heart disease Father     Heart disease Sister     Hypertension Sister     Alcohol abuse Brother     Heart disease Brother     Cirrhosis Brother         Liver     Hypertension Brother     No Known Problems Daughter     No Known Problems Maternal Grandmother     No Known Problems Paternal Grandfather     No Known Problems Daughter     No Known Problems Sister     No Known Problems Maternal Aunt     No Known Problems Cousin Medications/Allergies:      Current Outpatient Medications:     acetaminophen (TYLENOL) 650 mg CR tablet, Take 650 mg by mouth every 8 (eight) hours as needed, Disp: , Rfl:     atorvastatin (LIPITOR) 40 mg tablet, TAKE 1 TABLET (40 MG TOTAL) BY MOUTH DAILY FOR CHOLESTEROL, Disp: 90 tablet, Rfl: 1    Blood Pressure Monitoring (Blood Pressure Monitor/M Cuff) MISC, Use daily, Disp: 1 each, Rfl: 0    hydrochlorothiazide (HYDRODIURIL) 12 5 mg tablet, Take 1 tablet (12 5 mg total) by mouth daily, Disp: 90 tablet, Rfl: 2    meloxicam (MOBIC) 15 mg tablet, TAKE 1 TABLET BY MOUTH EVERY DAY, Disp: 30 tablet, Rfl: 0    metoprolol tartrate (LOPRESSOR) 50 mg tablet, TAKE 1 TABLET BY MOUTH TWICE A DAY, Disp: 180 tablet, Rfl: 1    omeprazole (PriLOSEC) 40 MG capsule, Take 40 mg by mouth daily, Disp: , Rfl:     quinapril (ACCUPRIL) 40 MG tablet, TAKE 1 TABLET BY MOUTH EVERY DAY, Disp: 90 tablet, Rfl: 1          Objective      Vital Signs:   Vitals:    09/01/21 1101   BP: 130/82   Pulse: 81     Antlers Sleepiness Scale: Total score: 4        Physical Exam:    General: Alert, appropriate, cooperative, overweight    Head: NC/AT    Skin: Warm, dry    Neuro: No motor abnormalities, cranial nerves appear intact    Extremity: No clubbing, cyanosis      DME Provider: [employer]        Counseling / Coordination of Care   I have spent   Fifteen minutes with the patient today in which greater than 50% of this time was spent in counseling/coordination of care regarding: equipment and compliance  Continuous Positive Airway Pressure (CPAP) therapy was prescribed to you as a medical necessity and there are risks to discontinuing use of the device, some of which may be long term    Symptoms you experienced before using CPAP may return such as snoring, apneas, excessive daytime sleepiness, hypertension, cardiac arrythmias, risk of stroke, congestive heart-failure, exacerbation of COPD and potential respiratory failure  Ultimately, it is a personal decision for you to make if you continue use of an affected device or discontinue until a replacement is provided  Unfortunately, [employer] has not yet provided us with information about available devices  According to [employer], potential risks of continuing to use an affected device include irritation of skin, eyes and respiratory tract, inflammatory response, headache, asthma, adverse effects to other organs such as kidneys and liver and toxic carcinogenic effects  Board Certified Sleep Specialist    Portions of the record may have been created with voice recognition software  Occasional wrong word or "sound a like" substitutions may have occurred due to the inherent limitations of voice recognition software  Read the chart carefully and recognize, using context, where substitutions have occurred

## 2021-09-21 ENCOUNTER — OFFICE VISIT (OUTPATIENT)
Dept: CARDIOLOGY CLINIC | Facility: CLINIC | Age: 66
End: 2021-09-21
Payer: COMMERCIAL

## 2021-09-21 VITALS
BODY MASS INDEX: 35.63 KG/M2 | SYSTOLIC BLOOD PRESSURE: 120 MMHG | OXYGEN SATURATION: 97 % | WEIGHT: 193.6 LBS | HEART RATE: 64 BPM | HEIGHT: 62 IN | DIASTOLIC BLOOD PRESSURE: 74 MMHG

## 2021-09-21 DIAGNOSIS — I27.21 PAH (PULMONARY ARTERIAL HYPERTENSION) WITH PORTAL HYPERTENSION (HCC): Primary | ICD-10-CM

## 2021-09-21 DIAGNOSIS — I10 ESSENTIAL HYPERTENSION: ICD-10-CM

## 2021-09-21 DIAGNOSIS — K76.6 PAH (PULMONARY ARTERIAL HYPERTENSION) WITH PORTAL HYPERTENSION (HCC): Primary | ICD-10-CM

## 2021-09-21 DIAGNOSIS — I50.9 HEART FAILURE WITH MID-RANGE EJECTION FRACTION (HCC): Primary | ICD-10-CM

## 2021-09-21 DIAGNOSIS — I50.42 CHRONIC COMBINED SYSTOLIC AND DIASTOLIC HEART FAILURE (HCC): ICD-10-CM

## 2021-09-21 DIAGNOSIS — E78.5 DYSLIPIDEMIA: ICD-10-CM

## 2021-09-21 DIAGNOSIS — I50.9 HEART FAILURE WITH MID-RANGE EJECTION FRACTION (HCC): ICD-10-CM

## 2021-09-21 DIAGNOSIS — I44.7 LBBB (LEFT BUNDLE BRANCH BLOCK): ICD-10-CM

## 2021-09-21 DIAGNOSIS — G47.33 OBSTRUCTIVE SLEEP APNEA, ADULT: ICD-10-CM

## 2021-09-21 PROCEDURE — 93000 ELECTROCARDIOGRAM COMPLETE: CPT | Performed by: INTERNAL MEDICINE

## 2021-09-21 PROCEDURE — 3078F DIAST BP <80 MM HG: CPT | Performed by: INTERNAL MEDICINE

## 2021-09-21 PROCEDURE — 99204 OFFICE O/P NEW MOD 45 MIN: CPT | Performed by: INTERNAL MEDICINE

## 2021-09-21 PROCEDURE — 3074F SYST BP LT 130 MM HG: CPT | Performed by: INTERNAL MEDICINE

## 2021-09-21 PROCEDURE — 1160F RVW MEDS BY RX/DR IN RCRD: CPT | Performed by: INTERNAL MEDICINE

## 2021-09-21 PROCEDURE — 1036F TOBACCO NON-USER: CPT | Performed by: INTERNAL MEDICINE

## 2021-09-21 RX ORDER — SACUBITRIL AND VALSARTAN 24; 26 MG/1; MG/1
1 TABLET, FILM COATED ORAL 2 TIMES DAILY
Qty: 60 TABLET | Refills: 6 | Status: SHIPPED | OUTPATIENT
Start: 2021-09-21 | End: 2022-04-06

## 2021-09-21 RX ORDER — METOPROLOL SUCCINATE 50 MG/1
50 TABLET, EXTENDED RELEASE ORAL 2 TIMES DAILY
Qty: 60 TABLET | Refills: 3 | Status: SHIPPED | OUTPATIENT
Start: 2021-09-21 | End: 2021-12-13

## 2021-09-21 NOTE — PROGRESS NOTES
Advanced Heart Failure Outpatient Consult Note - Sebastián Rockwell 74 y o  female MRN: [id number]    Encounter: [id number]      Assessment/Plan:    Patient Active Problem List    Diagnosis Date Noted    Decreased hearing of both ears 12/30/2020    Cataract 10/23/2020    Arthropathic psoriasis ([facility] [address] ) 09/11/2017    [facility] 09/11/2017    Obesity (BMI 30-39 9) 09/11/2017    Allergic rhinitis 10/26/2015    Obstructive sleep apnea, adult 11/13/2014    Dyslipidemia 04/24/2013    Hypertension 11/08/2012     Heart failure with mid range ejection fraction, Stage C, NYHA  Etiology: unclear at this time  Suspect ischemic component with regional wall motion abnormalities on echo with risk factors including hypertension and hyperlipidemia although controlled  Has LBBB on EKG today, likely chronic, dyscynchrony noted on echo since 2016  No EKG available for comparison  No heavy alcohol or drug use    Weight: 193 lbs  NT proBNP:     Studies- personally reviewed by me    Echocardiogram 7/8/21  LVEF: 40-45%  LVIDd: 5cm  RV: normal size and systolic function  MR: mild  PASP: 30mmHg  RVOT: mid systolic notching suggesting elevated pulmonary vascular resistance  Other: hypokinesis of inferior and septal walls, mild to moderately increased wall thickness, mild LAE    TTE 5/6/2016: LVEF 50%  Moderate concentric hypertrophy  Hypokinesis of mid anteroseptal, basal inferior and basal to mid inferolateral walls  Grade 1 diastology   Normal RV size and systolic function    Diet:  2 g sodium diet  2000 mL fluid restriction    Neurohormonal Blockade:  --Beta-Blocker: metoprolol tartrate 50mg BID  --ACEi, ARB or ARNi: quinapril 40mg daily  --Aldosterone Receptor Blocker:  --SGLT2 Inhibitor:  --Diuretic: hydrochlorothiazide 12 5mg daily    Sudden Cardiac Death Risk Reduction:  --ICD:  LVEF >35%    Electrical Resynchronization:  --Candidacy for BiV device:    Advanced Therapies (if appropriate): --Inotrope:  --LVAD/Transplant Candidacy:    Hypertension, controlled  Hyperlipidemia  8/10/21: TC 1159  HDL 50 LDL 82  Rx: atorvastatin 40mg daily  LUIZA on CPAP  Obesity  LBBB, likely chronic as noted above    Today's Plan: We will obtain pharmacologic nuclear stress test  Change metoprolol to succinate 50mg BID  We will price check entresto and optimize medical therapy for cardiomyopathy  Salt restriction        HPI:   41-year-old female with past medical history of hypertension, hyperlipidemia, obstructive sleep apnea on CPAP, obesity who presents for evaluation of heart failure  Patient with chronic heart failure with preserved ejection fraction  Echocardiogram back in 2015 showed LVEF of 50%  Most recent echocardiogram in 7/8/2021 showed LVEF of 40-45% with hypokinesis of the inferior and septal walls  Patient notes shortness of breath since the pandemic  Attributed to weight gain  And inactivity  She short of breath walking uphill and walking up steps for couple of years  She recently started exercising and walks on treadmill for 45 mins to an hour, no shortness of breath or chest pain  Occasional lightheadedness  Uses CPAP at night  No PND orthopnea  She has intermittent leg swelling and was on hydrochlorothiazide  Remote smoking history > 20 years ago  Rare alcohol intake  No drug use        Past Medical History:   Diagnosis Date    Chronic ankle pain     Unspec laterality, Last assessed - 6/22/16    Chronic hepatitis C (HCC)     Complete left bundle branch block (LBBB)     COPD (chronic obstructive pulmonary disease) (HCC)     Eczema     B/L elbows, start Hydrocortisone;  Last assessed - 8/5/15    HLD (hyperlipidemia)     Hydro[facility], moderate, in [facility] 3/11/2015    Violated CSMA, stopped    Hypertension     Osteoarthritis, knee     Rectal polyp     Sleep apnea     Violation of controlled substance agreement 10/23/2019    See notes of 10-       Review of Systems   Constitutional: Negative for chills and fever  HENT: Negative for ear pain and sore throat  Eyes: Negative for pain and visual disturbance  Respiratory: Positive for shortness of breath  Negative for cough and chest tightness  Cardiovascular: Negative for chest pain, palpitations and leg swelling  Gastrointestinal: Negative for abdominal distention, abdominal pain and vomiting  Genitourinary: Negative for dysuria and hematuria  Musculoskeletal: Negative for arthralgias and back pain  Skin: Negative for color change and rash  Neurological: Positive for light-headedness  Negative for seizures and syncope  All other systems reviewed and are negative  No Known Allergies    Current Outpatient Medications:     atorvastatin (LIPITOR) 40 mg tablet, TAKE 1 TABLET (40 MG TOTAL) BY MOUTH DAILY FOR CHOLESTEROL, Disp: 90 tablet, Rfl: 1    Blood Pressure Monitoring (Blood Pressure Monitor/M Cuff) MISC, Use daily, Disp: 1 each, Rfl: 0    hydrochlorothiazide (HYDRODIURIL) 12 5 mg tablet, Take 1 tablet (12 5 mg total) by mouth daily, Disp: 90 tablet, Rfl: 2    meloxicam (MOBIC) 15 mg tablet, TAKE 1 TABLET BY MOUTH EVERY DAY, Disp: 30 tablet, Rfl: 0    omeprazole (PriLOSEC) 40 MG capsule, Take 40 mg by mouth daily, Disp: , Rfl:     quinapril (ACCUPRIL) 40 MG tablet, TAKE 1 TABLET BY MOUTH EVERY DAY, Disp: 90 tablet, Rfl: 1    acetaminophen (TYLENOL) 650 mg CR tablet, Take 650 mg by mouth every 8 (eight) hours as needed (Patient not taking: Reported on 9/21/2021), Disp: , Rfl:     metoprolol succinate (TOPROL-XL) 50 mg 24 hr tablet, Take 1 tablet (50 mg total) by mouth 2 (two) times a day, Disp: 60 tablet, Rfl: 3    Social History     Socioeconomic History    Marital status:       Spouse name: Not on file    Number of children: Not on file    Years of education: Not on file    Highest education level: Not on file   Occupational History    Not on file   Tobacco Use    Smoking status: Former Smoker     Quit date: 1999     Years since quittin 5    Smokeless tobacco: Never Used   Vaping Use    Vaping Use: Never used   Substance and Sexual Activity    Alcohol use: No    Drug use: No    Sexual activity: Yes   Other Topics Concern    Not on file   Social History Narrative    Home environment-safe         Social Determinants of Health     Financial Resource Strain: Low Risk     Difficulty of Paying Living Expenses: Not hard at all   Food Insecurity: No Food Insecurity    Worried About Running Out of Food in the Last Year: Never true    Briseyda of Food in the Last Year: Never true   Transportation Needs: No Transportation Needs    Lack of Transportation (Medical): No    Lack of Transportation (Non-Medical):  No   Physical Activity: Sufficiently Active    Days of Exercise per Week: 7 days    Minutes of Exercise per Session: 60 min   Stress: No Stress Concern Present    Feeling of Stress : Not at all   Social Connections:     Frequency of Communication with Friends and Family:     Frequency of Social Gatherings with Friends and Family:     Attends Sikh Services:     Active Member of Clubs or Organizations:     Attends Club or Organization Meetings:     Marital Status:    Intimate Partner Violence: Not At Risk    Fear of Current or Ex-Partner: No    Emotionally Abused: No    Physically Abused: No    Sexually Abused: No       Family History   Problem Relation Age of Onset    Heart disease Mother         Pacemaker    Hypertension Mother     Heart disease Father     Heart disease Sister     Hypertension Sister     Alcohol abuse Brother     Heart disease Brother     Cirrhosis Brother         Liver     Hypertension Brother     No Known Problems Daughter     No Known Problems Maternal Grandmother     No Known Problems Paternal Grandfather     No Known Problems Daughter     No Known Problems Sister     No Known Problems Maternal Aunt     No Known Problems Cousin        Physical Exam:    Vitals: Blood pressure 120/74, pulse 64, height 5' 2" (1 575 m), weight 87 8 kg (193 lb 9 6 oz), SpO2 97 %, not currently breastfeeding , Body mass index is 35 41 kg/m² ,   Wt Readings from Last 3 Encounters:   09/21/21 87 8 kg (193 lb 9 6 oz)   09/01/21 90 3 kg (199 lb)   08/10/21 91 6 kg (202 lb)       Physical Exam  Constitutional:       General: She is not in acute distress  Appearance: Normal appearance  HENT:      Head: Normocephalic and atraumatic  Mouth/Throat:      Mouth: Mucous membranes are moist    Eyes:      Extraocular Movements: Extraocular movements intact  Conjunctiva/sclera: Conjunctivae normal    Cardiovascular:      Rate and Rhythm: Normal rate and regular rhythm  Pulses: Normal pulses  Heart sounds: No murmur heard  No friction rub  No gallop  Comments:  Nonelevated JVP  Trace pitting edema bilaterally  Pulmonary:      Effort: Pulmonary effort is normal  No respiratory distress  Breath sounds: No wheezing, rhonchi or rales  Abdominal:      General: There is no distension  Palpations: Abdomen is soft  Tenderness: There is no abdominal tenderness  There is no guarding  Musculoskeletal:         General: No deformity or signs of injury  Cervical back: Neck supple  Skin:     General: Skin is warm and dry  Capillary Refill: Capillary refill takes less than 2 seconds  Neurological:      General: No focal deficit present  Mental Status: She is alert and oriented to person, place, and time     Psychiatric:         Mood and Affect: Mood normal          Labs & Results:    Lab Results   Component Value Date    WBC 8 69 08/10/2021    HGB 12 7 08/10/2021    HCT 39 0 08/10/2021    MCV 92 08/10/2021     08/10/2021     Lab Results   Component Value Date    SODIUM 137 08/10/2021    K 3 3 (L) 08/10/2021     08/10/2021    CO2 26 08/10/2021    BUN 13 08/10/2021    CREATININE 0 71 08/10/2021    GLUC 105 12/06/2016    CALCIUM 9 5 08/10/2021     No results found for: NTBNP   Lab Results   Component Value Date    CHOLESTEROL 159 08/10/2021    CHOLESTEROL 155 08/16/2019     Lab Results   Component Value Date    HDL 50 08/10/2021    HDL 44 08/16/2019    HDL 41 04/22/2014     Lab Results   Component Value Date    TRIG 134 08/10/2021    TRIG 115 08/16/2019    TRIG 140 04/22/2014     No results found for: Tom    EKG personally reviewed by Jaxon Fernández MD      Counseling / Coordination of Care  Time spent today 40 minutes  Greater than 50% of total time was spent with the patient and / or family counseling and / or coordination of care  We discussed diagnoses, most recent studies and any changes in treatment    Thank you for the opportunity to participate in the care of this patient      Andra Chester MD  ADVANCED HEART FAILURE AND MECHANICAL CIRCULATORY SUPPORT  [facility]

## 2021-09-21 NOTE — PATIENT INSTRUCTIONS
We will switch metoprolol to metoprolol succinate 50mg two times a day  We will price check entresto   We will do a nuclear stress test  Salt restriction

## 2021-09-27 ENCOUNTER — HOSPITAL ENCOUNTER (OUTPATIENT)
Dept: NON INVASIVE DIAGNOSTICS | Facility: CLINIC | Age: 66
Discharge: HOME | End: 2021-09-27
Payer: COMMERCIAL

## 2021-09-27 DIAGNOSIS — I50.9 HEART FAILURE WITH MID-RANGE EJECTION FRACTION (HCC): ICD-10-CM

## 2021-09-27 LAB
CHEST PAIN STATEMENT: NORMAL
MAX DIASTOLIC BP: 80 MMHG
MAX HEART RATE: 94 BPM
MAX PREDICTED HEART RATE: 152 BPM
MAX. SYSTOLIC BP: 176 MMHG
PROTOCOL NAME: NORMAL
REASON FOR TERMINATION: NORMAL
TARGET HR FORMULA: NORMAL
TEST INDICATION: NORMAL
TIME IN EXERCISE PHASE: NORMAL

## 2021-09-27 PROCEDURE — 93017 CV STRESS TEST TRACING ONLY: CPT

## 2021-09-27 PROCEDURE — 93018 CV STRESS TEST I&R ONLY: CPT | Performed by: INTERNAL MEDICINE

## 2021-09-27 PROCEDURE — 93016 CV STRESS TEST SUPVJ ONLY: CPT | Performed by: INTERNAL MEDICINE

## 2021-09-27 PROCEDURE — G1004 CDSM NDSC: HCPCS

## 2021-09-27 PROCEDURE — 78452 HT MUSCLE IMAGE SPECT MULT: CPT

## 2021-09-27 PROCEDURE — A9502 TC99M TETROFOSMIN: HCPCS

## 2021-09-27 PROCEDURE — 78452 HT MUSCLE IMAGE SPECT MULT: CPT | Performed by: INTERNAL MEDICINE

## 2021-09-27 RX ADMIN — REGADENOSON 0.4 MG: 0.08 INJECTION, SOLUTION INTRAVENOUS at 09:05

## 2021-09-28 ENCOUNTER — OFFICE VISIT (OUTPATIENT)
Dept: MULTI SPECIALTY CLINIC | Facility: CLINIC | Age: 66
End: 2021-09-28

## 2021-09-28 VITALS
SYSTOLIC BLOOD PRESSURE: 109 MMHG | BODY MASS INDEX: 35.88 KG/M2 | TEMPERATURE: 97.6 F | HEART RATE: 70 BPM | WEIGHT: 195 LBS | HEIGHT: 62 IN | DIASTOLIC BLOOD PRESSURE: 50 MMHG

## 2021-09-28 DIAGNOSIS — M72.2 PLANTAR FASCIITIS: Primary | ICD-10-CM

## 2021-09-28 DIAGNOSIS — L60.3 NAIL DYSTROPHY: ICD-10-CM

## 2021-09-28 PROCEDURE — 1036F TOBACCO NON-USER: CPT | Performed by: PODIATRIST

## 2021-09-28 PROCEDURE — 3008F BODY MASS INDEX DOCD: CPT | Performed by: PODIATRIST

## 2021-09-28 PROCEDURE — 99211 OFF/OP EST MAY X REQ PHY/QHP: CPT | Performed by: PODIATRIST

## 2021-09-28 PROCEDURE — 3078F DIAST BP <80 MM HG: CPT | Performed by: PODIATRIST

## 2021-09-28 PROCEDURE — 3074F SYST BP LT 130 MM HG: CPT | Performed by: PODIATRIST

## 2021-09-28 PROCEDURE — 3008F BODY MASS INDEX DOCD: CPT | Performed by: INTERNAL MEDICINE

## 2021-09-28 PROCEDURE — 1160F RVW MEDS BY RX/DR IN RCRD: CPT | Performed by: PODIATRIST

## 2021-09-28 NOTE — PROGRESS NOTES
Podiatry Clinic Visit  Qi Puckett 74 y o  female MRN: [id number]  Encounter: [id number]    Assessment/Plan        Diagnoses and all orders for this visit:    Plantar fasciitis    Nail dystrophy           Plan:   Patient was seen and examined with all their questions and concerns addressed   Left foot plantar fasciitis has completely resolved at this time   Encouraged patient to continue with heel stretching to prevent recurrence of plantar fasciitis   Patient was told to reappoint as needed     - Dr Liliana Hernandez was available/present for entirety of patient encounter and present for all procedures  History of Present Illness     HPI: Qi Puckett is a 74 y o  female who presents for follow up on left foot plantar fasciitis  She reports that she has no pain in the left heel at this time  She reports she has been able to walk and exercise without any restrictions  She says she continues to do her stretching exercises at home  She also mentions noticing that the nails of her great toe are deformed after she had them removed with matrixectomy, however part of the nails grew back  They do not bother her at this time  The patient has no further podiatric complaints at this time  Review of Systems   Constitutional: Negative  HENT: Negative  Eyes: Negative  Respiratory: Negative  Cardiovascular: Negative  Gastrointestinal: Negative  Musculoskeletal: Negative  Skin: Dystrophic hallux nails bilaterally  Neurological: Negative  Historical Information   Past Medical History:   Diagnosis Date    Chronic ankle pain     Unspec laterality, Last assessed - 6/22/16    Chronic hepatitis C (HCC)     Complete left bundle branch block (LBBB)     COPD (chronic obstructive pulmonary disease) (HCC)     Eczema     B/L elbows, start Hydrocortisone;  Last assessed - 8/5/15    HLD (hyperlipidemia)     [facility]2015 Violated CSMA, stopped    Hypertension     Osteoarthritis, knee     Rectal polyp     Sleep apnea     Violation of controlled substance agreement 10/23/2019    See notes of 10-     Past Surgical History:   Procedure Laterality Date    COLONOSCOPY      EYE SURGERY       Social History   Social History     Substance and Sexual Activity   Alcohol Use No     Social History     Substance and Sexual Activity   Drug Use No     Social History     Tobacco Use   Smoking Status Former Smoker    Quit date: 1999    Years since quittin 6   Smokeless Tobacco Never Used     Family History:   Family History   Problem Relation Age of Onset    Heart disease Mother         Pacemaker   [place] Hypertension Mother     Heart disease Father     Heart disease Sister     Hypertension Sister     Alcohol abuse Brother     Heart disease Brother     Cirrhosis Brother         Liver     Hypertension Brother     No Known Problems Daughter     No Known Problems Maternal Grandmother     No Known Problems Paternal Grandfather     No Known Problems Daughter     No Known Problems Sister     No Known Problems Maternal Aunt     No Known Problems Cousin        Meds/Allergies   (Not in a hospital admission)    No Known Allergies    Objective     Current Vitals:   Blood Pressure: 109/50 (21)  Pulse: 70 (21)  Temperature: 97 6 °F (36 4 °C) (21)  Temp Source: Temporal (21)  Height: 5' 2" (157 5 cm) (21)  Weight - Scale: 88 5 kg (195 lb) (21)        /50 (BP Location: Left arm, Patient Position: Sitting, Cuff Size: Large)   Pulse 70   Temp 97 6 °F (36 4 °C) (Temporal)   Ht 5' 2" (1 575 m)   Wt 88 5 kg (195 lb)   BMI 35 67 kg/m²       Lower Extremity Exam:    Foot Exam    Musculoskeletal:  MMT is 5/5 to all compartments of the LE bilaterally  Ankle equinus is not present bilaterally   No pain on palpation of the left plantar heel at the insertion of the plantar fascia  Vascular:   DP & PT pulses palpable B/L  Capillary refill time <3 seconds B/L  Skin temperature WNL B/L  Pedal hair growth present bilaterally  Dermatological:  No open Lesions  Skin of the LE is normal texture, temperature, turgor  Interdigital maceration is not present  Hallux nails are dystrophic bilaterally  Neurologic:  Gross sensation is intact  Protective sensation is Intact

## 2021-10-01 ENCOUNTER — TELEPHONE (OUTPATIENT)
Dept: CARDIOLOGY CLINIC | Facility: CLINIC | Age: 66
End: 2021-10-01

## 2021-11-02 ENCOUNTER — OFFICE VISIT (OUTPATIENT)
Dept: CARDIOLOGY CLINIC | Facility: CLINIC | Age: 66
End: 2021-11-02
Payer: COMMERCIAL

## 2021-11-02 VITALS
BODY MASS INDEX: 35.43 KG/M2 | DIASTOLIC BLOOD PRESSURE: 76 MMHG | OXYGEN SATURATION: 97 % | HEART RATE: 68 BPM | SYSTOLIC BLOOD PRESSURE: 112 MMHG | HEIGHT: 62 IN | WEIGHT: 192.5 LBS

## 2021-11-02 DIAGNOSIS — G47.33 OBSTRUCTIVE SLEEP APNEA, ADULT: ICD-10-CM

## 2021-11-02 DIAGNOSIS — I50.9 HEART FAILURE WITH MID-RANGE EJECTION FRACTION (HCC): Primary | ICD-10-CM

## 2021-11-02 DIAGNOSIS — I10 ESSENTIAL HYPERTENSION: ICD-10-CM

## 2021-11-02 DIAGNOSIS — E78.5 DYSLIPIDEMIA: ICD-10-CM

## 2021-11-02 DIAGNOSIS — I44.7 LBBB (LEFT BUNDLE BRANCH BLOCK): ICD-10-CM

## 2021-11-02 PROCEDURE — 3078F DIAST BP <80 MM HG: CPT | Performed by: INTERNAL MEDICINE

## 2021-11-02 PROCEDURE — 1036F TOBACCO NON-USER: CPT | Performed by: INTERNAL MEDICINE

## 2021-11-02 PROCEDURE — 99214 OFFICE O/P EST MOD 30 MIN: CPT | Performed by: INTERNAL MEDICINE

## 2021-11-02 PROCEDURE — 1160F RVW MEDS BY RX/DR IN RCRD: CPT | Performed by: INTERNAL MEDICINE

## 2021-11-02 PROCEDURE — 3074F SYST BP LT 130 MM HG: CPT | Performed by: INTERNAL MEDICINE

## 2021-11-02 PROCEDURE — 3008F BODY MASS INDEX DOCD: CPT | Performed by: INTERNAL MEDICINE

## 2021-11-09 DIAGNOSIS — I10 ESSENTIAL HYPERTENSION: ICD-10-CM

## 2021-11-09 RX ORDER — HYDROCHLOROTHIAZIDE 12.5 MG/1
12.5 TABLET ORAL DAILY
Qty: 90 TABLET | Refills: 2 | Status: SHIPPED | OUTPATIENT
Start: 2021-11-09 | End: 2022-02-07 | Stop reason: ALTCHOICE

## 2021-11-10 ENCOUNTER — VBI (OUTPATIENT)
Dept: ADMINISTRATIVE | Facility: OTHER | Age: 66
End: 2021-11-10

## 2021-12-02 ENCOUNTER — VBI (OUTPATIENT)
Dept: ADMINISTRATIVE | Facility: OTHER | Age: 66
End: 2021-12-02

## 2021-12-03 ENCOUNTER — OFFICE VISIT (OUTPATIENT)
Dept: INTERNAL MEDICINE CLINIC | Facility: CLINIC | Age: 66
End: 2021-12-03

## 2021-12-03 VITALS
TEMPERATURE: 97.6 F | HEART RATE: 70 BPM | DIASTOLIC BLOOD PRESSURE: 80 MMHG | WEIGHT: 196 LBS | SYSTOLIC BLOOD PRESSURE: 130 MMHG | HEIGHT: 62 IN | BODY MASS INDEX: 36.07 KG/M2

## 2021-12-03 DIAGNOSIS — J30.1 SEASONAL ALLERGIC RHINITIS DUE TO POLLEN: ICD-10-CM

## 2021-12-03 DIAGNOSIS — R73.03 PREDIABETES: ICD-10-CM

## 2021-12-03 DIAGNOSIS — E87.6 HYPOKALEMIA: ICD-10-CM

## 2021-12-03 DIAGNOSIS — G47.33 OBSTRUCTIVE SLEEP APNEA, ADULT: ICD-10-CM

## 2021-12-03 DIAGNOSIS — E78.5 DYSLIPIDEMIA: ICD-10-CM

## 2021-12-03 DIAGNOSIS — E66.9 OBESITY (BMI 30-39.9): ICD-10-CM

## 2021-12-03 DIAGNOSIS — R07.89 OTHER CHEST PAIN: ICD-10-CM

## 2021-12-03 DIAGNOSIS — I50.42 CHRONIC COMBINED SYSTOLIC AND DIASTOLIC CONGESTIVE HEART FAILURE (HCC): ICD-10-CM

## 2021-12-03 DIAGNOSIS — M54.2 NECK PAIN: ICD-10-CM

## 2021-12-03 DIAGNOSIS — H91.93 DECREASED HEARING OF BOTH EARS: ICD-10-CM

## 2021-12-03 DIAGNOSIS — I10 PRIMARY HYPERTENSION: Primary | ICD-10-CM

## 2021-12-03 PROCEDURE — 99213 OFFICE O/P EST LOW 20 MIN: CPT | Performed by: HOSPITALIST

## 2021-12-03 PROCEDURE — 3075F SYST BP GE 130 - 139MM HG: CPT | Performed by: HOSPITALIST

## 2021-12-03 PROCEDURE — 3008F BODY MASS INDEX DOCD: CPT | Performed by: HOSPITALIST

## 2021-12-03 PROCEDURE — 3008F BODY MASS INDEX DOCD: CPT | Performed by: INTERNAL MEDICINE

## 2021-12-03 PROCEDURE — 1036F TOBACCO NON-USER: CPT | Performed by: HOSPITALIST

## 2021-12-03 PROCEDURE — 3079F DIAST BP 80-89 MM HG: CPT | Performed by: HOSPITALIST

## 2021-12-03 PROCEDURE — 1160F RVW MEDS BY RX/DR IN RCRD: CPT | Performed by: HOSPITALIST

## 2021-12-03 RX ORDER — NITROGLYCERIN 0.4 MG/1
0.4 TABLET SUBLINGUAL
Qty: 9 TABLET | Refills: 0 | Status: SHIPPED | OUTPATIENT
Start: 2021-12-03

## 2021-12-07 ENCOUNTER — APPOINTMENT (OUTPATIENT)
Dept: LAB | Facility: CLINIC | Age: 66
End: 2021-12-07
Payer: COMMERCIAL

## 2021-12-07 DIAGNOSIS — E87.6 HYPOKALEMIA: ICD-10-CM

## 2021-12-07 DIAGNOSIS — K76.6 PAH (PULMONARY ARTERIAL HYPERTENSION) WITH PORTAL HYPERTENSION (HCC): ICD-10-CM

## 2021-12-07 DIAGNOSIS — I27.21 PAH (PULMONARY ARTERIAL HYPERTENSION) WITH PORTAL HYPERTENSION (HCC): ICD-10-CM

## 2021-12-07 LAB
ANION GAP SERPL CALCULATED.3IONS-SCNC: 8 MMOL/L (ref 4–13)
BUN SERPL-MCNC: 18 MG/DL (ref 5–25)
CALCIUM SERPL-MCNC: 9.1 MG/DL (ref 8.3–10.1)
CHLORIDE SERPL-SCNC: 105 MMOL/L (ref 100–108)
CO2 SERPL-SCNC: 24 MMOL/L (ref 21–32)
CREAT SERPL-MCNC: 0.92 MG/DL (ref 0.6–1.3)
GFR SERPL CREATININE-BSD FRML MDRD: 64 ML/MIN/1.73SQ M
GLUCOSE P FAST SERPL-MCNC: 110 MG/DL (ref 65–99)
POTASSIUM SERPL-SCNC: 3.9 MMOL/L (ref 3.5–5.3)
SODIUM SERPL-SCNC: 137 MMOL/L (ref 136–145)

## 2021-12-07 PROCEDURE — 36415 COLL VENOUS BLD VENIPUNCTURE: CPT

## 2021-12-07 PROCEDURE — 80048 BASIC METABOLIC PNL TOTAL CA: CPT

## 2021-12-09 ENCOUNTER — TELEPHONE (OUTPATIENT)
Dept: CARDIOLOGY CLINIC | Facility: CLINIC | Age: 66
End: 2021-12-09

## 2021-12-30 DIAGNOSIS — I50.32 CHRONIC DIASTOLIC CONGESTIVE HEART FAILURE (HCC): ICD-10-CM

## 2021-12-30 RX ORDER — ATORVASTATIN CALCIUM 40 MG/1
40 TABLET, FILM COATED ORAL DAILY
Qty: 90 TABLET | Refills: 1 | Status: SHIPPED | OUTPATIENT
Start: 2021-12-30 | End: 2022-06-29

## 2022-01-03 ENCOUNTER — HOSPITAL ENCOUNTER (OUTPATIENT)
Dept: NON INVASIVE DIAGNOSTICS | Facility: CLINIC | Age: 67
Discharge: HOME | End: 2022-01-03
Payer: MEDICARE

## 2022-01-03 VITALS
WEIGHT: 196 LBS | HEIGHT: 62 IN | DIASTOLIC BLOOD PRESSURE: 80 MMHG | BODY MASS INDEX: 36.07 KG/M2 | HEART RATE: 70 BPM | SYSTOLIC BLOOD PRESSURE: 130 MMHG

## 2022-01-03 DIAGNOSIS — I50.9 HEART FAILURE WITH MID-RANGE EJECTION FRACTION (HCC): ICD-10-CM

## 2022-01-03 LAB
AORTIC ROOT: 2.7 CM
APICAL FOUR CHAMBER EJECTION FRACTION: 59 %
E WAVE DECELERATION TIME: 186 MS
FRACTIONAL SHORTENING: 37 % (ref 28–44)
INTERVENTRICULAR SEPTUM IN DIASTOLE (PARASTERNAL SHORT AXIS VIEW): 0.9 CM
LEFT ATRIUM AREA SYSTOLE SINGLE PLANE A4C: 12.3 CM2
LEFT INTERNAL DIMENSION IN SYSTOLE: 3.7 CM (ref 2.1–4)
LEFT VENTRICULAR INTERNAL DIMENSION IN DIASTOLE: 5.9 CM (ref 4.83–7.19)
LEFT VENTRICULAR POSTERIOR WALL IN END DIASTOLE: 0.9 CM
LEFT VENTRICULAR STROKE VOLUME: 111 ML
MV E'TISSUE VEL-SEP: 5 CM/S
MV PEAK A VEL: 1.18 M/S
MV PEAK E VEL: 91 CM/S
MV STENOSIS PRESSURE HALF TIME: 0 MS
RA PRESSURE ESTIMATED: 3 MMHG
RIGHT ATRIUM AREA SYSTOLE A4C: 10.6 CM2
RIGHT VENTRICLE ID DIMENSION: 3.9 CM
SL CV LV EF: 50
SL CV PED ECHO LEFT VENTRICLE DIASTOLIC VOLUME (MOD BIPLANE) 2D: 170 ML
SL CV PED ECHO LEFT VENTRICLE SYSTOLIC VOLUME (MOD BIPLANE) 2D: 60 ML
TR MAX PG: 24 MMHG
TRICUSPID VALVE PEAK REGURGITATION VELOCITY: 2.46 M/S
TRICUSPID VALVE S': 0.9 CM/S
TV PEAK GRADIENT: 24 MMHG
Z-SCORE OF LEFT VENTRICULAR DIMENSION IN END SYSTOLE: 0.02

## 2022-01-03 PROCEDURE — 93306 TTE W/DOPPLER COMPLETE: CPT

## 2022-01-03 PROCEDURE — 93306 TTE W/DOPPLER COMPLETE: CPT | Performed by: INTERNAL MEDICINE

## 2022-02-07 ENCOUNTER — OFFICE VISIT (OUTPATIENT)
Dept: CARDIOLOGY CLINIC | Facility: CLINIC | Age: 67
End: 2022-02-07
Payer: MEDICARE

## 2022-02-07 VITALS
WEIGHT: 201.7 LBS | HEIGHT: 62 IN | SYSTOLIC BLOOD PRESSURE: 160 MMHG | HEART RATE: 74 BPM | DIASTOLIC BLOOD PRESSURE: 90 MMHG | BODY MASS INDEX: 37.12 KG/M2 | OXYGEN SATURATION: 96 %

## 2022-02-07 DIAGNOSIS — I10 ESSENTIAL HYPERTENSION: ICD-10-CM

## 2022-02-07 DIAGNOSIS — E78.5 DYSLIPIDEMIA: ICD-10-CM

## 2022-02-07 DIAGNOSIS — G47.33 OBSTRUCTIVE SLEEP APNEA, ADULT: ICD-10-CM

## 2022-02-07 DIAGNOSIS — I50.9 HEART FAILURE WITH MID-RANGE EJECTION FRACTION (HCC): Primary | ICD-10-CM

## 2022-02-07 PROCEDURE — 99214 OFFICE O/P EST MOD 30 MIN: CPT | Performed by: INTERNAL MEDICINE

## 2022-02-07 RX ORDER — FUROSEMIDE 40 MG/1
40 TABLET ORAL DAILY
Qty: 30 TABLET | Refills: 1 | Status: SHIPPED | OUTPATIENT
Start: 2022-02-07 | End: 2022-04-06

## 2022-02-07 RX ORDER — POTASSIUM CHLORIDE 20 MEQ/1
40 TABLET, EXTENDED RELEASE ORAL DAILY
Qty: 60 TABLET | Refills: 1 | Status: SHIPPED | OUTPATIENT
Start: 2022-02-07 | End: 2022-02-24

## 2022-02-07 NOTE — PROGRESS NOTES
Advanced Heart Failure Outpatient Progress Note - Aziza Rockwell 74 y o  female MRN: [id number]    Encounter: [id number]      Assessment/Plan:    Patient Active Problem List    Diagnosis Date Noted    Prediabetes 12/03/2021    Decreased hearing of both ears 12/30/2020    Cataract 10/23/2020    Arthropathic psoriasis ([facility] [address] ) 09/11/2017    Chronic combined systolic and diastolic congestive heart failure ([facility] [address] ) 09/11/2017    Obesity (BMI 30-39 9) 09/11/2017    Allergic rhinitis 10/26/2015    Obstructive sleep apnea, adult 11/13/2014    Dyslipidemia 04/24/2013    Hypertension 11/08/2012     Heart failure with mid range ejection fraction, Stage C, NYHA  Etiology: Unclear  No definitive evidence of ischemia on nuclear stress test  Hypertension controlled  LBBB likely chronic  Dyscynchrony noted on echo since 2016  No heavy alcohol or drug use  Volume up on exam, warm and well perfused    Weight: 193 lbs; 192 lbs 11/2/21; 201 lbs 2/7/22  NT proBNP:      Studies- personally reviewed by me    Echo 1/3/22:  LVEF: 50%  LVIDd: 5 9cm  RV: normal size and systolic function  MR: mild  PASP:  24 mmHg  RVOT: mid systolic notching suggesting elevated pulmonary vascular resistance  Other: grade 2 diastology, no regional wall motion abnormalities seen, abnormal septal motion consistent with bundle branch block  IVC normal with normal respirophasic variation    Pharm Nuc Stress 9/27/21: Abnormal study after pharmacologic vasodilation without reproduction of symptoms  Inferior defect improved with prone imaging likely diaphragmatic attenuation  Partially reversible anteroapical defect likely shifting breast, cannot exclude small area of distal LAD ischemia  Left ventricular systolic function was reduced, without distinct regional wall motion abnormalities   LVEF 47%    Echocardiogram 7/8/21  LVEF: 40-45%  LVIDd: 5cm  RV: normal size and systolic function  MR: mild  PASP: 30mmHg  RVOT: mid systolic notching suggesting elevated pulmonary vascular resistance  Other: hypokinesis of inferior and septal walls, mild to moderately increased wall thickness, mild LAE    TTE 5/6/2016: LVEF 50%  Moderate concentric hypertrophy  Hypokinesis of mid anteroseptal, basal inferior and basal to mid inferolateral walls  Grade 1 diastology  Normal RV size and systolic function    Diet:  2 g sodium diet  2000 mL fluid restriction    Neurohormonal Blockade:  --Beta-Blocker: metoprolol succinate 50mg BID  --ACEi, ARB or ARNi: entresto 24-26mg BID  --Aldosterone Receptor Blocker:  --SGLT2 Inhibitor:  --Diuretic: hydrochlorothiazide 12 5mg daily    Sudden Cardiac Death Risk Reduction:  --ICD:  LVEF >35%    Electrical Resynchronization:  --Candidacy for BiV device:    Advanced Therapies (if appropriate): --Inotrope:  --LVAD/Transplant Candidacy:    Hypertension, elevated today, likely related to volume overload  Hyperlipidemia  8/10/21: TC 1159  HDL 50 LDL 82  Rx: atorvastatin 40mg daily  LUIZA on CPAP  Obesity  LBBB, likely chronic as noted above    Today's Plan:  Stop hydrochlorothiazide  Start furosemide 40 mg daily with potassium 40 mEq daily  BMP in 1 week  Plan to decrease furosemide to 20 mg once a day with potassium 20 mEq once a day when weight down to around 192 lbs  2 g sodium restriction  2 L fluid restriction  Daily weights  Follow-up in 2 weeks      HPI:   38-year-old female with past medical history of hypertension, hyperlipidemia, obstructive sleep apnea on CPAP, obesity who presents for evaluation of heart failure  Patient with chronic heart failure with preserved ejection fraction  Echocardiogram back in 2015 showed LVEF of 50%  Most recent echocardiogram in 7/8/2021 showed LVEF of 40-45% with hypokinesis of the inferior and septal walls  Patient notes shortness of breath since the pandemic  Attributed to weight gain  And inactivity  She short of breath walking uphill and walking up steps for couple of years    She recently started exercising and walks on treadmill for 45 mins to an hour, no shortness of breath or chest pain  Occasional lightheadedness  Uses CPAP at night  No PND orthopnea  She has intermittent leg swelling and was on hydrochlorothiazide  Remote smoking history > 20 years ago  Rare alcohol intake  No drug use    11/1/21: Here for follow up  Nuclear stress test results as above  Overall doing well  Walking 6 blocks almost everyday, no issues  Exercises and walks treadmill for 40 mins without chest pain or shortness of breath  Notes some shortness of breath walking uphill  Has been limiting salt in her diet  Adherent to medications  2/7/22: Here for follow up  Reports progressive shortness of breath on exertion over past few weeks  Weight up almost 10 lb since last visit in November  Weight up to 201 lb today  Also with abdominal fullness, leg swelling and orthopnea  Not been sleeping well night  Denies chest pain on exertion  No palpitations or lightheadedness  Has bendopnea    Past Medical History:   Diagnosis Date    Chronic ankle pain     Unspec laterality, Last assessed - 6/22/16    Chronic hepatitis C (HCC)     Complete left bundle branch block (LBBB)     COPD (chronic obstructive pulmonary disease) (HCC)     Eczema     B/L elbows, start Hydrocortisone; Last assessed - 8/5/15    HLD (hyperlipidemia)     Hydrocodone use disorder, moderate, in controlled environment ([facility] [address] ) 3/11/2015    Violated CSMA, stopped    Hypertension     Osteoarthritis, knee     Rectal polyp     Sleep apnea     Violation of controlled substance agreement 10/23/2019    See notes of 10-       Review of Systems   Constitutional: Positive for fatigue  Negative for chills and fever  HENT: Negative for ear pain and sore throat  Eyes: Negative for pain and visual disturbance  Respiratory: Positive for shortness of breath  Negative for cough and chest tightness  Cardiovascular: Positive for leg swelling   Negative for chest pain and palpitations  Gastrointestinal: Positive for abdominal distention  Negative for abdominal pain and vomiting  Genitourinary: Negative for dysuria and hematuria  Musculoskeletal: Negative for arthralgias and back pain  Skin: Negative for color change and rash  Neurological: Negative for seizures, syncope and light-headedness  All other systems reviewed and are negative  No Known Allergies    Current Outpatient Medications:     acetaminophen (TYLENOL) 650 mg CR tablet, Take 650 mg by mouth every 8 (eight) hours as needed , Disp: , Rfl:     atorvastatin (LIPITOR) 40 mg tablet, TAKE 1 TABLET (40 MG TOTAL) BY MOUTH DAILY FOR CHOLESTEROL, Disp: 90 tablet, Rfl: 1    Blood Pressure Monitoring (Blood Pressure Monitor/M Cuff) MISC, Use daily, Disp: 1 each, Rfl: 0    Diclofenac Sodium (VOLTAREN) 1 %, Apply 2 g topically 4 (four) times a day as needed (neck pain), Disp: 100 g, Rfl: 1    meloxicam (MOBIC) 15 mg tablet, TAKE 1 TABLET BY MOUTH EVERY DAY, Disp: 30 tablet, Rfl: 0    metoprolol succinate (TOPROL-XL) 50 mg 24 hr tablet, TAKE 1 TABLET BY MOUTH TWICE A DAY, Disp: 180 tablet, Rfl: 2    nitroglycerin (NITROSTAT) 0 4 mg SL tablet, Place 1 tablet (0 4 mg total) under the tongue every 5 (five) minutes as needed for chest pain (take every 5 minutes for chest pain with max 3 doses), Disp: 9 tablet, Rfl: 0    omeprazole (PriLOSEC) 40 MG capsule, Take 40 mg by mouth daily, Disp: , Rfl:     sacubitril-valsartan (Entresto) 24-26 MG TABS, Take 1 tablet by mouth 2 (two) times a day, Disp: 60 tablet, Rfl: 6    furosemide (LASIX) 40 mg tablet, Take 1 tablet (40 mg total) by mouth daily, Disp: 30 tablet, Rfl: 1    potassium chloride (K-DUR,KLOR-CON) 20 mEq tablet, Take 2 tablets (40 mEq total) by mouth daily, Disp: 60 tablet, Rfl: 1    Social History     Socioeconomic History    Marital status:       Spouse name: Not on file    Number of children: Not on file    Years of education: Not on file    Highest education level: Not on file   Occupational History    Not on file   Tobacco Use    Smoking status: Former Smoker     Quit date: 1999     Years since quittin 9    Smokeless tobacco: Never Used   Vaping Use    Vaping Use: Never used   Substance and Sexual Activity    Alcohol use: No    Drug use: No    Sexual activity: Yes   Other Topics Concern    Not on file   Social History Narrative    Home environment-safe         Social Determinants of Health     Financial Resource Strain: Low Risk     Difficulty of Paying Living Expenses: Not hard at all   Food Insecurity: No Food Insecurity    Worried About Running Out of Food in the Last Year: Never true    Briseyda of Food in the Last Year: Never true   Transportation Needs: No Transportation Needs    Lack of Transportation (Medical): No    Lack of Transportation (Non-Medical):  No   Physical Activity: Sufficiently Active    Days of Exercise per Week: 7 days    Minutes of Exercise per Session: 60 min   Stress: No Stress Concern Present    Feeling of Stress : Not at all   Social Connections: Not on file   Intimate Partner Violence: Not At Risk    Fear of Current or Ex-Partner: No    Emotionally Abused: No    Physically Abused: No    Sexually Abused: No   Housing Stability: Unknown    Unable to Pay for Housing in the Last Year: No    Number of Places Lived in the Last Year: Not on file    Unstable Housing in the Last Year: No       Family History   Problem Relation Age of Onset    Heart disease Mother         Pacemaker    Hypertension Mother     Heart disease Father     Heart disease Sister     Hypertension Sister     Alcohol abuse Brother     Heart disease Brother     Cirrhosis Brother         Liver     Hypertension Brother     No Known Problems Daughter     No Known Problems Maternal Grandmother     No Known Problems Paternal Grandfather     No Known Problems Daughter     No Known Problems Sister     No Known Problems Maternal Aunt     No Known Problems Cousin        Physical Exam:    Vitals: Blood pressure 160/90, pulse 74, height 5' 2" (1 575 m), weight 91 5 kg (201 lb 11 2 oz), SpO2 96 %, not currently breastfeeding , Body mass index is 36 89 kg/m² ,   Wt Readings from Last 3 Encounters:   22 91 5 kg (201 lb 11 2 oz)   22 88 9 kg (196 lb)   21 88 9 kg (196 lb)       Physical Exam  Constitutional:       General: She is not in acute distress  Appearance: Normal appearance  HENT:      Head: Normocephalic and atraumatic  Mouth/Throat:      Mouth: Mucous membranes are moist    Eyes:      Extraocular Movements: Extraocular movements intact  Conjunctiva/sclera: Conjunctivae normal    Cardiovascular:      Rate and Rhythm: Normal rate and regular rhythm  Pulses: Normal pulses  Heart sounds: No murmur heard  No friction rub  No gallop  Comments: Elevated 1-2cm above the clavicle sitting upright  +HJR  Pulmonary:      Effort: Pulmonary effort is normal  No respiratory distress  Breath sounds: No wheezing, rhonchi or rales  Abdominal:      General: There is no distension  Palpations: Abdomen is soft  Tenderness: There is no abdominal tenderness  There is no guarding  Musculoskeletal:         General: No deformity or signs of injury  Cervical back: Neck supple  Right lower le+ Edema present  Left lower le+ Edema present  Skin:     General: Skin is warm and dry  Capillary Refill: Capillary refill takes less than 2 seconds  Neurological:      General: No focal deficit present  Mental Status: She is alert and oriented to person, place, and time     Psychiatric:         Mood and Affect: Mood normal          Labs & Results:    Lab Results   Component Value Date    WBC 8 69 08/10/2021    HGB 12 7 08/10/2021    HCT 39 0 08/10/2021    MCV 92 08/10/2021     08/10/2021     Lab Results   Component Value Date    SODIUM 137 12/07/2021    K 3 9 12/07/2021     12/07/2021    CO2 24 12/07/2021    BUN 18 12/07/2021    CREATININE 0 92 12/07/2021    GLUC 105 12/06/2016    CALCIUM 9 1 12/07/2021     No results found for: NTBNP   Lab Results   Component Value Date    CHOLESTEROL 159 08/10/2021    CHOLESTEROL 155 08/16/2019     Lab Results   Component Value Date    HDL 50 08/10/2021    HDL 44 08/16/2019    HDL 41 04/22/2014     Lab Results   Component Value Date    TRIG 134 08/10/2021    TRIG 115 08/16/2019    TRIG 140 04/22/2014     No results found for: Tom    EKG personally reviewed by Reymundo Mora MD      Counseling / Coordination of Care  Time spent today 25 minutes  Greater than 50% of total time was spent with the patient and / or family counseling and / or coordination of care  We discussed diagnoses, most recent studies and any changes in treatment    Thank you for the opportunity to participate in the care of this patient      Sandra Martinez MD  ADVANCED HEART FAILURE AND MECHANICAL CIRCULATORY SUPPORT  [facility]

## 2022-02-15 ENCOUNTER — LAB (OUTPATIENT)
Dept: LAB | Facility: CLINIC | Age: 67
End: 2022-02-15
Payer: MEDICARE

## 2022-02-15 DIAGNOSIS — I50.9 HEART FAILURE WITH MID-RANGE EJECTION FRACTION (HCC): ICD-10-CM

## 2022-02-15 LAB
ANION GAP SERPL CALCULATED.3IONS-SCNC: 8 MMOL/L (ref 4–13)
BUN SERPL-MCNC: 20 MG/DL (ref 5–25)
CALCIUM SERPL-MCNC: 9.3 MG/DL (ref 8.3–10.1)
CHLORIDE SERPL-SCNC: 107 MMOL/L (ref 100–108)
CO2 SERPL-SCNC: 24 MMOL/L (ref 21–32)
CREAT SERPL-MCNC: 0.97 MG/DL (ref 0.6–1.3)
GFR SERPL CREATININE-BSD FRML MDRD: 60 ML/MIN/1.73SQ M
GLUCOSE P FAST SERPL-MCNC: 138 MG/DL (ref 65–99)
POTASSIUM SERPL-SCNC: 3.9 MMOL/L (ref 3.5–5.3)
SODIUM SERPL-SCNC: 139 MMOL/L (ref 136–145)

## 2022-02-15 PROCEDURE — 80048 BASIC METABOLIC PNL TOTAL CA: CPT

## 2022-02-15 PROCEDURE — 36415 COLL VENOUS BLD VENIPUNCTURE: CPT

## 2022-02-17 ENCOUNTER — TELEPHONE (OUTPATIENT)
Dept: CARDIOLOGY CLINIC | Facility: CLINIC | Age: 67
End: 2022-02-17

## 2022-02-17 NOTE — TELEPHONE ENCOUNTER
Sweta Glover, [address] [facility] Cardiology [facility] [place] [facility]  Please call patient and let her know that her kidney function is stable      I called and spoke to patient, advised of labs

## 2022-02-24 ENCOUNTER — OFFICE VISIT (OUTPATIENT)
Dept: CARDIOLOGY CLINIC | Facility: CLINIC | Age: 67
End: 2022-02-24
Payer: MEDICARE

## 2022-02-24 VITALS
HEART RATE: 86 BPM | SYSTOLIC BLOOD PRESSURE: 138 MMHG | WEIGHT: 196.5 LBS | OXYGEN SATURATION: 97 % | BODY MASS INDEX: 36.16 KG/M2 | HEIGHT: 62 IN | DIASTOLIC BLOOD PRESSURE: 80 MMHG

## 2022-02-24 DIAGNOSIS — E78.5 DYSLIPIDEMIA: ICD-10-CM

## 2022-02-24 DIAGNOSIS — I50.9 HEART FAILURE WITH MID-RANGE EJECTION FRACTION (HCC): Primary | ICD-10-CM

## 2022-02-24 DIAGNOSIS — G47.33 OBSTRUCTIVE SLEEP APNEA, ADULT: ICD-10-CM

## 2022-02-24 DIAGNOSIS — I10 ESSENTIAL HYPERTENSION: ICD-10-CM

## 2022-02-24 PROCEDURE — 99214 OFFICE O/P EST MOD 30 MIN: CPT | Performed by: INTERNAL MEDICINE

## 2022-02-24 RX ORDER — SPIRONOLACTONE 25 MG/1
25 TABLET ORAL DAILY
Qty: 30 TABLET | Refills: 2 | Status: SHIPPED | OUTPATIENT
Start: 2022-02-24 | End: 2022-03-31 | Stop reason: SDUPTHER

## 2022-02-24 RX ORDER — POTASSIUM CHLORIDE 20 MEQ/1
20 TABLET, EXTENDED RELEASE ORAL DAILY
Qty: 60 TABLET | Refills: 1
Start: 2022-02-24 | End: 2022-03-09 | Stop reason: SDUPTHER

## 2022-02-24 NOTE — PATIENT INSTRUCTIONS
Decrease potassium to 1 tablet once a day  Start spironolactone 25mg once a day  Obtain blood test (BMP) in 1 week  Home recording of blood pressure  2g sodium restriction  2L fluid restriction  Daily weights

## 2022-02-24 NOTE — PROGRESS NOTES
Advanced Heart Failure Outpatient Progress Note - To [place] 74 y o  female MRN: [id number]    Encounter: [id number]      Assessment/Plan:    Patient Active Problem List    Diagnosis Date Noted    Prediabetes 12/03/2021    Decreased hearing of both ears 12/30/2020    Cataract 10/23/2020    Arthropathic psoriasis ([facility] [address] ) 09/11/2017    Chronic combined systolic and diastolic congestive heart failure ([facility] [place] [address] ) 09/11/2017    Obesity (BMI 30-39 9) 09/11/2017    Allergic rhinitis 10/26/2015    Obstructive sleep apnea, adult 11/13/2014    Dyslipidemia 04/24/2013    Hypertension 11/08/2012     Heart failure with mid range ejection fraction, Stage C, NYHA  Etiology: Unclear  No definitive evidence of ischemia on nuclear stress test  Hypertension controlled  LBBB likely chronic  Dyscynchrony noted on echo since 2016  No heavy alcohol or drug use  Volume up on exam, warm and well perfused    Weight: 193 lbs; 192 lbs 11/2/21; 201 lbs 2/7/22; 196 lbs 2/24/22  NT proBNP:      Studies- personally reviewed by me    Echo 1/3/22:  LVEF: 50%  LVIDd: 5 9cm  RV: normal size and systolic function  MR: mild  PASP:  24 mmHg  RVOT: mid systolic notching suggesting elevated pulmonary vascular resistance  Other: grade 2 diastology, no regional wall motion abnormalities seen, abnormal septal motion consistent with bundle branch block  IVC normal with normal respirophasic variation    Pharm Nuc Stress 9/27/21: Abnormal study after pharmacologic vasodilation without reproduction of symptoms  Inferior defect improved with prone imaging likely diaphragmatic attenuation  Partially reversible anteroapical defect likely shifting breast, cannot exclude small area of distal LAD ischemia  Left ventricular systolic function was reduced, without distinct regional wall motion abnormalities   LVEF 47%    Echocardiogram 7/8/21  LVEF: 40-45%  LVIDd: 5cm  RV: normal size and systolic function  MR: mild  PASP: 30mmHg  RVOT: mid systolic notching suggesting elevated pulmonary vascular resistance  Other: hypokinesis of inferior and septal walls, mild to moderately increased wall thickness, mild LAE    TTE 5/6/2016: LVEF 50%  Moderate concentric hypertrophy  Hypokinesis of mid anteroseptal, basal inferior and basal to mid inferolateral walls  Grade 1 diastology  Normal RV size and systolic function    Diet:  2 g sodium diet  2000 mL fluid restriction    Neurohormonal Blockade:  --Beta-Blocker: metoprolol succinate 50mg BID  --ACEi, ARB or ARNi: entresto 24-26mg BID  --Aldosterone Receptor Blocker:   --SGLT2 Inhibitor:  --Diuretic: furosemide 40mg daily with potassium 20 meq BID    Sudden Cardiac Death Risk Reduction:  --ICD:  LVEF >35%    Electrical Resynchronization:  --Candidacy for BiV device:    Advanced Therapies (if appropriate): --Inotrope:  --LVAD/Transplant Candidacy:    Hypertension, need to optimize SVR reduction  Hyperlipidemia  8/10/21: TC 1159  HDL 50 LDL 82  Rx: atorvastatin 40mg daily  LUIZA on CPAP  Obesity  LBBB, likely chronic as noted above    Today's Plan:  Decrease potassium to 20 meq once a day  Start spironolactone 25mg once a day  BMP  in 1 week  Home recording of blood pressure  2 g sodium restriction  2 L fluid restriction  Daily weights  Follow-up in 4 weeks      HPI:   57-year-old female with past medical history of hypertension, hyperlipidemia, obstructive sleep apnea on CPAP, obesity who presents for evaluation of heart failure  Patient with chronic heart failure with preserved ejection fraction  Echocardiogram back in 2015 showed LVEF of 50%  Most recent echocardiogram in 7/8/2021 showed LVEF of 40-45% with hypokinesis of the inferior and septal walls  Patient notes shortness of breath since the pandemic  Attributed to weight gain  And inactivity  She short of breath walking uphill and walking up steps for couple of years    She recently started exercising and walks on treadmill for 45 mins to an hour, no shortness of breath or chest pain  Occasional lightheadedness  Uses CPAP at night  No PND orthopnea  She has intermittent leg swelling and was on hydrochlorothiazide  Remote smoking history > 20 years ago  Rare alcohol intake  No drug use    11/1/21: Here for follow up  Nuclear stress test results as above  Overall doing well  Walking 6 blocks almost everyday, no issues  Exercises and walks treadmill for 40 mins without chest pain or shortness of breath  Notes some shortness of breath walking uphill  Has been limiting salt in her diet  Adherent to medications  2/7/22: Here for follow up  Reports progressive shortness of breath on exertion over past few weeks  Weight up almost 10 lb since last visit in November  Weight up to 201 lb today  Also with abdominal fullness, leg swelling and orthopnea  Not been sleeping well night  Denies chest pain on exertion  No palpitations or lightheadedness  Has bendopnea    2/15/22: Here for follow up  Still with shortness of breath on exertion but overall improved  Weight down 5 lbs since last visit  2/15/22 Na 139 K 3 9 creatinine 0 97    Past Medical History:   Diagnosis Date    Chronic ankle pain     Unspec laterality, Last assessed - 6/22/16    Chronic hepatitis C ([facility]     Complete left bundle branch block (LBBB)     COPD (chronic obstructive pulmonary disease) (HCC)     Eczema     B/L elbows, start Hydrocortisone; Last assessed - 8/5/15    HLD (hyperlipidemia)     Hydrocodone use disorder, moderate, in controlled environment ([facility] ) 3/11/2015    Violated CSMA, stopped    Hypertension     Osteoarthritis, knee     Rectal polyp     Sleep apnea     Violation of controlled substance agreement 10/23/2019    See notes of 10-       Review of Systems   Constitutional: Positive for fatigue  Negative for chills and fever  HENT: Negative for ear pain and sore throat  Eyes: Negative for pain and visual disturbance  Respiratory: Positive for shortness of breath  Negative for cough and chest tightness  Cardiovascular: Negative for chest pain, palpitations and leg swelling  Gastrointestinal: Negative for abdominal distention, abdominal pain and vomiting  Genitourinary: Negative for dysuria and hematuria  Musculoskeletal: Negative for arthralgias and back pain  Skin: Negative for color change and rash  Neurological: Negative for seizures, syncope and light-headedness  All other systems reviewed and are negative  No Known Allergies        Current Outpatient Medications:     acetaminophen (TYLENOL) 650 mg CR tablet, Take 650 mg by mouth every 8 (eight) hours as needed , Disp: , Rfl:     atorvastatin (LIPITOR) 40 mg tablet, TAKE 1 TABLET (40 MG TOTAL) BY MOUTH DAILY FOR CHOLESTEROL, Disp: 90 tablet, Rfl: 1    Diclofenac Sodium (VOLTAREN) 1 %, Apply 2 g topically 4 (four) times a day as needed (neck pain), Disp: 100 g, Rfl: 1    furosemide (LASIX) 40 mg tablet, Take 1 tablet (40 mg total) by mouth daily, Disp: 30 tablet, Rfl: 1    meloxicam (MOBIC) 15 mg tablet, TAKE 1 TABLET BY MOUTH EVERY DAY, Disp: 30 tablet, Rfl: 0    metoprolol succinate (TOPROL-XL) 50 mg 24 hr tablet, TAKE 1 TABLET BY MOUTH TWICE A DAY, Disp: 180 tablet, Rfl: 2    nitroglycerin (NITROSTAT) 0 4 mg SL tablet, Place 1 tablet (0 4 mg total) under the tongue every 5 (five) minutes as needed for chest pain (take every 5 minutes for chest pain with max 3 doses), Disp: 9 tablet, Rfl: 0    omeprazole (PriLOSEC) 40 MG capsule, Take 40 mg by mouth daily, Disp: , Rfl:     potassium chloride (K-DUR,KLOR-CON) 20 mEq tablet, Take 1 tablet (20 mEq total) by mouth daily, Disp: 60 tablet, Rfl: 1    sacubitril-valsartan (Entresto) 24-26 MG TABS, Take 1 tablet by mouth 2 (two) times a day, Disp: 60 tablet, Rfl: 6    Blood Pressure Monitoring (Blood Pressure Monitor/M Cuff) MISC, Use daily, Disp: 1 each, Rfl: 0    spironolactone (ALDACTONE) 25 mg tablet, Take 1 tablet (25 mg total) by mouth daily, Disp: 30 tablet, Rfl: 2    Social History     Socioeconomic History    Marital status:      Spouse name: Not on file    Number of children: Not on file    Years of education: Not on file    Highest education level: Not on file   Occupational History    Not on file   Tobacco Use    Smoking status: Former Smoker     Quit date: 1999     Years since quittin 0    Smokeless tobacco: Never Used   Vaping Use    Vaping Use: Never used   Substance and Sexual Activity    Alcohol use: No    Drug use: No    Sexual activity: Yes   Other Topics Concern    Not on file   Social History Narrative    Home environment-safe         Social Determinants of Health     Financial Resource Strain: Low Risk     Difficulty of Paying Living Expenses: Not hard at all   Food Insecurity: No Food Insecurity    Worried About [employer] in the Last Year: Never true    Briseyda of Food in the Last Year: Never true   Transportation Needs: No Transportation Needs    Lack of Transportation (Medical): No    Lack of Transportation (Non-Medical):  No   Physical Activity: Sufficiently Active    Days of Exercise per Week: 7 days    Minutes of Exercise per Session: 60 min   Stress: No Stress Concern Present    Feeling of Stress : Not at all   Social Connections: Not on file   Intimate Partner Violence: Not At Risk    Fear of Current or Ex-Partner: No    Emotionally Abused: No    Physically Abused: No    Sexually Abused: No   Housing Stability: Unknown    Unable to Pay for Housing in the Last Year: No    Number of Places Lived in the Last Year: Not on file    Unstable Housing in the Last Year: No       Family History   Problem Relation Age of Onset    Heart disease Mother         Pacemaker    Hypertension Mother     Heart disease Father     Heart disease Sister     Hypertension Sister     Alcohol abuse Brother     Heart disease Brother     Cirrhosis Brother         Liver     Hypertension Brother     No Known Problems Daughter     No Known Problems Maternal Grandmother     No Known Problems Paternal Grandfather     No Known Problems Daughter     No Known Problems Sister     No Known Problems Maternal Aunt     No Known Problems Cousin        Physical Exam:    Vitals: Blood pressure 138/80, pulse 86, height 5' 2" (1 575 m), weight 89 1 kg (196 lb 8 oz), SpO2 97 %, not currently breastfeeding , Body mass index is 35 94 kg/m² ,   Wt Readings from Last 3 Encounters:   02/24/22 89 1 kg (196 lb 8 oz)   02/07/22 91 5 kg (201 lb 11 2 oz)   01/03/22 88 9 kg (196 lb)       Physical Exam  Constitutional:       General: She is not in acute distress  Appearance: Normal appearance  HENT:      Head: Normocephalic and atraumatic  Mouth/Throat:      Mouth: Mucous membranes are moist    Eyes:      Extraocular Movements: Extraocular movements intact  Conjunctiva/sclera: Conjunctivae normal    Cardiovascular:      Rate and Rhythm: Normal rate and regular rhythm  Pulses: Normal pulses  Heart sounds: No murmur heard  No friction rub  No gallop  Comments: Mildly elevated JVP  Trace pitting edema bilaterally  Pulmonary:      Effort: Pulmonary effort is normal  No respiratory distress  Breath sounds: No wheezing, rhonchi or rales  Abdominal:      General: There is no distension  Palpations: Abdomen is soft  Tenderness: There is no abdominal tenderness  There is no guarding  Musculoskeletal:         General: No deformity or signs of injury  Cervical back: Neck supple  Skin:     General: Skin is warm and dry  Capillary Refill: Capillary refill takes less than 2 seconds  Neurological:      General: No focal deficit present  Mental Status: She is alert and oriented to person, place, and time     Psychiatric:         Mood and Affect: Mood normal          Labs & Results:    Lab Results   Component Value Date    WBC 8 69 08/10/2021    HGB 12 7 08/10/2021    HCT 39 0 08/10/2021    MCV 92 08/10/2021     08/10/2021     Lab Results   Component Value Date    SODIUM 139 02/15/2022    K 3 9 02/15/2022     02/15/2022    CO2 24 02/15/2022    BUN 20 02/15/2022    CREATININE 0 97 02/15/2022    GLUC 105 12/06/2016    CALCIUM 9 3 02/15/2022     No results found for: NTBNP   Lab Results   Component Value Date    CHOLESTEROL 159 08/10/2021    CHOLESTEROL 155 08/16/2019     Lab Results   Component Value Date    HDL 50 08/10/2021    HDL 44 08/16/2019    HDL 41 04/22/2014     Lab Results   Component Value Date    TRIG 134 08/10/2021    TRIG 115 08/16/2019    TRIG 140 04/22/2014     No results found for: Tom    EKG personally reviewed by Joshua Trinh MD      Counseling / Coordination of Care  Time spent today 25 minutes  Greater than 50% of total time was spent with the patient and / or family counseling and / or coordination of care  We discussed diagnoses, most recent studies and any changes in treatment    Thank you for the opportunity to participate in the care of this patient      Opal Newman MD  ADVANCED HEART FAILURE AND MECHANICAL CIRCULATORY SUPPORT  [facility]

## 2022-03-03 ENCOUNTER — LAB (OUTPATIENT)
Dept: LAB | Facility: CLINIC | Age: 67
End: 2022-03-03
Payer: MEDICARE

## 2022-03-03 DIAGNOSIS — I50.9 HEART FAILURE WITH MID-RANGE EJECTION FRACTION (HCC): ICD-10-CM

## 2022-03-03 LAB
ANION GAP SERPL CALCULATED.3IONS-SCNC: 7 MMOL/L (ref 4–13)
BUN SERPL-MCNC: 22 MG/DL (ref 5–25)
CALCIUM SERPL-MCNC: 9.8 MG/DL (ref 8.3–10.1)
CHLORIDE SERPL-SCNC: 105 MMOL/L (ref 100–108)
CO2 SERPL-SCNC: 24 MMOL/L (ref 21–32)
CREAT SERPL-MCNC: 1.01 MG/DL (ref 0.6–1.3)
GFR SERPL CREATININE-BSD FRML MDRD: 57 ML/MIN/1.73SQ M
GLUCOSE P FAST SERPL-MCNC: 91 MG/DL (ref 65–99)
POTASSIUM SERPL-SCNC: 4.1 MMOL/L (ref 3.5–5.3)
SODIUM SERPL-SCNC: 136 MMOL/L (ref 136–145)

## 2022-03-03 PROCEDURE — 36415 COLL VENOUS BLD VENIPUNCTURE: CPT

## 2022-03-03 PROCEDURE — 80048 BASIC METABOLIC PNL TOTAL CA: CPT

## 2022-03-04 ENCOUNTER — TELEPHONE (OUTPATIENT)
Dept: CARDIOLOGY CLINIC | Facility: CLINIC | Age: 67
End: 2022-03-04

## 2022-03-04 NOTE — TELEPHONE ENCOUNTER
Bernadine Quiroga MD  P [facility] [place] [facility]  BMP normal  Continue current medications       I called pt and advised of normal labs

## 2022-03-09 DIAGNOSIS — I50.9 HEART FAILURE WITH MID-RANGE EJECTION FRACTION (HCC): ICD-10-CM

## 2022-03-11 RX ORDER — POTASSIUM CHLORIDE 20 MEQ/1
20 TABLET, EXTENDED RELEASE ORAL DAILY
Qty: 60 TABLET | Refills: 3
Start: 2022-03-11 | End: 2022-03-21

## 2022-03-21 ENCOUNTER — OFFICE VISIT (OUTPATIENT)
Dept: INTERNAL MEDICINE CLINIC | Facility: CLINIC | Age: 67
End: 2022-03-21

## 2022-03-21 VITALS
DIASTOLIC BLOOD PRESSURE: 81 MMHG | TEMPERATURE: 97.2 F | BODY MASS INDEX: 36.21 KG/M2 | SYSTOLIC BLOOD PRESSURE: 120 MMHG | HEART RATE: 84 BPM | WEIGHT: 198 LBS

## 2022-03-21 DIAGNOSIS — E66.9 OBESITY (BMI 30-39.9): ICD-10-CM

## 2022-03-21 DIAGNOSIS — R73.03 PREDIABETES: ICD-10-CM

## 2022-03-21 DIAGNOSIS — I10 PRIMARY HYPERTENSION: ICD-10-CM

## 2022-03-21 DIAGNOSIS — I50.42 CHRONIC COMBINED SYSTOLIC AND DIASTOLIC CONGESTIVE HEART FAILURE (HCC): ICD-10-CM

## 2022-03-21 DIAGNOSIS — E78.5 DYSLIPIDEMIA: ICD-10-CM

## 2022-03-21 DIAGNOSIS — I50.9 HEART FAILURE WITH MID-RANGE EJECTION FRACTION (HCC): ICD-10-CM

## 2022-03-21 DIAGNOSIS — G47.33 OBSTRUCTIVE SLEEP APNEA, ADULT: Primary | ICD-10-CM

## 2022-03-21 DIAGNOSIS — H91.93 DECREASED HEARING OF BOTH EARS: ICD-10-CM

## 2022-03-21 PROCEDURE — 3079F DIAST BP 80-89 MM HG: CPT | Performed by: INTERNAL MEDICINE

## 2022-03-21 PROCEDURE — 99213 OFFICE O/P EST LOW 20 MIN: CPT | Performed by: INTERNAL MEDICINE

## 2022-03-21 PROCEDURE — 3074F SYST BP LT 130 MM HG: CPT | Performed by: INTERNAL MEDICINE

## 2022-03-21 RX ORDER — POTASSIUM CHLORIDE 20 MEQ/1
20 TABLET, EXTENDED RELEASE ORAL DAILY
Qty: 60 TABLET | Refills: 3
Start: 2022-03-21 | End: 2022-03-31 | Stop reason: ALTCHOICE

## 2022-03-28 ENCOUNTER — TELEPHONE (OUTPATIENT)
Dept: OTHER | Facility: OTHER | Age: 67
End: 2022-03-28

## 2022-03-28 NOTE — TELEPHONE ENCOUNTER
Hakan Farrell (Self) [phone] (H)     Is calling to cancel her appointment and will call back to reschedule  Name: Hakan Farrell MRN: [id number]   Date: 3/28/2022 Status: [facility]   Time: 9:40 AM Length: 20   Visit Type: OFFICE VISIT SHORT PG [[id number]] Copay: $0 00   Provider:  Samy Wylie MD Department: [address]

## 2022-03-31 ENCOUNTER — OFFICE VISIT (OUTPATIENT)
Dept: CARDIOLOGY CLINIC | Facility: CLINIC | Age: 67
End: 2022-03-31
Payer: MEDICARE

## 2022-03-31 VITALS
HEIGHT: 62 IN | WEIGHT: 199.6 LBS | BODY MASS INDEX: 36.73 KG/M2 | DIASTOLIC BLOOD PRESSURE: 80 MMHG | SYSTOLIC BLOOD PRESSURE: 120 MMHG | HEART RATE: 73 BPM | OXYGEN SATURATION: 94 %

## 2022-03-31 DIAGNOSIS — E78.5 DYSLIPIDEMIA: ICD-10-CM

## 2022-03-31 DIAGNOSIS — I10 ESSENTIAL HYPERTENSION: ICD-10-CM

## 2022-03-31 DIAGNOSIS — G47.33 OBSTRUCTIVE SLEEP APNEA, ADULT: ICD-10-CM

## 2022-03-31 DIAGNOSIS — I50.9 HEART FAILURE WITH MID-RANGE EJECTION FRACTION (HCC): Primary | ICD-10-CM

## 2022-03-31 PROBLEM — E66.01 OBESITY, MORBID (HCC): Status: ACTIVE | Noted: 2022-03-31

## 2022-03-31 PROBLEM — N18.30 STAGE 3 CHRONIC KIDNEY DISEASE, UNSPECIFIED WHETHER STAGE 3A OR 3B CKD (HCC): Status: ACTIVE | Noted: 2022-03-31

## 2022-03-31 PROCEDURE — 99214 OFFICE O/P EST MOD 30 MIN: CPT | Performed by: INTERNAL MEDICINE

## 2022-03-31 RX ORDER — SPIRONOLACTONE 25 MG/1
25 TABLET ORAL 2 TIMES DAILY
Qty: 60 TABLET | Refills: 2 | Status: SHIPPED | OUTPATIENT
Start: 2022-03-31 | End: 2022-06-29

## 2022-03-31 NOTE — PATIENT INSTRUCTIONS
Increase spironolactone to 25mg two times a day  Stop potassium  Daily weights  2g sodium diet  2L fluid restriction

## 2022-03-31 NOTE — PROGRESS NOTES
Advanced Heart Failure Outpatient Progress Note - Zena Rockwell 74 y o  female MRN: [id number]    Encounter: [id number]      Assessment/Plan:    Patient Active Problem List    Diagnosis Date Noted    Prediabetes 12/03/2021    Decreased hearing of both ears 12/30/2020    Cataract 10/23/2020    Arthropathic psoriasis ([facility] ) 09/11/2017    Chronic combined systolic and diastolic congestive heart failure ([facility] ) 09/11/2017    Obesity (BMI 30-39 9) 09/11/2017    Allergic rhinitis 10/26/2015    Obstructive sleep apnea, adult 11/13/2014    Dyslipidemia 04/24/2013    Hypertension 11/08/2012    Obesity, morbid ([place] [id number] ) 03/31/2022    Stage 3 chronic kidney disease, unspecified whether stage 3a or 3b CKD ([place] [id number] ) 03/31/2022     Heart failure with mid range ejection fraction, Stage C, NYHA  Etiology: Unclear  No definitive evidence of ischemia on nuclear stress test  Hypertension controlled  LBBB likely chronic  Dyscynchrony noted on echo since 2016  No heavy alcohol or drug use  Mildly volume up, warm and well perfused    Weight: 193 lbs; 192 lbs 11/2/21; 201 lbs 2/7/22; 196 lbs 2/24/22; 199 lbs 3/31/22  NT proBNP:      Studies- personally reviewed by me    Echo 1/3/22:  LVEF: 50%  LVIDd: 5 9cm  RV: normal size and systolic function  MR: mild  PASP:  24 mmHg  RVOT: mid systolic notching suggesting elevated pulmonary vascular resistance  Other: grade 2 diastology, no regional wall motion abnormalities seen, abnormal septal motion consistent with bundle branch block  IVC normal with normal respirophasic variation    Pharm Nuc Stress 9/27/21: Abnormal study after pharmacologic vasodilation without reproduction of symptoms  Inferior defect improved with prone imaging likely diaphragmatic attenuation  Partially reversible anteroapical defect likely shifting breast, cannot exclude small area of distal LAD ischemia  Left ventricular systolic function was reduced, without distinct regional wall motion abnormalities   LVEF 47%    Echocardiogram 7/8/21  LVEF: 40-45%  LVIDd: 5cm  RV: normal size and systolic function  MR: mild  PASP: 30mmHg  RVOT: mid systolic notching suggesting elevated pulmonary vascular resistance  Other: hypokinesis of inferior and septal walls, mild to moderately increased wall thickness, mild LAE    TTE 5/6/2016: LVEF 50%  Moderate concentric hypertrophy  Hypokinesis of mid anteroseptal, basal inferior and basal to mid inferolateral walls  Grade 1 diastology  Normal RV size and systolic function    Diet:  2 g sodium diet  2000 mL fluid restriction    Neurohormonal Blockade:  --Beta-Blocker: metoprolol succinate 50mg BID  --ACEi, ARB or ARNi: entresto 24-26mg BID  --Aldosterone Receptor Blocker: spironolactone 25mg daily  --SGLT2 Inhibitor:  --Diuretic: furosemide 40mg daily with potassium 20 meq daily     Sudden Cardiac Death Risk Reduction:  --ICD:  LVEF >35%    Electrical Resynchronization:  --Candidacy for BiV device:    Advanced Therapies (if appropriate): --Inotrope:  --LVAD/Transplant Candidacy:    Hypertension, need to optimize SVR reduction, improving  Hyperlipidemia  8/10/21: TC 1159  HDL 50 LDL 82  Rx: atorvastatin 40mg daily  LUIZA on CPAP  Obesity  LBBB, likely chronic as noted above    Today's Plan:  Increase spironolactone to 25mg two times a day  Stop potassium  2 g sodium restriction  2 L fluid restriction  Daily weights  Follow-up in 2 months      HPI:   49-year-old female with past medical history of hypertension, hyperlipidemia, obstructive sleep apnea on CPAP, obesity who presents for evaluation of heart failure  Patient with chronic heart failure with preserved ejection fraction  Echocardiogram back in 2015 showed LVEF of 50%  Most recent echocardiogram in 7/8/2021 showed LVEF of 40-45% with hypokinesis of the inferior and septal walls  Patient notes shortness of breath since the pandemic  Attributed to weight gain  And inactivity    She short of breath walking uphill and walking up steps for couple of years  She recently started exercising and walks on treadmill for 45 mins to an hour, no shortness of breath or chest pain  Occasional lightheadedness  Uses CPAP at night  No PND orthopnea  She has intermittent leg swelling and was on hydrochlorothiazide  Remote smoking history > 20 years ago  Rare alcohol intake  No drug use    11/1/21: Here for follow up  Nuclear stress test results as above  Overall doing well  Walking 6 blocks almost everyday, no issues  Exercises and walks treadmill for 40 mins without chest pain or shortness of breath  Notes some shortness of breath walking uphill  Has been limiting salt in her diet  Adherent to medications  2/7/22: Here for follow up  Reports progressive shortness of breath on exertion over past few weeks  Weight up almost 10 lb since last visit in November  Weight up to 201 lb today  Also with abdominal fullness, leg swelling and orthopnea  Not been sleeping well night  Denies chest pain on exertion  No palpitations or lightheadedness  Has bendopnea    2/15/22: Here for follow up  Still with shortness of breath on exertion but overall improved  Weight down 5 lbs since last visit  2/15/22 Na 139 K 3 9 creatinine 0 97    3/31/22: Here for follow up  Started spironolactone 25mg daily and decreased potassium to 20 meq daily on last visit  3/3/21 Na 136 K4 1 creatinine 1 01  Eating overall okay  Still with shortness of breath on exertion  Not much change in weight  Having knee pains and mostly sedentary  No PND, orthopnea or lower extremity edema  Past Medical History:   Diagnosis Date    Chronic ankle pain     Unspec laterality, Last assessed - 6/22/16    Chronic hepatitis C (HCC)     Complete left bundle branch block (LBBB)     COPD (chronic obstructive pulmonary disease) (HCC)     Eczema     B/L elbows, start Hydrocortisone;  Last assessed - 8/5/15    HLD (hyperlipidemia)     Hydrocodone use disorder, moderate, in controlled environment ([facility] ) 3/11/2015    Violated CSMA, stopped    Hypertension     Osteoarthritis, knee     Rectal polyp     Sleep apnea     Violation of controlled substance agreement 10/23/2019    See notes of 10-       Review of Systems   Constitutional: Positive for fatigue  Negative for chills and fever  HENT: Negative for ear pain and sore throat  Eyes: Negative for pain and visual disturbance  Respiratory: Positive for shortness of breath  Negative for cough and chest tightness  Cardiovascular: Negative for chest pain, palpitations and leg swelling  Gastrointestinal: Negative for abdominal distention, abdominal pain and vomiting  Genitourinary: Negative for dysuria and hematuria  Musculoskeletal: Negative for arthralgias and back pain  Skin: Negative for color change and rash  Neurological: Negative for dizziness, seizures, syncope and light-headedness  All other systems reviewed and are negative  No Known Allergies        Current Outpatient Medications:     acetaminophen (TYLENOL) 650 mg CR tablet, Take 650 mg by mouth every 8 (eight) hours as needed , Disp: , Rfl:     atorvastatin (LIPITOR) 40 mg tablet, TAKE 1 TABLET (40 MG TOTAL) BY MOUTH DAILY FOR CHOLESTEROL, Disp: 90 tablet, Rfl: 1    Blood Pressure Monitoring (Blood Pressure Monitor/M Cuff) MISC, Use daily, Disp: 1 each, Rfl: 0    Diclofenac Sodium (VOLTAREN) 1 %, Apply 2 g topically 4 (four) times a day as needed (neck pain), Disp: 100 g, Rfl: 1    furosemide (LASIX) 40 mg tablet, Take 1 tablet (40 mg total) by mouth daily, Disp: 30 tablet, Rfl: 1    meloxicam (MOBIC) 15 mg tablet, TAKE 1 TABLET BY MOUTH EVERY DAY, Disp: 30 tablet, Rfl: 0    metoprolol succinate (TOPROL-XL) 50 mg 24 hr tablet, TAKE 1 TABLET BY MOUTH TWICE A DAY, Disp: 180 tablet, Rfl: 2    nitroglycerin (NITROSTAT) 0 4 mg SL tablet, Place 1 tablet (0 4 mg total) under the tongue every 5 (five) minutes as needed for chest pain (take every 5 minutes for chest pain with max 3 doses), Disp: 9 tablet, Rfl: 0    omeprazole (PriLOSEC) 40 MG capsule, Take 40 mg by mouth daily, Disp: , Rfl:     sacubitril-valsartan (Entresto) 24-26 MG TABS, Take 1 tablet by mouth 2 (two) times a day, Disp: 60 tablet, Rfl: 6    spironolactone (ALDACTONE) 25 mg tablet, Take 1 tablet (25 mg total) by mouth 2 (two) times a day, Disp: 60 tablet, Rfl: 2    Social History     Socioeconomic History    Marital status:      Spouse name: Not on file    Number of children: Not on file    Years of education: Not on file    Highest education level: Not on file   Occupational History    Not on file   Tobacco Use    Smoking status: Former Smoker     Quit date: 1999     Years since quittin 1    Smokeless tobacco: Never Used   Vaping Use    Vaping Use: Never used   Substance and Sexual Activity    Alcohol use: No    Drug use: No    Sexual activity: Yes   Other Topics Concern    Not on file   Social History Narrative    Home environment-safe         Social Determinants of Health     Financial Resource Strain: Low Risk     Difficulty of Paying Living Expenses: Not very hard   Food Insecurity: No Food Insecurity    Worried About Running Out of Food in the Last Year: Never true    Briseyda of Food in the Last Year: Never true   Transportation Needs: No Transportation Needs    Lack of Transportation (Medical): No    Lack of Transportation (Non-Medical):  No   Physical Activity: Sufficiently Active    Days of Exercise per Week: 7 days    Minutes of Exercise per Session: 60 min   Stress: No Stress Concern Present    Feeling of Stress : Not at all   Social Connections: Not on file   Intimate Partner Violence: Not At Risk    Fear of Current or Ex-Partner: No    Emotionally Abused: No    Physically Abused: No    Sexually Abused: No   Housing Stability: Unknown    Unable to Pay for Housing in the Last Year: No    Number of Places Lived in the Last Year: Not on file    Unstable Housing in the Last Year: No       Family History   Problem Relation Age of Onset    Heart disease Mother         Pacemaker    Hypertension Mother     Heart disease Father     Heart disease Sister     Hypertension Sister     Alcohol abuse Brother     Heart disease Brother     Cirrhosis Brother         Liver     Hypertension Brother     No Known Problems Daughter     No Known Problems Maternal Grandmother     No Known Problems Paternal Grandfather     No Known Problems Daughter     No Known Problems Sister     No Known Problems Maternal Aunt     No Known Problems Cousin        Physical Exam:    Vitals: Blood pressure 120/80, pulse 73, height 5' 2" (1 575 m), weight 90 5 kg (199 lb 9 6 oz), SpO2 94 %, not currently breastfeeding , Body mass index is 36 51 kg/m² ,   Wt Readings from Last 3 Encounters:   03/31/22 90 5 kg (199 lb 9 6 oz)   03/21/22 89 8 kg (198 lb)   02/24/22 89 1 kg (196 lb 8 oz)       Physical Exam  Constitutional:       General: She is not in acute distress  Appearance: Normal appearance  HENT:      Head: Normocephalic and atraumatic  Mouth/Throat:      Mouth: Mucous membranes are moist    Eyes:      Extraocular Movements: Extraocular movements intact  Conjunctiva/sclera: Conjunctivae normal    Cardiovascular:      Rate and Rhythm: Normal rate and regular rhythm  Pulses: Normal pulses  Heart sounds: No murmur heard  No friction rub  No gallop  Comments: Mildly elevated JVP  Trace pitting edema bilaterally  Pulmonary:      Effort: Pulmonary effort is normal  No respiratory distress  Breath sounds: No wheezing, rhonchi or rales  Abdominal:      General: There is no distension  Palpations: Abdomen is soft  Tenderness: There is no abdominal tenderness  There is no guarding  Musculoskeletal:         General: No deformity or signs of injury  Cervical back: Neck supple  Skin:     General: Skin is warm and dry  Capillary Refill: Capillary refill takes less than 2 seconds  Neurological:      General: No focal deficit present  Mental Status: She is alert and oriented to person, place, and time  Psychiatric:         Mood and Affect: Mood normal          Labs & Results:    Lab Results   Component Value Date    WBC 8 69 08/10/2021    HGB 12 7 08/10/2021    HCT 39 0 08/10/2021    MCV 92 08/10/2021     08/10/2021     Lab Results   Component Value Date    SODIUM 136 03/03/2022    K 4 1 03/03/2022     03/03/2022    CO2 24 03/03/2022    BUN 22 03/03/2022    CREATININE 1 01 03/03/2022    GLUC 105 12/06/2016    CALCIUM 9 8 03/03/2022     No results found for: NTBNP   Lab Results   Component Value Date    CHOLESTEROL 159 08/10/2021    CHOLESTEROL 155 08/16/2019     Lab Results   Component Value Date    HDL 50 08/10/2021    HDL 44 08/16/2019    HDL 41 04/22/2014     Lab Results   Component Value Date    TRIG 134 08/10/2021    TRIG 115 08/16/2019    TRIG 140 04/22/2014     No results found for: Tom    EKG personally reviewed by Camila Pearce MD      Counseling / Coordination of Care  Time spent today 25 minutes  Greater than 50% of total time was spent with the patient and / or family counseling and / or coordination of care  We discussed diagnoses, most recent studies and any changes in treatment    Thank you for the opportunity to participate in the care of this patient      Andrew Hand MD  ADVANCED HEART FAILURE AND MECHANICAL CIRCULATORY SUPPORT  [facility]

## 2022-04-04 DIAGNOSIS — I50.9 HEART FAILURE WITH MID-RANGE EJECTION FRACTION (HCC): ICD-10-CM

## 2022-04-04 NOTE — TELEPHONE ENCOUNTER
Requested medication(s) are due for refill today: Yes  Patient has already received a courtesy refill: No  Other reason request has been forwarded to provider: seen 3/31/22  Off Protocol Failed - not assigned to a protocol

## 2022-04-06 RX ORDER — SACUBITRIL AND VALSARTAN 24; 26 MG/1; MG/1
TABLET, FILM COATED ORAL
Qty: 60 TABLET | Refills: 6 | Status: SHIPPED | OUTPATIENT
Start: 2022-04-06

## 2022-04-06 RX ORDER — FUROSEMIDE 40 MG/1
TABLET ORAL
Qty: 30 TABLET | Refills: 1 | Status: SHIPPED | OUTPATIENT
Start: 2022-04-06 | End: 2022-04-29

## 2022-04-29 DIAGNOSIS — I50.9 HEART FAILURE WITH MID-RANGE EJECTION FRACTION (HCC): ICD-10-CM

## 2022-04-29 RX ORDER — FUROSEMIDE 40 MG/1
TABLET ORAL
Qty: 30 TABLET | Refills: 1 | Status: SHIPPED | OUTPATIENT
Start: 2022-04-29 | End: 2022-05-23

## 2022-05-21 DIAGNOSIS — I50.9 HEART FAILURE WITH MID-RANGE EJECTION FRACTION (HCC): ICD-10-CM

## 2022-05-23 RX ORDER — FUROSEMIDE 40 MG/1
TABLET ORAL
Qty: 30 TABLET | Refills: 1 | Status: SHIPPED | OUTPATIENT
Start: 2022-05-23 | End: 2022-05-27 | Stop reason: CLARIF

## 2022-05-25 DIAGNOSIS — I50.9 HEART FAILURE WITH MID-RANGE EJECTION FRACTION (HCC): ICD-10-CM

## 2022-05-25 RX ORDER — METOPROLOL SUCCINATE 50 MG/1
TABLET, EXTENDED RELEASE ORAL
Qty: 180 TABLET | Refills: 2 | Status: SHIPPED | OUTPATIENT
Start: 2022-05-25

## 2022-05-27 ENCOUNTER — TELEPHONE (OUTPATIENT)
Dept: CARDIOLOGY CLINIC | Facility: CLINIC | Age: 67
End: 2022-05-27

## 2022-05-27 ENCOUNTER — OFFICE VISIT (OUTPATIENT)
Dept: CARDIOLOGY CLINIC | Facility: CLINIC | Age: 67
End: 2022-05-27
Payer: MEDICARE

## 2022-05-27 VITALS
SYSTOLIC BLOOD PRESSURE: 102 MMHG | HEART RATE: 75 BPM | HEIGHT: 62 IN | WEIGHT: 202.7 LBS | BODY MASS INDEX: 37.3 KG/M2 | OXYGEN SATURATION: 95 % | DIASTOLIC BLOOD PRESSURE: 68 MMHG

## 2022-05-27 DIAGNOSIS — E78.5 DYSLIPIDEMIA: ICD-10-CM

## 2022-05-27 DIAGNOSIS — G47.33 OBSTRUCTIVE SLEEP APNEA, ADULT: ICD-10-CM

## 2022-05-27 DIAGNOSIS — I10 ESSENTIAL HYPERTENSION: ICD-10-CM

## 2022-05-27 DIAGNOSIS — I50.9 HEART FAILURE WITH MID-RANGE EJECTION FRACTION (HCC): Primary | ICD-10-CM

## 2022-05-27 PROCEDURE — 99214 OFFICE O/P EST MOD 30 MIN: CPT | Performed by: INTERNAL MEDICINE

## 2022-05-27 RX ORDER — FUROSEMIDE 20 MG/1
20 TABLET ORAL DAILY
Qty: 30 TABLET | Refills: 11
Start: 2022-05-27 | End: 2022-06-14 | Stop reason: SDUPTHER

## 2022-05-27 NOTE — PROGRESS NOTES
Advanced Heart Failure Outpatient Progress Note - Cornel Rockwell 69 y o  female MRN: [id number]    Encounter: [id number]      Assessment/Plan:    Patient Active Problem List    Diagnosis Date Noted    Prediabetes 12/03/2021    Decreased hearing of both ears 12/30/2020    Cataract 10/23/2020    Arthropathic psoriasis ([facility] ) 09/11/2017    Chronic combined systolic and diastolic congestive heart failure ([place] [address] ) 09/11/2017    Obesity (BMI 30-39 9) 09/11/2017    Allergic rhinitis 10/26/2015    Obstructive sleep apnea, adult 11/13/2014    Dyslipidemia 04/24/2013    Hypertension 11/08/2012    Obesity, morbid ([place] [address] ) 03/31/2022    Stage 3 chronic kidney disease, unspecified whether stage 3a or 3b CKD ([place] [address] ) 03/31/2022     Heart failure with mid range ejection fraction, Stage C, NYHA  Etiology: Unclear  No definitive evidence of ischemia on nuclear stress test  Hypertension controlled  LBBB likely chronic  Dyscynchrony noted on echo since 2016  No heavy alcohol or drug use  Euvolemic, warm and well perfused    Weight: 193 lbs; 192 lbs 11/2/21; 201 lbs 2/7/22; 196 lbs 2/24/22; 199 lbs 3/31/22; 202 lbs 5/27/22  NT proBNP:      Studies- personally reviewed by me    Echo 1/3/22:  LVEF: 50%  LVIDd: 5 9cm  RV: normal size and systolic function  MR: mild  PASP:  24 mmHg  RVOT: mid systolic notching suggesting elevated pulmonary vascular resistance  Other: grade 2 diastology, no regional wall motion abnormalities seen, abnormal septal motion consistent with bundle branch block  IVC normal with normal respirophasic variation    Pharm Nuc Stress 9/27/21: Abnormal study after pharmacologic vasodilation without reproduction of symptoms  Inferior defect improved with prone imaging likely diaphragmatic attenuation  Partially reversible anteroapical defect likely shifting breast, cannot exclude small area of distal LAD ischemia  Left ventricular systolic function was reduced, without distinct regional wall motion abnormalities  LVEF 47%    Echocardiogram 7/8/21  LVEF: 40-45%  LVIDd: 5cm  RV: normal size and systolic function  MR: mild  PASP: 30mmHg  RVOT: mid systolic notching suggesting elevated pulmonary vascular resistance  Other: hypokinesis of inferior and septal walls, mild to moderately increased wall thickness, mild LAE    TTE 5/6/2016: LVEF 50%  Moderate concentric hypertrophy  Hypokinesis of mid anteroseptal, basal inferior and basal to mid inferolateral walls  Grade 1 diastology  Normal RV size and systolic function    Diet:  2 g sodium diet  2000 mL fluid restriction    Neurohormonal Blockade:  --Beta-Blocker: metoprolol succinate 50mg BID  --ACEi, ARB or ARNi: entresto 24-26mg BID  --Aldosterone Receptor Blocker: spironolactone 25mg BID  --SGLT2 Inhibitor:  --Diuretic: furosemide 40mg daily with potassium 20 meq daily     Sudden Cardiac Death Risk Reduction:  --ICD:  LVEF >35%    Electrical Resynchronization:  --Candidacy for BiV device:    Advanced Therapies (if appropriate): --Inotrope:  --LVAD/Transplant Candidacy:    Hypertension, at goal  Hyperlipidemia  8/10/21: TC 1159  HDL 50 LDL 82  Rx: atorvastatin 40mg daily  LUIZA on CPAP  Obesity  LBBB, likely chronic as noted above    Today's Plan:  Continue current medications  We will price check Farxiga/Jardiance  2g sodium diet  2L fluid diet  Daily weights  Physical activities/exercise as tolerated      HPI:   53-year-old female with past medical history of hypertension, hyperlipidemia, obstructive sleep apnea on CPAP, obesity who presents for evaluation of heart failure  Patient with chronic heart failure with preserved ejection fraction  Echocardiogram back in 2015 showed LVEF of 50%  Most recent echocardiogram in 7/8/2021 showed LVEF of 40-45% with hypokinesis of the inferior and septal walls  Patient notes shortness of breath since the pandemic  Attributed to weight gain  And inactivity    She short of breath walking uphill and walking up steps for couple of years   She recently started exercising and walks on treadmill for 45 mins to an hour, no shortness of breath or chest pain  Occasional lightheadedness  Uses CPAP at night  No PND orthopnea  She has intermittent leg swelling and was on hydrochlorothiazide  Remote smoking history > 20 years ago  Rare alcohol intake  No drug use    11/1/21: Here for follow up  Nuclear stress test results as above  Overall doing well  Walking 6 blocks almost everyday, no issues  Exercises and walks treadmill for 40 mins without chest pain or shortness of breath  Notes some shortness of breath walking uphill  Has been limiting salt in her diet  Adherent to medications  2/7/22: Here for follow up  Reports progressive shortness of breath on exertion over past few weeks  Weight up almost 10 lb since last visit in November  Weight up to 201 lb today  Also with abdominal fullness, leg swelling and orthopnea  Not been sleeping well night  Denies chest pain on exertion  No palpitations or lightheadedness  Has bendopnea    2/15/22: Here for follow up  Still with shortness of breath on exertion but overall improved  Weight down 5 lbs since last visit  2/15/22 Na 139 K 3 9 creatinine 0 97    3/31/22: Here for follow up  Started spironolactone 25mg daily and decreased potassium to 20 meq daily on last visit  3/3/21 Na 136 K4 1 creatinine 1 01  Eating overall okay  Still with shortness of breath on exertion  Not much change in weight  Having knee pains and mostly sedentary  No PND, orthopnea or lower extremity edema  5/27/22: Here for follow up  Increased spironolactone to 25mg two times a day  Bikes 15-20 mins x 2  No symptoms  Having right leg cramping with new exercise   No palpitations or lightheadedness      Past Medical History:   Diagnosis Date    Chronic ankle pain     Unspec laterality, Last assessed - 6/22/16    Chronic hepatitis C (HCC)     Complete left bundle branch block (LBBB)     COPD (chronic obstructive pulmonary disease) (HCC)     Eczema     B/L elbows, start Hydrocortisone; Last assessed - 8/5/15    HLD (hyperlipidemia)     Hydrocodone use disorder, moderate, in controlled environment ([facility] ) 3/11/2015    Violated CSMA, stopped    Hypertension     Osteoarthritis, knee     Rectal polyp     Sleep apnea     Violation of controlled substance agreement 10/23/2019    See notes of 10-       Review of Systems   Constitutional: Negative for chills, fatigue and fever  HENT: Negative for ear pain and sore throat  Eyes: Negative for pain and visual disturbance  Respiratory: Negative for cough, chest tightness and shortness of breath  Cardiovascular: Negative for chest pain, palpitations and leg swelling  Gastrointestinal: Negative for abdominal distention, abdominal pain and vomiting  Genitourinary: Negative for dysuria and hematuria  Musculoskeletal: Negative for arthralgias and back pain  Skin: Negative for color change and rash  Neurological: Negative for dizziness, seizures, syncope and light-headedness  All other systems reviewed and are negative  No Known Allergies        Current Outpatient Medications:     acetaminophen (TYLENOL) 650 mg CR tablet, Take 650 mg by mouth every 8 (eight) hours as needed , Disp: , Rfl:     atorvastatin (LIPITOR) 40 mg tablet, TAKE 1 TABLET (40 MG TOTAL) BY MOUTH DAILY FOR CHOLESTEROL, Disp: 90 tablet, Rfl: 1    Blood Pressure Monitoring (Blood Pressure Monitor/M Cuff) MISC, Use daily, Disp: 1 each, Rfl: 0    Diclofenac Sodium (VOLTAREN) 1 %, Apply 2 g topically 4 (four) times a day as needed (neck pain), Disp: 100 g, Rfl: 1    Entresto 24-26 MG TABS, TAKE 1 TABLET BY MOUTH TWICE A DAY, Disp: 60 tablet, Rfl: 6    furosemide (LASIX) 40 mg tablet, TAKE 1 TABLET BY MOUTH EVERY DAY, Disp: 30 tablet, Rfl: 1    meloxicam (MOBIC) 15 mg tablet, TAKE 1 TABLET BY MOUTH EVERY DAY, Disp: 30 tablet, Rfl: 0    metoprolol succinate (TOPROL-XL) 50 mg 24 hr tablet, TAKE 1 TABLET BY MOUTH TWICE A DAY, Disp: 180 tablet, Rfl: 2    nitroglycerin (NITROSTAT) 0 4 mg SL tablet, Place 1 tablet (0 4 mg total) under the tongue every 5 (five) minutes as needed for chest pain (take every 5 minutes for chest pain with max 3 doses), Disp: 9 tablet, Rfl: 0    omeprazole (PriLOSEC) 40 MG capsule, Take 40 mg by mouth daily, Disp: , Rfl:     spironolactone (ALDACTONE) 25 mg tablet, Take 1 tablet (25 mg total) by mouth 2 (two) times a day, Disp: 60 tablet, Rfl: 2    Social History     Socioeconomic History    Marital status:      Spouse name: Not on file    Number of children: Not on file    Years of education: Not on file    Highest education level: Not on file   Occupational History    Not on file   Tobacco Use    Smoking status: Former Smoker     Quit date: 1999     Years since quittin 2    Smokeless tobacco: Never Used   Vaping Use    Vaping Use: Never used   Substance and Sexual Activity    Alcohol use: No    Drug use: No    Sexual activity: Yes   Other Topics Concern    Not on file   Social History Narrative    Home environment-safe         Social Determinants of Health     Financial Resource Strain: Low Risk     Difficulty of Paying Living Expenses: Not very hard   Food Insecurity: No Food Insecurity    Worried About Running Out of Food in the Last Year: Never true    Briseyda of Food in the Last Year: Never true   Transportation Needs: No Transportation Needs    Lack of Transportation (Medical): No    Lack of Transportation (Non-Medical):  No   Physical Activity: Sufficiently Active    Days of Exercise per Week: 7 days    Minutes of Exercise per Session: 60 min   Stress: No Stress Concern Present    Feeling of Stress : Not at all   Social Connections: Not on file   Intimate Partner Violence: Not At Risk    Fear of Current or Ex-Partner: No    Emotionally Abused: No    Physically Abused: No    Sexually Abused: No   Housing Stability: Unknown    Unable to Pay for Housing in the Last Year: No    Number of Places Lived in the Last Year: Not on file    Unstable Housing in the Last Year: No       Family History   Problem Relation Age of Onset    Heart disease Mother         Pacemaker    Hypertension Mother     Heart disease Father     Heart disease Sister     Hypertension Sister     Alcohol abuse Brother     Heart disease Brother     Cirrhosis Brother         Liver     Hypertension Brother     No Known Problems Daughter     No Known Problems Maternal Grandmother     No Known Problems Paternal Grandfather     No Known Problems Daughter     No Known Problems Sister     No Known Problems Maternal Aunt     No Known Problems Cousin        Physical Exam:    Vitals: Blood pressure 102/68, pulse 75, height 5' 2" (1 575 m), weight 91 9 kg (202 lb 11 2 oz), SpO2 95 %, not currently breastfeeding , Body mass index is 37 07 kg/m² ,   Wt Readings from Last 3 Encounters:   05/27/22 91 9 kg (202 lb 11 2 oz)   03/31/22 90 5 kg (199 lb 9 6 oz)   03/21/22 89 8 kg (198 lb)       Physical Exam  Constitutional:       General: She is not in acute distress  Appearance: Normal appearance  HENT:      Head: Normocephalic and atraumatic  Mouth/Throat:      Mouth: Mucous membranes are moist    Eyes:      Extraocular Movements: Extraocular movements intact  Conjunctiva/sclera: Conjunctivae normal    Cardiovascular:      Rate and Rhythm: Normal rate and regular rhythm  Pulses: Normal pulses  Heart sounds: No murmur heard  No friction rub  No gallop  Comments: Nonelevated JVP  Pulmonary:      Effort: Pulmonary effort is normal  No respiratory distress  Breath sounds: No wheezing, rhonchi or rales  Abdominal:      General: There is no distension  Palpations: Abdomen is soft  Tenderness: There is no abdominal tenderness  There is no guarding  Musculoskeletal:         General: No deformity or signs of injury  Cervical back: Neck supple  Right lower leg: No edema  Left lower leg: No edema  Skin:     General: Skin is warm and dry  Capillary Refill: Capillary refill takes less than 2 seconds  Neurological:      General: No focal deficit present  Mental Status: She is alert and oriented to person, place, and time  Psychiatric:         Mood and Affect: Mood normal          Labs & Results:    Lab Results   Component Value Date    WBC 8 69 08/10/2021    HGB 12 7 08/10/2021    HCT 39 0 08/10/2021    MCV 92 08/10/2021     08/10/2021     Lab Results   Component Value Date    SODIUM 136 03/03/2022    K 4 1 03/03/2022     03/03/2022    CO2 24 03/03/2022    BUN 22 03/03/2022    CREATININE 1 01 03/03/2022    GLUC 105 12/06/2016    CALCIUM 9 8 03/03/2022     No results found for: NTBNP   Lab Results   Component Value Date    CHOLESTEROL 159 08/10/2021    CHOLESTEROL 155 08/16/2019     Lab Results   Component Value Date    HDL 50 08/10/2021    HDL 44 08/16/2019    HDL 41 04/22/2014     Lab Results   Component Value Date    TRIG 134 08/10/2021    TRIG 115 08/16/2019    TRIG 140 04/22/2014     No results found for: Tom    EKG personally reviewed by Uri Serna MD      Counseling / Coordination of Care  Time spent today 25 minutes  Greater than 50% of total time was spent with the patient and / or family counseling and / or coordination of care  We discussed diagnoses, most recent studies and any changes in treatment    Thank you for the opportunity to participate in the care of this patient      Gloriajean Babinski, MD  ADVANCED HEART FAILURE AND MECHANICAL CIRCULATORY SUPPORT  [facility]

## 2022-05-27 NOTE — TELEPHONE ENCOUNTER
Please start jardiance 10mg daily  Decrease furosemide to 20mg daily and obtain BMP 1 week after starting Jardiance   Thanks

## 2022-05-27 NOTE — TELEPHONE ENCOUNTER
----- Message from Jing Goodson MD sent at 5/27/2022 10:03 AM EDT -----  Please price check farxiga/jardiance 10mg daily   Thanks

## 2022-05-27 NOTE — PATIENT INSTRUCTIONS
Continue current medications  We will price check Farxiga/Jardiance  2g sodium diet  2L fluid diet  Daily weights  Physical activities/exercise as tolerated

## 2022-06-02 ENCOUNTER — TELEPHONE (OUTPATIENT)
Dept: CARDIOLOGY CLINIC | Facility: CLINIC | Age: 67
End: 2022-06-02

## 2022-06-02 DIAGNOSIS — I50.22 HEART FAILURE WITH MID-RANGE EJECTION FRACTION (HCC): Primary | ICD-10-CM

## 2022-06-02 NOTE — TELEPHONE ENCOUNTER
Received a call from [facility] outpatient lab requesting a script for lab work  Reviewed the last note, no blood work ordered  Patient thought she was supposed to have another BMP  Do you need another BMP drawn?   Last BMP 3/3/22 totally normal

## 2022-06-03 ENCOUNTER — APPOINTMENT (OUTPATIENT)
Dept: LAB | Facility: CLINIC | Age: 67
End: 2022-06-03
Payer: MEDICARE

## 2022-06-03 DIAGNOSIS — I50.9 HEART FAILURE WITH MID-RANGE EJECTION FRACTION (HCC): ICD-10-CM

## 2022-06-03 LAB
ANION GAP SERPL CALCULATED.3IONS-SCNC: 7 MMOL/L (ref 4–13)
BUN SERPL-MCNC: 18 MG/DL (ref 5–25)
CALCIUM SERPL-MCNC: 9.4 MG/DL (ref 8.3–10.1)
CHLORIDE SERPL-SCNC: 101 MMOL/L (ref 100–108)
CO2 SERPL-SCNC: 28 MMOL/L (ref 21–32)
CREAT SERPL-MCNC: 1.36 MG/DL (ref 0.6–1.3)
GFR SERPL CREATININE-BSD FRML MDRD: 39 ML/MIN/1.73SQ M
GLUCOSE P FAST SERPL-MCNC: 109 MG/DL (ref 65–99)
POTASSIUM SERPL-SCNC: 3.7 MMOL/L (ref 3.5–5.3)
SODIUM SERPL-SCNC: 136 MMOL/L (ref 136–145)

## 2022-06-03 PROCEDURE — 80048 BASIC METABOLIC PNL TOTAL CA: CPT

## 2022-06-03 PROCEDURE — 36415 COLL VENOUS BLD VENIPUNCTURE: CPT

## 2022-06-14 ENCOUNTER — TELEPHONE (OUTPATIENT)
Dept: CARDIOLOGY CLINIC | Facility: CLINIC | Age: 67
End: 2022-06-14

## 2022-06-14 DIAGNOSIS — I50.9 HEART FAILURE WITH MID-RANGE EJECTION FRACTION (HCC): ICD-10-CM

## 2022-06-14 RX ORDER — FUROSEMIDE 20 MG/1
20 TABLET ORAL DAILY PRN
Qty: 30 TABLET | Refills: 11
Start: 2022-06-14

## 2022-06-14 NOTE — TELEPHONE ENCOUNTER
----- Message from Asif Nguyen MD sent at 6/10/2022  9:58 AM EDT -----  Please check how she is doing  Mild increase in creatinine after starting jardiance  Decrease furosemide 20mg to only as needed  Thanks

## 2022-06-14 NOTE — TELEPHONE ENCOUNTER
S/w Stacie, legs are achy and tired otherwise feels great  Discussed BMP results  Weight today 202 lbs  Not happy w/ weight in 200 lbs  Discussed diet modifications  Advised recommendations-- verbally understood  Recommended to take Lasix for weight gain 3 lbs overnight or 5 lbs in a week, worsening SOB or LE edema  Advised to call office if consistently taking PRN diuretic-- verbally understood  Med list updated

## 2022-06-29 DIAGNOSIS — I50.32 CHRONIC DIASTOLIC CONGESTIVE HEART FAILURE (HCC): ICD-10-CM

## 2022-06-29 RX ORDER — ATORVASTATIN CALCIUM 40 MG/1
40 TABLET, FILM COATED ORAL DAILY
Qty: 90 TABLET | Refills: 1 | Status: SHIPPED | OUTPATIENT
Start: 2022-06-29

## 2022-08-25 ENCOUNTER — OFFICE VISIT (OUTPATIENT)
Dept: CARDIOLOGY CLINIC | Facility: CLINIC | Age: 67
End: 2022-08-25
Payer: MEDICARE

## 2022-08-25 VITALS
HEIGHT: 62 IN | BODY MASS INDEX: 37.04 KG/M2 | SYSTOLIC BLOOD PRESSURE: 128 MMHG | WEIGHT: 201.3 LBS | HEART RATE: 92 BPM | DIASTOLIC BLOOD PRESSURE: 80 MMHG | OXYGEN SATURATION: 97 %

## 2022-08-25 DIAGNOSIS — I10 ESSENTIAL HYPERTENSION: ICD-10-CM

## 2022-08-25 DIAGNOSIS — E78.5 DYSLIPIDEMIA: ICD-10-CM

## 2022-08-25 DIAGNOSIS — G47.33 OBSTRUCTIVE SLEEP APNEA, ADULT: ICD-10-CM

## 2022-08-25 DIAGNOSIS — I50.22 HEART FAILURE WITH MID-RANGE EJECTION FRACTION (HCC): Primary | ICD-10-CM

## 2022-08-25 PROCEDURE — 99214 OFFICE O/P EST MOD 30 MIN: CPT | Performed by: INTERNAL MEDICINE

## 2022-08-25 RX ORDER — POTASSIUM CHLORIDE 20 MEQ/1
20 TABLET, EXTENDED RELEASE ORAL 2 TIMES DAILY
Qty: 30 TABLET | Refills: 3 | Status: SHIPPED | OUTPATIENT
Start: 2022-08-25 | End: 2022-09-06

## 2022-08-25 RX ORDER — FUROSEMIDE 20 MG/1
20 TABLET ORAL DAILY
Qty: 30 TABLET | Refills: 11
Start: 2022-08-25

## 2022-08-25 NOTE — PROGRESS NOTES
Advanced Heart Failure Outpatient Progress Note - Aston Rockwell 69 y o  female MRN: [id number]    Encounter: [id number]      Assessment/Plan:    Patient Active Problem List    Diagnosis Date Noted    Prediabetes 12/03/2021    Decreased hearing of both ears 12/30/2020    Cataract 10/23/2020    Arthropathic psoriasis ([facility] ) 09/11/2017    Chronic combined systolic and diastolic congestive heart failure ([place] [address] ) 09/11/2017    Obesity (BMI 30-39 9) 09/11/2017    Allergic rhinitis 10/26/2015    Obstructive sleep apnea, adult 11/13/2014    Dyslipidemia 04/24/2013    Hypertension 11/08/2012    Obesity, morbid ([place] [address] ) 03/31/2022    Stage 3 chronic kidney disease, unspecified whether stage 3a or 3b CKD ([place] [address] ) 03/31/2022     Heart failure with mid range ejection fraction, Stage C, NYHA  Etiology: Unclear  No definitive evidence of ischemia on nuclear stress test  Hypertension controlled  LBBB likely chronic  Dyscynchrony noted on echo since 2016  No heavy alcohol or drug use  Mildly volume up, warm and well perfused    Weight: 193 lbs; 192 lbs 11/2/21; 201 lbs 2/7/22; 196 lbs 2/24/22; 199 lbs 3/31/22; 202 lbs 5/27/22; 201 lbs 8/25/22  NT proBNP:      Studies- personally reviewed by me    Echo 1/3/22:  LVEF: 50%  LVIDd: 5 9cm  RV: normal size and systolic function  MR: mild  PASP:  24 mmHg  RVOT: mid systolic notching suggesting elevated pulmonary vascular resistance  Other: grade 2 diastology, no regional wall motion abnormalities seen, abnormal septal motion consistent with bundle branch block  IVC normal with normal respirophasic variation    Pharm Nuc Stress 9/27/21: Abnormal study after pharmacologic vasodilation without reproduction of symptoms  Inferior defect improved with prone imaging likely diaphragmatic attenuation  Partially reversible anteroapical defect likely shifting breast, cannot exclude small area of distal LAD ischemia   Left ventricular systolic function was reduced, without distinct regional wall motion abnormalities  LVEF 47%    Echocardiogram 7/8/21  LVEF: 40-45%  LVIDd: 5cm  RV: normal size and systolic function  MR: mild  PASP: 30mmHg  RVOT: mid systolic notching suggesting elevated pulmonary vascular resistance  Other: hypokinesis of inferior and septal walls, mild to moderately increased wall thickness, mild LAE    TTE 5/6/2016: LVEF 50%  Moderate concentric hypertrophy  Hypokinesis of mid anteroseptal, basal inferior and basal to mid inferolateral walls  Grade 1 diastology  Normal RV size and systolic function    Diet:  2 g sodium diet  2000 mL fluid restriction    Neurohormonal Blockade:  --Beta-Blocker: metoprolol succinate 50mg BID  --ACEi, ARB or ARNi: entresto 24-26mg BID  --Aldosterone Receptor Blocker: spironolactone 25mg BID  --SGLT2 Inhibitor: empagliflozin 10mg daily  --Diuretic: furosemide 20mg daily PRN for weight gain     Sudden Cardiac Death Risk Reduction:  --ICD:  LVEF >35%    Electrical Resynchronization:  --Candidacy for BiV device:    Advanced Therapies (if appropriate): --We will continue to monitor, no indication at this time    Hypertension, controlled  Hyperlipidemia  8/10/21: TC 1159  HDL 50 LDL 82  Rx: atorvastatin 40mg daily  LUIZA on CPAP  Obesity  LBBB, likely chronic as noted above    Today's Plan:  Resume furosemide 20mg once a day  Start potassium 20 meq once a day  BMP in 1 week  2g sodium diet  2L fluid diet  Daily weights  Physical activities/exercise as tolerated      HPI:   67-year-old female with past medical history of hypertension, hyperlipidemia, obstructive sleep apnea on CPAP, obesity who presents for evaluation of heart failure  Patient with chronic heart failure with preserved ejection fraction  Echocardiogram back in 2015 showed LVEF of 50%  Most recent echocardiogram in 7/8/2021 showed LVEF of 40-45% with hypokinesis of the inferior and septal walls  Patient notes shortness of breath since the pandemic  Attributed to weight gain  And inactivity  She short of breath walking uphill and walking up steps for couple of years  She recently started exercising and walks on treadmill for 45 mins to an hour, no shortness of breath or chest pain  Occasional lightheadedness  Uses CPAP at night  No PND orthopnea  She has intermittent leg swelling and was on hydrochlorothiazide  Remote smoking history > 20 years ago  Rare alcohol intake  No drug use    11/1/21: Here for follow up  Nuclear stress test results as above  Overall doing well  Walking 6 blocks almost everyday, no issues  Exercises and walks treadmill for 40 mins without chest pain or shortness of breath  Notes some shortness of breath walking uphill  Has been limiting salt in her diet  Adherent to medications  2/7/22: Here for follow up  Reports progressive shortness of breath on exertion over past few weeks  Weight up almost 10 lb since last visit in November  Weight up to 201 lb today  Also with abdominal fullness, leg swelling and orthopnea  Not been sleeping well night  Denies chest pain on exertion  No palpitations or lightheadedness  Has bendopnea    2/15/22: Here for follow up  Still with shortness of breath on exertion but overall improved  Weight down 5 lbs since last visit  2/15/22 Na 139 K 3 9 creatinine 0 97    3/31/22: Here for follow up  Started spironolactone 25mg daily and decreased potassium to 20 meq daily on last visit  3/3/21 Na 136 K4 1 creatinine 1 01  Eating overall okay  Still with shortness of breath on exertion  Not much change in weight  Having knee pains and mostly sedentary  No PND, orthopnea or lower extremity edema  5/27/22: Here for follow up  Increased spironolactone to 25mg two times a day  Bikes 15-20 mins x 2  No symptoms  Having right leg cramping with new exercise  No palpitations or lightheadedness    8/25/22: here for follow up  Started on jardiance 10mg daily since last visit  6/3/22 Na 136 K 3 7 and creatinine 1 36 from 1 01   Lasix dose reduced to 20mg daily PRN for weight gain  Home scale weight 202 at home  Intermittent episodes of shortness of breath  No PND, orthopnea or worsening lower extremity edema  No palpitations  Occasional lightheadedness with getting up  Past Medical History:   Diagnosis Date    Chronic ankle pain     Unspec laterality, Last assessed - 6/22/16    Chronic hepatitis C (HCC)     Complete left bundle branch block (LBBB)     COPD (chronic obstructive pulmonary disease) (HCC)     Eczema     B/L elbows, start Hydrocortisone; Last assessed - 8/5/15    HLD (hyperlipidemia)     Hydrocodone use disorder, moderate, in controlled environment ([facility] ) 3/11/2015    Violated CSMA, stopped    Hypertension     Osteoarthritis, knee     Rectal polyp     Sleep apnea     Violation of controlled substance agreement 10/23/2019    See notes of 10-       Review of Systems   Constitutional: Negative for chills, fatigue and fever  HENT: Negative for ear pain and sore throat  Eyes: Negative for pain and visual disturbance  Respiratory: Positive for shortness of breath  Negative for cough and chest tightness  Cardiovascular: Negative for chest pain, palpitations and leg swelling  Gastrointestinal: Negative for abdominal distention, abdominal pain and vomiting  Genitourinary: Negative for dysuria and hematuria  Musculoskeletal: Negative for arthralgias and back pain  Skin: Negative for color change and rash  Neurological: Negative for dizziness, seizures and syncope  All other systems reviewed and are negative  No Known Allergies        Current Outpatient Medications:     acetaminophen (TYLENOL) 650 mg CR tablet, Take 650 mg by mouth every 8 (eight) hours as needed , Disp: , Rfl:     atorvastatin (LIPITOR) 40 mg tablet, TAKE 1 TABLET (40 MG TOTAL) BY MOUTH DAILY FOR CHOLESTEROL, Disp: 90 tablet, Rfl: 1    Blood Pressure Monitoring (Blood Pressure Monitor/M Cuff) MISC, Use daily, Disp: 1 each, Rfl: 0   Diclofenac Sodium (VOLTAREN) 1 %, Apply 2 g topically 4 (four) times a day as needed (neck pain), Disp: 100 g, Rfl: 1    Empagliflozin (Jardiance) 10 MG TABS, Take 1 tablet (10 mg total) by mouth every morning, Disp: 30 tablet, Rfl: 11    Entresto 24-26 MG TABS, TAKE 1 TABLET BY MOUTH TWICE A DAY, Disp: 60 tablet, Rfl: 6    furosemide (LASIX) 20 mg tablet, Take 1 tablet (20 mg total) by mouth daily, Disp: 30 tablet, Rfl: 11    meloxicam (MOBIC) 15 mg tablet, TAKE 1 TABLET BY MOUTH EVERY DAY, Disp: 30 tablet, Rfl: 0    metoprolol succinate (TOPROL-XL) 50 mg 24 hr tablet, TAKE 1 TABLET BY MOUTH TWICE A DAY, Disp: 180 tablet, Rfl: 2    nitroglycerin (NITROSTAT) 0 4 mg SL tablet, Place 1 tablet (0 4 mg total) under the tongue every 5 (five) minutes as needed for chest pain (take every 5 minutes for chest pain with max 3 doses), Disp: 9 tablet, Rfl: 0    omeprazole (PriLOSEC) 40 MG capsule, Take 40 mg by mouth daily, Disp: , Rfl:     potassium chloride (K-DUR,KLOR-CON) 20 mEq tablet, Take 1 tablet (20 mEq total) by mouth 2 (two) times a day, Disp: 30 tablet, Rfl: 3    spironolactone (ALDACTONE) 25 mg tablet, TAKE 1 TABLET BY MOUTH TWICE A DAY, Disp: 180 tablet, Rfl: 1    Social History     Socioeconomic History    Marital status:       Spouse name: Not on file    Number of children: Not on file    Years of education: Not on file    Highest education level: Not on file   Occupational History    Not on file   Tobacco Use    Smoking status: Former Smoker     Quit date: 1999     Years since quittin 5    Smokeless tobacco: Never Used   Vaping Use    Vaping Use: Never used   Substance and Sexual Activity    Alcohol use: No    Drug use: No    Sexual activity: Yes   Other Topics Concern    Not on file   Social History Narrative    Home environment-safe         Social Determinants of Health     Financial Resource Strain: Low Risk     Difficulty of Paying Living Expenses: Not very hard   Food Insecurity: No Food Insecurity    Worried About Running Out of Food in the Last Year: Never true    Ran Out of Food in the Last Year: Never true   Transportation Needs: No Transportation Needs    Lack of Transportation (Medical): No    Lack of Transportation (Non-Medical): No   Physical Activity: Not on file   Stress: Not on file   Social Connections: Not on file   Intimate Partner Violence: Not on file   Housing Stability: Not on file       Family History   Problem Relation Age of Onset    Heart disease Mother         Pacemaker    Hypertension Mother     Heart disease Father     Heart disease Sister     Hypertension Sister     Alcohol abuse Brother     Heart disease Brother     Cirrhosis Brother         Liver     Hypertension Brother     No Known Problems Daughter     No Known Problems Maternal Grandmother     No Known Problems Paternal Grandfather     No Known Problems Daughter     No Known Problems Sister     No Known Problems Maternal Aunt     No Known Problems Cousin        Physical Exam:    Vitals: Blood pressure 128/80, pulse 92, height 5' 2" (1 575 m), weight 91 3 kg (201 lb 4 8 oz), SpO2 97 %, not currently breastfeeding , Body mass index is 36 82 kg/m² ,   Wt Readings from Last 3 Encounters:   08/25/22 91 3 kg (201 lb 4 8 oz)   05/27/22 91 9 kg (202 lb 11 2 oz)   03/31/22 90 5 kg (199 lb 9 6 oz)       Physical Exam  Constitutional:       General: She is not in acute distress  Appearance: Normal appearance  HENT:      Head: Normocephalic and atraumatic  Mouth/Throat:      Mouth: Mucous membranes are moist    Eyes:      Extraocular Movements: Extraocular movements intact  Conjunctiva/sclera: Conjunctivae normal    Cardiovascular:      Rate and Rhythm: Normal rate and regular rhythm  Pulses: Normal pulses  Heart sounds: No murmur heard  No friction rub  No gallop  Comments: Elevated JVP   Trace pitting edema bilaterally  Pulmonary:      Effort: Pulmonary effort is normal  No respiratory distress  Breath sounds: No wheezing, rhonchi or rales  Abdominal:      General: There is no distension  Palpations: Abdomen is soft  Tenderness: There is no abdominal tenderness  There is no guarding  Musculoskeletal:         General: No deformity or signs of injury  Cervical back: Neck supple  Skin:     General: Skin is warm and dry  Capillary Refill: Capillary refill takes less than 2 seconds  Neurological:      General: No focal deficit present  Mental Status: She is alert and oriented to person, place, and time  Psychiatric:         Mood and Affect: Mood normal          Labs & Results:    Lab Results   Component Value Date    WBC 8 69 08/10/2021    HGB 12 7 08/10/2021    HCT 39 0 08/10/2021    MCV 92 08/10/2021     08/10/2021     Lab Results   Component Value Date    SODIUM 136 06/03/2022    K 3 7 06/03/2022     06/03/2022    CO2 28 06/03/2022    BUN 18 06/03/2022    CREATININE 1 36 (H) 06/03/2022    GLUC 105 12/06/2016    CALCIUM 9 4 06/03/2022     No results found for: NTBNP   Lab Results   Component Value Date    CHOLESTEROL 159 08/10/2021    CHOLESTEROL 155 08/16/2019     Lab Results   Component Value Date    HDL 50 08/10/2021    HDL 44 08/16/2019    HDL 41 04/22/2014     Lab Results   Component Value Date    TRIG 134 08/10/2021    TRIG 115 08/16/2019    TRIG 140 04/22/2014     No results found for: [facility]    EKG personally reviewed by Joshua Trinh MD      Counseling / Coordination of Care  Time spent today 25 minutes  Greater than 50% of total time was spent with the patient and / or family counseling and / or coordination of care  We discussed diagnoses, most recent studies and any changes in treatment    Thank you for the opportunity to participate in the care of this patient      Opal Newman MD  ADVANCED HEART FAILURE AND MECHANICAL CIRCULATORY SUPPORT  [facility]

## 2022-08-25 NOTE — PATIENT INSTRUCTIONS
Resume furosemide 20mg once a day  Start potassium 20 meq once a day  Obtain blood test (BMP) in 1 week  2g sodium diet  2L fluid restriction  Daily weights

## 2022-08-26 ENCOUNTER — TELEPHONE (OUTPATIENT)
Dept: INTERNAL MEDICINE CLINIC | Facility: CLINIC | Age: 67
End: 2022-08-26

## 2022-08-26 DIAGNOSIS — G47.33 OBSTRUCTIVE SLEEP APNEA, ADULT: Primary | ICD-10-CM

## 2022-08-26 NOTE — TELEPHONE ENCOUNTER
----- Message from Adela Richard sent at 8/26/2022  8:52 AM EDT -----  Regarding: PHYSICIAN REFERRAL/ORDER RQUIRED  Patient is scheduled with sleep medicine physician 9/6 (not a study) and requires physician referral/order from PCP office    Please enter in epic    Dx:  [phone]

## 2022-08-30 ENCOUNTER — LAB (OUTPATIENT)
Dept: LAB | Facility: CLINIC | Age: 67
End: 2022-08-30
Payer: MEDICARE

## 2022-08-30 DIAGNOSIS — I50.22 HEART FAILURE WITH MID-RANGE EJECTION FRACTION (HCC): ICD-10-CM

## 2022-08-30 LAB
ANION GAP SERPL CALCULATED.3IONS-SCNC: 8 MMOL/L (ref 4–13)
BUN SERPL-MCNC: 10 MG/DL (ref 5–25)
CALCIUM SERPL-MCNC: 9.4 MG/DL (ref 8.3–10.1)
CHLORIDE SERPL-SCNC: 109 MMOL/L (ref 96–108)
CO2 SERPL-SCNC: 23 MMOL/L (ref 21–32)
CREAT SERPL-MCNC: 1.05 MG/DL (ref 0.6–1.3)
GFR SERPL CREATININE-BSD FRML MDRD: 54 ML/MIN/1.73SQ M
GLUCOSE P FAST SERPL-MCNC: 99 MG/DL (ref 65–99)
POTASSIUM SERPL-SCNC: 4 MMOL/L (ref 3.5–5.3)
SODIUM SERPL-SCNC: 140 MMOL/L (ref 135–147)

## 2022-08-30 PROCEDURE — 36415 COLL VENOUS BLD VENIPUNCTURE: CPT

## 2022-08-30 PROCEDURE — 80048 BASIC METABOLIC PNL TOTAL CA: CPT

## 2022-08-31 ENCOUNTER — TELEPHONE (OUTPATIENT)
Dept: CARDIOLOGY CLINIC | Facility: CLINIC | Age: 67
End: 2022-08-31

## 2022-08-31 NOTE — TELEPHONE ENCOUNTER
Claude Creamer, MD  [facility]  Please let her know renal functions stable, K normal  Continue current diuretic and potassium dose       I called and L/M with normal results

## 2022-09-02 DIAGNOSIS — I50.22 HEART FAILURE WITH MID-RANGE EJECTION FRACTION (HCC): ICD-10-CM

## 2022-09-06 ENCOUNTER — OFFICE VISIT (OUTPATIENT)
Dept: SLEEP CENTER | Facility: CLINIC | Age: 67
End: 2022-09-06
Payer: MEDICARE

## 2022-09-06 VITALS
DIASTOLIC BLOOD PRESSURE: 90 MMHG | BODY MASS INDEX: 37.17 KG/M2 | SYSTOLIC BLOOD PRESSURE: 140 MMHG | HEART RATE: 82 BPM | WEIGHT: 202 LBS | OXYGEN SATURATION: 96 % | HEIGHT: 62 IN

## 2022-09-06 DIAGNOSIS — G47.33 OBSTRUCTIVE SLEEP APNEA, ADULT: ICD-10-CM

## 2022-09-06 DIAGNOSIS — G47.33 OSA (OBSTRUCTIVE SLEEP APNEA): Primary | ICD-10-CM

## 2022-09-06 PROCEDURE — 99213 OFFICE O/P EST LOW 20 MIN: CPT | Performed by: INTERNAL MEDICINE

## 2022-09-06 RX ORDER — POTASSIUM CHLORIDE 1500 MG/1
TABLET, EXTENDED RELEASE ORAL
Qty: 180 TABLET | Refills: 1 | Status: SHIPPED | OUTPATIENT
Start: 2022-09-06

## 2022-09-06 NOTE — PROGRESS NOTES
Progress Note - Sleep Center   Austin Jerez IFB:[birth year] MRN: [id number]      Reason for Visit:  69 y  o female here for annual follow-up    Assessment:  Doing well on current therapy of CPAP 6 cm for previously-diagnosed obstructive sleep apnea  Plan:  Continue same    Follow up: One year    History of Present Illness:  History of LUIZA on PAP therapy  Fully compliant and deriving benefit  Review of Systems      Genitourinary hot flashes at night   Cardiology none   Gastrointestinal none   Neurology muscle weakness and numbness/tingling of an extremity   Constitutional weight change   Integumentary none   Psychiatry none   Musculoskeletal none   Pulmonary none   ENT ringing in ears   Endocrine excessive thirst   Hematological none           I have reviewed and updated the review of systems as necessary      Historical Information    Past Medical History:   Past Medical History:   Diagnosis Date    Chronic ankle pain     Unspec laterality, Last assessed - 6/22/16    Chronic hepatitis C (HCC)     Complete left bundle branch block (LBBB)     COPD (chronic obstructive pulmonary disease) (HCC)     Eczema     B/L elbows, start Hydrocortisone; Last assessed - 8/5/15    HLD (hyperlipidemia)     [facility], [facility] 3/11/2015    Violated CSMA, stopped    Hypertension     Osteoarthritis, knee     Rectal polyp     Sleep apnea     Violation of controlled substance agreement 10/23/2019    See notes of 10-         Past Surgical History:   Past Surgical History:   Procedure Laterality Date    COLONOSCOPY      EYE SURGERY         Social History:   Social History     Socioeconomic History    Marital status:       Spouse name: Not on file    Number of children: Not on file    Years of education: Not on file    Highest education level: Not on file   Occupational History    Not on file   Tobacco Use    Smoking status: Former Smoker     Quit date: 1999     Years since quittin 5    Smokeless tobacco: Never Used   Vaping Use    Vaping Use: Never used   Substance and Sexual Activity    Alcohol use: No    Drug use: No    Sexual activity: Yes   Other Topics Concern    Not on file   Social History Narrative    Home environment-safe         Social Determinants of Health     Financial Resource Strain: Low Risk     Difficulty of Paying Living Expenses: Not very hard   Food Insecurity: No Food Insecurity    Worried About Running Out of Food in the Last Year: Never true    Briseyda of Food in the Last Year: Never true   Transportation Needs: No Transportation Needs    Lack of Transportation (Medical): No    Lack of Transportation (Non-Medical):  No   Physical Activity: Not on file   Stress: Not on file   Social Connections: Not on file   Intimate Partner Violence: Not on file   Housing Stability: Not on file       Family History:   Family History   Problem Relation Age of Onset    Heart disease Mother         Pacemaker    Hypertension Mother     Heart disease Father     Heart disease Sister     Hypertension Sister     Alcohol abuse Brother     Heart disease Brother     Cirrhosis Brother         Liver     Hypertension Brother     No Known Problems Daughter     No Known Problems Maternal Grandmother     No Known Problems Paternal Grandfather     No Known Problems Daughter     No Known Problems Sister     No Known Problems Maternal Aunt     No Known Problems Cousin        Medications/Allergies:      Current Outpatient Medications:     acetaminophen (TYLENOL) 650 mg CR tablet, Take 650 mg by mouth every 8 (eight) hours as needed , Disp: , Rfl:     atorvastatin (LIPITOR) 40 mg tablet, TAKE 1 TABLET (40 MG TOTAL) BY MOUTH DAILY FOR CHOLESTEROL, Disp: 90 tablet, Rfl: 1    Blood Pressure Monitoring (Blood Pressure Monitor/M Cuff) MISC, Use daily, Disp: 1 each, Rfl: 0    Diclofenac Sodium (VOLTAREN) 1 %, Apply 2 g topically 4 (four) times a day as needed (neck pain), Disp: 100 g, Rfl: 1    Empagliflozin (Jardiance) 10 MG TABS, Take 1 tablet (10 mg total) by mouth every morning, Disp: 30 tablet, Rfl: 11    Entresto 24-26 MG TABS, TAKE 1 TABLET BY MOUTH TWICE A DAY, Disp: 60 tablet, Rfl: 6    furosemide (LASIX) 20 mg tablet, Take 1 tablet (20 mg total) by mouth daily, Disp: 30 tablet, Rfl: 11    meloxicam (MOBIC) 15 mg tablet, TAKE 1 TABLET BY MOUTH EVERY DAY, Disp: 30 tablet, Rfl: 0    metoprolol succinate (TOPROL-XL) 50 mg 24 hr tablet, TAKE 1 TABLET BY MOUTH TWICE A DAY, Disp: 180 tablet, Rfl: 2    nitroglycerin (NITROSTAT) 0 4 mg SL tablet, Place 1 tablet (0 4 mg total) under the tongue every 5 (five) minutes as needed for chest pain (take every 5 minutes for chest pain with max 3 doses), Disp: 9 tablet, Rfl: 0    omeprazole (PriLOSEC) 40 MG capsule, Take 40 mg by mouth daily, Disp: , Rfl:     potassium chloride (K-DUR,KLOR-CON) 20 mEq tablet, Take 1 tablet (20 mEq total) by mouth 2 (two) times a day, Disp: 30 tablet, Rfl: 3    spironolactone (ALDACTONE) 25 mg tablet, TAKE 1 TABLET BY MOUTH TWICE A DAY, Disp: 180 tablet, Rfl: 1          Objective      Vital Signs:   Vitals:    09/06/22 0759   BP: 140/90   Pulse: 82   SpO2: 96%     [place] Sleepiness Scale: Total score: 2        Physical Exam:    General: Alert, appropriate, cooperative, overweight    Head: NC/AT    Skin: Warm, dry    Neuro: No motor abnormalities, cranial nerves appear intact    Extremity: No clubbing, cyanosis      DME Provider: TOÑA        Counseling / Coordination of Care   I have spent 15 minutes with the patient today in which greater than 50% of this time was spent in counseling/coordination of care regarding: equipment and compliance  Board Certified Sleep Specialist    Portions of the record may have been created with voice recognition software    Occasional wrong word or "sound a like" substitutions may have occurred due to the inherent limitations of voice recognition software  Read the chart carefully and recognize, using context, where substitutions have occurred

## 2022-10-17 ENCOUNTER — APPOINTMENT (OUTPATIENT)
Dept: LAB | Facility: CLINIC | Age: 67
End: 2022-10-17
Payer: MEDICARE

## 2022-10-17 ENCOUNTER — OFFICE VISIT (OUTPATIENT)
Dept: INTERNAL MEDICINE CLINIC | Facility: CLINIC | Age: 67
End: 2022-10-17

## 2022-10-17 VITALS
BODY MASS INDEX: 36.99 KG/M2 | DIASTOLIC BLOOD PRESSURE: 82 MMHG | WEIGHT: 201 LBS | HEART RATE: 86 BPM | TEMPERATURE: 98.6 F | SYSTOLIC BLOOD PRESSURE: 124 MMHG | HEIGHT: 62 IN | OXYGEN SATURATION: 97 %

## 2022-10-17 DIAGNOSIS — N18.30 STAGE 3 CHRONIC KIDNEY DISEASE, UNSPECIFIED WHETHER STAGE 3A OR 3B CKD (HCC): ICD-10-CM

## 2022-10-17 DIAGNOSIS — M85.88 OSTEOPENIA OF LUMBAR SPINE: ICD-10-CM

## 2022-10-17 DIAGNOSIS — I50.42 CHRONIC COMBINED SYSTOLIC AND DIASTOLIC CONGESTIVE HEART FAILURE (HCC): ICD-10-CM

## 2022-10-17 DIAGNOSIS — Z12.31 ENCOUNTER FOR SCREENING MAMMOGRAM FOR MALIGNANT NEOPLASM OF BREAST: ICD-10-CM

## 2022-10-17 DIAGNOSIS — R73.03 PREDIABETES: ICD-10-CM

## 2022-10-17 DIAGNOSIS — H91.93 DECREASED HEARING OF BOTH EARS: ICD-10-CM

## 2022-10-17 DIAGNOSIS — G47.33 OBSTRUCTIVE SLEEP APNEA, ADULT: Primary | ICD-10-CM

## 2022-10-17 DIAGNOSIS — I10 PRIMARY HYPERTENSION: ICD-10-CM

## 2022-10-17 DIAGNOSIS — E78.5 DYSLIPIDEMIA: ICD-10-CM

## 2022-10-17 DIAGNOSIS — L40.50 ARTHROPATHIC PSORIASIS (HCC): ICD-10-CM

## 2022-10-17 LAB
25(OH)D3 SERPL-MCNC: 32.9 NG/ML (ref 30–100)
ALBUMIN SERPL BCP-MCNC: 4.3 G/DL (ref 3.5–5)
ALP SERPL-CCNC: 80 U/L (ref 46–116)
ALT SERPL W P-5'-P-CCNC: 28 U/L (ref 12–78)
ANION GAP SERPL CALCULATED.3IONS-SCNC: 8 MMOL/L (ref 4–13)
AST SERPL W P-5'-P-CCNC: 17 U/L (ref 5–45)
BILIRUB SERPL-MCNC: 0.52 MG/DL (ref 0.2–1)
BUN SERPL-MCNC: 20 MG/DL (ref 5–25)
CALCIUM SERPL-MCNC: 10.4 MG/DL (ref 8.3–10.1)
CHLORIDE SERPL-SCNC: 109 MMOL/L (ref 96–108)
CO2 SERPL-SCNC: 20 MMOL/L (ref 21–32)
CREAT SERPL-MCNC: 1.13 MG/DL (ref 0.6–1.3)
EST. AVERAGE GLUCOSE BLD GHB EST-MCNC: 120 MG/DL
GFR SERPL CREATININE-BSD FRML MDRD: 49 ML/MIN/1.73SQ M
GLUCOSE P FAST SERPL-MCNC: 106 MG/DL (ref 65–99)
HBA1C MFR BLD: 5.8 %
POTASSIUM SERPL-SCNC: 4.8 MMOL/L (ref 3.5–5.3)
PROT SERPL-MCNC: 7.9 G/DL (ref 6.4–8.4)
SODIUM SERPL-SCNC: 137 MMOL/L (ref 135–147)

## 2022-10-17 PROCEDURE — 99213 OFFICE O/P EST LOW 20 MIN: CPT | Performed by: INTERNAL MEDICINE

## 2022-10-17 PROCEDURE — 83036 HEMOGLOBIN GLYCOSYLATED A1C: CPT

## 2022-10-17 PROCEDURE — 3079F DIAST BP 80-89 MM HG: CPT | Performed by: INTERNAL MEDICINE

## 2022-10-17 PROCEDURE — 82306 VITAMIN D 25 HYDROXY: CPT

## 2022-10-17 PROCEDURE — 3074F SYST BP LT 130 MM HG: CPT | Performed by: INTERNAL MEDICINE

## 2022-10-17 PROCEDURE — 80053 COMPREHEN METABOLIC PANEL: CPT

## 2022-10-17 PROCEDURE — 36415 COLL VENOUS BLD VENIPUNCTURE: CPT

## 2022-10-17 NOTE — PROGRESS NOTES
[address] [facility]  INTERNAL MEDICINE OFFICE VISIT     PATIENT INFORMATION     Liset Maravilla   69 y o  female   MRN: [id number]    ASSESSMENT/PLAN     Diagnoses and all orders for this visit:    Obstructive sleep apnea, adult  Continue CPAP and follow up with sleep medicine    Chronic combined systolic and diastolic congestive heart failure ([facility] [address] )  Most recent Echocardiogram completed 1/2022 with EF 50% and G2DD  Currently following with Dr Yang Heath for management of heart failure of unclear etiology with relatively normal stress testing  Maintained on Entresto 24-26mg , Metoprolol 50mg, Lasix 20mg, Sprionolactone 25mg, and K supplementation  Has scheduled follow up in November 2022  · Continue medication regimen as above  · Continue regular follow up with Dr Danni Culver office  · Low salt diet  · Monitor regular weights and call for increased water retention/edema  · Will check CMP with current potassium supplementation and CKD along with diuretic use  -     Comprehensive metabolic panel; Future    Primary hypertension  /82 in clinic today  Controlled on Entresto, metoprolol, spironolactone,  and lasix as detailed above  · Continue current medication regimen  · Salt restricted diet  · Continue with diet and exercise  -     Comprehensive metabolic panel; Future    Stage 3a chronic kidney disease  Patient with most recent BMP demonstrating eGFR 54   · Recheck CMP today  · Avoid nephrotoxic agents  -     Comprehensive metabolic panel; Future    Dyslipidemia  Continue Atorvastatin 40mg daily    Decreased hearing of both ears  Following with ENT and audiology  Prediabetes  A1c 5 7 on 8/10/2021  Currently on jardiance with hope for cardiorenal protection in setting of current heart disease   -     HEMOGLOBIN A1C W/ EAG ESTIMATION;  Future    Encounter for screening mammogram for malignant neoplasm of breast  -     Mammo screening bilateral w 3d & cad; Future    Osteopenia of lumbar spine  DXA scan completed 10/2019 with lumbar T score -1 1  Currently only utilizing Vitamin D and calcium supplementation as needed  · Check Vit D and Ca levels today  · Will plan to initiate scheduled supplementation if low  · Recommend repeat DXA scan in next 1-2 years  -     Comprehensive metabolic panel; Future  -     Vitamin D 25 hydroxy; Future    Healthcare Maintenance  · PHQ completed today - negative  · Fall risk completed today - negative  · BMI Counseling: Body mass index is 36 76 kg/m²  The BMI is above normal  Nutrition recommendations include reducing portion sizes, decreasing overall calorie intake and 3-5 servings of fruits/vegetables daily  Exercise recommendations include exercising 3-5 times per week and strength training exercises  · Patient refusing further cervical cancer screening at this time - will continue to discuss risk/benefits going forward  · Mammogram ordered today  · Received flu shot and COVID booster at local pharmacy  · [address] to be scheduled in 3 months  · DXA scan 10/2019 - repeat in next 1-2 years per patient preference  · Colonoscopy completed 4/2014 - noted hemorrhoids and diverticulosis - repeat in 2024    Schedule a follow-up appointment for Rosalina Sunitha Carter in 3 months      HEALTH MAINTENANCE     Immunization History   Administered Date(s) Administered   • COVID-19 MODERNA VACC 0 5 ML IM 04/13/2021, 05/13/2021   • INFLUENZA 10/26/2015, 12/05/2016, 11/17/2017   • Influenza Quadrivalent, 6-35 Months IM 12/05/2016, 11/17/2017   • Influenza, high dose seasonal 0 7 mL 10/23/2018   • Influenza, injectable, quadrivalent, preservative free 0 5 mL 08/22/2019   • Influenza, seasonal, injectable 01/15/2014, 11/13/2014, 10/26/2015   • Pneumococcal Conjugate 13-Valent 02/01/2019   • Tdap 04/28/2016   • Zoster 10/26/2015     CHIEF COMPLAINT     Chief Complaint   Patient presents with   • Follow-up      HISTORY OF PRESENT ILLNESS     Ms Sandeep Luciano is a 56-year-old female past medical history significant for LUIZA, combined systolic and diastolic heart failure with EF 50%, hypertension, dyslipidemia, prediabetes who presents to clinic today for regular 6 month follow-up  Regularly following with Dr Kelly León for management of heart failure  Weight consistent and denies any recent edema, orthopnea, and increased shortness of breath on current lasix and spironolactone dosing  Stopped doing silver sneakers due to breathing issues  Now using at home elliptical  Walks everywhere  Lives at home  Retired   Prior smoker from age [age] around 1/2 PPD  Cutting back on portions  Utilizing slim fast    Got flu shot at pharmacy already  REVIEW OF SYSTEMS     Review of Systems   Constitutional: Negative for chills, fatigue and fever  HENT: Positive for hearing loss  Negative for ear pain, rhinorrhea, sore throat and trouble swallowing  Eyes: Negative for pain and visual disturbance  Respiratory: Negative for cough and shortness of breath  Cardiovascular: Negative for chest pain, palpitations and leg swelling  Gastrointestinal: Negative for abdominal pain, constipation, diarrhea, nausea and vomiting  Endocrine: Negative for polydipsia and polyuria  Genitourinary: Negative for difficulty urinating and dysuria  Musculoskeletal: Positive for arthralgias  Negative for back pain  Skin: Negative for color change and rash  Neurological: Negative for dizziness, weakness, light-headedness and headaches  Psychiatric/Behavioral: Negative for confusion  The patient is not nervous/anxious  OBJECTIVE     Vitals:    10/17/22 0758   BP: 124/82   BP Location: Right arm   Patient Position: Sitting   Cuff Size: Large   Pulse: 86   Temp: 98 6 °F (37 °C)   TempSrc: Temporal   SpO2: 97%   Weight: 91 2 kg (201 lb)   Height: 5' 2" (1 575 m)     Physical Exam  Vitals reviewed  Constitutional:       General: She is not in acute distress  Appearance: Normal appearance   She is well-developed  She is not ill-appearing, toxic-appearing or diaphoretic  HENT:      Head: Normocephalic and atraumatic  Right Ear: External ear normal       Left Ear: External ear normal       Nose: Nose normal       Mouth/Throat:      Mouth: Mucous membranes are moist       Pharynx: Oropharynx is clear  Eyes:      General:         Right eye: No discharge  Left eye: No discharge  Conjunctiva/sclera: Conjunctivae normal    Cardiovascular:      Rate and Rhythm: Normal rate and regular rhythm  Pulmonary:      Effort: Pulmonary effort is normal  No respiratory distress  Breath sounds: Normal breath sounds  Abdominal:      General: Bowel sounds are normal  There is no distension  Palpations: Abdomen is soft  Tenderness: There is no abdominal tenderness  Musculoskeletal:         General: No swelling or tenderness  Normal range of motion  Cervical back: Normal range of motion and neck supple  Right lower leg: No edema  Left lower leg: No edema  Skin:     General: Skin is warm and dry  Coloration: Skin is not jaundiced  Findings: No bruising  Neurological:      General: No focal deficit present  Mental Status: She is alert and oriented to person, place, and time  Motor: No weakness  Psychiatric:         Mood and Affect: Mood normal          Behavior: Behavior normal          Thought Content:  Thought content normal        CURRENT MEDICATIONS     Current Outpatient Medications:   •  acetaminophen (TYLENOL) 650 mg CR tablet, Take 650 mg by mouth every 8 (eight) hours as needed , Disp: , Rfl:   •  atorvastatin (LIPITOR) 40 mg tablet, TAKE 1 TABLET (40 MG TOTAL) BY MOUTH DAILY FOR CHOLESTEROL, Disp: 90 tablet, Rfl: 1  •  Blood Pressure Monitoring (Blood Pressure Monitor/M Cuff) MISC, Use daily, Disp: 1 each, Rfl: 0  •  Diclofenac Sodium (VOLTAREN) 1 %, Apply 2 g topically 4 (four) times a day as needed (neck pain), Disp: 100 g, Rfl: 1  • Empagliflozin (Jardiance) 10 MG TABS, Take 1 tablet (10 mg total) by mouth every morning, Disp: 30 tablet, Rfl: 11  •  Entresto 24-26 MG TABS, TAKE 1 TABLET BY MOUTH TWICE A DAY, Disp: 60 tablet, Rfl: 6  •  furosemide (LASIX) 20 mg tablet, Take 1 tablet (20 mg total) by mouth daily, Disp: 30 tablet, Rfl: 11  •  Klor-Con M20 20 MEQ tablet, TAKE 1 TABLET BY MOUTH 2 TIMES A DAY , Disp: 180 tablet, Rfl: 1  •  meloxicam (MOBIC) 15 mg tablet, TAKE 1 TABLET BY MOUTH EVERY DAY, Disp: 30 tablet, Rfl: 0  •  metoprolol succinate (TOPROL-XL) 50 mg 24 hr tablet, TAKE 1 TABLET BY MOUTH TWICE A DAY, Disp: 180 tablet, Rfl: 2  •  nitroglycerin (NITROSTAT) 0 4 mg SL tablet, Place 1 tablet (0 4 mg total) under the tongue every 5 (five) minutes as needed for chest pain (take every 5 minutes for chest pain with max 3 doses), Disp: 9 tablet, Rfl: 0  •  omeprazole (PriLOSEC) 40 MG capsule, Take 40 mg by mouth daily, Disp: , Rfl:   •  spironolactone (ALDACTONE) 25 mg tablet, TAKE 1 TABLET BY MOUTH TWICE A DAY, Disp: 180 tablet, Rfl: 1    PAST MEDICAL & SURGICAL HISTORY     Past Medical History:   Diagnosis Date   • Chronic ankle pain     Unspec laterality, Last assessed - 6/22/16   • Chronic hepatitis C (HCC)    • Complete left bundle branch block (LBBB)    • COPD (chronic obstructive pulmonary disease) (HCC)    • Eczema     B/L elbows, start Hydrocortisone; Last assessed - 8/5/15   • HLD (hyperlipidemia)    • Hydrocodone use disorder, moderate, in controlled environment ([facility] [address] ) 3/11/2015    Violated CSMA, stopped   • Hypertension    • Osteoarthritis, knee    • Rectal polyp    • Sleep apnea    • Violation of controlled substance agreement 10/23/2019    See notes of 10-     Past Surgical History:   Procedure Laterality Date   • COLONOSCOPY     • EYE SURGERY       SOCIAL & FAMILY HISTORY     Social History     Socioeconomic History   • Marital status:       Spouse name: Not on file   • Number of children: Not on file   • Years of education: Not on file   • Highest education level: Not on file   Occupational History   • Not on file   Tobacco Use   • Smoking status: Former Smoker     Quit date: 1999     Years since quittin 6   • Smokeless tobacco: Never Used   Vaping Use   • Vaping Use: Never used   Substance and Sexual Activity   • Alcohol use: No   • Drug use: No   • Sexual activity: Yes   Other Topics Concern   • Not on file   Social History Narrative    Home environment-safe         Social Determinants of Health     Financial Resource Strain: Low Risk    • Difficulty of Paying Living Expenses: Not very hard   Food Insecurity: No Food Insecurity   • Worried About Running Out of Food in the Last Year: Never true   • Ran Out of Food in the Last Year: Never true   Transportation Needs: No Transportation Needs   • Lack of Transportation (Medical): No   • Lack of Transportation (Non-Medical):  No   Physical Activity: Not on file   Stress: Not on file   Social Connections: Not on file   Intimate Partner Violence: Not on file   Housing Stability: Low Risk    • Unable to Pay for Housing in the Last Year: No   • Number of Places Lived in the Last Year: 1   • Unstable Housing in the Last Year: No     Social History     Substance and Sexual Activity   Alcohol Use No     Social History     Substance and Sexual Activity   Drug Use No     Social History     Tobacco Use   Smoking Status Former Smoker   • Quit date: 1999   • Years since quittin 6   Smokeless Tobacco Never Used     Family History   Problem Relation Age of Onset   • Heart disease Mother         Pacemaker   • Hypertension Mother    • Heart disease Father    • Heart disease Sister    • Hypertension Sister    • Alcohol abuse Brother    • Heart disease Brother    • Cirrhosis Brother         Liver    • Hypertension Brother    • No Known Problems Daughter    • No Known Problems Maternal Grandmother    • No Known Problems Paternal Grandfather    • No Known Problems Daughter    • No Known Problems Sister    • No Known Problems Maternal Aunt    • No Known Problems Cousin      ==  Zafar Welch,   Internal Medicine Resident, PGY-3  [facility] , [address] [address] [place] [address], [address] [place] [address]  Office: [phone]  Fax: [phone]

## 2022-10-21 DIAGNOSIS — E83.52 HYPERCALCEMIA: Primary | ICD-10-CM

## 2022-10-21 DIAGNOSIS — I50.22 HEART FAILURE WITH MID-RANGE EJECTION FRACTION (HCC): ICD-10-CM

## 2022-10-21 RX ORDER — SACUBITRIL AND VALSARTAN 24; 26 MG/1; MG/1
TABLET, FILM COATED ORAL
Qty: 60 TABLET | Refills: 6 | Status: SHIPPED | OUTPATIENT
Start: 2022-10-21

## 2022-11-11 ENCOUNTER — VBI (OUTPATIENT)
Dept: ADMINISTRATIVE | Facility: OTHER | Age: 67
End: 2022-11-11

## 2022-11-11 NOTE — TELEPHONE ENCOUNTER
11/11/22 9:25 AM     See documentation in the [facility] MargueriteForm       Heavenly Mcknight

## 2022-11-21 NOTE — PROGRESS NOTES
Advanced Heart Failure Outpatient Progress Note - Von Rockwell 69 y o  female MRN: [id number]    Encounter: [id number]      Assessment/Plan:    Patient Active Problem List    Diagnosis Date Noted   • Prediabetes 12/03/2021   • Decreased hearing of both ears 12/30/2020   • Cataract 10/23/2020   • Arthropathic psoriasis ([facility] ) 09/11/2017   • Chronic combined systolic and diastolic congestive heart failure ([facility] ) 09/11/2017   • Obesity (BMI 30-39 9) 09/11/2017   • Allergic rhinitis 10/26/2015   • Obstructive sleep apnea, adult 11/13/2014   • Dyslipidemia 04/24/2013   • Hypertension 11/08/2012   • Obesity, morbid ([place] [id number] ) 03/31/2022   • Stage 3 chronic kidney disease, unspecified whether stage 3a or 3b CKD ([place] [id number] ) 03/31/2022     # Heart failure with mid range ejection fraction, Stage C, NYHA II  Etiology: Unclear  No definitive evidence of ischemia on nuclear stress test  Hypertension controlled  LBBB likely chronic  Dyscynchrony noted on echo since 2016  No heavy alcohol or drug use  Euvoemic, warm and well perfused    Weight: 193 lbs; 192 lbs 11/2/21; 201 lbs 2/7/22; 196 lbs 2/24/22; 199 lbs 3/31/22; 202 lbs 5/27/22; 201 lbs 8/25/22; 202 lbs 11/22/22  NT proBNP:      Studies- personally reviewed by me    Echo 1/3/22:  LVEF: 50%  LVIDd: 5 9cm  RV: normal size and systolic function  MR: mild  PASP:  24 mmHg  RVOT: mid systolic notching suggesting elevated pulmonary vascular resistance  Other: grade 2 diastology, no regional wall motion abnormalities seen, abnormal septal motion consistent with bundle branch block  IVC normal with normal respirophasic variation    Pharm Nuc Stress 9/27/21: Abnormal study after pharmacologic vasodilation without reproduction of symptoms  Inferior defect improved with prone imaging likely diaphragmatic attenuation  Partially reversible anteroapical defect likely shifting breast, cannot exclude small area of distal LAD ischemia   Left ventricular systolic function was reduced, without distinct regional wall motion abnormalities  LVEF 47%    Echocardiogram 7/8/21  LVEF: 40-45%  LVIDd: 5cm  RV: normal size and systolic function  MR: mild  PASP: 30mmHg  RVOT: mid systolic notching suggesting elevated pulmonary vascular resistance  Other: hypokinesis of inferior and septal walls, mild to moderately increased wall thickness, mild LAE    TTE 5/6/2016: LVEF 50%  Moderate concentric hypertrophy  Hypokinesis of mid anteroseptal, basal inferior and basal to mid inferolateral walls  Grade 1 diastology  Normal RV size and systolic function    Diet:  2 g sodium diet  2000 mL fluid restriction    Neurohormonal Blockade:  --Beta-Blocker: metoprolol succinate 50mg BID  --ACEi, ARB or ARNi: entresto 24-26mg BID  --Aldosterone Receptor Blocker: spironolactone 25mg BID  --SGLT2 Inhibitor: empagliflozin 10mg daily  --Diuretic: furosemide 20mg daily with potassium 20 meq daily    Sudden Cardiac Death Risk Reduction:  --ICD:  LVEF >35%    Electrical Resynchronization:  --Candidacy for BiV device:    Advanced Therapies (if appropriate): --We will continue to monitor, no indication at this time    # Hypertension, controlled  # Hyperlipidemia  8/10/21: TC 1159  HDL 50 LDL 82  Rx: atorvastatin 40mg daily  # LUIZA on CPAP  # Obesity  # LBBB, likely chronic as noted above    Today's Plan:  Euvolemic on exam  Continue current medications  2g sodium diet  2L fluid diet  Daily weights  Physical activities/exercise as tolerated      HPI:   75-year-old female with past medical history of hypertension, hyperlipidemia, obstructive sleep apnea on CPAP, obesity who presents for evaluation of heart failure  Patient with chronic heart failure with preserved ejection fraction  Echocardiogram back in 2015 showed LVEF of 50%  Most recent echocardiogram in 7/8/2021 showed LVEF of 40-45% with hypokinesis of the inferior and septal walls  Patient notes shortness of breath since the pandemic  Attributed to weight gain  And inactivity    She short of breath walking uphill and walking up steps for couple of years  She recently started exercising and walks on treadmill for 45 mins to an hour, no shortness of breath or chest pain  Occasional lightheadedness  Uses CPAP at night  No PND orthopnea  She has intermittent leg swelling and was on hydrochlorothiazide  Remote smoking history > 20 years ago  Rare alcohol intake  No drug use    11/1/21: Here for follow up  Nuclear stress test results as above  Overall doing well  Walking 6 blocks almost everyday, no issues  Exercises and walks treadmill for 40 mins without chest pain or shortness of breath  Notes some shortness of breath walking uphill  Has been limiting salt in her diet  Adherent to medications  2/7/22: Here for follow up  Reports progressive shortness of breath on exertion over past few weeks  Weight up almost 10 lb since last visit in November  Weight up to 201 lb today  Also with abdominal fullness, leg swelling and orthopnea  Not been sleeping well night  Denies chest pain on exertion  No palpitations or lightheadedness  Has bendopnea    2/15/22: Here for follow up  Still with shortness of breath on exertion but overall improved  Weight down 5 lbs since last visit  2/15/22 Na 139 K 3 9 creatinine 0 97    3/31/22: Here for follow up  Started spironolactone 25mg daily and decreased potassium to 20 meq daily on last visit  3/3/21 Na 136 K4 1 creatinine 1 01  Eating overall okay  Still with shortness of breath on exertion  Not much change in weight  Having knee pains and mostly sedentary  No PND, orthopnea or lower extremity edema  5/27/22: Here for follow up  Increased spironolactone to 25mg two times a day  Bikes 15-20 mins x 2  No symptoms  Having right leg cramping with new exercise  No palpitations or lightheadedness    8/25/22: here for follow up  Started on jardiance 10mg daily since last visit  6/3/22 Na 136 K 3 7 and creatinine 1 36 from 1 01   Lasix dose reduced to 20mg daily PRN for weight gain  Home scale weight 202 at home  Intermittent episodes of shortness of breath  No PND, orthopnea or worsening lower extremity edema  No palpitations  Occasional lightheadedness with getting up       11/22/22: Here for follow up  Resumed furosemide 20mg once a day and started potassium 20 meq once a day on last visit  10/17/22 Na 137 K 4 8 and creatinine 1 13  Exercising on the elliptical for 10 minutes  No worsening shortness of breath, short of breath walking uphill  Lightheadedness better  Past Medical History:   Diagnosis Date   • Chronic ankle pain     Unspec laterality, Last assessed - 6/22/16   • Chronic hepatitis C (HCC)    • Complete left bundle branch block (LBBB)    • COPD (chronic obstructive pulmonary disease) (HCC)    • Eczema     B/L elbows, start Hydrocortisone; Last assessed - 8/5/15   • HLD (hyperlipidemia)    • Hydrocodone use disorder, moderate, in controlled environment ([facility] ) 3/11/2015    Violated CSMA, stopped   • Hypertension    • Osteoarthritis, knee    • Rectal polyp    • Sleep apnea    • Violation of controlled substance agreement 10/23/2019    See notes of 10-       Review of Systems   Constitutional: Negative for chills, fatigue and fever  HENT: Negative for ear pain and sore throat  Eyes: Negative for pain and visual disturbance  Respiratory: Negative for cough and chest tightness  Cardiovascular: Negative for chest pain, palpitations and leg swelling  Gastrointestinal: Negative for abdominal distention, abdominal pain and vomiting  Genitourinary: Negative for dysuria and hematuria  Musculoskeletal: Negative for arthralgias and back pain  Skin: Negative for color change and rash  Neurological: Negative for dizziness, seizures and syncope  All other systems reviewed and are negative  No Known Allergies        Current Outpatient Medications:   •  acetaminophen (TYLENOL) 650 mg CR tablet, Take 650 mg by mouth every 8 (eight) hours as needed , Disp: , Rfl:   •  atorvastatin (LIPITOR) 40 mg tablet, TAKE 1 TABLET (40 MG TOTAL) BY MOUTH DAILY FOR CHOLESTEROL, Disp: 90 tablet, Rfl: 1  •  Blood Pressure Monitoring (Blood Pressure Monitor/M Cuff) MISC, Use daily, Disp: 1 each, Rfl: 0  •  Diclofenac Sodium (VOLTAREN) 1 %, Apply 2 g topically 4 (four) times a day as needed (neck pain), Disp: 100 g, Rfl: 1  •  Empagliflozin (Jardiance) 10 MG TABS, Take 1 tablet (10 mg total) by mouth every morning, Disp: 30 tablet, Rfl: 11  •  Entresto 24-26 MG TABS, TAKE 1 TABLET BY MOUTH TWICE A DAY, Disp: 60 tablet, Rfl: 6  •  furosemide (LASIX) 20 mg tablet, Take 1 tablet (20 mg total) by mouth daily, Disp: 30 tablet, Rfl: 11  •  Klor-Con M20 20 MEQ tablet, TAKE 1 TABLET BY MOUTH 2 TIMES A DAY , Disp: 180 tablet, Rfl: 1  •  meloxicam (MOBIC) 15 mg tablet, TAKE 1 TABLET BY MOUTH EVERY DAY, Disp: 30 tablet, Rfl: 0  •  metoprolol succinate (TOPROL-XL) 50 mg 24 hr tablet, TAKE 1 TABLET BY MOUTH TWICE A DAY, Disp: 180 tablet, Rfl: 2  •  nitroglycerin (NITROSTAT) 0 4 mg SL tablet, Place 1 tablet (0 4 mg total) under the tongue every 5 (five) minutes as needed for chest pain (take every 5 minutes for chest pain with max 3 doses), Disp: 9 tablet, Rfl: 0  •  omeprazole (PriLOSEC) 40 MG capsule, Take 40 mg by mouth daily, Disp: , Rfl:   •  spironolactone (ALDACTONE) 25 mg tablet, TAKE 1 TABLET BY MOUTH TWICE A DAY, Disp: 180 tablet, Rfl: 1    Social History     Socioeconomic History   • Marital status:       Spouse name: Not on file   • Number of children: Not on file   • Years of education: Not on file   • Highest education level: Not on file   Occupational History   • Not on file   Tobacco Use   • Smoking status: Former     Types: Cigarettes     Quit date: 1999     Years since quittin 7   • Smokeless tobacco: Never   Vaping Use   • Vaping Use: Never used   Substance and Sexual Activity   • Alcohol use: No   • Drug use: No   • Sexual activity: Yes   Other Topics Concern   • Not on file   Social History Narrative    Home environment-safe         Social Determinants of Health     Financial Resource Strain: Low Risk    • Difficulty of Paying Living Expenses: Not very hard   Food Insecurity: No Food Insecurity   • Worried About Running Out of Food in the Last Year: Never true   • Ran Out of Food in the Last Year: Never true   Transportation Needs: No Transportation Needs   • Lack of Transportation (Medical): No   • Lack of Transportation (Non-Medical): No   Physical Activity: Not on file   Stress: Not on file   Social Connections: Not on file   Intimate Partner Violence: Not on file   Housing Stability: Low Risk    • Unable to Pay for Housing in the Last Year: No   • Number of Places Lived in the Last Year: 1   • Unstable Housing in the Last Year: No       Family History   Problem Relation Age of Onset   • Heart disease Mother         Pacemaker   • Hypertension Mother    • Heart disease Father    • Heart disease Sister    • Hypertension Sister    • Alcohol abuse Brother    • Heart disease Brother    • Cirrhosis Brother         Liver    • Hypertension Brother    • No Known Problems Daughter    • No Known Problems Maternal Grandmother    • No Known Problems Paternal Grandfather    • No Known Problems Daughter    • No Known Problems Sister    • No Known Problems Maternal Aunt    • No Known Problems Cousin        Physical Exam:    Vitals: Blood pressure 128/84, pulse 78, height 5' 2" (1 575 m), weight 91 6 kg (202 lb), SpO2 97 %, not currently breastfeeding , Body mass index is 36 95 kg/m² ,   Wt Readings from Last 3 Encounters:   11/22/22 91 6 kg (202 lb)   10/17/22 91 2 kg (201 lb)   09/06/22 91 6 kg (202 lb)       Physical Exam  Constitutional:       General: She is not in acute distress  Appearance: Normal appearance  HENT:      Head: Normocephalic and atraumatic        Mouth/Throat:      Mouth: Mucous membranes are moist    Eyes:      Extraocular Movements: Extraocular movements intact  Conjunctiva/sclera: Conjunctivae normal    Cardiovascular:      Rate and Rhythm: Normal rate and regular rhythm  Pulses: Normal pulses  Heart sounds: No murmur heard  No friction rub  No gallop  Comments: Nonelevated JVP  Pulmonary:      Effort: Pulmonary effort is normal  No respiratory distress  Breath sounds: No wheezing, rhonchi or rales  Abdominal:      General: There is no distension  Palpations: Abdomen is soft  Tenderness: There is no abdominal tenderness  There is no guarding  Musculoskeletal:         General: No deformity or signs of injury  Cervical back: Neck supple  Right lower leg: No edema  Left lower leg: No edema  Skin:     General: Skin is warm and dry  Capillary Refill: Capillary refill takes less than 2 seconds  Neurological:      General: No focal deficit present  Mental Status: She is alert and oriented to person, place, and time  Psychiatric:         Mood and Affect: Mood normal          Labs & Results:    Lab Results   Component Value Date    WBC 8 69 08/10/2021    HGB 12 7 08/10/2021    HCT 39 0 08/10/2021    MCV 92 08/10/2021     08/10/2021     Lab Results   Component Value Date    SODIUM 137 10/17/2022    K 4 8 10/17/2022     (H) 10/17/2022    CO2 20 (L) 10/17/2022    BUN 20 10/17/2022    CREATININE 1 13 10/17/2022    GLUC 105 12/06/2016    CALCIUM 10 4 (H) 10/17/2022     No results found for: NTBNP   Lab Results   Component Value Date    CHOLESTEROL 159 08/10/2021    CHOLESTEROL 155 08/16/2019     Lab Results   Component Value Date    HDL 50 08/10/2021    HDL 44 08/16/2019    HDL 41 04/22/2014     Lab Results   Component Value Date    TRIG 134 08/10/2021    TRIG 115 08/16/2019    TRIG 140 04/22/2014     No results found for: Tom    EKG personally reviewed by Vladislav Morgan  Counseling / Coordination of Care  Time spent today 25 minutes    Greater than 50% of total time was spent with the patient and / or family counseling and / or coordination of care  We discussed diagnoses, most recent studies and any changes in treatment    Thank you for the opportunity to participate in the care of this patient      Aminata Pratt MD  ADVANCED HEART FAILURE AND MECHANICAL CIRCULATORY SUPPORT  [facility]

## 2022-11-22 ENCOUNTER — OFFICE VISIT (OUTPATIENT)
Dept: CARDIOLOGY CLINIC | Facility: CLINIC | Age: 67
End: 2022-11-22

## 2022-11-22 VITALS
WEIGHT: 202 LBS | HEIGHT: 62 IN | OXYGEN SATURATION: 97 % | SYSTOLIC BLOOD PRESSURE: 128 MMHG | HEART RATE: 78 BPM | BODY MASS INDEX: 37.17 KG/M2 | DIASTOLIC BLOOD PRESSURE: 84 MMHG

## 2022-11-22 DIAGNOSIS — E78.5 DYSLIPIDEMIA: ICD-10-CM

## 2022-11-22 DIAGNOSIS — I10 ESSENTIAL HYPERTENSION: ICD-10-CM

## 2022-11-22 DIAGNOSIS — I50.22 HEART FAILURE WITH MID-RANGE EJECTION FRACTION (HCC): Primary | ICD-10-CM

## 2022-11-22 DIAGNOSIS — G47.33 OBSTRUCTIVE SLEEP APNEA, ADULT: ICD-10-CM

## 2022-11-22 NOTE — PATIENT INSTRUCTIONS
Continue current medications  2g sodium diet  2L fluid diet  Daily weights  Physical activities/exercise as tolerated
none

## 2022-12-21 DIAGNOSIS — I50.22 HEART FAILURE WITH MID-RANGE EJECTION FRACTION (HCC): ICD-10-CM

## 2022-12-21 RX ORDER — SPIRONOLACTONE 25 MG/1
TABLET ORAL
Qty: 180 TABLET | Refills: 1 | Status: SHIPPED | OUTPATIENT
Start: 2022-12-21

## 2022-12-26 DIAGNOSIS — I50.22 HEART FAILURE WITH MID-RANGE EJECTION FRACTION (HCC): ICD-10-CM

## 2022-12-28 RX ORDER — POTASSIUM CHLORIDE 1500 MG/1
TABLET, EXTENDED RELEASE ORAL
Qty: 180 TABLET | Refills: 1 | Status: SHIPPED | OUTPATIENT
Start: 2022-12-28

## 2023-01-30 ENCOUNTER — OFFICE VISIT (OUTPATIENT)
Dept: INTERNAL MEDICINE CLINIC | Facility: CLINIC | Age: 68
End: 2023-01-30

## 2023-01-30 VITALS
HEART RATE: 84 BPM | WEIGHT: 206 LBS | BODY MASS INDEX: 36.5 KG/M2 | SYSTOLIC BLOOD PRESSURE: 115 MMHG | HEIGHT: 63 IN | TEMPERATURE: 97.9 F | DIASTOLIC BLOOD PRESSURE: 75 MMHG

## 2023-01-30 DIAGNOSIS — M85.852 OSTEOPENIA OF NECKS OF BOTH FEMURS: ICD-10-CM

## 2023-01-30 DIAGNOSIS — L40.50 ARTHROPATHIC PSORIASIS (HCC): ICD-10-CM

## 2023-01-30 DIAGNOSIS — I10 PRIMARY HYPERTENSION: ICD-10-CM

## 2023-01-30 DIAGNOSIS — M85.851 OSTEOPENIA OF NECKS OF BOTH FEMURS: ICD-10-CM

## 2023-01-30 DIAGNOSIS — E66.9 OBESITY (BMI 30-39.9): ICD-10-CM

## 2023-01-30 DIAGNOSIS — J30.1 SEASONAL ALLERGIC RHINITIS DUE TO POLLEN: ICD-10-CM

## 2023-01-30 DIAGNOSIS — Z23 NEED FOR PNEUMOCOCCAL VACCINATION: ICD-10-CM

## 2023-01-30 DIAGNOSIS — Z00.00 MEDICARE ANNUAL WELLNESS VISIT, INITIAL: Primary | ICD-10-CM

## 2023-01-30 DIAGNOSIS — G47.33 OBSTRUCTIVE SLEEP APNEA, ADULT: ICD-10-CM

## 2023-01-30 DIAGNOSIS — M25.572 LEFT ANKLE PAIN: ICD-10-CM

## 2023-01-30 DIAGNOSIS — H91.93 DECREASED HEARING OF BOTH EARS: ICD-10-CM

## 2023-01-30 DIAGNOSIS — Z00.00 WELCOME TO MEDICARE PREVENTIVE VISIT: ICD-10-CM

## 2023-01-30 DIAGNOSIS — N18.30 STAGE 3 CHRONIC KIDNEY DISEASE, UNSPECIFIED WHETHER STAGE 3A OR 3B CKD (HCC): ICD-10-CM

## 2023-01-30 DIAGNOSIS — R73.03 PREDIABETES: ICD-10-CM

## 2023-01-30 DIAGNOSIS — E78.5 DYSLIPIDEMIA: ICD-10-CM

## 2023-01-30 DIAGNOSIS — I50.42 CHRONIC COMBINED SYSTOLIC AND DIASTOLIC CONGESTIVE HEART FAILURE (HCC): ICD-10-CM

## 2023-01-30 DIAGNOSIS — H26.9 CATARACT OF BOTH EYES, UNSPECIFIED CATARACT TYPE: ICD-10-CM

## 2023-01-30 PROBLEM — Z86.19 HISTORY OF HEPATITIS C: Status: ACTIVE | Noted: 2023-01-30

## 2023-01-30 NOTE — PATIENT INSTRUCTIONS
Medicare Preventive Visit Patient Instructions  Thank you for completing your Welcome to Medicare Visit or Medicare Annual Wellness Visit today  Your next wellness visit will be due in one year (1/31/2024)  The screening/preventive services that you may require over the next 5-10 years are detailed below  Some tests may not apply to you based off risk factors and/or age  Screening tests ordered at today's visit but not completed yet may show as past due  Also, please note that scanned in results may not display below  Preventive Screenings:  Service Recommendations Previous Testing/Comments   Colorectal Cancer Screening  * Colonoscopy    * Fecal Occult Blood Test (FOBT)/Fecal Immunochemical Test (FIT)  * Fecal DNA/Cologuard Test  * Flexible Sigmoidoscopy Age: 39-70 years old   Colonoscopy: every 10 years (may be performed more frequently if at higher risk)  OR  FOBT/FIT: every 1 year  OR  Cologuard: every 3 years  OR  Sigmoidoscopy: every 5 years  Screening may be recommended earlier than age 39 if at higher risk for colorectal cancer  Also, an individualized decision between you and your healthcare provider will decide whether screening between the ages of 74-80 would be appropriate  Colonoscopy: 04/10/2014  FOBT/FIT: Not on file  Cologuard: Not on file  Sigmoidoscopy: Not on file          Breast Cancer Screening Age: 36 years old  Frequency: every 1-2 years  Not required if history of left and right mastectomy Mammogram: 08/05/2021        Cervical Cancer Screening Between the ages of 21-29, pap smear recommended once every 3 years  Between the ages of 33-67, can perform pap smear with HPV co-testing every 5 years     Recommendations may differ for women with a history of total hysterectomy, cervical cancer, or abnormal pap smears in past  Pap Smear: 01/13/2017        Hepatitis C Screening Once for adults born between 1945 and 1965  More frequently in patients at high risk for Hepatitis C Hep C Antibody: 08/22/2019        Diabetes Screening 1-2 times per year if you're at risk for diabetes or have pre-diabetes Fasting glucose: 106 mg/dL (10/17/2022)  A1C: 5 8 % (10/17/2022)      Cholesterol Screening Once every 5 years if you don't have a lipid disorder  May order more often based on risk factors  Lipid panel: 08/10/2021          Other Preventive Screenings Covered by Medicare:  1  Abdominal Aortic Aneurysm (AAA) Screening: covered once if your at risk  You're considered to be at risk if you have a family history of AAA  2  Lung Cancer Screening: covers low dose CT scan once per year if you meet all of the following conditions: (1) Age 50-69; (2) No signs or symptoms of lung cancer; (3) Current smoker or have quit smoking within the last 15 years; (4) You have a tobacco smoking history of at least 20 pack years (packs per day multiplied by number of years you smoked); (5) You get a written order from a healthcare provider  3  Glaucoma Screening: covered annually if you're considered high risk: (1) You have diabetes OR (2) Family history of glaucoma OR (3)  aged 48 and older OR (3)  American aged 72 and older  3  Osteoporosis Screening: covered every 2 years if you meet one of the following conditions: (1) You're estrogen deficient and at risk for osteoporosis based off medical history and other findings; (2) Have a vertebral abnormality; (3) On glucocorticoid therapy for more than 3 months; (4) Have primary hyperparathyroidism; (5) On osteoporosis medications and need to assess response to drug therapy  · Last bone density test (DXA Scan): 10/04/2019   5  HIV Screening: covered annually if you're between the age of 15-65  Also covered annually if you are younger than 13 and older than 72 with risk factors for HIV infection  For pregnant patients, it is covered up to 3 times per pregnancy      Immunizations:  Immunization Recommendations   Influenza Vaccine Annual influenza vaccination during flu season is recommended for all persons aged >= 6 months who do not have contraindications   Pneumococcal Vaccine   * Pneumococcal conjugate vaccine = PCV13 (Prevnar 13), PCV15 (Vaxneuvance), PCV20 (Prevnar 20)  * Pneumococcal polysaccharide vaccine = PPSV23 (Pneumovax) Adults 25-60 years old: 1-3 doses may be recommended based on certain risk factors  Adults 72 years old: 1-2 doses may be recommended based off what pneumonia vaccine you previously received   Hepatitis B Vaccine 3 dose series if at intermediate or high risk (ex: diabetes, end stage renal disease, liver disease)   Tetanus (Td) Vaccine - COST NOT COVERED BY MEDICARE PART B Following completion of primary series, a booster dose should be given every 10 years to maintain immunity against tetanus  Td may also be given as tetanus wound prophylaxis  Tdap Vaccine - COST NOT COVERED BY MEDICARE PART B Recommended at least once for all adults  For pregnant patients, recommended with each pregnancy  Shingles Vaccine (Shingrix) - COST NOT COVERED BY MEDICARE PART B  2 shot series recommended in those aged 48 and above     Health Maintenance Due:      Topic Date Due   • Cervical Cancer Screening  01/13/2020   • Breast Cancer Screening: Mammogram  08/05/2023   • Colorectal Cancer Screening  04/10/2024   • Hepatitis C Screening  Completed     Immunizations Due:      Topic Date Due   • Pneumococcal Vaccine: 65+ Years (2 - PPSV23 if available, else PCV20) 02/01/2020   • COVID-19 Vaccine (3 - Booster for [employer] series) 07/08/2021     Advance Directives   What are advance directives? Advance directives are legal documents that state your wishes and plans for medical care  These plans are made ahead of time in case you lose your ability to make decisions for yourself  Advance directives can apply to any medical decision, such as the treatments you want, and if you want to donate organs  What are the types of advance directives?   There are many types of advance directives, and each state has rules about how to use them  You may choose a combination of any of the following:  · Living will: This is a written record of the treatment you want  You can also choose which treatments you do not want, which to limit, and which to stop at a certain time  This includes surgery, medicine, IV fluid, and tube feedings  · Durable power of  for healthcare [place] [facility]): This is a written record that states who you want to make healthcare choices for you when you are unable to make them for yourself  This person, called a proxy, is usually a family member or a friend  You may choose more than 1 proxy  · Do not resuscitate (DNR) order:  A DNR order is used in case your heart stops beating or you stop breathing  It is a request not to have certain forms of treatment, such as CPR  A DNR order may be included in other types of advance directives  · Medical directive: This covers the care that you want if you are in a coma, near death, or unable to make decisions for yourself  You can list the treatments you want for each condition  Treatment may include pain medicine, surgery, blood transfusions, dialysis, IV or tube feedings, and a ventilator (breathing machine)  · Values history: This document has questions about your views, beliefs, and how you feel and think about life  This information can help others choose the care that you would choose  Why are advance directives important? An advance directive helps you control your care  Although spoken wishes may be used, it is better to have your wishes written down  Spoken wishes can be misunderstood, or not followed  Treatments may be given even if you do not want them  An advance directive may make it easier for your family to make difficult choices about your care     Weight Management   Why it is important to manage your weight:  Being overweight increases your risk of health conditions such as heart disease, high blood pressure, type 2 diabetes, and certain types of cancer  It can also increase your risk for osteoarthritis, sleep apnea, and other respiratory problems  Aim for a slow, steady weight loss  Even a small amount of weight loss can lower your risk of health problems  How to lose weight safely:  A safe and healthy way to lose weight is to eat fewer calories and get regular exercise  You can lose up about 1 pound a week by decreasing the number of calories you eat by 500 calories each day  Healthy meal plan for weight management:  A healthy meal plan includes a variety of foods, contains fewer calories, and helps you stay healthy  A healthy meal plan includes the following:  · Eat whole-grain foods more often  A healthy meal plan should contain fiber  Fiber is the part of grains, fruits, and vegetables that is not broken down by your body  Whole-grain foods are healthy and provide extra fiber in your diet  Some examples of whole-grain foods are whole-wheat breads and pastas, oatmeal, brown rice, and bulgur  · Eat a variety of vegetables every day  Include dark, leafy greens such as spinach, kale, denae greens, and mustard greens  Eat yellow and orange vegetables such as carrots, sweet potatoes, and winter squash  · Eat a variety of fruits every day  Choose fresh or canned fruit (canned in its own juice or light syrup) instead of juice  Fruit juice has very little or no fiber  · Eat low-fat dairy foods  Drink fat-free (skim) milk or 1% milk  Eat fat-free yogurt and low-fat cottage cheese  Try low-fat cheeses such as mozzarella and other reduced-fat cheeses  · Choose meat and other protein foods that are low in fat  Choose beans or other legumes such as split peas or lentils  Choose fish, skinless poultry (chicken or turkey), or lean cuts of red meat (beef or pork)  Before you cook meat or poultry, cut off any visible fat  · Use less fat and oil  Try baking foods instead of frying them   Add less fat, such as margarine, sour cream, regular salad dressing and mayonnaise to foods  Eat fewer high-fat foods  Some examples of high-fat foods include french fries, doughnuts, ice cream, and cakes  · Eat fewer sweets  Limit foods and drinks that are high in sugar  This includes candy, cookies, regular soda, and sweetened drinks  Exercise:  Exercise at least 30 minutes per day on most days of the week  Some examples of exercise include walking, biking, dancing, and swimming  You can also fit in more physical activity by taking the stairs instead of the elevator or parking farther away from stores  Ask your healthcare provider about the best exercise plan for you  © Copyright [employer] 2018 Information is for End User's use only and may not be sold, redistributed or otherwise used for commercial purposes  All illustrations and images included in CareNotes® are the copyrighted property of [employer]  or [facility] Preventive Visit Patient Instructions  Thank you for completing your Welcome to Medicare Visit or Medicare Annual Wellness Visit today  Your next wellness visit will be due in one year (1/31/2024)  The screening/preventive services that you may require over the next 5-10 years are detailed below  Some tests may not apply to you based off risk factors and/or age  Screening tests ordered at today's visit but not completed yet may show as past due  Also, please note that scanned in results may not display below    Preventive Screenings:  Service Recommendations Previous Testing/Comments   Colorectal Cancer Screening  * Colonoscopy    * Fecal Occult Blood Test (FOBT)/Fecal Immunochemical Test (FIT)  * Fecal DNA/Cologuard Test  * Flexible Sigmoidoscopy Age: 39-70 years old   Colonoscopy: every 10 years (may be performed more frequently if at higher risk)  OR  FOBT/FIT: every 1 year  OR  Cologuard: every 3 years  OR  Sigmoidoscopy: every 5 years  Screening may be recommended earlier than age 39 if at higher risk for colorectal cancer  Also, an individualized decision between you and your healthcare provider will decide whether screening between the ages of 74-80 would be appropriate  Colonoscopy: 04/10/2014  FOBT/FIT: Not on file  Cologuard: Not on file  Sigmoidoscopy: Not on file    Screening Current     Breast Cancer Screening Age: 36 years old  Frequency: every 1-2 years  Not required if history of left and right mastectomy Mammogram: 08/05/2021    Screening Current   Cervical Cancer Screening Between the ages of 21-29, pap smear recommended once every 3 years  Between the ages of 33-67, can perform pap smear with HPV co-testing every 5 years  Recommendations may differ for women with a history of total hysterectomy, cervical cancer, or abnormal pap smears in past  Pap Smear: 01/13/2017    Screening Not Indicated   Hepatitis C Screening Once for adults born between 1945 and 1965  More frequently in patients at high risk for Hepatitis C Hep C Antibody: 08/22/2019    Screening Not Indicated  History Hepatitis C   Diabetes Screening 1-2 times per year if you're at risk for diabetes or have pre-diabetes Fasting glucose: 106 mg/dL (10/17/2022)  A1C: 5 8 % (10/17/2022)  Screening Current   Cholesterol Screening Once every 5 years if you don't have a lipid disorder  May order more often based on risk factors  Lipid panel: 08/10/2021    Screening Current     Other Preventive Screenings Covered by Medicare:  6  Abdominal Aortic Aneurysm (AAA) Screening: covered once if your at risk  You're considered to be at risk if you have a family history of AAA    7  Lung Cancer Screening: covers low dose CT scan once per year if you meet all of the following conditions: (1) Age 50-69; (2) No signs or symptoms of lung cancer; (3) Current smoker or have quit smoking within the last 15 years; (4) You have a tobacco smoking history of at least 20 pack years (packs per day multiplied by number of years you smoked); (5) You get a written order from a healthcare provider  8  Glaucoma Screening: covered annually if you're considered high risk: (1) You have diabetes OR (2) Family history of glaucoma OR (3)  aged 48 and older OR (3)  American aged 72 and older  5  Osteoporosis Screening: covered every 2 years if you meet one of the following conditions: (1) You're estrogen deficient and at risk for osteoporosis based off medical history and other findings; (2) Have a vertebral abnormality; (3) On glucocorticoid therapy for more than 3 months; (4) Have primary hyperparathyroidism; (5) On osteoporosis medications and need to assess response to drug therapy  · Last bone density test (DXA Scan): 10/04/2019   10  HIV Screening: covered annually if you're between the age of 15-65  Also covered annually if you are younger than 13 and older than 72 with risk factors for HIV infection  For pregnant patients, it is covered up to 3 times per pregnancy  Immunizations:  Immunization Recommendations   Influenza Vaccine Annual influenza vaccination during flu season is recommended for all persons aged >= 6 months who do not have contraindications   Pneumococcal Vaccine   * Pneumococcal conjugate vaccine = PCV13 (Prevnar 13), PCV15 (Vaxneuvance), PCV20 (Prevnar 20)  * Pneumococcal polysaccharide vaccine = PPSV23 (Pneumovax) Adults 25-60 years old: 1-3 doses may be recommended based on certain risk factors  Adults 72 years old: 1-2 doses may be recommended based off what pneumonia vaccine you previously received   Hepatitis B Vaccine 3 dose series if at intermediate or high risk (ex: diabetes, end stage renal disease, liver disease)   Tetanus (Td) Vaccine - COST NOT COVERED BY MEDICARE PART B Following completion of primary series, a booster dose should be given every 10 years to maintain immunity against tetanus  Td may also be given as tetanus wound prophylaxis     Tdap Vaccine - COST NOT COVERED BY MEDICARE PART B Recommended at least once for all adults  For pregnant patients, recommended with each pregnancy  Shingles Vaccine (Shingrix) - COST NOT COVERED BY MEDICARE PART B  2 shot series recommended in those aged 48 and above     Health Maintenance Due:      Topic Date Due   • Cervical Cancer Screening  02/28/2023 (Originally 1/13/2020)   • Breast Cancer Screening: Mammogram  08/05/2023   • Colorectal Cancer Screening  04/10/2024   • Hepatitis C Screening  Discontinued     Immunizations Due:      Topic Date Due   • Hepatitis B Vaccine (1 of 3 - Risk 3-dose series) Never done   • COVID-19 Vaccine (3 - Booster for Moderna series) 07/08/2021     Advance Directives   What are advance directives? Advance directives are legal documents that state your wishes and plans for medical care  These plans are made ahead of time in case you lose your ability to make decisions for yourself  Advance directives can apply to any medical decision, such as the treatments you want, and if you want to donate organs  What are the types of advance directives? There are many types of advance directives, and each state has rules about how to use them  You may choose a combination of any of the following:  · Living will: This is a written record of the treatment you want  You can also choose which treatments you do not want, which to limit, and which to stop at a certain time  This includes surgery, medicine, IV fluid, and tube feedings  · Durable power of  for healthcare [place] [facility]): This is a written record that states who you want to make healthcare choices for you when you are unable to make them for yourself  This person, called a proxy, is usually a family member or a friend  You may choose more than 1 proxy  · Do not resuscitate (DNR) order:  A DNR order is used in case your heart stops beating or you stop breathing  It is a request not to have certain forms of treatment, such as CPR   A DNR order may be included in other types of advance directives  · Medical directive: This covers the care that you want if you are in a coma, near death, or unable to make decisions for yourself  You can list the treatments you want for each condition  Treatment may include pain medicine, surgery, blood transfusions, dialysis, IV or tube feedings, and a ventilator (breathing machine)  · Values history: This document has questions about your views, beliefs, and how you feel and think about life  This information can help others choose the care that you would choose  Why are advance directives important? An advance directive helps you control your care  Although spoken wishes may be used, it is better to have your wishes written down  Spoken wishes can be misunderstood, or not followed  Treatments may be given even if you do not want them  An advance directive may make it easier for your family to make difficult choices about your care  Weight Management   Why it is important to manage your weight:  Being overweight increases your risk of health conditions such as heart disease, high blood pressure, type 2 diabetes, and certain types of cancer  It can also increase your risk for osteoarthritis, sleep apnea, and other respiratory problems  Aim for a slow, steady weight loss  Even a small amount of weight loss can lower your risk of health problems  How to lose weight safely:  A safe and healthy way to lose weight is to eat fewer calories and get regular exercise  You can lose up about 1 pound a week by decreasing the number of calories you eat by 500 calories each day  Healthy meal plan for weight management:  A healthy meal plan includes a variety of foods, contains fewer calories, and helps you stay healthy  A healthy meal plan includes the following:  · Eat whole-grain foods more often  A healthy meal plan should contain fiber  Fiber is the part of grains, fruits, and vegetables that is not broken down by your body   Whole-grain foods are healthy and provide extra fiber in your diet  Some examples of whole-grain foods are whole-wheat breads and pastas, oatmeal, brown rice, and bulgur  · Eat a variety of vegetables every day  Include dark, leafy greens such as spinach, kale, denae greens, and mustard greens  Eat yellow and orange vegetables such as carrots, sweet potatoes, and winter squash  · Eat a variety of fruits every day  Choose fresh or canned fruit (canned in its own juice or light syrup) instead of juice  Fruit juice has very little or no fiber  · Eat low-fat dairy foods  Drink fat-free (skim) milk or 1% milk  Eat fat-free yogurt and low-fat cottage cheese  Try low-fat cheeses such as mozzarella and other reduced-fat cheeses  · Choose meat and other protein foods that are low in fat  Choose beans or other legumes such as split peas or lentils  Choose fish, skinless poultry (chicken or turkey), or lean cuts of red meat (beef or pork)  Before you cook meat or poultry, cut off any visible fat  · Use less fat and oil  Try baking foods instead of frying them  Add less fat, such as margarine, sour cream, regular salad dressing and mayonnaise to foods  Eat fewer high-fat foods  Some examples of high-fat foods include french fries, doughnuts, ice cream, and cakes  · Eat fewer sweets  Limit foods and drinks that are high in sugar  This includes candy, cookies, regular soda, and sweetened drinks  Exercise:  Exercise at least 30 minutes per day on most days of the week  Some examples of exercise include walking, biking, dancing, and swimming  You can also fit in more physical activity by taking the stairs instead of the elevator or parking farther away from stores  Ask your healthcare provider about the best exercise plan for you  © Copyright [address] 2018 Information is for End User's use only and may not be sold, redistributed or otherwise used for commercial purposes   All illustrations and images included in CareNotes® are the copyrighted property of [employer]  or [address] [place], [place] [address]

## 2023-01-30 NOTE — PROGRESS NOTES
MEDICARE ANNUAL WELLNESS VISIT     Assessment and Plan:     Problem List Items Addressed This Visit        Respiratory    Allergic rhinitis    Obstructive sleep apnea, adult    Relevant Orders    Comprehensive metabolic panel       Cardiovascular and Mediastinum    Chronic combined systolic and diastolic congestive heart failure (HCC)    Hypertension       Musculoskeletal and Integument    Arthropathic psoriasis (HCC)       Genitourinary    Stage 3 chronic kidney disease, unspecified whether stage 3a or 3b CKD (HCC)    Relevant Orders    Comprehensive metabolic panel    CBC and differential       Other    Dyslipidemia    Obesity (BMI 30-39  9)    Cataract    Decreased hearing of both ears    Prediabetes    Relevant Orders    Comprehensive metabolic panel    CBC and differential   Other Visit Diagnoses     Medicare annual wellness visit, initial    -  Primary    Osteopenia of necks of both femurs        Relevant Orders    DXA bone density spine hip and pelvis    Left ankle pain        Relevant Orders    Ambulatory Referral to Physical Therapy    Need for pneumococcal vaccination        Relevant Orders    Pneumococcal Conjugate Vaccine 20-valent (Pcv20) (Completed)    Welcome to Medicare preventive visit            BMI Counseling: Body mass index is 36 49 kg/m²  The BMI is above normal  Nutrition recommendations include decreasing portion sizes and encouraging healthy choices of fruits and vegetables  Exercise recommendations include exercising 3-5 times per week  No pharmacotherapy was ordered  Rationale for BMI follow-up plan is due to patient being overweight or obese  Preventive health issues were discussed with patient, and age appropriate screening tests were ordered as noted in patient's After Visit Summary  Personalized health advice and appropriate referrals for health education or preventive services given if needed, as noted in patient's After Visit Summary      Keep scheduled mammo 2/9  Continue cardiology f/u - 2/22  Sleep medicine annual follow up  Recheck labs as detailed above  PNA vaccine given today  Had flu vaccine 9/2022 and COVID booster 11/2022  Defers pap smear at this time - will continue to discuss    Follow up in 3 months     History of Present Illness:     Patient presents for a Medicare Wellness Visit    L posterior ankle pain x1 month - intermittent  Improves with walking - takes aspirin for pain  Denies any trauma to area  Doesn't recall what triggered it initially  Tighter when she starts from rest    Endorses compliance with CPAP  Walks daily  Now has hearing aids, helpful when speaking to her son  Having trouble using them in public settings due to ambient noise - discussed usually ability to change settings to address this issue    Patient Care Team:  Elsi Mendoza DO as PCP - General (Internal Medicine)     Review of Systems:     Review of Systems   Constitutional: Negative for chills, fatigue and fever  HENT: Negative for ear pain, hearing loss, rhinorrhea, sore throat and trouble swallowing  Eyes: Negative for pain and visual disturbance  Respiratory: Negative for cough and shortness of breath  Cardiovascular: Negative for chest pain, palpitations and leg swelling  Gastrointestinal: Negative for abdominal pain, constipation, diarrhea, nausea and vomiting  Endocrine: Negative for polydipsia and polyuria  Genitourinary: Negative for difficulty urinating and dysuria  Musculoskeletal: Positive for arthralgias  Negative for back pain  L ankle pain   Skin: Negative for color change and rash  Neurological: Negative for dizziness, weakness, light-headedness and headaches  Psychiatric/Behavioral: Negative for confusion  The patient is not nervous/anxious           Problem List:     Patient Active Problem List   Diagnosis   • Allergic rhinitis   • Arthropathic psoriasis (HCC)   • Chronic combined systolic and diastolic congestive heart failure ([facility] [address] )   • Dyslipidemia   • Hypertension   • Obesity (BMI 30-39  9)   • Obstructive sleep apnea, adult   • Cataract   • Decreased hearing of both ears   • Prediabetes   • Obesity, morbid (HCC)   • Stage 3 chronic kidney disease, unspecified whether stage 3a or 3b CKD ([facility] [address] )   • History of hepatitis C      Past Medical and Surgical History:     Past Medical History:   Diagnosis Date   • Chronic ankle pain     Unspec laterality, Last assessed - 6/22/16   • Chronic hepatitis C (HCC)    • Complete left bundle branch block (LBBB)    • COPD (chronic obstructive pulmonary disease) (HCC)    • Eczema     B/L elbows, start Hydrocortisone; Last assessed - 8/5/15   • HLD (hyperlipidemia)    • Hydrocodone use disorder, moderate, in controlled environment ([facility] ) 3/11/2015    Violated CSMA, stopped   • Hypertension    • Osteoarthritis, knee    • Rectal polyp    • Sleep apnea    • Violation of controlled substance agreement 10/23/2019    See notes of 10-     Past Surgical History:   Procedure Laterality Date   • COLONOSCOPY     • EYE SURGERY        Family History:     Family History   Problem Relation Age of Onset   • Heart disease Mother         Pacemaker   • Hypertension Mother    • Heart disease Father    • Heart disease Sister    • Hypertension Sister    • Alcohol abuse Brother    • Heart disease Brother    • Cirrhosis Brother         Liver    • Hypertension Brother    • No Known Problems Daughter    • No Known Problems Maternal Grandmother    • No Known Problems Paternal Grandfather    • No Known Problems Daughter    • No Known Problems Sister    • No Known Problems Maternal Aunt    • No Known Problems Cousin       Social History:     Social History     Socioeconomic History   • Marital status:       Spouse name: None   • Number of children: None   • Years of education: None   • Highest education level: None   Occupational History   • None   Tobacco Use   • Smoking status: Former     Types: Cigarettes     Quit date: 2/25/1999 Years since quittin 9   • Smokeless tobacco: Never   Vaping Use   • Vaping Use: Never used   Substance and Sexual Activity   • Alcohol use: No   • Drug use: No   • Sexual activity: Yes   Other Topics Concern   • None   Social History Narrative    Home environment-safe         Social Determinants of Health     Financial Resource Strain: Low Risk    • Difficulty of Paying Living Expenses: Not very hard   Food Insecurity: No Food Insecurity   • Worried About Running Out of Food in the Last Year: Never true   • Ran Out of Food in the Last Year: Never true   Transportation Needs: No Transportation Needs   • Lack of Transportation (Medical): No   • Lack of Transportation (Non-Medical):  No   Physical Activity: Not on file   Stress: Not on file   Social Connections: Not on file   Intimate Partner Violence: Not on file   Housing Stability: Low Risk    • Unable to Pay for Housing in the Last Year: No   • Number of Places Lived in the Last Year: 1   • Unstable Housing in the Last Year: No      Medications and Allergies:     Current Outpatient Medications   Medication Sig Dispense Refill   • acetaminophen (TYLENOL) 650 mg CR tablet Take 650 mg by mouth every 8 (eight) hours as needed      • atorvastatin (LIPITOR) 40 mg tablet TAKE 1 TABLET (40 MG TOTAL) BY MOUTH DAILY FOR CHOLESTEROL 90 tablet 3   • Blood Pressure Monitoring (Blood Pressure Monitor/M Cuff) MISC Use daily 1 each 0   • Diclofenac Sodium (VOLTAREN) 1 % Apply 2 g topically 4 (four) times a day as needed (neck pain) 100 g 1   • Empagliflozin (Jardiance) 10 MG TABS Take 1 tablet (10 mg total) by mouth every morning 30 tablet 11   • Entresto 24-26 MG TABS TAKE 1 TABLET BY MOUTH TWICE A DAY 60 tablet 6   • furosemide (LASIX) 20 mg tablet Take 1 tablet (20 mg total) by mouth daily 30 tablet 11   • Klor-Con M20 20 MEQ tablet TAKE 1 TABLET BY MOUTH TWICE A  tablet 1   • meloxicam (MOBIC) 15 mg tablet TAKE 1 TABLET BY MOUTH EVERY DAY 30 tablet 0   • metoprolol succinate (TOPROL-XL) 50 mg 24 hr tablet TAKE 1 TABLET BY MOUTH TWICE A  tablet 2   • nitroglycerin (NITROSTAT) 0 4 mg SL tablet Place 1 tablet (0 4 mg total) under the tongue every 5 (five) minutes as needed for chest pain (take every 5 minutes for chest pain with max 3 doses) 9 tablet 0   • omeprazole (PriLOSEC) 40 MG capsule Take 40 mg by mouth daily     • spironolactone (ALDACTONE) 25 mg tablet TAKE 1 TABLET BY MOUTH TWICE A  tablet 1     No current facility-administered medications for this visit  No Known Allergies   Immunizations:     Immunization History   Administered Date(s) Administered   • COVID-19 MODERNA VACC 0 5 ML IM 04/13/2021, 05/13/2021   • INFLUENZA 10/26/2015, 12/05/2016, 11/17/2017   • Influenza Quadrivalent, 6-35 Months IM 12/05/2016, 11/17/2017   • Influenza, high dose seasonal 0 7 mL 10/23/2018   • Influenza, injectable, quadrivalent, preservative free 0 5 mL 08/22/2019   • Influenza, seasonal, injectable 01/15/2014, 11/13/2014, 10/26/2015   • Pneumococcal Conjugate 13-Valent 02/01/2019   • Pneumococcal Conjugate Vaccine 20-valent (Pcv20), Polysace 01/30/2023   • Tdap 04/28/2016   • Zoster 10/26/2015      Health Maintenance:         Topic Date Due   • Cervical Cancer Screening  02/28/2023 (Originally 1/13/2020)   • Breast Cancer Screening: Mammogram  08/05/2023   • Colorectal Cancer Screening  04/10/2024   • Hepatitis C Screening  Discontinued         Topic Date Due   • Hepatitis B Vaccine (1 of 3 - Risk 3-dose series) Never done   • COVID-19 Vaccine (3 - Booster for Rausch Reap series) 07/08/2021      Medicare Screening Tests and Risk Assessments:     Rose Hand is here for her Welcome to Medicare visit  Health Risk Assessment:   Patient rates overall health as very good  Patient feels that their physical health rating is same  Patient is satisfied with their life  Eyesight was rated as same  Hearing was rated as slightly worse   Patients states they are never, rarely angry  Patient states they are never, rarely unusually tired/fatigued  Pain experienced in the last 7 days has been a lot  Patient's pain rating has been 6/10  Patient states that she has experienced weight loss or gain in last 6 months  Gained 4-5 lbs    Fall Risk Screening: In the past year, patient has experienced: no history of falling in past year      Urinary Incontinence Screening:   Patient has not leaked urine accidently in the last six months  Home Safety:  Patient does not have trouble with stairs inside or outside of their home  Patient has working smoke alarms and has working carbon monoxide detector  Home safety hazards include: none  Nutrition:   Current diet is Regular  Medications:   Patient is not currently taking any over-the-counter supplements  Patient is able to manage medications  Activities of Daily Living (ADLs)/Instrumental Activities of Daily Living (IADLs):   Walk and transfer into and out of bed and chair?: Yes  Dress and groom yourself?: Yes    Bathe or shower yourself?: Yes    Feed yourself? Yes  Do your laundry/housekeeping?: Yes  Manage your money, pay your bills and track your expenses?: Yes  Make your own meals?: Yes    Do your own shopping?: Yes    Previous Hospitalizations:   Any hospitalizations or ED visits within the last 12 months?: No      Advance Care Planning:   Living will: No    Durable POA for healthcare:  Yes    Advanced directive counseling given: Yes      Comments: Patient's son to be medical POA  Currently Level 1 - Full Code     PREVENTIVE SCREENINGS      Cardiovascular Screening:    General: Screening Current      Diabetes Screening:     General: Screening Current      Colorectal Cancer Screening:     General: Screening Current      Breast Cancer Screening:     General: Screening Current      Cervical Cancer Screening:    General: Screening Not Indicated      Osteoporosis Screening:    General: Screening Current      Lung Cancer Screening: General: Screening Not Indicated      Hepatitis C Screening:    General: Screening Not Indicated and History Hepatitis C    No results found  Physical Exam:     /75 (BP Location: Right arm, Patient Position: Sitting, Cuff Size: Large)   Pulse 84   Temp 97 9 °F (36 6 °C) (Temporal)   Ht 5' 3" (1 6 m)   Wt 93 4 kg (206 lb)   BMI 36 49 kg/m²     Physical Exam  Vitals reviewed  Constitutional:       General: She is not in acute distress  Appearance: Normal appearance  She is well-developed  She is not ill-appearing, toxic-appearing or diaphoretic  HENT:      Head: Normocephalic and atraumatic  Right Ear: External ear normal       Left Ear: External ear normal       Nose: Nose normal       Mouth/Throat:      Mouth: Mucous membranes are moist       Pharynx: Oropharynx is clear  Eyes:      General:         Right eye: No discharge  Left eye: No discharge  Conjunctiva/sclera: Conjunctivae normal    Cardiovascular:      Rate and Rhythm: Normal rate and regular rhythm  Heart sounds: Normal heart sounds  Pulmonary:      Effort: Pulmonary effort is normal  No respiratory distress  Breath sounds: Normal breath sounds  Abdominal:      General: Bowel sounds are normal  There is no distension  Palpations: Abdomen is soft  Tenderness: There is no abdominal tenderness  Musculoskeletal:         General: Tenderness (L posterior ankle along achilles tendon) present  No swelling  Normal range of motion  Cervical back: Normal range of motion and neck supple  Right lower leg: No edema  Left lower leg: No edema  Skin:     General: Skin is warm and dry  Coloration: Skin is not jaundiced  Findings: No bruising  Neurological:      General: No focal deficit present  Mental Status: She is alert and oriented to person, place, and time  Motor: No weakness     Psychiatric:         Mood and Affect: Mood normal          Behavior: Behavior normal  Thought Content:  Thought content normal           Tiffany Carlson, DO

## 2023-02-08 ENCOUNTER — APPOINTMENT (OUTPATIENT)
Dept: LAB | Facility: CLINIC | Age: 68
End: 2023-02-08

## 2023-02-08 DIAGNOSIS — G47.33 OBSTRUCTIVE SLEEP APNEA, ADULT: ICD-10-CM

## 2023-02-08 DIAGNOSIS — N18.30 STAGE 3 CHRONIC KIDNEY DISEASE, UNSPECIFIED WHETHER STAGE 3A OR 3B CKD (HCC): ICD-10-CM

## 2023-02-08 DIAGNOSIS — R73.03 PREDIABETES: ICD-10-CM

## 2023-02-08 LAB
ALBUMIN SERPL BCP-MCNC: 4 G/DL (ref 3.5–5)
ALP SERPL-CCNC: 91 U/L (ref 46–116)
ALT SERPL W P-5'-P-CCNC: 17 U/L (ref 12–78)
ANION GAP SERPL CALCULATED.3IONS-SCNC: 6 MMOL/L (ref 4–13)
AST SERPL W P-5'-P-CCNC: 13 U/L (ref 5–45)
BASOPHILS # BLD AUTO: 0.08 THOUSANDS/ÂΜL (ref 0–0.1)
BASOPHILS NFR BLD AUTO: 1 % (ref 0–1)
BILIRUB SERPL-MCNC: 0.41 MG/DL (ref 0.2–1)
BUN SERPL-MCNC: 14 MG/DL (ref 5–25)
CALCIUM SERPL-MCNC: 9.1 MG/DL (ref 8.3–10.1)
CHLORIDE SERPL-SCNC: 112 MMOL/L (ref 96–108)
CO2 SERPL-SCNC: 23 MMOL/L (ref 21–32)
CREAT SERPL-MCNC: 0.96 MG/DL (ref 0.6–1.3)
EOSINOPHIL # BLD AUTO: 0.3 THOUSAND/ÂΜL (ref 0–0.61)
EOSINOPHIL NFR BLD AUTO: 4 % (ref 0–6)
ERYTHROCYTE [DISTWIDTH] IN BLOOD BY AUTOMATED COUNT: 13.7 % (ref 11.6–15.1)
GFR SERPL CREATININE-BSD FRML MDRD: 60 ML/MIN/1.73SQ M
GLUCOSE P FAST SERPL-MCNC: 97 MG/DL (ref 65–99)
HCT VFR BLD AUTO: 42 % (ref 34.8–46.1)
HGB BLD-MCNC: 13.7 G/DL (ref 11.5–15.4)
IMM GRANULOCYTES # BLD AUTO: 0.02 THOUSAND/UL (ref 0–0.2)
IMM GRANULOCYTES NFR BLD AUTO: 0 % (ref 0–2)
LYMPHOCYTES # BLD AUTO: 1.91 THOUSANDS/ÂΜL (ref 0.6–4.47)
LYMPHOCYTES NFR BLD AUTO: 27 % (ref 14–44)
MCH RBC QN AUTO: 29.8 PG (ref 26.8–34.3)
MCHC RBC AUTO-ENTMCNC: 32.6 G/DL (ref 31.4–37.4)
MCV RBC AUTO: 92 FL (ref 82–98)
MONOCYTES # BLD AUTO: 0.55 THOUSAND/ÂΜL (ref 0.17–1.22)
MONOCYTES NFR BLD AUTO: 8 % (ref 4–12)
NEUTROPHILS # BLD AUTO: 4.1 THOUSANDS/ÂΜL (ref 1.85–7.62)
NEUTS SEG NFR BLD AUTO: 60 % (ref 43–75)
NRBC BLD AUTO-RTO: 0 /100 WBCS
PLATELET # BLD AUTO: 363 THOUSANDS/UL (ref 149–390)
PMV BLD AUTO: 10.7 FL (ref 8.9–12.7)
POTASSIUM SERPL-SCNC: 4.2 MMOL/L (ref 3.5–5.3)
PROT SERPL-MCNC: 7.3 G/DL (ref 6.4–8.4)
RBC # BLD AUTO: 4.59 MILLION/UL (ref 3.81–5.12)
SODIUM SERPL-SCNC: 141 MMOL/L (ref 135–147)
WBC # BLD AUTO: 6.96 THOUSAND/UL (ref 4.31–10.16)

## 2023-02-09 ENCOUNTER — HOSPITAL ENCOUNTER (OUTPATIENT)
Dept: RADIOLOGY | Age: 68
Discharge: HOME | End: 2023-02-09

## 2023-02-09 VITALS — BODY MASS INDEX: 37.91 KG/M2 | WEIGHT: 206 LBS | HEIGHT: 62 IN

## 2023-02-09 DIAGNOSIS — Z12.31 ENCOUNTER FOR SCREENING MAMMOGRAM FOR MALIGNANT NEOPLASM OF BREAST: ICD-10-CM

## 2023-02-22 ENCOUNTER — OFFICE VISIT (OUTPATIENT)
Dept: CARDIOLOGY CLINIC | Facility: CLINIC | Age: 68
End: 2023-02-22

## 2023-02-22 VITALS
OXYGEN SATURATION: 99 % | DIASTOLIC BLOOD PRESSURE: 80 MMHG | SYSTOLIC BLOOD PRESSURE: 118 MMHG | HEART RATE: 80 BPM | WEIGHT: 201.9 LBS | BODY MASS INDEX: 36.93 KG/M2

## 2023-02-22 DIAGNOSIS — I50.22 HEART FAILURE WITH MID-RANGE EJECTION FRACTION (HCC): Primary | ICD-10-CM

## 2023-02-22 DIAGNOSIS — G47.33 OBSTRUCTIVE SLEEP APNEA, ADULT: ICD-10-CM

## 2023-02-22 DIAGNOSIS — I44.7 LBBB (LEFT BUNDLE BRANCH BLOCK): ICD-10-CM

## 2023-02-22 DIAGNOSIS — I10 ESSENTIAL HYPERTENSION: ICD-10-CM

## 2023-02-22 DIAGNOSIS — E78.5 DYSLIPIDEMIA: ICD-10-CM

## 2023-02-22 NOTE — PROGRESS NOTES
Advanced Heart Failure Outpatient Progress Note - Tyson Rockwell 69 y o  female MRN: [id number]    Encounter: [id number]      Assessment/Plan:    Patient Active Problem List    Diagnosis Date Noted   • Prediabetes 12/03/2021   • Decreased hearing of both ears 12/30/2020   • Cataract 10/23/2020   • Arthropathic psoriasis ([facility] ) 09/11/2017   • Chronic combined systolic and diastolic congestive heart failure ([facility] ) 09/11/2017   • Obesity (BMI 30-39 9) 09/11/2017   • Allergic rhinitis 10/26/2015   • Obstructive sleep apnea, adult 11/13/2014   • Dyslipidemia 04/24/2013   • Hypertension 11/08/2012   • History of hepatitis C 01/30/2023   • Obesity, morbid ([place] [address] ) 03/31/2022   • Stage 3 chronic kidney disease, unspecified whether stage 3a or 3b CKD ([place] [address] ) 03/31/2022     # Heart failure with mid range ejection fraction, Stage C, NYHA II  Etiology: Unclear  No definitive evidence of ischemia on nuclear stress test  Hypertension controlled  LBBB likely chronic  Dyscynchrony noted on echo since 2016  No heavy alcohol or drug use  Euvoemic, warm and well perfused    Weight: 202 lbs 5/27/22; 201 lbs 8/25/22; 202 lbs 11/22/22; 201 lbs 2/22/23  NT proBNP:      Studies- personally reviewed by me    Echo 1/3/22:  LVEF: 50%  LVIDd: 5 9cm  RV: normal size and systolic function  MR: mild  PASP:  24 mmHg  RVOT: mid systolic notching suggesting elevated pulmonary vascular resistance  Other: grade 2 diastology, no regional wall motion abnormalities seen, abnormal septal motion consistent with bundle branch block  IVC normal with normal respirophasic variation    Pharm Nuc Stress 9/27/21: Abnormal study after pharmacologic vasodilation without reproduction of symptoms  Inferior defect improved with prone imaging likely diaphragmatic attenuation  Partially reversible anteroapical defect likely shifting breast, cannot exclude small area of distal LAD ischemia   Left ventricular systolic function was reduced, without distinct regional wall motion abnormalities  LVEF 47%    Echocardiogram 7/8/21  LVEF: 40-45%  LVIDd: 5cm  RV: normal size and systolic function  MR: mild  PASP: 30mmHg  RVOT: mid systolic notching suggesting elevated pulmonary vascular resistance  Other: hypokinesis of inferior and septal walls, mild to moderately increased wall thickness, mild LAE    TTE 5/6/2016: LVEF 50%  Moderate concentric hypertrophy  Hypokinesis of mid anteroseptal, basal inferior and basal to mid inferolateral walls  Grade 1 diastology  Normal RV size and systolic function    Diet:  2 g sodium diet  2000 mL fluid restriction    Neurohormonal Blockade:  --Beta-Blocker: metoprolol succinate 50mg BID  --ACEi, ARB or ARNi: entresto 24-26mg BID  --Aldosterone Receptor Blocker: spironolactone 25mg BID  --SGLT2 Inhibitor: empagliflozin 10mg daily  --Diuretic: furosemide 20mg daily with potassium 20 meq daily    Sudden Cardiac Death Risk Reduction:  --ICD:  LVEF >35%    Electrical Resynchronization:  --Candidacy for BiV device:    Advanced Therapies (if appropriate): --We will continue to monitor, no indication at this time    # Hypertension, controlled  # Hyperlipidemia  8/10/21: TC 1159  HDL 50 LDL 82  Rx: atorvastatin 40mg daily  # LUIZA on CPAP  # Obesity  # LBBB, likely chronic as noted above    Today's Plan:  Overall doing well from a cardiac standpoint  Continue current cardiac medications  2g sodium diet  2L fluid diet  Daily weights  Physical activities/exercise as tolerated      HPI:   56-year-old female with past medical history of hypertension, hyperlipidemia, obstructive sleep apnea on CPAP, obesity who presents for evaluation of heart failure  Patient with chronic heart failure with preserved ejection fraction  Echocardiogram back in 2015 showed LVEF of 50%  Most recent echocardiogram in 7/8/2021 showed LVEF of 40-45% with hypokinesis of the inferior and septal walls  Patient notes shortness of breath since the pandemic    Attributed to weight gain  And inactivity  She short of breath walking uphill and walking up steps for couple of years  She recently started exercising and walks on treadmill for 45 mins to an hour, no shortness of breath or chest pain  Occasional lightheadedness  Uses CPAP at night  No PND orthopnea  She has intermittent leg swelling and was on hydrochlorothiazide  Remote smoking history > 20 years ago  Rare alcohol intake  No drug use    11/1/21: Here for follow up  Nuclear stress test results as above  Overall doing well  Walking 6 blocks almost everyday, no issues  Exercises and walks treadmill for 40 mins without chest pain or shortness of breath  Notes some shortness of breath walking uphill  Has been limiting salt in her diet  Adherent to medications  2/7/22: Here for follow up  Reports progressive shortness of breath on exertion over past few weeks  Weight up almost 10 lb since last visit in November  Weight up to 201 lb today  Also with abdominal fullness, leg swelling and orthopnea  Not been sleeping well night  Denies chest pain on exertion  No palpitations or lightheadedness  Has bendopnea    2/15/22: Here for follow up  Still with shortness of breath on exertion but overall improved  Weight down 5 lbs since last visit  2/15/22 Na 139 K 3 9 creatinine 0 97    3/31/22: Here for follow up  Started spironolactone 25mg daily and decreased potassium to 20 meq daily on last visit  3/3/21 Na 136 K4 1 creatinine 1 01  Eating overall okay  Still with shortness of breath on exertion  Not much change in weight  Having knee pains and mostly sedentary  No PND, orthopnea or lower extremity edema  5/27/22: Here for follow up  Increased spironolactone to 25mg two times a day  Bikes 15-20 mins x 2  No symptoms  Having right leg cramping with new exercise  No palpitations or lightheadedness    8/25/22: here for follow up  Started on jardiance 10mg daily since last visit  6/3/22 Na 136 K 3 7 and creatinine 1 36 from 1 01   Lasix dose reduced to 20mg daily PRN for weight gain  Home scale weight 202 at home  Intermittent episodes of shortness of breath  No PND, orthopnea or worsening lower extremity edema  No palpitations  Occasional lightheadedness with getting up       11/22/22: Here for follow up  Resumed furosemide 20mg once a day and started potassium 20 meq once a day on last visit  10/17/22 Na 137 K 4 8 and creatinine 1 13  Exercising on the elliptical for 10 minutes  No worsening shortness of breath, short of breath walking uphill  Lightheadedness better  2/22/23: Here for follow up  Overall doing well  Trying to lose weight  Short of breath walking up hill  Still trying to do elliptical but recently having left ankle pain  No leg swelling, PND, or orthopnea  No abdominal fullness  No chest pain or lightheadedness    Past Medical History:   Diagnosis Date   • Chronic ankle pain     Unspec laterality, Last assessed - 6/22/16   • Chronic hepatitis C (HCC)    • Complete left bundle branch block (LBBB)    • COPD (chronic obstructive pulmonary disease) (HCC)    • Eczema     B/L elbows, start Hydrocortisone; Last assessed - 8/5/15   • HLD (hyperlipidemia)    • Hydrocodone use disorder, moderate, in controlled environment ([facility] ) 3/11/2015    Violated CSMA, stopped   • Hypertension    • Osteoarthritis, knee    • Rectal polyp    • Sleep apnea    • Violation of controlled substance agreement 10/23/2019    See notes of 10-       Review of Systems   Constitutional: Negative for chills, fatigue and fever  HENT: Negative for ear pain and sore throat  Eyes: Negative for pain and visual disturbance  Respiratory: Negative for cough and chest tightness  Cardiovascular: Negative for chest pain, palpitations and leg swelling  Gastrointestinal: Negative for abdominal distention, abdominal pain and vomiting  Genitourinary: Negative for dysuria and hematuria  Musculoskeletal: Negative for arthralgias and back pain     Skin: Negative for color change and rash  Neurological: Negative for dizziness, seizures and syncope  All other systems reviewed and are negative  No Known Allergies    Current Outpatient Medications:   •  acetaminophen (TYLENOL) 650 mg CR tablet, Take 650 mg by mouth every 8 (eight) hours as needed , Disp: , Rfl:   •  atorvastatin (LIPITOR) 40 mg tablet, TAKE 1 TABLET (40 MG TOTAL) BY MOUTH DAILY FOR CHOLESTEROL, Disp: 90 tablet, Rfl: 3  •  Blood Pressure Monitoring (Blood Pressure Monitor/M Cuff) MISC, Use daily, Disp: 1 each, Rfl: 0  •  Diclofenac Sodium (VOLTAREN) 1 %, Apply 2 g topically 4 (four) times a day as needed (neck pain), Disp: 100 g, Rfl: 1  •  Empagliflozin (Jardiance) 10 MG TABS, Take 1 tablet (10 mg total) by mouth every morning, Disp: 30 tablet, Rfl: 11  •  Entresto 24-26 MG TABS, TAKE 1 TABLET BY MOUTH TWICE A DAY, Disp: 60 tablet, Rfl: 6  •  furosemide (LASIX) 20 mg tablet, Take 1 tablet (20 mg total) by mouth daily, Disp: 30 tablet, Rfl: 11  •  Klor-Con M20 20 MEQ tablet, TAKE 1 TABLET BY MOUTH TWICE A DAY, Disp: 180 tablet, Rfl: 1  •  meloxicam (MOBIC) 15 mg tablet, TAKE 1 TABLET BY MOUTH EVERY DAY, Disp: 30 tablet, Rfl: 0  •  metoprolol succinate (TOPROL-XL) 50 mg 24 hr tablet, TAKE 1 TABLET BY MOUTH TWICE A DAY, Disp: 180 tablet, Rfl: 2  •  nitroglycerin (NITROSTAT) 0 4 mg SL tablet, Place 1 tablet (0 4 mg total) under the tongue every 5 (five) minutes as needed for chest pain (take every 5 minutes for chest pain with max 3 doses), Disp: 9 tablet, Rfl: 0  •  omeprazole (PriLOSEC) 40 MG capsule, Take 40 mg by mouth daily, Disp: , Rfl:   •  spironolactone (ALDACTONE) 25 mg tablet, TAKE 1 TABLET BY MOUTH TWICE A DAY, Disp: 180 tablet, Rfl: 1    Social History     Socioeconomic History   • Marital status:       Spouse name: Not on file   • Number of children: Not on file   • Years of education: Not on file   • Highest education level: Not on file   Occupational History   • Not on file Tobacco Use   • Smoking status: Former     Types: Cigarettes     Quit date: 1999     Years since quittin 0   • Smokeless tobacco: Never   Vaping Use   • Vaping Use: Never used   Substance and Sexual Activity   • Alcohol use: No   • Drug use: No   • Sexual activity: Yes   Other Topics Concern   • Not on file   Social History Narrative    Home environment-safe         Social Determinants of Health     Financial Resource Strain: Low Risk    • Difficulty of Paying Living Expenses: Not very hard   Food Insecurity: No Food Insecurity   • Worried About Running Out of Food in the Last Year: Never true   • Ran Out of Food in the Last Year: Never true   Transportation Needs: No Transportation Needs   • Lack of Transportation (Medical): No   • Lack of Transportation (Non-Medical):  No   Physical Activity: Not on file   Stress: Not on file   Social Connections: Not on file   Intimate Partner Violence: Not on file   Housing Stability: Low Risk    • Unable to Pay for Housing in the Last Year: No   • Number of Places Lived in the Last Year: 1   • Unstable Housing in the Last Year: No       Family History   Problem Relation Age of Onset   • Heart disease Mother         Pacemaker   • Hypertension Mother    • Heart disease Father    • Heart disease Sister    • Hypertension Sister    • No Known Problems Sister    • No Known Problems Daughter    • No Known Problems Daughter    • No Known Problems Maternal Grandmother    • No Known Problems Maternal Grandfather    • No Known Problems Paternal Grandmother    • No Known Problems Paternal Grandfather    • Alcohol abuse Brother    • Heart disease Brother    • Cirrhosis Brother         Liver    • Hypertension Brother    • No Known Problems Brother    • No Known Problems Maternal Aunt    • No Known Problems Cousin        Physical Exam:    Vitals: Blood pressure 118/80, pulse 80, weight 91 6 kg (201 lb 14 4 oz), SpO2 99 %, not currently breastfeeding , Body mass index is 36 93 kg/m² ,   Wt Readings from Last 3 Encounters:   02/22/23 91 6 kg (201 lb 14 4 oz)   02/09/23 93 4 kg (206 lb)   01/30/23 93 4 kg (206 lb)       Physical Exam  Constitutional:       General: She is not in acute distress  Appearance: Normal appearance  HENT:      Head: Normocephalic and atraumatic  Mouth/Throat:      Mouth: Mucous membranes are moist    Eyes:      Extraocular Movements: Extraocular movements intact  Conjunctiva/sclera: Conjunctivae normal    Cardiovascular:      Rate and Rhythm: Normal rate and regular rhythm  Pulses: Normal pulses  Heart sounds: No murmur heard  No friction rub  No gallop  Comments: Nonelevated JVP  Pulmonary:      Effort: Pulmonary effort is normal  No respiratory distress  Breath sounds: No wheezing, rhonchi or rales  Abdominal:      General: There is no distension  Palpations: Abdomen is soft  Tenderness: There is no abdominal tenderness  There is no guarding  Musculoskeletal:         General: No deformity or signs of injury  Cervical back: Neck supple  Right lower leg: No edema  Left lower leg: No edema  Skin:     General: Skin is warm and dry  Capillary Refill: Capillary refill takes less than 2 seconds  Neurological:      General: No focal deficit present  Mental Status: She is alert and oriented to person, place, and time     Psychiatric:         Mood and Affect: Mood normal          Labs & Results:    Lab Results   Component Value Date    WBC 6 96 02/08/2023    HGB 13 7 02/08/2023    HCT 42 0 02/08/2023    MCV 92 02/08/2023     02/08/2023     Lab Results   Component Value Date    SODIUM 141 02/08/2023    K 4 2 02/08/2023     (H) 02/08/2023    CO2 23 02/08/2023    BUN 14 02/08/2023    CREATININE 0 96 02/08/2023    GLUC 105 12/06/2016    CALCIUM 9 1 02/08/2023     No results found for: NTBNP   Lab Results   Component Value Date    CHOLESTEROL 159 08/10/2021    CHOLESTEROL 155 08/16/2019     Lab Results   Component Value Date    HDL 50 08/10/2021    HDL 44 08/16/2019    HDL 41 04/22/2014     Lab Results   Component Value Date    TRIG 134 08/10/2021    TRIG 115 08/16/2019    TRIG 140 04/22/2014     No results found for: Tom    EKG personally reviewed by Beba Hernandez  Counseling / Coordination of Care  Time spent today 25 minutes  Greater than 50% of total time was spent with the patient and / or family counseling and / or coordination of care  We discussed diagnoses, most recent studies and any changes in treatment    Thank you for the opportunity to participate in the care of this patient      Angelica Kemp MD  ADVANCED HEART FAILURE AND MECHANICAL CIRCULATORY SUPPORT  [facility]

## 2023-02-22 NOTE — PATIENT INSTRUCTIONS
Continue current medications  2g sodium diet  2L fluid diet  Daily weights  Physical activities/exercise as tolerated

## 2023-02-23 ENCOUNTER — EVALUATION (OUTPATIENT)
Dept: PHYSICAL THERAPY | Facility: CLINIC | Age: 68
End: 2023-02-23

## 2023-02-23 DIAGNOSIS — M25.572 LEFT ANKLE PAIN, UNSPECIFIED CHRONICITY: ICD-10-CM

## 2023-02-23 DIAGNOSIS — M25.572 LEFT ANKLE PAIN: ICD-10-CM

## 2023-02-23 NOTE — PROGRESS NOTES
Physical Therapy Initial Evaluation    Today's date: 2023  Patient name: Liset Maravilla  : 1955  MRN: [id number]  Referring provider: Nevaeh Waterman DO  Dx:   Encounter Diagnosis     ICD-10-CM    1  Left ankle pain  M25 572 Ambulatory Referral to Physical Therapy      2  Left ankle pain, unspecified chronicity  M25 572           Start Time: 0745  Stop Time: 0830  Total time in clinic (min): 45 minutes    Assessment  Impairments: abnormal gait, abnormal or restricted ROM, activity intolerance, impaired balance, impaired physical strength, lacks appropriate home exercise program, pain with function, poor posture  and poor body mechanics  Understanding of Dx/Px/POC: excellent  Goals  (up to 6 weeks)  1  Independent and safe with HEP  2  Independent and safe with amb on all surfaces pain free  3  L ankle AROM WFL t/o  No limitations  4  L ankle MMT 5/5 t/o - no limitations  Plan  Patient would benefit from: skilled physical therapy  Planned modality interventions: low level laser therapy  Planned therapy interventions: manual therapy, massage, neuromuscular re-education, patient education, postural training, strengthening, stretching, therapeutic activities, therapeutic exercise, therapeutic training, transfer training, home exercise program, graded exercise, graded activity, functional ROM exercises, flexibility, body mechanics training, gait training, balance/weight bearing training, balance and joint mobilization  Frequency: 2x week  Duration in weeks: 6  Treatment plan discussed with: patient        Subjective Evaluation    History of Present Illness  Date of onset: 2023  Mechanism of injury: Pt is 68 y/o female with c/o L ankle pain  No clear onset noted, gradual progression of symptoms  Pt was seen by her PCP and was referred to [facility] PT for evaluation and tx of L ankle    Current functional limitations: c/o pain with amb/WB, decreased standing ADL's due to discomfort, decreased balance, decreased stairs negotiation  Not a recurrent problem   Quality of life: good    Pain  Current pain ratin  At best pain ratin  At worst pain rating: 10  Quality: burning and throbbing  Relieving factors: change in position, rest, medications and support  Aggravating factors: standing, stair climbing and walking  Progression: worsening    Social Support  Steps to enter house: yes (32 steps with u/l HR)  Stairs in house: no   Lives in: apartment  Lives with: alone    Employment status: not working  Hand dominance: left      Diagnostic Tests  No diagnostic tests performed  Treatments  Previous treatment: medication  Current treatment: medication  Patient Goals  Patient goals for therapy: decreased pain, improved balance, increased motion, independence with ADLs/IADLs and increased strength          Objective     Static Posture   General Observations  Asymmetrical weight bearing and shifted right  Knee   Knee (Left): Recurvatum  Knee (Right): Recurvatum  Ankle/Foot   Ankle/Foot (Left): Pes planus and pronated  Ankle/Foot (Right): Pes planus and pronated  Observations     Additional Observation Details  No visible abnormalities  Palpation   Left   Tenderness of the peroneus and soleus  Trigger point to peroneus and soleus  Additional Palpation Details  C/o discomfort in post L heel/achilles tendon      Active Range of Motion   Left Ankle/Foot   Dorsiflexion (ke): 15 degrees with pain  Dorsiflexion (kf): 15 degrees with pain  Plantar flexion: 25 degrees with pain  Inversion: WFL  Eversion: WFL    Right Ankle/Foot   Normal active range of motion  Dorsiflexion (ke): WFL  Dorsiflexion (kf): WFL  Plantar flexion: WFL  Inversion: WFL  Eversion: WFL    Strength/Myotome Testing     Left Ankle/Foot   Dorsiflexion: 3+  Plantar flexion: 3+  Inversion: 4  Eversion: 4  Great toe flexion: 5  Great toe extension: 5    Right Ankle/Foot   Normal strength    Ambulation     Ambulation: Level Surfaces   Ambulation without assistive device: independent    Ambulation: Stairs   Ascend stairs: independent  Pattern: non-reciprocal  Railings: one rail  Descend stairs: independent  Pattern: non-reciprocal  Railings: one rail  Curbs: independent    Observational Gait   Gait: antalgic and asymmetric   Decreased walking speed, stride length, left stance time, left swing time and left step length     Left foot contact pattern: heel to toe  Right foot contact pattern: heel to toe  Base of support: normal    Functional Assessment        Comments  Decreased u/l standing dynamic balance on L: Fair (+)      Flowsheet Rows    Flowsheet Row Most Recent Value   PT/OT G-Codes    Current Score 63   Projected Score 74             Precautions: not any given      Manuals 2/23            PROM/stretch L ankle             STM/TPR L calfs             LA as needed                          Neuro Re-Ed                          Rec bike/posture                                                                              Ther Ex             Ankle pumps demo            Ankle circles demo            DF stretch w strap demo                                                                             Ther Activity             HEP review 8'                         Gait Training                                       Modalities

## 2023-02-28 ENCOUNTER — OFFICE VISIT (OUTPATIENT)
Dept: PHYSICAL THERAPY | Facility: CLINIC | Age: 68
End: 2023-02-28

## 2023-02-28 DIAGNOSIS — M25.572 LEFT ANKLE PAIN, UNSPECIFIED CHRONICITY: Primary | ICD-10-CM

## 2023-02-28 NOTE — PROGRESS NOTES
Daily Note     Today's date: 2023  Patient name: Jamie Gonsales  : 1955  MRN: [id number]  Referring provider: Gerda Walter DO  Dx:   Encounter Diagnosis     ICD-10-CM    1  Left ankle pain, unspecified chronicity  M25 572                      Subjective: "Not too bad today " no changes since IE  Objective: See treatment diary below      Assessment: Tolerated treatment well  Patient exhibited good technique with therapeutic exercises and would benefit from continued PT for further manual tx and progression of therex as lissett in pain free range  Plan: Continue per plan of care  Progress treatment as tolerated         Precautions: not any given      Manuals            PROM/stretch L ankle  SZ           STM/TPR L calfs  SZ           LA as needed             L achilles taping  SZ           Neuro Re-Ed                          Rec bike/posture  10'                                                                            Ther Ex             Ankle pumps demo 10x2           Ankle circles demo 10x2           DF stretch w strap demo manual 5x30"           Heel raises  10x2           Step stretch  5x30"                                                  Ther Activity             HEP review 8'                         Gait Training                                       Modalities             MH w tx  7'

## 2023-03-02 ENCOUNTER — OFFICE VISIT (OUTPATIENT)
Dept: PHYSICAL THERAPY | Facility: CLINIC | Age: 68
End: 2023-03-02

## 2023-03-02 DIAGNOSIS — M25.572 LEFT ANKLE PAIN, UNSPECIFIED CHRONICITY: Primary | ICD-10-CM

## 2023-03-02 NOTE — PROGRESS NOTES
Daily Note     Today's date: 3/2/2023  Patient name: Rachael Phillips  : 1955  MRN: [id number]  Referring provider: Ananth Santamaria DO  Dx:   Encounter Diagnosis     ICD-10-CM    1  Left ankle pain, unspecified chronicity  M25 572                      Subjective: Pt reports improvement  Objective: See treatment diary below      Assessment: Tolerated treatment well  Patient exhibited good technique with therapeutic exercises and would benefit from continued PT for further manual tx and progression of therex/HEP  Updated HEP was given and reviewed  No pain post tx verbalized  Plan: Continue per plan of care  Progress treatment as tolerated         Precautions: not any given      Manuals  3          PROM/stretch L ankle  SZ SZ          STM/TPR L calfs  SZ           LA as needed             L achilles taping  SZ SZ          Neuro Re-Ed                          Rec bike/posture  10' 10'                                                                           Ther Ex             Ankle pumps demo 10x2 10x3          Ankle circles demo 10x2 10x3          DF stretch w strap demo manual 5x30" Strap 5x30"          Heel raises/standing  10x2 10x3          Step stretch  5x30" np          Standing mini squats   10x2 B UE hold          Standing FF lunges   10x2 w u/l UE hold          Sit to stand   10x          Ther Activity             HEP review 8'                         Gait Training                                       Modalities             MH w tx  7'

## 2023-03-09 ENCOUNTER — APPOINTMENT (OUTPATIENT)
Dept: PHYSICAL THERAPY | Facility: CLINIC | Age: 68
End: 2023-03-09

## 2023-03-13 ENCOUNTER — OFFICE VISIT (OUTPATIENT)
Dept: PHYSICAL THERAPY | Facility: CLINIC | Age: 68
End: 2023-03-13

## 2023-03-13 DIAGNOSIS — M25.572 LEFT ANKLE PAIN, UNSPECIFIED CHRONICITY: Primary | ICD-10-CM

## 2023-03-13 NOTE — PROGRESS NOTES
Daily Note     Today's date: 3/13/2023  Patient name: Sandy Rain  : 1955  MRN: [id number]  Referring provider: Gino Barr DO  Dx:   Encounter Diagnosis     ICD-10-CM    1  Left ankle pain, unspecified chronicity  M25 572                      Subjective: "I feel so much better "      Objective: See treatment diary below      Assessment: Tolerated treatment well  Patient exhibited good technique with therapeutic exercises and would benefit from continued PT  No pain post tx voiced  Plan: Continue per plan of care  Progress treatment as tolerated         Precautions: not any given      Manuals 2/23 2/28 3/2 3/13         PROM/stretch L ankle  SZ SZ SZ         STM/TPR L calfs  SZ           LA as needed             L achilles taping  SZ SZ SZ         Neuro Re-Ed                          Rec bike/posture  10' 10' 10'                                                                          Ther Ex             Ankle pumps demo 10x2 10x3          Ankle circles demo 10x2 10x3          DF stretch w strap demo manual 5x30" Strap 5x30"          Heel raises/standing  10x2 10x3 Foam L LE 10x2         Step stretch  5x30" np 5x30"         Standing mini squats   10x2 B UE hold Foam 10x2         Standing FF lunges   10x2 w u/l UE hold          Sit to stand   10x          Foam u/l balance    L LE 10x5"on/off use of UE         Stairs negotiation    1 flight step over, u/l UE hold                      Ther Activity             HEP review 8'                         Gait Training                                       Modalities              w tx  7'

## 2023-03-16 ENCOUNTER — APPOINTMENT (OUTPATIENT)
Dept: PHYSICAL THERAPY | Facility: CLINIC | Age: 68
End: 2023-03-16

## 2023-03-20 ENCOUNTER — APPOINTMENT (OUTPATIENT)
Dept: PHYSICAL THERAPY | Facility: CLINIC | Age: 68
End: 2023-03-20

## 2023-03-23 ENCOUNTER — APPOINTMENT (OUTPATIENT)
Dept: PHYSICAL THERAPY | Facility: CLINIC | Age: 68
End: 2023-03-23

## 2023-03-30 ENCOUNTER — TELEPHONE (OUTPATIENT)
Dept: SLEEP CENTER | Facility: CLINIC | Age: 68
End: 2023-03-30

## 2023-03-30 NOTE — TELEPHONE ENCOUNTER
Patient left message on the nurse line stating that she received a letter that Dr Chester Galloway was leaving the [facility] [address]  and requesting to re-schedule appointment with a new provider  Returned patient's call  Patient scheduled for compliance follow-up with EMEKA Campa 9/12/2023 in the [facility] office

## 2023-04-24 DIAGNOSIS — I50.22 HEART FAILURE WITH MID-RANGE EJECTION FRACTION (HCC): ICD-10-CM

## 2023-04-24 RX ORDER — METOPROLOL SUCCINATE 50 MG/1
TABLET, EXTENDED RELEASE ORAL
Qty: 180 TABLET | Refills: 2 | Status: SHIPPED | OUTPATIENT
Start: 2023-04-24

## 2023-04-27 ENCOUNTER — OFFICE VISIT (OUTPATIENT)
Dept: INTERNAL MEDICINE CLINIC | Facility: CLINIC | Age: 68
End: 2023-04-27

## 2023-04-27 VITALS
HEIGHT: 62 IN | WEIGHT: 196 LBS | HEART RATE: 74 BPM | SYSTOLIC BLOOD PRESSURE: 137 MMHG | BODY MASS INDEX: 36.07 KG/M2 | OXYGEN SATURATION: 98 % | TEMPERATURE: 98.6 F | DIASTOLIC BLOOD PRESSURE: 88 MMHG

## 2023-04-27 DIAGNOSIS — M79.672 FOOT PAIN, LEFT: ICD-10-CM

## 2023-04-27 DIAGNOSIS — M72.2 PLANTAR FASCIITIS: ICD-10-CM

## 2023-04-27 RX ORDER — MELOXICAM 15 MG/1
15 TABLET ORAL DAILY
Qty: 30 TABLET | Refills: 0 | Status: SHIPPED | OUTPATIENT
Start: 2023-04-27

## 2023-04-27 NOTE — PROGRESS NOTES
[facility]  INTERNAL MEDICINE OFFICE VISIT     PATIENT INFORMATION     Veronique Rockwell   69 y o  female   MRN: [id number]    ASSESSMENT/PLAN   1  Plantar fasciitis    - meloxicam (MOBIC) 15 mg tablet; Take 1 tablet (15 mg total) by mouth daily  Dispense: 30 tablet; Refill: 0  - Ambulatory Referral to Podiatry; Future    2  Foot pain, left    - meloxicam (MOBIC) 15 mg tablet; Take 1 tablet (15 mg total) by mouth daily  Dispense: 30 tablet; Refill: 0  - Ambulatory Referral to Podiatry; Future  Assessment   Schedule a follow-up appointment in one year   Assessment/Plan     1  L plantar fasciitis with L foot pain  No concerning neurovascular deficits   Continue physical therapy appointments  Referral to Podiatry  Refilled Meloxicam, apply voltaren gel  Can take Tylenol PRN for pain      HEALTH MAINTENANCE     Immunization History   Administered Date(s) Administered   • COVID-19 MODERNA VACC 0 5 ML IM 04/13/2021, 05/13/2021   • INFLUENZA 10/26/2015, 12/05/2016, 11/17/2017   • Influenza Quadrivalent, 6-35 Months IM 12/05/2016, 11/17/2017   • Influenza, high dose seasonal 0 7 mL 10/23/2018   • Influenza, injectable, quadrivalent, preservative free 0 5 mL 08/22/2019   • Influenza, seasonal, injectable 01/15/2014, 11/13/2014, 10/26/2015   • Pneumococcal Conjugate 13-Valent 02/01/2019   • Pneumococcal Conjugate Vaccine 20-valent (Pcv20), Polysace 01/30/2023   • Tdap 04/28/2016   • Zoster 10/26/2015     CHIEF COMPLAINT     Chief Complaint   Patient presents with   • foot pain     Left foot, heel pain       HISTORY OF PRESENT ILLNESS     Carly Victoria is a 69 yr old F with PMH of HTN, HLD, LUIZA CPAP, HFpEF presents with L foot pain  States back of heel burns, 5/10 pain  Able to walk around, no issues with balance  Tylenol uses only once a day  Seen by physical therapy 3/21/23  10 minutes away, states too early and up a hill  Stage 3, community ambulation    2021 last seen podiatry for plantar "fascitis which was resolved at that time  Was on Meloxicam previously  REVIEW OF SYSTEMS     Review of Systems   All other systems reviewed and are negative  OBJECTIVE     Vitals:    04/27/23 1054   BP: 137/88   BP Location: Right arm   Patient Position: Sitting   Cuff Size: Standard   Pulse: 74   Temp: 98 6 °F (37 °C)   TempSrc: Temporal   SpO2: 98%   Weight: 88 9 kg (196 lb)   Height: 5' 2\" (1 575 m)     Physical Exam  Vitals reviewed  Constitutional:       General: She is not in acute distress  Cardiovascular:      Rate and Rhythm: Normal rate and regular rhythm  Pulses: Normal pulses  Heart sounds: Normal heart sounds  Pulmonary:      Effort: Pulmonary effort is normal       Breath sounds: Normal breath sounds  Musculoskeletal:         General: Normal range of motion  Feet:      Comments: L foot distal pulses intact, good capillary refill  No plantar surface tenderness, primarily heel tenderness  No obvious deformations   Neurological:      Mental Status: She is alert and oriented to person, place, and time  Sensory: No sensory deficit  Motor: No weakness         CURRENT MEDICATIONS     Current Outpatient Medications:   •  acetaminophen (TYLENOL) 650 mg CR tablet, Take 650 mg by mouth every 8 (eight) hours as needed , Disp: , Rfl:   •  atorvastatin (LIPITOR) 40 mg tablet, TAKE 1 TABLET (40 MG TOTAL) BY MOUTH DAILY FOR CHOLESTEROL, Disp: 90 tablet, Rfl: 3  •  Blood Pressure Monitoring (Blood Pressure Monitor/M Cuff) MISC, Use daily, Disp: 1 each, Rfl: 0  •  Diclofenac Sodium (VOLTAREN) 1 %, Apply 2 g topically 4 (four) times a day as needed (neck pain), Disp: 100 g, Rfl: 1  •  Empagliflozin (Jardiance) 10 MG TABS, Take 1 tablet (10 mg total) by mouth every morning, Disp: 30 tablet, Rfl: 11  •  Entresto 24-26 MG TABS, TAKE 1 TABLET BY MOUTH TWICE A DAY, Disp: 60 tablet, Rfl: 6  •  furosemide (LASIX) 20 mg tablet, Take 1 tablet (20 mg total) by mouth daily, Disp: 30 tablet, " Rfl: 11  •  Klor-Con M20 20 MEQ tablet, TAKE 1 TABLET BY MOUTH TWICE A DAY, Disp: 180 tablet, Rfl: 1  •  meloxicam (MOBIC) 15 mg tablet, TAKE 1 TABLET BY MOUTH EVERY DAY, Disp: 30 tablet, Rfl: 0  •  metoprolol succinate (TOPROL-XL) 50 mg 24 hr tablet, TAKE 1 TABLET BY MOUTH TWICE A DAY, Disp: 180 tablet, Rfl: 2  •  nitroglycerin (NITROSTAT) 0 4 mg SL tablet, Place 1 tablet (0 4 mg total) under the tongue every 5 (five) minutes as needed for chest pain (take every 5 minutes for chest pain with max 3 doses), Disp: 9 tablet, Rfl: 0  •  omeprazole (PriLOSEC) 40 MG capsule, Take 40 mg by mouth daily, Disp: , Rfl:   •  spironolactone (ALDACTONE) 25 mg tablet, TAKE 1 TABLET BY MOUTH TWICE A DAY, Disp: 180 tablet, Rfl: 1    PAST MEDICAL & SURGICAL HISTORY     Past Medical History:   Diagnosis Date   • Chronic ankle pain     Unspec laterality, Last assessed - 16   • Chronic hepatitis C (HCC)    • Complete left bundle branch block (LBBB)    • COPD (chronic obstructive pulmonary disease) (HCC)    • Eczema     B/L elbows, start Hydrocortisone; Last assessed - 8/5/15   • HLD (hyperlipidemia)    • Hydrocodone [facility] disorder, moderate, [facility] ([facility] ) 3/11/2015    Violated CSMA, stopped   • Hypertension    • Osteoarthritis, knee    • Rectal polyp    • Sleep apnea    • Violation of controlled substance agreement 10/23/2019    See notes of 10-     Past Surgical History:   Procedure Laterality Date   • COLONOSCOPY     • EYE SURGERY       SOCIAL & FAMILY HISTORY     Social History     Socioeconomic History   • Marital status:       Spouse name: Not on file   • Number of children: Not on file   • Years of education: Not on file   • Highest education level: Not on file   Occupational History   • Not on file   Tobacco Use   • Smoking status: Former     Types: Cigarettes     Quit date: 1999     Years since quittin 1   • Smokeless tobacco: Never   Vaping Use   • Vaping Use: Never used   Substance and Sexual Activity   • Alcohol use: No   • Drug use: No   • Sexual activity: Yes   Other Topics Concern   • Not on file   Social History Narrative    Home environment-safe         Social Determinants of Health     Financial Resource Strain: Low Risk    • Difficulty of Paying Living Expenses: Not hard at all   Food Insecurity: No Food Insecurity   • Worried About Running Out of Food in the Last Year: Never true   • Ran Out of Food in the Last Year: Never true   Transportation Needs: No Transportation Needs   • Lack of Transportation (Medical): No   • Lack of Transportation (Non-Medical):  No   Physical Activity: Not on file   Stress: Not on file   Social Connections: Not on file   Intimate Partner Violence: Not on file   Housing Stability: Low Risk    • Unable to Pay for Housing in the Last Year: No   • Number of Places Lived in the Last Year: 1   • Unstable Housing in the Last Year: No     Social History     Substance and Sexual Activity   Alcohol Use No     Substance and Sexual Activity   Alcohol Use No        Substance and Sexual Activity   Drug Use No     Social History     Tobacco Use   Smoking Status Former   • Types: Cigarettes   • Quit date: 1999   • Years since quittin 1   Smokeless Tobacco Never     Family History   Problem Relation Age of Onset   • Heart disease Mother         Pacemaker   • Hypertension Mother    • Heart disease Father    • Heart disease Sister    • Hypertension Sister    • No Known Problems Sister    • No Known Problems Daughter    • No Known Problems Daughter    • No Known Problems Maternal Grandmother    • No Known Problems Maternal Grandfather    • No Known Problems Paternal Grandmother    • No Known Problems Paternal Grandfather    • Alcohol abuse Brother    • Heart disease Brother    • Cirrhosis Brother         Liver    • Hypertension Brother    • No Known Problems Brother    • No Known Problems Maternal Aunt    • No Known Problems Cousin                ==  Peter Giraldo Flies, MD  PGY-1  [facility] [address] [facility]  [address]   [facility] , [address] [address] [facility] [address], [address] [facility]  Office: [phone]  Fax: [phone]

## 2023-05-13 DIAGNOSIS — I50.22 HEART FAILURE WITH MID-RANGE EJECTION FRACTION (HCC): ICD-10-CM

## 2023-05-15 RX ORDER — EMPAGLIFLOZIN 10 MG/1
TABLET, FILM COATED ORAL
Qty: 30 TABLET | Refills: 11 | Status: SHIPPED | OUTPATIENT
Start: 2023-05-15

## 2023-05-24 DIAGNOSIS — M79.672 FOOT PAIN, LEFT: ICD-10-CM

## 2023-05-24 DIAGNOSIS — M72.2 PLANTAR FASCIITIS: ICD-10-CM

## 2023-05-24 RX ORDER — MELOXICAM 15 MG/1
15 TABLET ORAL DAILY
Qty: 30 TABLET | Refills: 0 | Status: SHIPPED | OUTPATIENT
Start: 2023-05-24

## 2023-05-25 ENCOUNTER — TELEPHONE (OUTPATIENT)
Dept: INTERNAL MEDICINE CLINIC | Facility: CLINIC | Age: 68
End: 2023-05-25

## 2023-05-25 ENCOUNTER — TELEPHONE (OUTPATIENT)
Dept: OBGYN CLINIC | Facility: HOSPITAL | Age: 68
End: 2023-05-25

## 2023-05-25 ENCOUNTER — OFFICE VISIT (OUTPATIENT)
Dept: INTERNAL MEDICINE CLINIC | Facility: CLINIC | Age: 68
End: 2023-05-25

## 2023-05-25 VITALS
DIASTOLIC BLOOD PRESSURE: 79 MMHG | BODY MASS INDEX: 36.62 KG/M2 | WEIGHT: 199 LBS | SYSTOLIC BLOOD PRESSURE: 110 MMHG | TEMPERATURE: 97.6 F | HEIGHT: 62 IN | HEART RATE: 81 BPM

## 2023-05-25 DIAGNOSIS — R73.03 PREDIABETES: ICD-10-CM

## 2023-05-25 DIAGNOSIS — N18.30 STAGE 3 CHRONIC KIDNEY DISEASE, UNSPECIFIED WHETHER STAGE 3A OR 3B CKD (HCC): ICD-10-CM

## 2023-05-25 DIAGNOSIS — I10 PRIMARY HYPERTENSION: ICD-10-CM

## 2023-05-25 DIAGNOSIS — L40.50 ARTHROPATHIC PSORIASIS (HCC): ICD-10-CM

## 2023-05-25 DIAGNOSIS — G47.33 OBSTRUCTIVE SLEEP APNEA, ADULT: Primary | ICD-10-CM

## 2023-05-25 DIAGNOSIS — E78.5 DYSLIPIDEMIA: ICD-10-CM

## 2023-05-25 DIAGNOSIS — Z86.19 HISTORY OF HEPATITIS C: ICD-10-CM

## 2023-05-25 DIAGNOSIS — I50.42 CHRONIC COMBINED SYSTOLIC AND DIASTOLIC CONGESTIVE HEART FAILURE (HCC): ICD-10-CM

## 2023-05-25 NOTE — TELEPHONE ENCOUNTER
Called patient twice to make appointment for rheum, however the call kept dropping> I will call again tomorrow

## 2023-05-25 NOTE — PROGRESS NOTES
[facility]  INTERNAL MEDICINE OFFICE VISIT     PATIENT INFORMATION     Nba Mendez   77 y o  female   MRN: [id number]    ASSESSMENT/PLAN     Diagnoses and all orders for this visit:    Obstructive sleep apnea, adult  Continue CPAPand follow-up with sleep medicine    Chronic combined systolic and diastolic congestive heart failure ([facility] )  History of systolic and diastolic congestive heart failure  Initial echocardiogram 7/8/2021 significant for EF 40-45% with hypokinesis of the basal inferior wall  Repeat echocardiogram on 1/3/2022 On goal-directed medical therapy with an EF of 50% and grade 2 diastolic dysfunction  · Continue regular follow-up with heart failure (Dr Tish Herrera)  · Continue Sharia Halo, Jardiance, Lasix, spironolactone and metoprolol per their office  · Monitor salt and fluid intake  · Keep track of daily weights    Primary hypertension  BP in clinic today 110/79  Continue Entresto, Lasix, spironolactone    Arthropathic psoriasis ([facility] )  Patient with history of psoriasis and psoriatic arthritis  Unclear when patient was diagnosed  Continues to have left heel pain despite therapy for plantar fasciitis  Patient also endorses radiation of the pain up into her lower calf  · Concern for enthesitis of left Achilles tendon  · Previously treated with NSAIDs with improvement  · No rheumatology evaluation despite symptoms  · Will refer to rheumatology - clerical team to assist patient with scheduling  · Will also check inflammatory markers and HLAB27, unable to order HLAC06  · Continue with symptomatic management at this time    Stage 3 chronic kidney disease, unspecified whether stage 3a or 3b CKD ([facility] )  CMP stable  Continue to avoid nephrotoxic agents    Dyslipidemia  Continue Atorvastatin 40mg daily    Prediabetes  A1c 5 8 10/17/2022  Continue Jardiance  Discussed continued diet and lifestyle modifications     Schedule a follow-up appointment in 6 months      HEALTH MAINTENANCE     Immunization History   Administered Date(s) Administered   • COVID-19 MODERNA VACC 0 5 ML IM 04/13/2021, 05/13/2021   • INFLUENZA 10/26/2015, 12/05/2016, 11/17/2017   • Influenza Quadrivalent, 6-35 Months IM 12/05/2016, 11/17/2017   • Influenza, high dose seasonal 0 7 mL 10/23/2018   • Influenza, injectable, quadrivalent, preservative free 0 5 mL 08/22/2019   • Influenza, seasonal, injectable 01/15/2014, 11/13/2014, 10/26/2015   • Pneumococcal Conjugate 13-Valent 02/01/2019   • Pneumococcal Conjugate Vaccine 20-valent (Pcv20), Polysace 01/30/2023   • Tdap 04/28/2016   • Zoster 10/26/2015     CHIEF COMPLAINT     Chief Complaint   Patient presents with   • Follow-up     B/p      HISTORY OF PRESENT ILLNESS     Ms Karina Capone is a 75-year-old female past medical history significant for LUIZA, diastolic heart failure with EF 50%, hypertension, dyslipidemia, prediabetes, and psoriatic arthritis who presents to clinic today for regular 6 month follow-up  Patient continues to endorse L sided ankle/heel pain with exertion  Walks through it  Doing exercises as recommended at [address]  No other acute complaints at this time  Reports adherence to her medication regimen  Walking daily  Trying to lose weight  REVIEW OF SYSTEMS     Review of Systems   Constitutional: Negative for chills, fatigue and fever  HENT: Negative for ear pain, hearing loss, rhinorrhea, sore throat and trouble swallowing  Eyes: Negative for pain and visual disturbance  Respiratory: Negative for cough and shortness of breath  Cardiovascular: Negative for chest pain, palpitations and leg swelling  Gastrointestinal: Negative for abdominal pain, constipation, diarrhea, nausea and vomiting  Endocrine: Negative for polydipsia and polyuria  Genitourinary: Negative for difficulty urinating and dysuria  Musculoskeletal: Negative for arthralgias and back pain  Skin: Negative for color change and rash     Neurological: Negative "for dizziness, weakness, light-headedness and headaches  Psychiatric/Behavioral: Negative for confusion  The patient is not nervous/anxious  OBJECTIVE     Vitals:    05/25/23 0750   BP: 110/79   BP Location: Right arm   Patient Position: Sitting   Cuff Size: Large   Pulse: 81   Temp: 97 6 °F (36 4 °C)   TempSrc: Temporal   Weight: 90 3 kg (199 lb)   Height: 5' 2\" (1 575 m)     Physical Exam  Vitals reviewed  Constitutional:       General: She is not in acute distress  Appearance: Normal appearance  She is well-developed  She is obese  She is not ill-appearing, toxic-appearing or diaphoretic  HENT:      Head: Normocephalic and atraumatic  Right Ear: External ear normal       Left Ear: External ear normal       Nose: Nose normal       Mouth/Throat:      Mouth: Mucous membranes are moist       Pharynx: Oropharynx is clear  Eyes:      General:         Right eye: No discharge  Left eye: No discharge  Conjunctiva/sclera: Conjunctivae normal    Cardiovascular:      Rate and Rhythm: Normal rate and regular rhythm  Heart sounds: Normal heart sounds  Pulmonary:      Effort: Pulmonary effort is normal  No respiratory distress  Breath sounds: Normal breath sounds  Abdominal:      General: Bowel sounds are normal  There is no distension  Palpations: Abdomen is soft  Tenderness: There is no abdominal tenderness  Musculoskeletal:         General: Tenderness (L heel/calf) present  No swelling  Normal range of motion  Cervical back: Normal range of motion and neck supple  Right lower leg: No edema  Left lower leg: No edema  Skin:     General: Skin is warm and dry  Coloration: Skin is not jaundiced  Findings: Rash present  No bruising  Neurological:      General: No focal deficit present  Mental Status: She is alert and oriented to person, place, and time  Motor: No weakness     Psychiatric:         Mood and Affect: Mood normal          " Behavior: Behavior normal          Thought Content: Thought content normal        CURRENT MEDICATIONS     Current Outpatient Medications:   •  acetaminophen (TYLENOL) 650 mg CR tablet, Take 650 mg by mouth every 8 (eight) hours as needed , Disp: , Rfl:   •  atorvastatin (LIPITOR) 40 mg tablet, TAKE 1 TABLET (40 MG TOTAL) BY MOUTH DAILY FOR CHOLESTEROL, Disp: 90 tablet, Rfl: 3  •  Blood Pressure Monitoring (Blood Pressure Monitor/M Cuff) MISC, Use daily, Disp: 1 each, Rfl: 0  •  Diclofenac Sodium (VOLTAREN) 1 %, Apply 2 g topically 4 (four) times a day as needed (neck pain), Disp: 100 g, Rfl: 1  •  Entresto 24-26 MG TABS, TAKE 1 TABLET BY MOUTH TWICE A DAY, Disp: 60 tablet, Rfl: 6  •  furosemide (LASIX) 20 mg tablet, Take 1 tablet (20 mg total) by mouth daily, Disp: 30 tablet, Rfl: 11  •  Jardiance 10 MG TABS tablet, TAKE 1 TABLET (10 MG TOTAL) BY MOUTH EVERY MORNING , Disp: 30 tablet, Rfl: 11  •  Klor-Con M20 20 MEQ tablet, TAKE 1 TABLET BY MOUTH TWICE A DAY, Disp: 180 tablet, Rfl: 1  •  meloxicam (MOBIC) 15 mg tablet, TAKE 1 TABLET (15 MG TOTAL) BY MOUTH DAILY  , Disp: 30 tablet, Rfl: 0  •  metoprolol succinate (TOPROL-XL) 50 mg 24 hr tablet, TAKE 1 TABLET BY MOUTH TWICE A DAY, Disp: 180 tablet, Rfl: 2  •  nitroglycerin (NITROSTAT) 0 4 mg SL tablet, Place 1 tablet (0 4 mg total) under the tongue every 5 (five) minutes as needed for chest pain (take every 5 minutes for chest pain with max 3 doses), Disp: 9 tablet, Rfl: 0  •  omeprazole (PriLOSEC) 40 MG capsule, Take 40 mg by mouth daily, Disp: , Rfl:   •  spironolactone (ALDACTONE) 25 mg tablet, TAKE 1 TABLET BY MOUTH TWICE A DAY, Disp: 180 tablet, Rfl: 1    PAST MEDICAL & SURGICAL HISTORY     Past Medical History:   Diagnosis Date   • Chronic ankle pain     Unspec laterality, Last assessed - 6/22/16   • Chronic hepatitis C (HCC)    • Complete left bundle branch block (LBBB)    • COPD (chronic obstructive pulmonary disease) (HCC)    • Eczema     B/L elbows, start Hydrocortisone; Last assessed - 8/5/15   • HLD (hyperlipidemia)    • Hydrocodone use disorder, moderate, in controlled environment ([facility] ) 3/11/2015    Violated CSMA, stopped   • Hypertension    • Osteoarthritis, knee    • Rectal polyp    • Sleep apnea    • Violation of controlled substance agreement 10/23/2019    See notes of 10-     Past Surgical History:   Procedure Laterality Date   • COLONOSCOPY     • EYE SURGERY       SOCIAL & FAMILY HISTORY     Social History     Socioeconomic History   • Marital status:      Spouse name: Not on file   • Number of children: Not on file   • Years of education: Not on file   • Highest education level: Not on file   Occupational History   • Not on file   Tobacco Use   • Smoking status: Former     Types: Cigarettes     Quit date: 1999     Years since quittin 2   • Smokeless tobacco: Never   Vaping Use   • Vaping Use: Never used   Substance and Sexual Activity   • Alcohol use: Yes     Comment: social   • Drug use: No   • Sexual activity: Yes   Other Topics Concern   • Not on file   Social History Narrative    Home environment-safe         Social Determinants of Health     Financial Resource Strain: Low Risk  (2023)    Overall Financial Resource Strain (CARDIA)    • Difficulty of Paying Living Expenses: Not hard at all   Food Insecurity: No Food Insecurity (2023)    Hunger Vital Sign    • Worried About Running Out of Food in the Last Year: Never true    • Ran Out of Food in the Last Year: Never true   Transportation Needs: No Transportation Needs (2023)    PRAPARE - Transportation    • Lack of Transportation (Medical): No    • Lack of Transportation (Non-Medical):  No   Physical Activity: Sufficiently Active (8/10/2021)    Exercise Vital Sign    • Days of Exercise per Week: 7 days    • Minutes of Exercise per Session: 60 min   Recent Concern: Physical Activity - Inactive (2021)    Exercise Vital Sign    • Days of Exercise per Week: 0 days • Minutes of Exercise per Session: 0 min   Stress: No Stress Concern Present (8/10/2021)    [address]    • Feeling of Stress : Not at all   Social Connections: Not on file   Intimate Partner Violence: Not At Risk (8/10/2021)    Humiliation, Afraid, Rape, and Kick questionnaire    • Fear of Current or Ex-Partner: No    • Emotionally Abused: No    • Physically Abused: No    • Sexually Abused: No   Housing Stability: [address]  (10/17/2022)    Housing Stability Vital Sign    • Unable to Pay for Housing in the Last Year: No    • Number of [place] in the Last Year: 1    • Unstable Housing in the Last Year: No     Social History     Substance and Sexual Activity   Alcohol Use Yes    Comment: social     Social History     Substance and Sexual Activity   Drug Use No     Social History     Tobacco Use   Smoking Status Former   • Types: Cigarettes   • Quit date: 1999   • Years since quittin 2   Smokeless Tobacco Never     Family History   Problem Relation Age of Onset   • Heart disease Mother         Pacemaker   • Hypertension Mother    • Heart disease Father    • Heart disease Sister    • Hypertension Sister    • No Known Problems Sister    • No Known Problems Daughter    • No Known Problems Daughter    • No Known Problems Maternal Grandmother    • No Known Problems Maternal Grandfather    • No Known Problems Paternal Grandmother    • No Known Problems Paternal Grandfather    • Alcohol abuse Brother    • Heart disease Brother    • Cirrhosis Brother         Liver    • Hypertension Brother    • No Known Problems Brother    • No Known Problems Maternal Aunt    • No Known Problems Cousin      ==  Cristiane Diane DO  Internal Medicine Resident, PGY-3  [facility] [address] [place] [address] - [place] , [address] [address] [facility], [address] [facility]  Office: [phone]  Fax: [phone]

## 2023-05-25 NOTE — TELEPHONE ENCOUNTER
Caller: Silvina Gatica    Doctor: Devika Benito     Reason for call: please add patient to cancellation list     Call back#: [phone]

## 2023-06-02 ENCOUNTER — TELEPHONE (OUTPATIENT)
Dept: RHEUMATOLOGY | Facility: CLINIC | Age: 68
End: 2023-06-02

## 2023-06-05 ENCOUNTER — LAB (OUTPATIENT)
Dept: LAB | Facility: CLINIC | Age: 68
End: 2023-06-05
Payer: MEDICARE

## 2023-06-05 ENCOUNTER — HOSPITAL ENCOUNTER (OUTPATIENT)
Dept: RADIOLOGY | Facility: HOSPITAL | Age: 68
Discharge: HOME | End: 2023-06-05
Payer: MEDICARE

## 2023-06-05 ENCOUNTER — OFFICE VISIT (OUTPATIENT)
Dept: RHEUMATOLOGY | Facility: CLINIC | Age: 68
End: 2023-06-05
Payer: MEDICARE

## 2023-06-05 VITALS
SYSTOLIC BLOOD PRESSURE: 169 MMHG | WEIGHT: 198.4 LBS | HEART RATE: 74 BPM | BODY MASS INDEX: 36.51 KG/M2 | DIASTOLIC BLOOD PRESSURE: 108 MMHG | HEIGHT: 62 IN

## 2023-06-05 DIAGNOSIS — R76.8 HEPATITIS B CORE ANTIBODY POSITIVE: Primary | ICD-10-CM

## 2023-06-05 DIAGNOSIS — Z86.19 HISTORY OF HEPATITIS C: ICD-10-CM

## 2023-06-05 DIAGNOSIS — G89.29 HEEL PAIN, CHRONIC, LEFT: ICD-10-CM

## 2023-06-05 DIAGNOSIS — M13.0 POLYARTHRITIS: ICD-10-CM

## 2023-06-05 DIAGNOSIS — G89.29 CHRONIC PAIN OF LEFT ANKLE: ICD-10-CM

## 2023-06-05 DIAGNOSIS — L40.50 ARTHROPATHIC PSORIASIS (HCC): ICD-10-CM

## 2023-06-05 DIAGNOSIS — M25.572 CHRONIC PAIN OF LEFT ANKLE: ICD-10-CM

## 2023-06-05 DIAGNOSIS — Z11.1 ENCOUNTER FOR SCREENING FOR RESPIRATORY TUBERCULOSIS: ICD-10-CM

## 2023-06-05 DIAGNOSIS — Z11.59 NEED FOR HEPATITIS B SCREENING TEST: ICD-10-CM

## 2023-06-05 DIAGNOSIS — M79.672 HEEL PAIN, CHRONIC, LEFT: ICD-10-CM

## 2023-06-05 DIAGNOSIS — Z87.2 HISTORY OF PSORIATIC ARTHRITIS: Primary | ICD-10-CM

## 2023-06-05 LAB
ALBUMIN SERPL BCP-MCNC: 4.5 G/DL (ref 3.5–5)
ALP SERPL-CCNC: 86 U/L (ref 34–104)
ALT SERPL W P-5'-P-CCNC: 11 U/L (ref 7–52)
ANA SER QL IA: NEGATIVE
ANION GAP SERPL CALCULATED.3IONS-SCNC: 10 MMOL/L (ref 4–13)
AST SERPL W P-5'-P-CCNC: 13 U/L (ref 13–39)
BACTERIA UR QL AUTO: ABNORMAL /HPF
BASOPHILS # BLD AUTO: 0.05 THOUSANDS/ÂΜL (ref 0–0.1)
BASOPHILS NFR BLD AUTO: 1 % (ref 0–1)
BILIRUB SERPL-MCNC: 0.61 MG/DL (ref 0.2–1)
BILIRUB UR QL STRIP: NEGATIVE
BUN SERPL-MCNC: 14 MG/DL (ref 5–25)
CALCIUM SERPL-MCNC: 9.5 MG/DL (ref 8.4–10.2)
CHLORIDE SERPL-SCNC: 107 MMOL/L (ref 96–108)
CLARITY UR: ABNORMAL
CO2 SERPL-SCNC: 20 MMOL/L (ref 21–32)
COLOR UR: ABNORMAL
CREAT SERPL-MCNC: 0.95 MG/DL (ref 0.6–1.3)
CREAT UR-MCNC: 54.4 MG/DL
CRP SERPL QL: 6 MG/L
EOSINOPHIL # BLD AUTO: 0.2 THOUSAND/ÂΜL (ref 0–0.61)
EOSINOPHIL NFR BLD AUTO: 3 % (ref 0–6)
ERYTHROCYTE [DISTWIDTH] IN BLOOD BY AUTOMATED COUNT: 15.1 % (ref 11.6–15.1)
ERYTHROCYTE [SEDIMENTATION RATE] IN BLOOD: 21 MM/HOUR (ref 0–29)
GFR SERPL CREATININE-BSD FRML MDRD: 60 ML/MIN/1.73SQ M
GLUCOSE P FAST SERPL-MCNC: 104 MG/DL (ref 65–99)
GLUCOSE UR STRIP-MCNC: ABNORMAL MG/DL
HBV CORE AB SER QL: REACTIVE
HBV CORE IGM SER QL: ABNORMAL
HBV SURFACE AG SER QL: ABNORMAL
HCT VFR BLD AUTO: 44.7 % (ref 34.8–46.1)
HCV AB SER QL: REACTIVE
HGB BLD-MCNC: 14.5 G/DL (ref 11.5–15.4)
HGB UR QL STRIP.AUTO: NEGATIVE
IMM GRANULOCYTES # BLD AUTO: 0.03 THOUSAND/UL (ref 0–0.2)
IMM GRANULOCYTES NFR BLD AUTO: 0 % (ref 0–2)
KETONES UR STRIP-MCNC: NEGATIVE MG/DL
LEUKOCYTE ESTERASE UR QL STRIP: ABNORMAL
LYMPHOCYTES # BLD AUTO: 1.63 THOUSANDS/ÂΜL (ref 0.6–4.47)
LYMPHOCYTES NFR BLD AUTO: 20 % (ref 14–44)
MCH RBC QN AUTO: 30.1 PG (ref 26.8–34.3)
MCHC RBC AUTO-ENTMCNC: 32.4 G/DL (ref 31.4–37.4)
MCV RBC AUTO: 93 FL (ref 82–98)
MONOCYTES # BLD AUTO: 0.59 THOUSAND/ÂΜL (ref 0.17–1.22)
MONOCYTES NFR BLD AUTO: 7 % (ref 4–12)
NEUTROPHILS # BLD AUTO: 5.61 THOUSANDS/ÂΜL (ref 1.85–7.62)
NEUTS SEG NFR BLD AUTO: 69 % (ref 43–75)
NITRITE UR QL STRIP: NEGATIVE
NON-SQ EPI CELLS URNS QL MICRO: ABNORMAL /HPF
NRBC BLD AUTO-RTO: 0 /100 WBCS
PH UR STRIP.AUTO: 5.5 [PH]
PLATELET # BLD AUTO: 396 THOUSANDS/UL (ref 149–390)
PMV BLD AUTO: 10.4 FL (ref 8.9–12.7)
POTASSIUM SERPL-SCNC: 4.5 MMOL/L (ref 3.5–5.3)
PROT SERPL-MCNC: 7.2 G/DL (ref 6.4–8.4)
PROT UR STRIP-MCNC: NEGATIVE MG/DL
PROT UR-MCNC: 14 MG/DL
PROT/CREAT UR: 0.26 MG/G{CREAT} (ref 0–0.1)
RBC # BLD AUTO: 4.81 MILLION/UL (ref 3.81–5.12)
RBC #/AREA URNS AUTO: ABNORMAL /HPF
SODIUM SERPL-SCNC: 137 MMOL/L (ref 135–147)
SP GR UR STRIP.AUTO: 1.01 (ref 1–1.03)
UROBILINOGEN UR STRIP-ACNC: <2 MG/DL
WBC # BLD AUTO: 8.11 THOUSAND/UL (ref 4.31–10.16)
WBC #/AREA URNS AUTO: ABNORMAL /HPF

## 2023-06-05 PROCEDURE — 85652 RBC SED RATE AUTOMATED: CPT

## 2023-06-05 PROCEDURE — 86704 HEP B CORE ANTIBODY TOTAL: CPT | Performed by: PHYSICIAN ASSISTANT

## 2023-06-05 PROCEDURE — 84156 ASSAY OF PROTEIN URINE: CPT | Performed by: PHYSICIAN ASSISTANT

## 2023-06-05 PROCEDURE — 85025 COMPLETE CBC W/AUTO DIFF WBC: CPT | Performed by: PHYSICIAN ASSISTANT

## 2023-06-05 PROCEDURE — 86480 TB TEST CELL IMMUN MEASURE: CPT | Performed by: PHYSICIAN ASSISTANT

## 2023-06-05 PROCEDURE — 82570 ASSAY OF URINE CREATININE: CPT | Performed by: PHYSICIAN ASSISTANT

## 2023-06-05 PROCEDURE — 87340 HEPATITIS B SURFACE AG IA: CPT | Performed by: PHYSICIAN ASSISTANT

## 2023-06-05 PROCEDURE — 36415 COLL VENOUS BLD VENIPUNCTURE: CPT

## 2023-06-05 PROCEDURE — 86140 C-REACTIVE PROTEIN: CPT

## 2023-06-05 PROCEDURE — 81001 URINALYSIS AUTO W/SCOPE: CPT | Performed by: PHYSICIAN ASSISTANT

## 2023-06-05 PROCEDURE — 86803 HEPATITIS C AB TEST: CPT | Performed by: PHYSICIAN ASSISTANT

## 2023-06-05 PROCEDURE — 87517 HEPATITIS B DNA QUANT: CPT

## 2023-06-05 PROCEDURE — 86235 NUCLEAR ANTIGEN ANTIBODY: CPT

## 2023-06-05 PROCEDURE — 73630 X-RAY EXAM OF FOOT: CPT

## 2023-06-05 PROCEDURE — 86038 ANTINUCLEAR ANTIBODIES: CPT

## 2023-06-05 PROCEDURE — 86200 CCP ANTIBODY: CPT | Performed by: PHYSICIAN ASSISTANT

## 2023-06-05 PROCEDURE — 86430 RHEUMATOID FACTOR TEST QUAL: CPT | Performed by: PHYSICIAN ASSISTANT

## 2023-06-05 PROCEDURE — 86705 HEP B CORE ANTIBODY IGM: CPT | Performed by: PHYSICIAN ASSISTANT

## 2023-06-05 PROCEDURE — 87522 HEPATITIS C REVRS TRNSCRPJ: CPT | Performed by: PHYSICIAN ASSISTANT

## 2023-06-05 PROCEDURE — 99204 OFFICE O/P NEW MOD 45 MIN: CPT | Performed by: PHYSICIAN ASSISTANT

## 2023-06-05 PROCEDURE — 86235 NUCLEAR ANTIGEN ANTIBODY: CPT | Performed by: PHYSICIAN ASSISTANT

## 2023-06-05 PROCEDURE — 73610 X-RAY EXAM OF ANKLE: CPT

## 2023-06-05 PROCEDURE — 80053 COMPREHEN METABOLIC PANEL: CPT | Performed by: PHYSICIAN ASSISTANT

## 2023-06-05 NOTE — PROGRESS NOTES
Assessment and Plan:  Ms Cece Song is a 65yo female with PMHx significant for hx of arthropathic psoriasis, left plantar fasciitis hepatitis C, combined systolic and diastolic CHF, LUIZA, CKD stage III, hypertension who presents for rheumatology evaluation of left foot pain  The patient has a historic diagnosis of arthropathic psoriasis dating back to 2018 at a primary care clinic  The patient does not recall ever being treated for this and has never been seen by rheumatology  She has no active synovitis or psoriasis on exam today  She does have history of left plantar fasciitis, for which she has done PT, and there is concern for enthesitis of left Achilles tendon  A few labs have been ordered by the primary team including HLA-B27 and inflammation markers  I have added some additional blood work for the patient to obtain as well as left foot and ankle x-rays to evaluate for evidence of enthesitis or changes that would represent h/o inflammatory arthritis  Of note, the patient does have a history of hepatitis C and believes that it may have been treated in the past, however there is no documentation in the chart to suggest this  We will obtain hep C RNA for further evaluation and it should be noted that she would need treatment for hep C prior to immunosuppression, if warranted  Follow-up to be determined      Plan:  Diagnoses and all orders for this visit:    History of psoriatic arthritis  -     CBC and differential  -     Comprehensive metabolic panel  -     Urinalysis with microscopic  -     Protein / creatinine ratio, urine  -     Hepatitis C RNA, quantitative, PCR  -     Chronic Hepatitis Panel  -     Quantiferon TB Gold Plus  -     Sjogren's Antibodies  -     Cancel: Sm and Sm/RNP Antibodies  -     Cyclic citrul peptide antibody, IgG  -     RF Screen w/ Reflex to Titer  -     Cancel: MISCELLANEOUS LAB TEST    Polyarthritis  -     Sjogren's Antibodies  -     Cyclic citrul peptide antibody, IgG  - " RF Screen w/ Reflex to Titer    History of hepatitis C  -     Hepatitis C RNA, quantitative, PCR    Chronic pain of left ankle  -     XR ankle 3+ vw left    Heel pain, chronic, left  -     XR foot 3+ vw left    Need for hepatitis B screening test  -     Chronic Hepatitis Panel    Encounter for screening for respiratory tuberculosis  -     Quantiferon TB Gold Plus        I have personally reviewed prior notes, recent laboratory results, and pertinent films in PACS  Activities as tolerated  Exercise: try to maintain a low impact exercise regimen as much as possible  Walk for 30 minutes a day for at least 3 days a week  Continue other medications as prescribed by PCP and other specialists  RTC TBD    Follow-up plan: TBD        Chief Complaint  Chief Complaint   Patient presents with   • New Patient Visit   \"my heel hurts  \"        HPI  Heber Mejia is a 77 y o   female who presents as a Rheumatology consult referred by Vandana Martinez DO for evaluation of left foot pain  The patient has a historic diagnosis of arthropathic psoriasis dating back to 2018 at a primary care clinic  The patient does not recall ever being treated for this and has never been seen by rheumatology  She also has history of left plantar fasciitis, for which she has done PT (she notes that it did not help, but the PT notes state that she felt much better), and there is concern for enthesitis of left Achilles tendon  A few labs have been ordered by the primary team including HLA-B27 and inflammation markers -not yet drawn  The patient complains of left heel pain and left ankle pain  This is associating with burning  She feels it was worse after physical therapy  She feels that her fingers may be stiff for 5 to 10 minutes when she wakes up in the morning  She states it is worse at the end of the day after activity  She takes meloxicam daily and is unsure if it helps or not    No other joint complaints, redness, " swelling, heat, tenderness  Pertinent positives include : +dry eyes (hx of cataracts b/l-- in the remote past, which never recurred), Left middle finger turns white & gets cold     Denies denies fevers, unintentional weight loss, hair loss, inflammatory eye disease, photosensitivity, mouth/nose ulcers, swollen glands, pleuritic chest pain, abdominal pain, vomiting, diarrhea, blood in stools, inflammatory bowel disease, blood clots, miscarriages ()  Review of Systems  Review of Systems  Constitutional: Negative for weight change, fevers, chills, night sweats, fatigue  ENT/Mouth: Negative for hearing changes, ear pain, nasal congestion, sinus pain, hoarseness, sore throat, rhinorrhea, swallowing difficulty  Eyes: Negative for pain, redness, discharge, vision changes  Cardiovascular: Negative for chest pain, SOB, palpitations  Respiratory: Negative for cough, sputum, wheezing, dyspnea  Gastrointestinal: Negative for nausea, vomiting, diarrhea, constipation, pain, heartburn  Genitourinary: Negative for dysuria, urinary frequency, hematuria  Musculoskeletal: As per HPI  Skin: Negative for skin rash, color changes  Neuro: Negative for weakness, numbness, tingling, loss of consciousness  Psych: Negative for anxiety, depression  Heme/Lymph: Negative for easy bruising, bleeding, lymphadenopathy        Allergies  No Known Allergies    Home Medications    Current Outpatient Medications:   •  acetaminophen (TYLENOL) 650 mg CR tablet, Take 650 mg by mouth every 8 (eight) hours as needed , Disp: , Rfl:   •  atorvastatin (LIPITOR) 40 mg tablet, TAKE 1 TABLET (40 MG TOTAL) BY MOUTH DAILY FOR CHOLESTEROL, Disp: 90 tablet, Rfl: 3  •  Blood Pressure Monitoring (Blood Pressure Monitor/M Cuff) MISC, Use daily, Disp: 1 each, Rfl: 0  •  Diclofenac Sodium (VOLTAREN) 1 %, Apply 2 g topically 4 (four) times a day as needed (neck pain), Disp: 100 g, Rfl: 1  •  Entresto 24-26 MG TABS, TAKE 1 TABLET BY MOUTH TWICE A DAY, Disp: 60 tablet, Rfl: 6  •  furosemide (LASIX) 20 mg tablet, Take 1 tablet (20 mg total) by mouth daily, Disp: 30 tablet, Rfl: 11  •  Jardiance 10 MG TABS tablet, TAKE 1 TABLET (10 MG TOTAL) BY MOUTH EVERY MORNING , Disp: 30 tablet, Rfl: 11  •  Klor-Con M20 20 MEQ tablet, TAKE 1 TABLET BY MOUTH TWICE A DAY, Disp: 180 tablet, Rfl: 1  •  meloxicam (MOBIC) 15 mg tablet, TAKE 1 TABLET (15 MG TOTAL) BY MOUTH DAILY  , Disp: 30 tablet, Rfl: 0  •  metoprolol succinate (TOPROL-XL) 50 mg 24 hr tablet, TAKE 1 TABLET BY MOUTH TWICE A DAY, Disp: 180 tablet, Rfl: 2  •  nitroglycerin (NITROSTAT) 0 4 mg SL tablet, Place 1 tablet (0 4 mg total) under the tongue every 5 (five) minutes as needed for chest pain (take every 5 minutes for chest pain with max 3 doses), Disp: 9 tablet, Rfl: 0  •  omeprazole (PriLOSEC) 40 MG capsule, Take 40 mg by mouth daily, Disp: , Rfl:   •  spironolactone (ALDACTONE) 25 mg tablet, TAKE 1 TABLET BY MOUTH TWICE A DAY, Disp: 180 tablet, Rfl: 1    Past Medical History  Past Medical History:   Diagnosis Date   • Chronic ankle pain     Unspec laterality, Last assessed - 6/22/16   • Chronic hepatitis C (HCC)    • Complete left bundle branch block (LBBB)    • COPD (chronic obstructive pulmonary disease) (HCC)    • Eczema     B/L elbows, start Hydrocortisone;  Last assessed - 8/5/15   • HLD (hyperlipidemia)    • [facility], [facility]2015    Violated CSMA, stopped   • Hypertension    • Osteoarthritis, knee    • Rectal polyp    • Sleep apnea    • Violation of controlled substance agreement 10/23/2019    See notes of 10-       Past Surgical History   Past Surgical History:   Procedure Laterality Date   • COLONOSCOPY     • EYE SURGERY         Family History    Family History   Problem Relation Age of Onset   • Heart disease Mother         Pacemaker   • Hypertension Mother    • Heart disease Father    • Heart disease Sister    • Hypertension Sister    • No "Known Problems Sister    • No Known Problems Daughter    • No Known Problems Daughter    • No Known Problems Maternal Grandmother    • No Known Problems Maternal Grandfather    • No Known Problems Paternal Grandmother    • No Known Problems Paternal Grandfather    • Alcohol abuse Brother    • Heart disease Brother    • Cirrhosis Brother         Liver    • Hypertension Brother    • No Known Problems Brother    • No Known Problems Maternal Aunt    • No Known Problems Cousin      Patient's mother had some sort of arthritis  Social History  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  Social History     Substance and Sexual Activity   Alcohol Use Yes    Comment: social     Social History     Substance and Sexual Activity   Drug Use No     Social History     Tobacco Use   Smoking Status Former   • Types: Cigarettes   • Quit date: 1999   • Years since quittin 2   Smokeless Tobacco Never       Objective:  Vitals:    23 0935   BP: (!) 169/108   Pulse: 74   Weight: 90 kg (198 lb 6 4 oz)   Height: 5' 2\" (1 575 m)       Physical Exam  General: Well appearing, well nourished, in no acute distress  Oriented x 3, normal mood and affect  Ambulating without difficulty  Skin: Good turgor, no rash, unusual bruising or prominent lesions  Hair: Normal texture and distribution  Nails: Normal color, no deformities  HEENT:  Head: Normocephalic, atraumatic  Eyes: Conjunctiva clear, sclera non-icteric, EOM intact  Nose: No external lesions, mucosa non-inflamed  Mouth: Mucous membranes moist, no mucosal lesions  Neck: Supple  Extremities: No amputations or deformities, cyanosis, edema  Musculoskeletal:   + Tenderness to palpation of left Achilles tendon and heel  No swelling or heat noted  No asymmetry or deformities noted of bilateral upper and lower extremities  No pain or swelling of joints bilaterally to include: shoulder, elbow, wrist, MCP I-V, PIP I-V, knee, MTP I-V  Neurologic: Alert and oriented   No focal " neurological deficits appreciated  Psychiatric: Normal mood and affect  Reviewed labs and imaging  Imaging:   No results found       Labs:   Appointment on 06/05/2023   Component Date Value Ref Range Status   • Sed Rate 06/05/2023 21  0 - 29 mm/hour Final   • CRP 06/05/2023 6 0 (H)  <3 0 mg/L Final   Office Visit on 06/05/2023   Component Date Value Ref Range Status   • WBC 06/05/2023 8 11  4 31 - 10 16 Thousand/uL Final   • RBC 06/05/2023 4 81  3 81 - 5 12 Million/uL Final   • Hemoglobin 06/05/2023 14 5  11 5 - 15 4 g/dL Final   • Hematocrit 06/05/2023 44 7  34 8 - 46 1 % Final   • MCV 06/05/2023 93  82 - 98 fL Final   • MCH 06/05/2023 30 1  26 8 - 34 3 pg Final   • MCHC 06/05/2023 32 4  31 4 - 37 4 g/dL Final   • RDW 06/05/2023 15 1  11 6 - 15 1 % Final   • MPV 06/05/2023 10 4  8 9 - 12 7 fL Final   • Platelets 48/60/3326 396 (H)  149 - 390 Thousands/uL Final   • nRBC 06/05/2023 0  /100 WBCs Final   • Neutrophils Relative 06/05/2023 69  43 - 75 % Final   • Immat GRANS % 06/05/2023 0  0 - 2 % Final   • Lymphocytes Relative 06/05/2023 20  14 - 44 % Final   • Monocytes Relative 06/05/2023 7  4 - 12 % Final   • Eosinophils Relative 06/05/2023 3  0 - 6 % Final   • Basophils Relative 06/05/2023 1  0 - 1 % Final   • Neutrophils Absolute 06/05/2023 5 61  1 85 - 7 62 Thousands/µL Final   • Immature Grans Absolute 06/05/2023 0 03  0 00 - 0 20 Thousand/uL Final   • Lymphocytes Absolute 06/05/2023 1 63  0 60 - 4 47 Thousands/µL Final   • Monocytes Absolute 06/05/2023 0 59  0 17 - 1 22 Thousand/µL Final   • Eosinophils Absolute 06/05/2023 0 20  0 00 - 0 61 Thousand/µL Final   • Basophils Absolute 06/05/2023 0 05  0 00 - 0 10 Thousands/µL Final   • Sodium 06/05/2023 137  135 - 147 mmol/L Final   • Potassium 06/05/2023 4 5  3 5 - 5 3 mmol/L Final   • Chloride 06/05/2023 107  96 - 108 mmol/L Final   • CO2 06/05/2023 20 (L)  21 - 32 mmol/L Final   • ANION GAP 06/05/2023 10  4 - 13 mmol/L Final   • BUN 06/05/2023 14  5 - 25 mg/dL Final   • Creatinine 06/05/2023 0 95  0 60 - 1 30 mg/dL Final    Standardized to IDMS reference method   • Glucose, Fasting 06/05/2023 104 (H)  65 - 99 mg/dL Final   • Calcium 06/05/2023 9 5  8 4 - 10 2 mg/dL Final   • AST 06/05/2023 13  13 - 39 U/L Final   • ALT 06/05/2023 11  7 - 52 U/L Final    Specimen collection should occur prior to Sulfasalazine administration due to the potential for falsely depressed results  • Alkaline Phosphatase 06/05/2023 86  34 - 104 U/L Final   • Total Protein 06/05/2023 7 2  6 4 - 8 4 g/dL Final   • Albumin 06/05/2023 4 5  3 5 - 5 0 g/dL Final   • Total Bilirubin 06/05/2023 0 61  0 20 - 1 00 mg/dL Final    Use of this assay is not recommended for patients undergoing treatment with eltrombopag due to the potential for falsely elevated results  N-acetyl-p-benzoquinone imine (metabolite of Acetaminophen) will generate erroneously low results in samples for patients that have taken an overdose of Acetaminophen     • eGFR 06/05/2023 60  ml/min/1 73sq m Final   • Color, UA 06/05/2023 Light Yellow   Final   • Clarity, UA 06/05/2023 Turbid   Final   • Specific Gravity, UA 06/05/2023 1 011  1 003 - 1 030 Final    High concentrations of glucose or protein may affect the specific gravity   • pH, UA 06/05/2023 5 5  4 5, 5 0, 5 5, 6 0, 6 5, 7 0, 7 5, 8 0 Final   • Leukocytes, UA 06/05/2023 Large (A)  Negative Final   • Nitrite, UA 06/05/2023 Negative  Negative Final   • Protein, UA 06/05/2023 Negative  Negative mg/dl Final   • Glucose, UA 06/05/2023 >=1000 (1%) (A)  Negative mg/dl Final    Elevated glucose may cause false negative leukocyte esterase   • Ketones, UA 06/05/2023 Negative  Negative mg/dl Final   • Urobilinogen, UA 06/05/2023 <2 0  <2 0 mg/dl mg/dl Final   • Bilirubin, UA 06/05/2023 Negative  Negative Final   • Occult Blood, UA 06/05/2023 Negative  Negative Final   • RBC, UA 06/05/2023 2-4 (A)  None Seen, 1-2 /hpf Final   • WBC, UA 06/05/2023 4-10 (A)  None Seen, 1-2 /hpf Final   • Epithelial Cells 06/05/2023 Innumerable (A)  None Seen, Occasional /hpf Final   • Bacteria, UA 06/05/2023 Occasional  None Seen, Occasional /hpf Final   • Creatinine, Ur 06/05/2023 54 4  mg/dL Final   • Protein Urine Random 06/05/2023 14  mg/dL Final   • Prot/Creat Ratio, Ur 06/05/2023 0 26 (H)  0 00 - 0 10 Final   Appointment on 02/08/2023   Component Date Value Ref Range Status   • Sodium 02/08/2023 141  135 - 147 mmol/L Final   • Potassium 02/08/2023 4 2  3 5 - 5 3 mmol/L Final   • Chloride 02/08/2023 112 (H)  96 - 108 mmol/L Final   • CO2 02/08/2023 23  21 - 32 mmol/L Final   • ANION GAP 02/08/2023 6  4 - 13 mmol/L Final   • BUN 02/08/2023 14  5 - 25 mg/dL Final   • Creatinine 02/08/2023 0 96  0 60 - 1 30 mg/dL Final    Standardized to IDMS reference method   • Glucose, Fasting 02/08/2023 97  65 - 99 mg/dL Final    Specimen collection should occur prior to Sulfasalazine administration due to the potential for falsely depressed results  Specimen collection should occur prior to Sulfapyridine administration due to the potential for falsely elevated results  • Calcium 02/08/2023 9 1  8 3 - 10 1 mg/dL Final   • AST 02/08/2023 13  5 - 45 U/L Final    Specimen collection should occur prior to Sulfasalazine administration due to the potential for falsely depressed results  • ALT 02/08/2023 17  12 - 78 U/L Final    Specimen collection should occur prior to Sulfasalazine and/or Sulfapyridine administration due to the potential for falsely depressed results  • Alkaline Phosphatase 02/08/2023 91  46 - 116 U/L Final   • Total Protein 02/08/2023 7 3  6 4 - 8 4 g/dL Final   • Albumin 02/08/2023 4 0  3 5 - 5 0 g/dL Final   • Total Bilirubin 02/08/2023 0 41  0 20 - 1 00 mg/dL Final    Use of this assay is not recommended for patients undergoing treatment with eltrombopag due to the potential for falsely elevated results     • eGFR 02/08/2023 60  ml/min/1 73sq m Final   • WBC 02/08/2023 6 96  4 31 - 10 16 Thousand/uL Final   • RBC 02/08/2023 4 59  3 81 - 5 12 Million/uL Final   • Hemoglobin 02/08/2023 13 7  11 5 - 15 4 g/dL Final   • Hematocrit 02/08/2023 42 0  34 8 - 46 1 % Final   • MCV 02/08/2023 92  82 - 98 fL Final   • MCH 02/08/2023 29 8  26 8 - 34 3 pg Final   • MCHC 02/08/2023 32 6  31 4 - 37 4 g/dL Final   • RDW 02/08/2023 13 7  11 6 - 15 1 % Final   • MPV 02/08/2023 10 7  8 9 - 12 7 fL Final   • Platelets 60/69/7062 363  149 - 390 Thousands/uL Final   • nRBC 02/08/2023 0  /100 WBCs Final   • Neutrophils Relative 02/08/2023 60  43 - 75 % Final   • Immat GRANS % 02/08/2023 0  0 - 2 % Final   • Lymphocytes Relative 02/08/2023 27  14 - 44 % Final   • Monocytes Relative 02/08/2023 8  4 - 12 % Final   • Eosinophils Relative 02/08/2023 4  0 - 6 % Final   • Basophils Relative 02/08/2023 1  0 - 1 % Final   • Neutrophils Absolute 02/08/2023 4 10  1 85 - 7 62 Thousands/µL Final   • Immature Grans Absolute 02/08/2023 0 02  0 00 - 0 20 Thousand/uL Final   • Lymphocytes Absolute 02/08/2023 1 91  0 60 - 4 47 Thousands/µL Final   • Monocytes Absolute 02/08/2023 0 55  0 17 - 1 22 Thousand/µL Final   • Eosinophils Absolute 02/08/2023 0 30  0 00 - 0 61 Thousand/µL Final   • Basophils Absolute 02/08/2023 0 08  0 00 - 0 10 Thousands/µL Final         Elvia Gaona PA-C  Rheumatology

## 2023-06-06 ENCOUNTER — TELEPHONE (OUTPATIENT)
Dept: RHEUMATOLOGY | Facility: CLINIC | Age: 68
End: 2023-06-06

## 2023-06-06 LAB
CCP AB SER IA-ACNC: 1.5
ENA RNP AB SER-ACNC: <0.2 AI (ref 0–0.9)
ENA SM AB SER-ACNC: <0.2 AI (ref 0–0.9)
ENA SS-A AB SER-ACNC: <0.2 AI (ref 0–0.9)
ENA SS-B AB SER-ACNC: <0.2 AI (ref 0–0.9)
HCV RNA SERPL NAA+PROBE-ACNC: NORMAL IU/ML
RHEUMATOID FACT SER QL LA: NEGATIVE
TEST INFORMATION: NORMAL

## 2023-06-06 NOTE — RESULT ENCOUNTER NOTE
Pls call Lab at [phone] to see if we can add on the test I ordered as follow up to the positive hep panel - I placed order for Hep B DNA PCR  Not sure if they can add this with the blood they already have or if pt will need to go back  (I already had ordered the Hep C RNA PCR b/c I knew of her hx of Hep C)  Thanks!

## 2023-06-06 NOTE — TELEPHONE ENCOUNTER
----- Message from Arie Youssef PA-C sent at 6/5/2023  8:54 PM EDT -----  Pls call Lab at [phone] to see if we can add on the test I ordered as follow up to the positive hep panel - I placed order for Hep B DNA PCR  Not sure if they can add this with the blood they already have or if pt will need to go back  (I already had ordered the Hep C RNA PCR b/c I knew of her hx of Hep C)  Thanks!

## 2023-06-07 LAB
GAMMA INTERFERON BACKGROUND BLD IA-ACNC: 0.02 IU/ML
M TB IFN-G BLD-IMP: NEGATIVE
M TB IFN-G CD4+ BCKGRND COR BLD-ACNC: 0 IU/ML
M TB IFN-G CD4+ BCKGRND COR BLD-ACNC: 0 IU/ML
MITOGEN IGNF BCKGRD COR BLD-ACNC: >10 IU/ML

## 2023-06-08 LAB
HBV DNA SERPL NAA+PROBE-ACNC: NORMAL IU/ML
HBV DNA SERPL NAA+PROBE-LOG IU: NORMAL LOG10 IU/ML
REF LAB TEST REF RANGE: NORMAL

## 2023-06-09 ENCOUNTER — TELEPHONE (OUTPATIENT)
Dept: RHEUMATOLOGY | Facility: CLINIC | Age: 68
End: 2023-06-09

## 2023-06-09 DIAGNOSIS — M72.2 PLANTAR FASCIITIS: ICD-10-CM

## 2023-06-09 DIAGNOSIS — M79.672 FOOT PAIN, LEFT: ICD-10-CM

## 2023-06-09 RX ORDER — MELOXICAM 15 MG/1
15 TABLET ORAL DAILY
Qty: 30 TABLET | Refills: 6 | Status: SHIPPED | OUTPATIENT
Start: 2023-06-09 | End: 2023-07-09

## 2023-06-09 NOTE — TELEPHONE ENCOUNTER
----- Message from Edgar Castañeda PA-C sent at 6/9/2023  9:11 AM EDT -----  Please call the patient and let her know that her labs/x-ray of the foot/ankle are consistent with psoriatic arthritis  I sent a new prescription for meloxicam to her local pharmacy that I would like her to continue taking once daily  Please make a follow-up appointment for her to see me in 6 months  Also, you can let her know that the tests for active hepatitis B and hepatitis C were negative

## 2023-06-12 LAB — HLA-B27 QL NAA+PROBE: NEGATIVE

## 2023-06-20 ENCOUNTER — HOSPITAL ENCOUNTER (OUTPATIENT)
Dept: RADIOLOGY | Age: 68
Discharge: HOME | End: 2023-06-20
Payer: MEDICARE

## 2023-06-20 DIAGNOSIS — M85.851 OSTEOPENIA OF NECKS OF BOTH FEMURS: ICD-10-CM

## 2023-06-20 DIAGNOSIS — M85.852 OSTEOPENIA OF NECKS OF BOTH FEMURS: ICD-10-CM

## 2023-06-20 PROCEDURE — 77080 DXA BONE DENSITY AXIAL: CPT

## 2023-06-26 ENCOUNTER — OFFICE VISIT (OUTPATIENT)
Dept: CARDIOLOGY CLINIC | Facility: CLINIC | Age: 68
End: 2023-06-26
Payer: MEDICARE

## 2023-06-26 VITALS
HEIGHT: 62 IN | OXYGEN SATURATION: 98 % | BODY MASS INDEX: 36.44 KG/M2 | DIASTOLIC BLOOD PRESSURE: 76 MMHG | HEART RATE: 76 BPM | SYSTOLIC BLOOD PRESSURE: 118 MMHG | WEIGHT: 198 LBS

## 2023-06-26 DIAGNOSIS — I50.22 HEART FAILURE WITH MID-RANGE EJECTION FRACTION (HCC): Primary | ICD-10-CM

## 2023-06-26 DIAGNOSIS — G47.33 OBSTRUCTIVE SLEEP APNEA, ADULT: ICD-10-CM

## 2023-06-26 DIAGNOSIS — I10 ESSENTIAL HYPERTENSION: ICD-10-CM

## 2023-06-26 DIAGNOSIS — E78.5 DYSLIPIDEMIA: ICD-10-CM

## 2023-06-26 PROCEDURE — 99214 OFFICE O/P EST MOD 30 MIN: CPT | Performed by: INTERNAL MEDICINE

## 2023-06-26 NOTE — PROGRESS NOTES
Advanced Heart Failure Outpatient Progress Note - Maribeth Rockwell 77 y o  female MRN: [id number]    Encounter: [id number]      Assessment/Plan:    Patient Active Problem List    Diagnosis Date Noted   • Prediabetes 12/03/2021   • Decreased hearing of both ears 12/30/2020   • Cataract 10/23/2020   • Arthropathic psoriasis ([facility] ) 09/11/2017   • Chronic combined systolic and diastolic congestive heart failure ([facility] ) 09/11/2017   • Obesity (BMI 30-39 9) 09/11/2017   • Allergic rhinitis 10/26/2015   • Obstructive sleep apnea, adult 11/13/2014   • Dyslipidemia 04/24/2013   • Hypertension 11/08/2012   • History of hepatitis C 01/30/2023   • Stage 3 chronic kidney disease, unspecified whether stage 3a or 3b CKD ([place] [id number] ) 03/31/2022     # Heart failure with mid range ejection fraction, Stage C, NYHA II  Etiology: Unclear  No definitive evidence of ischemia on nuclear stress test  Hypertension controlled  LBBB likely chronic  Dyscynchrony noted on echo since 2016  No heavy alcohol or drug use  Euvolemic     Weight: 202 lbs 5/27/22; 201 lbs 8/25/22; 202 lbs 11/22/22; 201 lbs 2/22/23; 198 lbs 6/26/23  NT proBNP:      Studies- personally reviewed by me    Echo 1/3/22:  LVEF: 50%  LVIDd: 5 9cm  RV: normal size and systolic function  MR: mild  PASP:  24 mmHg  RVOT: mid systolic notching suggesting elevated pulmonary vascular resistance  Other: grade 2 diastology, no regional wall motion abnormalities seen, abnormal septal motion consistent with bundle branch block  IVC normal with normal respirophasic variation    Pharm Nuc Stress 9/27/21: Abnormal study after pharmacologic vasodilation without reproduction of symptoms  Inferior defect improved with prone imaging likely diaphragmatic attenuation  Partially reversible anteroapical defect likely shifting breast, cannot exclude small area of distal LAD ischemia  Left ventricular systolic function was reduced, without distinct regional wall motion abnormalities   LVEF 47%    Echocardiogram 7/8/21  LVEF: 40-45%  LVIDd: 5cm  RV: normal size and systolic function  MR: mild  PASP: 30mmHg  RVOT: mid systolic notching suggesting elevated pulmonary vascular resistance  Other: hypokinesis of inferior and septal walls, mild to moderately increased wall thickness, mild LAE    TTE 5/6/2016: LVEF 50%  Moderate concentric hypertrophy  Hypokinesis of mid anteroseptal, basal inferior and basal to mid inferolateral walls  Grade 1 diastology  Normal RV size and systolic function    Diet:  2 g sodium diet  2000 mL fluid restriction    Neurohormonal Blockade:  --Beta-Blocker: metoprolol succinate 50mg BID  --ACEi, ARB or ARNi: entresto 24-26mg BID  --Aldosterone Receptor Blocker: spironolactone 25mg BID  --SGLT2 Inhibitor: empagliflozin 10mg daily  --Diuretic: furosemide 20mg daily with potassium 20 meq BID    Sudden Cardiac Death Risk Reduction:  --ICD:  LVEF >35%    Electrical Resynchronization:  --Candidacy for BiV device:    Advanced Therapies (if appropriate): --We will continue to monitor, no indication at this time    # Hypertension, controlled  # Hyperlipidemia  8/10/21: TC 1159  HDL 50 LDL 82  Rx: atorvastatin 40mg daily  # LUIZA on CPAP, not tolerating all night  # Obesity  # LBBB, likely chronic as noted above    Today's Plan:  Overall doing well from a cardiac standpoint, euvolemic  Continue current medications  2g sodium diet  2L fluid diet  Daily weights  Physical activities/exercise as tolerated  Follow up in 4 months or sooner as needed        HPI:   68-year-old female with past medical history of hypertension, hyperlipidemia, obstructive sleep apnea on CPAP, obesity who presents for evaluation of heart failure  Patient with chronic heart failure with preserved ejection fraction  Echocardiogram back in 2015 showed LVEF of 50%  Most recent echocardiogram in 7/8/2021 showed LVEF of 40-45% with hypokinesis of the inferior and septal walls  Patient notes shortness of breath since the pandemic  Attributed to weight gain  And inactivity  She short of breath walking uphill and walking up steps for couple of years  She recently started exercising and walks on treadmill for 45 mins to an hour, no shortness of breath or chest pain  Occasional lightheadedness  Uses CPAP at night  No PND orthopnea  She has intermittent leg swelling and was on hydrochlorothiazide  Remote smoking history > 20 years ago  Rare alcohol intake  No drug use    11/1/21: Here for follow up  Nuclear stress test results as above  Overall doing well  Walking 6 blocks almost everyday, no issues  Exercises and walks treadmill for 40 mins without chest pain or shortness of breath  Notes some shortness of breath walking uphill  Has been limiting salt in her diet  Adherent to medications  2/7/22: Here for follow up  Reports progressive shortness of breath on exertion over past few weeks  Weight up almost 10 lb since last visit in November  Weight up to 201 lb today  Also with abdominal fullness, leg swelling and orthopnea  Not been sleeping well night  Denies chest pain on exertion  No palpitations or lightheadedness  Has bendopnea    2/15/22: Here for follow up  Still with shortness of breath on exertion but overall improved  Weight down 5 lbs since last visit  2/15/22 Na 139 K 3 9 creatinine 0 97    3/31/22: Here for follow up  Started spironolactone 25mg daily and decreased potassium to 20 meq daily on last visit  3/3/21 Na 136 K4 1 creatinine 1 01  Eating overall okay  Still with shortness of breath on exertion  Not much change in weight  Having knee pains and mostly sedentary  No PND, orthopnea or lower extremity edema  5/27/22: Here for follow up  Increased spironolactone to 25mg two times a day  Bikes 15-20 mins x 2  No symptoms  Having right leg cramping with new exercise  No palpitations or lightheadedness    8/25/22: here for follow up  Started on jardiance 10mg daily since last visit   6/3/22 Na 136 K 3 7 and creatinine 1 36 from 1 01  Lasix dose reduced to 20mg daily PRN for weight gain  Home scale weight 202 at home  Intermittent episodes of shortness of breath  No PND, orthopnea or worsening lower extremity edema  No palpitations  Occasional lightheadedness with getting up       11/22/22: Here for follow up  Resumed furosemide 20mg once a day and started potassium 20 meq once a day on last visit  10/17/22 Na 137 K 4 8 and creatinine 1 13  Exercising on the elliptical for 10 minutes  No worsening shortness of breath, short of breath walking uphill  Lightheadedness better  2/22/23: Here for follow up  Overall doing well  Trying to lose weight  Short of breath walking up hill  Still trying to do elliptical but recently having left ankle pain  No leg swelling, PND, or orthopnea  No abdominal fullness  No chest pain or lightheadedness    6/26/23: Here for follow up  Continues to do well  No chest pain or shortness of breath  Mainly limited by foot pain and seen recently by rheumatology  Still continues to stay active walking uphill  No leg swelling or abdominal distension  Occasional PND when not using CPAP  Cannot tolerate CPAP all night  6/5/23 Na 137 K 4 5 creatinine 0 95  Past Medical History:   Diagnosis Date   • Chronic ankle pain     Unspec laterality, Last assessed - 6/22/16   • Chronic hepatitis C (HCC)    • Complete left bundle branch block (LBBB)    • COPD (chronic obstructive pulmonary disease) (HCC)    • Eczema     B/L elbows, start Hydrocortisone; Last assessed - 8/5/15   • HLD (hyperlipidemia)    • Hydrocodone use disorder, moderate, in controlled environment ([facility] ) 3/11/2015    Violated CSMA, stopped   • Hypertension    • Osteoarthritis, knee    • Rectal polyp    • Sleep apnea    • Violation of controlled substance agreement 10/23/2019    See notes of 10-       Review of Systems   Constitutional: Negative for chills, fatigue and fever  HENT: Negative for ear pain and sore throat      Eyes: Negative for pain and visual disturbance  Respiratory: Negative for cough and chest tightness  Cardiovascular: Negative for chest pain, palpitations and leg swelling  Gastrointestinal: Negative for abdominal distention, abdominal pain and vomiting  Genitourinary: Negative for dysuria and hematuria  Musculoskeletal: Negative for arthralgias and back pain  Skin: Negative for color change and rash  Neurological: Negative for dizziness, seizures and syncope  All other systems reviewed and are negative  No Known Allergies        Current Outpatient Medications:   •  acetaminophen (TYLENOL) 650 mg CR tablet, Take 650 mg by mouth every 8 (eight) hours as needed , Disp: , Rfl:   •  atorvastatin (LIPITOR) 40 mg tablet, TAKE 1 TABLET (40 MG TOTAL) BY MOUTH DAILY FOR CHOLESTEROL, Disp: 90 tablet, Rfl: 3  •  Blood Pressure Monitoring (Blood Pressure Monitor/M Cuff) MISC, Use daily, Disp: 1 each, Rfl: 0  •  Diclofenac Sodium (VOLTAREN) 1 %, Apply 2 g topically 4 (four) times a day as needed (neck pain), Disp: 100 g, Rfl: 1  •  Entresto 24-26 MG TABS, TAKE 1 TABLET BY MOUTH TWICE A DAY, Disp: 60 tablet, Rfl: 6  •  furosemide (LASIX) 20 mg tablet, Take 1 tablet (20 mg total) by mouth daily, Disp: 30 tablet, Rfl: 11  •  Jardiance 10 MG TABS tablet, TAKE 1 TABLET (10 MG TOTAL) BY MOUTH EVERY MORNING , Disp: 30 tablet, Rfl: 11  •  Klor-Con M20 20 MEQ tablet, TAKE 1 TABLET BY MOUTH TWICE A DAY, Disp: 180 tablet, Rfl: 1  •  meloxicam (MOBIC) 15 mg tablet, Take 1 tablet (15 mg total) by mouth daily, Disp: 30 tablet, Rfl: 6  •  metoprolol succinate (TOPROL-XL) 50 mg 24 hr tablet, TAKE 1 TABLET BY MOUTH TWICE A DAY, Disp: 180 tablet, Rfl: 2  •  omeprazole (PriLOSEC) 40 MG capsule, Take 40 mg by mouth daily, Disp: , Rfl:   •  spironolactone (ALDACTONE) 25 mg tablet, TAKE 1 TABLET BY MOUTH TWICE A DAY, Disp: 180 tablet, Rfl: 1  •  nitroglycerin (NITROSTAT) 0 4 mg SL tablet, Place 1 tablet (0 4 mg total) under the tongue every 5 (five) minutes as needed for chest pain (take every 5 minutes for chest pain with max 3 doses), Disp: 9 tablet, Rfl: 0    Social History     Socioeconomic History   • Marital status:      Spouse name: Not on file   • Number of children: Not on file   • Years of education: Not on file   • Highest education level: Not on file   Occupational History   • Not on file   Tobacco Use   • Smoking status: Former     Types: Cigarettes     Quit date: 1999     Years since quittin 3   • Smokeless tobacco: Never   Vaping Use   • Vaping Use: Never used   Substance and Sexual Activity   • Alcohol use: Yes     Comment: social   • Drug use: No   • Sexual activity: Yes   Other Topics Concern   • Not on file   Social History Narrative    Home environment-safe         Social Determinants of Health     Financial Resource Strain: Low Risk  (2023)    Overall Financial Resource Strain (CARDIA)    • Difficulty of Paying Living Expenses: Not hard at all   Food Insecurity: No Food Insecurity (2023)    Hunger Vital Sign    • Worried About Running Out of Food in the Last Year: Never true    • Ran Out of Food in the Last Year: Never true   Transportation Needs: No Transportation Needs (2023)    PRAPARE - Transportation    • Lack of Transportation (Medical): No    • Lack of Transportation (Non-Medical):  No   Physical Activity: Sufficiently Active (8/10/2021)    Exercise Vital Sign    • Days of Exercise per Week: 7 days    • Minutes of Exercise per Session: 60 min   Recent Concern: Physical Activity - Inactive (2021)    Exercise Vital Sign    • Days of Exercise per Week: 0 days    • Minutes of Exercise per Session: 0 min   Stress: No Stress Concern Present (8/10/2021)    [address]    • Feeling of Stress : Not at all   Social Connections: Not on file   Intimate Partner Violence: Not At Risk (8/10/2021)    Humiliation, Afraid, Rape, and Kick "questionnaire    • Fear of Current or Ex-Partner: No    • Emotionally Abused: No    • Physically Abused: No    • Sexually Abused: No   Housing Stability: Low Risk  (10/17/2022)    Housing Stability Vital Sign    • Unable to Pay for Housing in the Last Year: No    • Number of Places Lived in the Last Year: 1    • Unstable Housing in the Last Year: No       Family History   Problem Relation Age of Onset   • Heart disease Mother         Pacemaker   • Hypertension Mother    • Heart disease Father    • Heart disease Sister    • Hypertension Sister    • No Known Problems Sister    • No Known Problems Daughter    • No Known Problems Daughter    • No Known Problems Maternal Grandmother    • No Known Problems Maternal Grandfather    • No Known Problems Paternal Grandmother    • No Known Problems Paternal Grandfather    • Alcohol abuse Brother    • Heart disease Brother    • Cirrhosis Brother         Liver    • Hypertension Brother    • No Known Problems Brother    • No Known Problems Maternal Aunt    • No Known Problems Cousin        Physical Exam:    Vitals: Blood pressure 118/76, pulse 76, height 5' 2\" (1 575 m), weight 89 8 kg (198 lb), SpO2 98 %, not currently breastfeeding , Body mass index is 36 21 kg/m² ,   Wt Readings from Last 3 Encounters:   06/26/23 89 8 kg (198 lb)   06/05/23 90 kg (198 lb 6 4 oz)   05/25/23 90 3 kg (199 lb)       Physical Exam  Constitutional:       General: She is not in acute distress  Appearance: Normal appearance  HENT:      Head: Normocephalic and atraumatic  Mouth/Throat:      Mouth: Mucous membranes are moist    Eyes:      Extraocular Movements: Extraocular movements intact  Conjunctiva/sclera: Conjunctivae normal    Cardiovascular:      Rate and Rhythm: Normal rate and regular rhythm  Pulses: Normal pulses  Heart sounds: No murmur heard  No friction rub  No gallop        Comments: Nonelevated JVP  Pulmonary:      Effort: Pulmonary effort is normal  No " "respiratory distress  Breath sounds: No wheezing, rhonchi or rales  Abdominal:      General: There is no distension  Palpations: Abdomen is soft  Tenderness: There is no abdominal tenderness  There is no guarding  Musculoskeletal:         General: No deformity or signs of injury  Cervical back: Neck supple  Right lower leg: No edema  Left lower leg: No edema  Skin:     General: Skin is warm and dry  Capillary Refill: Capillary refill takes less than 2 seconds  Neurological:      General: No focal deficit present  Mental Status: She is alert and oriented to person, place, and time  Psychiatric:         Mood and Affect: Mood normal          Labs & Results:    Lab Results   Component Value Date    WBC 8 11 06/05/2023    HGB 14 5 06/05/2023    HCT 44 7 06/05/2023    MCV 93 06/05/2023     (H) 06/05/2023     Lab Results   Component Value Date    SODIUM 137 06/05/2023    K 4 5 06/05/2023     06/05/2023    CO2 20 (L) 06/05/2023    BUN 14 06/05/2023    CREATININE 0 95 06/05/2023    GLUC 105 12/06/2016    CALCIUM 9 5 06/05/2023     No results found for: \"NTBNP\"   Lab Results   Component Value Date    CHOLESTEROL 159 08/10/2021    CHOLESTEROL 155 08/16/2019     Lab Results   Component Value Date    HDL 50 08/10/2021    HDL 44 08/16/2019    HDL 41 04/22/2014     Lab Results   Component Value Date    TRIG 134 08/10/2021    TRIG 115 08/16/2019    TRIG 140 04/22/2014     No results found for: \"NONHDLC\"    EKG personally reviewed by Aggie Weinstein  Counseling / Coordination of Care  Time spent today 25 minutes  Greater than 50% of total time was spent with the patient and / or family counseling and / or coordination of care  We discussed diagnoses, most recent studies and any changes in treatment    Thank you for the opportunity to participate in the care of this patient      Helder Steele MD  ADVANCED HEART FAILURE AND MECHANICAL CIRCULATORY SUPPORT  [facility]  " [place]

## 2023-06-26 NOTE — PATIENT INSTRUCTIONS
Continue current medications  2g sodium diet  2L fluid diet  Daily weights  Continue physical activities/exercise as tolerated

## 2023-09-07 ENCOUNTER — TELEPHONE (OUTPATIENT)
Dept: CARDIOLOGY CLINIC | Facility: CLINIC | Age: 68
End: 2023-09-07

## 2023-09-07 NOTE — TELEPHONE ENCOUNTER
P/C asking if we can write a letter for welfare stating she is under the care from cardiology, and has heart failure. Please advise if can write letter?     Last ov 6/23

## 2023-09-12 ENCOUNTER — OFFICE VISIT (OUTPATIENT)
Dept: SLEEP CENTER | Facility: CLINIC | Age: 68
End: 2023-09-12
Payer: MEDICARE

## 2023-09-12 VITALS
OXYGEN SATURATION: 98 % | SYSTOLIC BLOOD PRESSURE: 148 MMHG | DIASTOLIC BLOOD PRESSURE: 86 MMHG | HEIGHT: 62 IN | WEIGHT: 193 LBS | BODY MASS INDEX: 35.51 KG/M2 | HEART RATE: 82 BPM

## 2023-09-12 DIAGNOSIS — G47.33 OBSTRUCTIVE SLEEP APNEA, ADULT: Primary | ICD-10-CM

## 2023-09-12 PROCEDURE — 99214 OFFICE O/P EST MOD 30 MIN: CPT | Performed by: NURSE PRACTITIONER

## 2023-09-12 NOTE — PROGRESS NOTES
Progress Note - [address] [place] [address] Rd Luli 77 y.o. female   MHZ:2/1/[birth year], MRN: [id number]  9/12/2023          Follow Up Evaluation / Problem:     Mild Obstructive Sleep Apnea      Thank you for the opportunity of participating in the evaluation and care of this patient in the Sleep Clinic at [address] [place] [address]. HPI: Rona Rice is a 77y.o. year old female, who is new to me, previously treated by Dr. Jarod Weinstein. She presents for follow up of mild obstructive sleep apnea. She completed a diagnostic sleep study in 2012, scored to 4%. AHI was 5. Oxygen casper was noted at 88% with most of the study at saturations >90%. ECG noted atrial fibrillation. A CPAP titration study was then completed with titration to 6cm of water pressure. She continues to use CPAP and presents to review annual compliance and effectiveness of treatment.       Current Outpatient Medications:   •  acetaminophen (TYLENOL) 650 mg CR tablet, Take 650 mg by mouth every 8 (eight) hours as needed , Disp: , Rfl:   •  atorvastatin (LIPITOR) 40 mg tablet, TAKE 1 TABLET (40 MG TOTAL) BY MOUTH DAILY FOR CHOLESTEROL, Disp: 90 tablet, Rfl: 3  •  Blood Pressure Monitoring (Blood Pressure Monitor/M Cuff) MISC, Use daily, Disp: 1 each, Rfl: 0  •  Diclofenac Sodium (VOLTAREN) 1 %, Apply 2 g topically 4 (four) times a day as needed (neck pain), Disp: 100 g, Rfl: 1  •  Entresto 24-26 MG TABS, TAKE 1 TABLET BY MOUTH TWICE A DAY, Disp: 60 tablet, Rfl: 6  •  furosemide (LASIX) 20 mg tablet, Take 1 tablet (20 mg total) by mouth daily, Disp: 30 tablet, Rfl: 11  •  Jardiance 10 MG TABS tablet, TAKE 1 TABLET (10 MG TOTAL) BY MOUTH EVERY MORNING., Disp: 30 tablet, Rfl: 11  •  Klor-Con M20 20 MEQ tablet, TAKE 1 TABLET BY MOUTH TWICE A DAY, Disp: 180 tablet, Rfl: 1  •  metoprolol succinate (TOPROL-XL) 50 mg 24 hr tablet, TAKE 1 TABLET BY MOUTH TWICE A DAY, Disp: 180 tablet, Rfl: 2  •  nitroglycerin (NITROSTAT) 0.4 mg SL tablet, Place 1 tablet (0.4 mg total) under the tongue every 5 (five) minutes as needed for chest pain (take every 5 minutes for chest pain with max 3 doses), Disp: 9 tablet, Rfl: 0  •  omeprazole (PriLOSEC) 40 MG capsule, Take 40 mg by mouth daily, Disp: , Rfl:   •  spironolactone (ALDACTONE) 25 mg tablet, TAKE 1 TABLET BY MOUTH TWICE A DAY, Disp: 180 tablet, Rfl: 1  •  meloxicam (MOBIC) 15 mg tablet, Take 1 tablet (15 mg total) by mouth daily, Disp: 30 tablet, Rfl: 6    How likely are you to doze off or fall asleep in the following situations, in contrast to feeling just tired? Sitting and reading: Would never doze  Watching TV: Would never doze  Sitting, inactive in a public place (e.g. a theatre or a meeting): Would never doze  As a passenger in a car for an hour without a break: Would never doze  Lying down to rest in the afternoon when circumstances permit: Slight chance of dozing  Sitting and talking to someone: Would never doze  Sitting quietly after a lunch without alcohol: Would never doze  In a car, while stopped for a few minutes in traffic: Would never doze  Total score: 1              Vitals:    09/12/23 0816   BP: 148/86   BP Location: Right arm   Patient Position: Sitting   Cuff Size: Standard   Pulse: 82   SpO2: 98%   Weight: 87.5 kg (193 lb)   Height: 5' 2" (1.575 m)       Body mass index is 35.3 kg/m². EPWORTH SLEEPINESS SCORE  Total score: 1      Past History Since Last Sleep Center Visit:   She has been having pain in her hip and arthritis in her foot, which limits her activity and bothers her at night at times. She has some shortness of breath with ambulation. She also notes that she has been having increase in heart burn at night over the past few weeks. She is not sure what may be causing it. She can't recall any changes to her diet that may be contributing.   The latest she eats is 4:30-5:00pm.  This is new - she used to eat later into the evening, but she has been avoiding snacking in an attempt to lose weight. She has been using the CPAP equipment and feels she uses it most nights. Some nights she falls asleep before she puts her mask on. She also notices sometimes the hose falls off of the mask. She feels the pressure could be a little stronger at times. She wakes up feeling more refreshed when she is able to use it throughout the night. The patient reports that she cleans the equipment appropriately, using mild soap and water, and changes supplies on a regular basis. The review of systems and following portions of the patient's history were reviewed and updated as appropriate: allergies, current medications, past family history, past medical history, past social history, past surgical history, and problem list.        OBJECTIVE  Equipment set up date:  8/16/28, received DreamStation 2 recall replacement  PAP Pressure: Nasal CPAP set to deliver 6 cm of water pressure  Type of mask used: full face  DME Provider: [facility]    Physical Exam:     General Appearance:   Alert, cooperative, no distress, appears stated age, obese     Lungs:    Heart:  Clear to auscultation bilaterally, respirations unlabored      Regular rate and rhythm, S1 and S2 normal, no murmur, rub or gallop              ASSESSMENT / PLAN    1. Obstructive sleep apnea, adult  PAP DME Resupply/Reorder    PAP DME Pressure Change              Counseling / Coordination of Care  I have spent a total time of 30 minutes on 09/13/23 in caring for this patient including Risks and benefits of tx options, Instructions for management, Patient and family education, Importance of tx compliance, Risk factor reductions, Impressions, Counseling / Coordination of care, Documenting in the medical record and Reviewing / ordering tests, medicine, procedures  . Today I reviewed the patient's compliance data. she has been able to use the equipment 70% of all days recorded.   Average usage was 4 or more hours 26.7% of all days recorded. The patient uses the equipment for an average of 3 hours and 12 minutes per night. The estimated AHI is 4.4 abnormal breathing events per hour. She is not at goal for hours of use. She is at goal for effectiveness of treatment, however, a pressure change has been ordered today, due to reports of pressure feeling too low at times and to further optimize treatment. The importance of increasing use was discussed, as was complications associated with untreated LUIZA. The patient feels they benefit from the use of PAP equipment and would like to continue PAP therapy. Response to treatment has been good. A prescription for supplies has been provided to last for the next year. She was advised to follow up with her PCP regarding GERD symptoms. We also discussed having a healthy snack later in the evening, to see if that helps. She was encouraged to continue healthy diet and smaller portions. Atrial fibrillation was noted on past sleep study with no diagnosis or testing indicating this. Message sent to her cardiologist, Dr. Raul Das, regarding this finding. She will continue using this equipment at the settings noted above for the next 3-4 months. At that timeshe will then return for a routine follow-up evaluation. I have asked the patient to contact the Sleep [facility] if she encounters any difficulties prior to that time. The following instructions have been given to the patient today:    Patient Instructions   1. Continue use of CPAP equipment nightly - increase use  2. Continue to clean your equipment, as discussed  3. Contact the Sleep [address] [place] [address] with any questions or concerns prior to your next visit, as needed  4. Schedule visit for follow-up in 3-4 months    [facility]  Resupply orderin [address] Contact:  [phone]    Suggested PAP supply Replacement Frequency  Disposable filters. .................................. [address] 2 weeks  Replaceable nasal mask cushions. ....... Wally Spine every 2 weeks  Replaceable full face cushions. ............. Wally Spine every 1 month  Mask. ..................................................... Wally Spine every 3 months  Tubing. ................................................... Wally Spine every 3 months  Head Gear. ............................................ Wally Spine every 6 months  Water chamber. ..................................... Wally Spine every 6 months    After hours emergency:  [phone]          Nursing Support:  When: Monday through Friday 7A-5PM except holidays  Where: Our direct line is [phone]. If you are having a true emergency please call 911. In the event that the line is busy or it is after hours please leave a voice message and we will return your call. Please speak clearly, leaving your full name, birth date, best number to reach you and the reason for your call. Medication refills: We will need the name of the medication, the dosage, the ordering provider, whether you get a 30 or 90 day refill, and the pharmacy name and address. Medications will be ordered by the provider only. Nurses cannot call in prescriptions. Please allow 7 days for medication refills. Physician requested updates: If your provider requested that you call with an update after starting medication, please be ready to provide us the medication and dosage, what time you take your medication, the time you attempt to fall asleep, time you fall asleep, when you wake up, and what time you get out of bed. Sleep Study Results: We will contact you with sleep study results and/or next steps after the physician has reviewed your testing. Oma Casiano, [address]      Portions of the record may have been created with voice recognition software. Occasional wrong word or "sound a like" substitutions may have occurred due to the inherent limitations of voice recognition software.  Read the chart carefully and recognize, using context, where substitutions have occurred.

## 2023-09-12 NOTE — PATIENT INSTRUCTIONS
1.  Continue use of CPAP equipment nightly - increase use  2. Continue to clean your equipment, as discussed  3. Contact the Sleep [address] [place] [address] with any questions or concerns prior to your next visit, as needed  4. Schedule visit for follow-up in 3-4 months    [facility]  Resupply orderin [address] Contact:  [phone]    Suggested PAP supply Replacement Frequency  Disposable filters. .................................. Huggins Kristina every 2 weeks  Replaceable nasal mask cushions. ....... Huggins Kristina every 2 weeks  Replaceable full face cushions. ............. Huggins Kristina every 1 month  Mask. ..................................................... Huggins Kristina every 3 months  Tubing. ................................................... Huggins Kristina every 3 months  Head Gear. ............................................ Huggins Kristina every 6 months  Water chamber. ..................................... Huggins Kristina every 6 months    After hours emergency:  [phone]          Nursing Support:  When: Monday through Friday 7A-5PM except holidays  Where: Our direct line is [phone]. If you are having a true emergency please call 911. In the event that the line is busy or it is after hours please leave a voice message and we will return your call. Please speak clearly, leaving your full name, birth date, best number to reach you and the reason for your call. Medication refills: We will need the name of the medication, the dosage, the ordering provider, whether you get a 30 or 90 day refill, and the pharmacy name and address. Medications will be ordered by the provider only. Nurses cannot call in prescriptions. Please allow 7 days for medication refills. Physician requested updates: If your provider requested that you call with an update after starting medication, please be ready to provide us the medication and dosage, what time you take your medication, the time you attempt to fall asleep, time you fall asleep, when you wake up, and what time you get out of bed.   Sleep Study Results: We will contact you with sleep study results and/or next steps after the physician has reviewed your testing.

## 2023-09-13 ENCOUNTER — TELEPHONE (OUTPATIENT)
Dept: SLEEP CENTER | Facility: CLINIC | Age: 68
End: 2023-09-13

## 2023-09-15 ENCOUNTER — TELEPHONE (OUTPATIENT)
Dept: SLEEP CENTER | Facility: CLINIC | Age: 68
End: 2023-09-15

## 2023-09-15 NOTE — TELEPHONE ENCOUNTER
Per Chase Schaumann 9/13:  Pressure change requested by Mima Flor. I changed the pts pressure remotely on CO to 5-9cm per her order.  Called pt to advise, BRIDGETTE

## 2023-09-21 LAB

## 2023-09-27 DIAGNOSIS — Z71.89 COMPLEX CARE COORDINATION: Primary | ICD-10-CM

## 2023-10-05 ENCOUNTER — TELEPHONE (OUTPATIENT)
Dept: OBGYN CLINIC | Facility: HOSPITAL | Age: 68
End: 2023-10-05

## 2023-10-05 DIAGNOSIS — Z87.2 HISTORY OF PSORIATIC ARTHRITIS: ICD-10-CM

## 2023-10-05 DIAGNOSIS — M72.2 PLANTAR FASCIITIS: Primary | ICD-10-CM

## 2023-10-05 RX ORDER — METHYLPREDNISOLONE 4 MG/1
TABLET ORAL
Qty: 21 TABLET | Refills: 0 | Status: SHIPPED | OUTPATIENT
Start: 2023-10-05

## 2023-10-05 NOTE — TELEPHONE ENCOUNTER
Caller: Fortino Miller     Doctor: Flor Maguire     Reason for call: Patient seen in office for Arthropathic psoriasis / left foot pain . Pain has worsened pain . Patient states she has to walk everywhere. Her appointment was cancelled for Tuesday and rescheduled for December. Patient is taking Meloxicam and Tylenol Arthritis. Please advise what she can do for the pain.       Call back#: [phone]

## 2023-10-06 ENCOUNTER — PATIENT OUTREACH (OUTPATIENT)
Dept: INTERNAL MEDICINE CLINIC | Facility: CLINIC | Age: 68
End: 2023-10-06

## 2023-10-06 ENCOUNTER — TELEPHONE (OUTPATIENT)
Dept: RHEUMATOLOGY | Facility: CLINIC | Age: 68
End: 2023-10-06

## 2023-10-06 NOTE — PROGRESS NOTES
Outpatient Care Management Note:    Re: Chronic Care Management Outreach       Patient referred to outpatient nurse care management for chronic care management program (CCM). Chart reviewed and patient has history of the following CCM diagnoses chronic combined systolic and diastolic congestive heart failure, hypertension, CKD3, dyslipidemia, obesity, cataract, and decreased hearing in both ears. Patient last seen by PCP office on 5/25/23. Patient follows with cardiology, rheumatology and sleep medicine (patient uses CPAP). I called patient and explained my role and reason for call and chronic care management program. Patient feels she is managing ok at this time except for the pain with her foot. She did get steroid dose pack at pharmacy today and did start to take it. Patient confirms she is taking spironolactone 25 mg twice a day and is taking her Jonda Chalk and Metoprolol 25 mg twice a day. Patient has 20 mg of Lasix and takes daily along with Potassium. Patient reports she uses a pill box that she sets up every week to help manage her medication. Patient also reports taking allergy medication that she gets from her over the counter catalog book with her insurance as she is given an allowance with her insurance quarterly. Patient reports she has been suffering from allergies lately and complains of a runny nose. I did discuss with her about using saline spray in the winter for dry nose and she can order this out of catalog. Reviewed importance of weighing herself, watching fluid and sodium/salt intake and reviewed signs and symptoms or when to reach out to cardiology office. I did review with her upcoming appointments including PCP on 10/18, Cardiology on 10/24 and Rheumatology on 12/7.  Patient scheduled with Dr. Ray Helms (Rheumatology) on 11/28 and ok with canceling this appointment as she is already established with another [facility] with clerical staff made aware and will cancel appointment. Patient does not have any further questions, concerns, or other needs at this time. Patient has PCP office number [phone] if needed. Patient declining need for further outreach at this time. Please re-consult as needed.

## 2023-10-24 NOTE — PROGRESS NOTES
Heart Failure Outpatient Progress Note - Sharon Rockwell 77 y.o. female MRN: [id number]    @ Encounter: [id number]      Assessment/Plan:    Patient Active Problem List    Diagnosis Date Noted    History of hepatitis C 01/30/2023    Stage 3 chronic kidney disease, unspecified whether stage 3a or 3b CKD ([address]) 03/31/2022    Prediabetes 12/03/2021    Decreased hearing of both ears 12/30/2020    Cataract 10/23/2020    Arthropathic psoriasis ([address]) 09/11/2017    Chronic combined systolic and diastolic congestive heart failure ([address]) 09/11/2017    Obesity (BMI 30-39.9) 09/11/2017    Allergic rhinitis 10/26/2015    Obstructive sleep apnea, adult 11/13/2014    Dyslipidemia 04/24/2013    Hypertension 11/08/2012     Chronic HFmrEF. Etiology: Unclear. No definitive evidence of ischemia on nuclear stress test. Hypertension controlled. LBBB likely chronic. Dyscynchrony noted on echo since 2016. No heavy alcohol or drug use. Slightly tachy, BP up a little today. Admits to white coat syndrome and being in a rush today. Warm and euvolemic on exam. Down about 10 pounds with lifestyle changes. Applauded her on this. Weight: 202 lbs 5/27/22; 201 lbs 8/25/22; 202 lbs 11/22/22; 201 lbs 2/22/23; 198 lbs 6/26/23, today 189 lbs. Echo 1/3/22:  LVEF: 50%  LVIDd: 5.9cm  RV: normal size and systolic function  MR: mild  PASP:  24 mmHg  RVOT: mid systolic notching suggesting elevated pulmonary vascular resistance  Other: grade 2 diastology, no regional wall motion abnormalities seen, abnormal septal motion consistent with bundle branch block  IVC normal with normal respirophasic variation     Pharm Nuc Stress 9/27/21: Abnormal study after pharmacologic vasodilation without reproduction of symptoms. Inferior defect improved with prone imaging likely diaphragmatic attenuation. Partially reversible anteroapical defect likely shifting breast, cannot exclude small area of distal LAD ischemia.  Left ventricular systolic function was reduced, without distinct regional wall motion abnormalities. LVEF 47%     Echocardiogram 7/8/21  LVEF: 40-45%  LVIDd: 5cm  RV: normal size and systolic function  MR: mild  PASP: 30mmHg  RVOT: mid systolic notching suggesting elevated pulmonary vascular resistance  Other: hypokinesis of inferior and septal walls, mild to moderately increased wall thickness, mild LAE     TTE 5/6/2016: LVEF 50%. Moderate concentric hypertrophy. Hypokinesis of mid anteroseptal, basal inferior and basal to mid inferolateral walls. Grade 1 diastology. Normal RV size and systolic function     Diet:  2 g sodium diet  2000 mL fluid restriction     Neurohormonal Blockade:  --Beta-Blocker: metoprolol succinate 50mg BID  --ACEi, ARB or ARNi: entresto 24-26mg BID  --Aldosterone Receptor Blocker: spironolactone 25mg BID  --SGLT2 Inhibitor: empagliflozin 10mg daily  --Diuretic: furosemide 20mg daily with potassium 20 meq BID     Sudden Cardiac Death Risk Reduction:  --ICD:  LVEF >35%     Electrical Resynchronization:  --Candidacy for BiV device:     Advanced Therapies (if appropriate): --We will continue to monitor, no indication at this time     Hypertension, controlled  Hyperlipidemia  8/10/21: TC 1159  HDL 50 LDL 82  Rx: atorvastatin 40mg daily  LUIZA on CPAP, not tolerating all night  Obesity  LBBB, likely chronic as noted above              HPI:   68-year-old female with past medical history of hypertension, hyperlipidemia, obstructive sleep apnea on CPAP, obesity who presents for evaluation of heart failure. Patient with chronic heart failure with preserved ejection fraction. Echocardiogram back in 2015 showed LVEF of 50%. Most recent echocardiogram in 7/8/2021 showed LVEF of 40-45% with hypokinesis of the inferior and septal walls. Patient notes shortness of breath since the pandemic. Attributed to weight gain  And inactivity. She short of breath walking uphill and walking up steps for couple of years.   She recently started exercising and walks on treadmill for 45 mins to an hour, no shortness of breath or chest pain. Occasional lightheadedness. Uses CPAP at night. No PND orthopnea. She has intermittent leg swelling and was on hydrochlorothiazide. Remote smoking history > 20 years ago. Rare alcohol intake. No drug use     11/1/21: Here for follow up. Nuclear stress test results as above. Overall doing well. Walking 6 blocks almost everyday, no issues. Exercises and walks treadmill for 40 mins without chest pain or shortness of breath. Notes some shortness of breath walking uphill. Has been limiting salt in her diet. Adherent to medications. 2/7/22: Here for follow up. Reports progressive shortness of breath on exertion over past few weeks. Weight up almost 10 lb since last visit in November. Weight up to 201 lb today. Also with abdominal fullness, leg swelling and orthopnea. Not been sleeping well night. Denies chest pain on exertion. No palpitations or lightheadedness. Has bendopnea     2/15/22: Here for follow up. Still with shortness of breath on exertion but overall improved. Weight down 5 lbs since last visit. 2/15/22 Na 139 K 3.9 creatinine 0.97     3/31/22: Here for follow up. Started spironolactone 25mg daily and decreased potassium to 20 meq daily on last visit. 3/3/21 Na 136 K4.1 creatinine 1.01. Eating overall okay. Still with shortness of breath on exertion. Not much change in weight. Having knee pains and mostly sedentary. No PND, orthopnea or lower extremity edema. 5/27/22: Here for follow up. Increased spironolactone to 25mg two times a day. Bikes 15-20 mins x 2. No symptoms. Having right leg cramping with new exercise. No palpitations or lightheadedness     8/25/22: here for follow up. Started on jardiance 10mg daily since last visit. 6/3/22 Na 136 K 3.7 and creatinine 1.36 from 1.01. Lasix dose reduced to 20mg daily PRN for weight gain. Home scale weight 202 at home.  Intermittent episodes of shortness of breath. No PND, orthopnea or worsening lower extremity edema. No palpitations. Occasional lightheadedness with getting up.       11/22/22: Here for follow up. Resumed furosemide 20mg once a day and started potassium 20 meq once a day on last visit. 10/17/22 Na 137 K 4.8 and creatinine 1.13. Exercising on the elliptical for 10 minutes. No worsening shortness of breath, short of breath walking uphill. Lightheadedness better. 2/22/23: Here for follow up. Overall doing well. Trying to lose weight. Short of breath walking up hill. Still trying to do elliptical but recently having left ankle pain. No leg swelling, PND, or orthopnea. No abdominal fullness. No chest pain or lightheadedness     6/26/23: Here for follow up. Continues to do well. No chest pain or shortness of breath. Mainly limited by foot pain and seen recently by rheumatology. Still continues to stay active walking uphill. No leg swelling or abdominal distension. Occasional PND when not using CPAP. Cannot tolerate CPAP all night. 6/5/23 Na 137 K 4.5 creatinine 0.95.    10/26/23. Presents for follow up. Missed her visit with Dr. Tom Hernandez this week due to transportation issues. She is feeling well. Thrilled that she lost 10 pounds. Says she did this by cutting out sweets and walking more. She is feeling well functionally. Exercise/activity tolerance improved. She has noticed some scapular pain with occasional chest pressure at rest. This occurs infrequently, maybe twice per month. Not sure if it's muscular or otherwise. Usually subsides over a few minutes. Past Medical History:   Diagnosis Date    Chronic ankle pain     Unspec laterality, Last assessed - 6/22/16    Chronic hepatitis C (HCC)     Complete left bundle branch block (LBBB)     COPD (chronic obstructive pulmonary disease) (HCC)     Eczema     B/L elbows, start Hydrocortisone;  Last assessed - 8/5/15    HLD (hyperlipidemia)     Hydrocodone use disorder, moderate, in controlled environment ([facility]) 3/11/2015    Violated CSMA, stopped    Hypertension     Osteoarthritis, knee     Rectal polyp     Sleep apnea     Violation of controlled substance agreement 10/23/2019    See notes of 10-       12 point ROS negative other than that stated in HPI    No Known Allergies  .     Current Outpatient Medications:     acetaminophen (TYLENOL) 650 mg CR tablet, Take 650 mg by mouth every 8 (eight) hours as needed , Disp: , Rfl:     atorvastatin (LIPITOR) 40 mg tablet, TAKE 1 TABLET (40 MG TOTAL) BY MOUTH DAILY FOR CHOLESTEROL, Disp: 90 tablet, Rfl: 3    Blood Pressure Monitoring (Blood Pressure Monitor/M Cuff) MISC, Use daily, Disp: 1 each, Rfl: 0    Diclofenac Sodium (VOLTAREN) 1 %, Apply 2 g topically 4 (four) times a day as needed (neck pain), Disp: 100 g, Rfl: 1    Entresto 24-26 MG TABS, TAKE 1 TABLET BY MOUTH TWICE A DAY, Disp: 60 tablet, Rfl: 6    furosemide (LASIX) 20 mg tablet, Take 1 tablet (20 mg total) by mouth daily, Disp: 30 tablet, Rfl: 11    Jardiance 10 MG TABS tablet, TAKE 1 TABLET (10 MG TOTAL) BY MOUTH EVERY MORNING., Disp: 30 tablet, Rfl: 11    Klor-Con M20 20 MEQ tablet, TAKE 1 TABLET BY MOUTH TWICE A DAY, Disp: 180 tablet, Rfl: 1    meloxicam (MOBIC) 15 mg tablet, Take 1 tablet (15 mg total) by mouth daily, Disp: 30 tablet, Rfl: 6    methylPREDNISolone 4 MG tablet therapy pack, Use as directed on package, Disp: 21 tablet, Rfl: 0    metoprolol succinate (TOPROL-XL) 50 mg 24 hr tablet, TAKE 1 TABLET BY MOUTH TWICE A DAY, Disp: 180 tablet, Rfl: 2    nitroglycerin (NITROSTAT) 0.4 mg SL tablet, Place 1 tablet (0.4 mg total) under the tongue every 5 (five) minutes as needed for chest pain (take every 5 minutes for chest pain with max 3 doses), Disp: 9 tablet, Rfl: 0    omeprazole (PriLOSEC) 40 MG capsule, Take 40 mg by mouth daily, Disp: , Rfl:     spironolactone (ALDACTONE) 25 mg tablet, TAKE 1 TABLET BY MOUTH TWICE A DAY, Disp: 180 tablet, Rfl: 1    Social History     Socioeconomic History    Marital status:      Spouse name: Not on file    Number of children: Not on file    Years of education: Not on file    Highest education level: Not on file   Occupational History    Not on file   Tobacco Use    Smoking status: Former     Types: Cigarettes     Quit date: 1999     Years since quittin.6    Smokeless tobacco: Never   Vaping Use    Vaping Use: Never used   Substance and Sexual Activity    Alcohol use: Yes     Comment: social    Drug use: No    Sexual activity: Yes   Other Topics Concern    Not on file   Social History Narrative    Home environment-safe         Social Determinants of Health     Financial Resource Strain: Low Risk  (2023)    Overall Financial Resource Strain (CARDIA)     Difficulty of Paying Living Expenses: Not hard at all   Food Insecurity: No Food Insecurity (2023)    Hunger Vital Sign     Worried About Running Out of Food in the Last Year: Never true     [address] in the Last Year: Never true   Transportation Needs: No Transportation Needs (2023)    PRAPARE - Transportation     Lack of Transportation (Medical): No     Lack of Transportation (Non-Medical):  No   Physical Activity: Sufficiently Active (8/10/2021)    Exercise Vital Sign     Days of Exercise per Week: 7 days     Minutes of Exercise per Session: 60 min   Recent Concern: Physical Activity - Inactive (2021)    Exercise Vital Sign     Days of Exercise per Week: 0 days     Minutes of Exercise per Session: 0 min   Stress: No Stress Concern Present (8/10/2021)    [facility]     Feeling of Stress : Not at all   Social Connections: Not on file   Intimate Partner Violence: Not At Risk (8/10/2021)    Humiliation, Afraid, Rape, and Kick questionnaire     Fear of Current or Ex-Partner: No     Emotionally Abused: No     Physically Abused: No     Sexually Abused: No   Housing Stability: [address] [facility]  (10/17/2022)    Housing Stability Vital Sign     Unable to Pay for Housing in the Last Year: No     Number of Places Lived in the Last Year: 1     Unstable Housing in the Last Year: No       Family History   Problem Relation Age of Onset    Heart disease Mother         Pacemaker    Hypertension Mother     Heart disease Father     Heart disease Sister     Hypertension Sister     No Known Problems Sister     No Known Problems Daughter     No Known Problems Daughter     No Known Problems Maternal Grandmother     No Known Problems Maternal Grandfather     No Known Problems Paternal Grandmother     No Known Problems Paternal Grandfather     Alcohol abuse Brother     Heart disease Brother     Cirrhosis Brother         Liver     Hypertension Brother     No Known Problems Brother     No Known Problems Maternal Aunt     No Known Problems Cousin        Physical Exam:    Vitals: /94 (BP Location: Left arm, Patient Position: Sitting, Cuff Size: Standard)   Pulse 105   Ht 5' 2" (1.575 m)   Wt 85.7 kg (189 lb)   SpO2 97%   BMI 34.57 kg/m²     Wt Readings from Last 3 Encounters:   09/12/23 87.5 kg (193 lb)   06/26/23 89.8 kg (198 lb)   06/05/23 90 kg (198 lb 6.4 oz)       GEN: Jeni Fonseca appears well, alert and oriented x 3, pleasant and cooperative   HEENT: pupils equal, round, and reactive to light; extraocular muscles intact  NECK: supple, no carotid bruits   HEART: regular rhythm, normal S1 and S2, no murmurs, clicks, gallops or rubs, JVP is down    LUNGS: clear to auscultation bilaterally; no wheezes, rales, or rhonchi   ABDOMEN: normal bowel sounds, soft, no tenderness, no distention  EXTREMITIES: peripheral pulses normal; no clubbing, cyanosis, or edema  NEURO: no focal findings   SKIN: normal without suspicious lesions on exposed skin    Labs & Results:    Chemistry        Component Value Date/Time     09/23/2015 0920    K 4.5 06/05/2023 1019    K 3.9 09/23/2015 0920     06/05/2023 1019     (H) 09/23/2015 0920 CO2 20 (L) 06/05/2023 1019    CO2 28 09/23/2015 0920    BUN 14 06/05/2023 1019    BUN 8 09/23/2015 0920    CREATININE 0.95 06/05/2023 1019    CREATININE 0.70 09/23/2015 0920        Component Value Date/Time    CALCIUM 9.5 06/05/2023 1019    CALCIUM 9.1 09/23/2015 0920    ALKPHOS 86 06/05/2023 1019    ALKPHOS 101 04/22/2014 0835    AST 13 06/05/2023 1019    AST 15 04/22/2014 0835    ALT 11 06/05/2023 1019    ALT 24 04/22/2014 0835    BILITOT 0.52 04/22/2014 0835        Lab Results   Component Value Date    WBC 8.11 06/05/2023    HGB 14.5 06/05/2023    HCT 44.7 06/05/2023    MCV 93 06/05/2023     (H) 06/05/2023     No results found for: "BNP"   Lab Results   Component Value Date    LDLCALC 82 08/10/2021     Lab Results   Component Value Date    [id number] 1.250 08/16/2019       EKG personally reviewed by EMEKA Deluna. No results found for this visit on 10/26/23. Counseling / Coordination of Care  Total floor / unit time spent today 20 minutes. Greater than 50% of total time was spent with the patient and / or family counseling and / or coordination of care. A description of the counseling / coordination of care: 20. Thank you for the opportunity to participate in the care of this patient.     Caesar Ball

## 2023-10-26 ENCOUNTER — OFFICE VISIT (OUTPATIENT)
Dept: CARDIOLOGY CLINIC | Facility: CLINIC | Age: 68
End: 2023-10-26
Payer: MEDICARE

## 2023-10-26 VITALS
WEIGHT: 189 LBS | BODY MASS INDEX: 34.78 KG/M2 | DIASTOLIC BLOOD PRESSURE: 94 MMHG | SYSTOLIC BLOOD PRESSURE: 130 MMHG | OXYGEN SATURATION: 97 % | HEIGHT: 62 IN | HEART RATE: 105 BPM

## 2023-10-26 DIAGNOSIS — R00.0 TACHYCARDIA: Primary | ICD-10-CM

## 2023-10-26 PROCEDURE — 99215 OFFICE O/P EST HI 40 MIN: CPT | Performed by: NURSE PRACTITIONER

## 2023-10-26 PROCEDURE — 93000 ELECTROCARDIOGRAM COMPLETE: CPT | Performed by: NURSE PRACTITIONER

## 2023-10-26 NOTE — PATIENT INSTRUCTIONS
Weight yourself daily  If you gain 3 lbs in one day or 5 lbs in one week, please call the office at [phone] and ask for a nurse or the heart failure nurse  Keep your sodium intake to <2 grams, (2000 mg) per day, and fluids <2 Liters (2000 ml) per day. This is around 6-7, 8 oz glasses of fluid per day    Monitor your symptoms. If you have any worsening SOB, back or chest pain, please call our office  For acute symptoms that will not resolve go to the ER.

## 2023-11-01 ENCOUNTER — OFFICE VISIT (OUTPATIENT)
Dept: INTERNAL MEDICINE CLINIC | Facility: CLINIC | Age: 68
End: 2023-11-01

## 2023-11-01 VITALS — DIASTOLIC BLOOD PRESSURE: 100 MMHG | HEART RATE: 100 BPM | TEMPERATURE: 98.6 F | SYSTOLIC BLOOD PRESSURE: 155 MMHG

## 2023-11-01 DIAGNOSIS — M72.2 PLANTAR FASCIITIS: ICD-10-CM

## 2023-11-01 DIAGNOSIS — I10 PRIMARY HYPERTENSION: Primary | ICD-10-CM

## 2023-11-01 DIAGNOSIS — I50.42 CHRONIC COMBINED SYSTOLIC AND DIASTOLIC CONGESTIVE HEART FAILURE (HCC): ICD-10-CM

## 2023-11-01 DIAGNOSIS — I50.22 HEART FAILURE WITH MID-RANGE EJECTION FRACTION (HCC): ICD-10-CM

## 2023-11-01 DIAGNOSIS — R73.03 PREDIABETES: ICD-10-CM

## 2023-11-01 PROBLEM — J41.0 SIMPLE CHRONIC BRONCHITIS (HCC): Status: ACTIVE | Noted: 2023-11-01

## 2023-11-01 PROCEDURE — 99214 OFFICE O/P EST MOD 30 MIN: CPT | Performed by: STUDENT IN AN ORGANIZED HEALTH CARE EDUCATION/TRAINING PROGRAM

## 2023-11-01 RX ORDER — METOPROLOL SUCCINATE 50 MG/1
50 TABLET, EXTENDED RELEASE ORAL 2 TIMES DAILY
Qty: 180 TABLET | Refills: 2 | Status: SHIPPED | OUTPATIENT
Start: 2023-11-01

## 2023-11-01 RX ORDER — MELOXICAM 15 MG/1
15 TABLET ORAL DAILY
Qty: 30 TABLET | Refills: 6 | Status: SHIPPED | OUTPATIENT
Start: 2023-11-01 | End: 2023-12-01

## 2023-11-01 RX ORDER — SACUBITRIL AND VALSARTAN 49; 51 MG/1; MG/1
1 TABLET, FILM COATED ORAL 2 TIMES DAILY
Qty: 30 TABLET | Refills: 0 | Status: SHIPPED | OUTPATIENT
Start: 2023-11-01

## 2023-11-01 NOTE — ASSESSMENT & PLAN NOTE
Wt Readings from Last 3 Encounters:   10/26/23 85.7 kg (189 lb)   09/12/23 87.5 kg (193 lb)   06/26/23 89.8 kg (198 lb)       Pt recently saw cardiologist 5 days ago, says she has committed to a healthy lifestyle. Endorses 10 pound weight loss in the last 12 months via elliptical machine that she recently purchased. Patient endorses exertional dyspnea only when she does not use her CPAP the night before. Given that her last echocardiogram was in January 2022, we will order another one.     Plan  Order echocardiogram  Continue at home Lasix, Jardiance, and Toprol

## 2023-11-01 NOTE — ASSESSMENT & PLAN NOTE
Patient has known history of plantar fasciitis, managed with meloxicam 15 mg and 1 therapy Pack of methylprednisolone. She endorses improvement since starting medications, and we advised her to use Dr. Shalom Pepe insoles to help alleviate pain experienced after prolonged walking.     Plan:  Use Dr. Shalom Pepe insoles  Continue at home Meloxicam

## 2023-11-01 NOTE — ASSESSMENT & PLAN NOTE
Patient's last hemoglobin A1c was taken in October 2022 and was found to be 5.8. We will remeasure hemoglobin A1c again, in order to determine if we need to increase her Lipitor dose and/or add metformin.     Plan:  Measure hemoglobin A1c

## 2023-11-01 NOTE — PROGRESS NOTES
Name: Kate Jo      : 1955      MRN: [id number]  Encounter Provider: Ling Goodwin MD  Encounter Date: 2023   Encounter department: [address]     1. Primary hypertension  Assessment & Plan:  Patient has known history of hypertension managed with Entresto 24-26 mg, and Lasix 20 mg. During today's visit, patient's blood pressure readings were 160/100, 155/101. We will go up on Entresto to 49-52 mg. Plan:  Double Entresto dose  Advised patient that if she feels lightheadedness, to sit down, take blood pressure reading and come to office if necessary      2. Heart failure with mid-range ejection fraction ([address])  -     Echo complete w/ contrast if indicated; Future; Expected date: 2023  -     metoprolol succinate (TOPROL-XL) 50 mg 24 hr tablet; Take 1 tablet (50 mg total) by mouth 2 (two) times a day  -     sacubitril-valsartan (Entresto) 49-51 MG TABS; Take 1 tablet by mouth 2 (two) times a day  -     Lipid Panel with Direct LDL reflex; Future  -     HEMOGLOBIN A1C W/ EAG ESTIMATION; Future  -     CBC and differential; Future  -     Basic metabolic panel; Future    3. Plantar fasciitis  Assessment & Plan:  Patient has known history of plantar fasciitis, managed with meloxicam 15 mg and 1 therapy Pack of methylprednisolone. She endorses improvement since starting medications, and we advised her to use Dr. Mono Jane insoles to help alleviate pain experienced after prolonged walking. Plan:  Use Dr. Mono Jane insoles  Continue at home Meloxicam     Orders:  -     meloxicam (MOBIC) 15 mg tablet; Take 1 tablet (15 mg total) by mouth daily    4.  Chronic combined systolic and diastolic congestive heart failure (HCC)  Assessment & Plan:  Wt Readings from Last 3 Encounters:   10/26/23 85.7 kg (189 lb)   23 87.5 kg (193 lb)   23 89.8 kg (198 lb)       Pt recently saw cardiologist 5 days ago, says she has committed to a healthy lifestyle. Endorses 10 pound weight loss in the last 12 months via elliptical machine that she recently purchased. Patient endorses exertional dyspnea only when she does not use her CPAP the night before. Given that her last echocardiogram was in January 2022, we will order another one. Plan  Order echocardiogram  Continue at home Lasix, Jardiance, and Toprol          5. Prediabetes  Assessment & Plan:  Patient's last hemoglobin A1c was taken in October 2022 and was found to be 5.8. We will remeasure hemoglobin A1c again, in order to determine if we need to increase her Lipitor dose and/or add metformin. Plan:  Measure hemoglobin A1c             Subjective      Yesenia Díaz is a 57-year-old female with past medical history significant for HTN, HFpEF with last LVEF in 2022 shown to be 50%, LUIZA on CPAP, prediabetes, HLD, and plantar fasciitis presented to the clinic today for routine follow-up. Given her CHF, patient saw cardiologist 5 days before today's encounter, and expressed to cardiology and to us that she has only been feeling exertional dyspnea on days when she did not use her CPAP the night before. Denies chest pain, SOB, lightheadedness, vision, intense abdominal pain, significant swelling of hands and feet, palpitations, nausea and vomiting. Regarding her hypertension, patient says that she does not regularly measure her blood pressure at home; during today's visit, patient's blood pressure was measured twice and both readings had SBP's over the 150. We will double her Entresto dose from 24-26 mg to 48-52 mg. We advised patient that this increased dose may lead to symptoms of lightheadedness, and to take blood pressure if she feels palpitations, lightheadedness or dizziness. Regarding her prediabetes, patient's last hemoglobin A1c reading was done in May 2022 and was found to be 5.8. We will measure hemoglobin A1c in order to determine if we need to increase her Lipitor dose and/or add metformin. Patient denied polyphagia, polydipsia, and polyuria. Regarding plantar fasciitis, patient is managed with meloxicam 15 mg and had 1 therapy pack of methylprednisolone. Endorses passive improvement following initiation of therapy, however she still feels persistent pain after disease involving prolonged walking. We advised patient that she should use Dr. Joe Perry insoles. During this encounter patient was advised that she should have at least 1 more Pap smear for cervical cancer screening, which she rejected. Patient also endorsed having flu vaccine administered within the last month at local pharmacy. Review of Systems   Constitutional: Negative. HENT: Negative. Eyes: Negative. Respiratory: Negative. Cardiovascular: Negative. Gastrointestinal: Negative. Endocrine: Negative. Genitourinary: Negative. Musculoskeletal:  Positive for myalgias. All other systems reviewed and are negative. Current Outpatient Medications on File Prior to Visit   Medication Sig    acetaminophen (TYLENOL) 650 mg CR tablet Take 650 mg by mouth every 8 (eight) hours as needed     atorvastatin (LIPITOR) 40 mg tablet TAKE 1 TABLET (40 MG TOTAL) BY MOUTH DAILY FOR CHOLESTEROL    Blood Pressure Monitoring (Blood Pressure Monitor/M Cuff) MISC Use daily    Diclofenac Sodium (VOLTAREN) 1 % Apply 2 g topically 4 (four) times a day as needed (neck pain)    Entresto 24-26 MG TABS TAKE 1 TABLET BY MOUTH TWICE A DAY    furosemide (LASIX) 20 mg tablet Take 1 tablet (20 mg total) by mouth daily    Jardiance 10 MG TABS tablet TAKE 1 TABLET (10 MG TOTAL) BY MOUTH EVERY MORNING.     Klor-Con M20 20 MEQ tablet TAKE 1 TABLET BY MOUTH TWICE A DAY    methylPREDNISolone 4 MG tablet therapy pack Use as directed on package    nitroglycerin (NITROSTAT) 0.4 mg SL tablet Place 1 tablet (0.4 mg total) under the tongue every 5 (five) minutes as needed for chest pain (take every 5 minutes for chest pain with max 3 doses) omeprazole (PriLOSEC) 40 MG capsule Take 40 mg by mouth daily    spironolactone (ALDACTONE) 25 mg tablet TAKE 1 TABLET BY MOUTH TWICE A DAY    [DISCONTINUED] meloxicam (MOBIC) 15 mg tablet Take 1 tablet (15 mg total) by mouth daily    [DISCONTINUED] metoprolol succinate (TOPROL-XL) 50 mg 24 hr tablet TAKE 1 TABLET BY MOUTH TWICE A DAY       Objective     /100 (BP Location: Left arm, Patient Position: Sitting)   Pulse 100   Temp 98.6 °F (37 °C)     Physical Exam  Vitals and nursing note reviewed. Constitutional:       General: She is not in acute distress. Appearance: Normal appearance. She is normal weight. She is not ill-appearing, toxic-appearing or diaphoretic. HENT:      Head: Normocephalic and atraumatic. Cardiovascular:      Rate and Rhythm: Normal rate and regular rhythm. Pulses: Normal pulses. Heart sounds: Normal heart sounds. No murmur heard. No friction rub. No gallop. Pulmonary:      Effort: Pulmonary effort is normal. No respiratory distress. Breath sounds: Normal breath sounds. No stridor. No wheezing, rhonchi or rales. Chest:      Chest wall: No tenderness. Abdominal:      General: Abdomen is flat. Bowel sounds are normal. There is no distension. Palpations: Abdomen is soft. There is no mass. Tenderness: There is no abdominal tenderness. There is no right CVA tenderness, left CVA tenderness, guarding or rebound. Hernia: No hernia is present. Musculoskeletal:         General: Tenderness present. No swelling, deformity or signs of injury. Normal range of motion. Cervical back: Normal range of motion and neck supple. No rigidity. Right lower leg: No edema. Left lower leg: No edema. Left foot: Normal range of motion. No deformity, bunion, Charcot foot, foot drop or prominent metatarsal heads. Feet:    Feet:      Left foot:      Skin integrity: No ulcer, blister, skin breakdown, erythema or warmth.       Comments: Tenderness due to plantar fascitis in left foot  Lymphadenopathy:      Cervical: No cervical adenopathy. Neurological:      Mental Status: She is alert.        Joselyn Torres MD

## 2023-11-01 NOTE — ASSESSMENT & PLAN NOTE
Patient has known history of hypertension managed with Entresto 24-26 mg, and Lasix 20 mg. During today's visit, patient's blood pressure readings were 160/100, 155/101. We will go up on Entresto to 49-52 mg.     Plan:  Double Entresto dose  Advised patient that if she feels lightheadedness, to sit down, take blood pressure reading and come to office if necessary

## 2023-12-06 ENCOUNTER — OFFICE VISIT (OUTPATIENT)
Dept: RHEUMATOLOGY | Facility: CLINIC | Age: 68
End: 2023-12-06
Payer: MEDICARE

## 2023-12-06 VITALS
BODY MASS INDEX: 33.68 KG/M2 | DIASTOLIC BLOOD PRESSURE: 70 MMHG | HEIGHT: 62 IN | WEIGHT: 183 LBS | SYSTOLIC BLOOD PRESSURE: 140 MMHG

## 2023-12-06 DIAGNOSIS — M79.672 HEEL PAIN, CHRONIC, LEFT: ICD-10-CM

## 2023-12-06 DIAGNOSIS — M25.561 CHRONIC PAIN OF RIGHT KNEE: ICD-10-CM

## 2023-12-06 DIAGNOSIS — G89.29 CHRONIC PAIN OF RIGHT KNEE: ICD-10-CM

## 2023-12-06 DIAGNOSIS — G89.29 HEEL PAIN, CHRONIC, LEFT: ICD-10-CM

## 2023-12-06 DIAGNOSIS — Z79.1 NSAID LONG-TERM USE: ICD-10-CM

## 2023-12-06 DIAGNOSIS — M77.9 ENTHESITIS: ICD-10-CM

## 2023-12-06 DIAGNOSIS — L40.50 PSORIATIC ARTHRITIS (HCC): Primary | ICD-10-CM

## 2023-12-06 PROCEDURE — 99214 OFFICE O/P EST MOD 30 MIN: CPT | Performed by: PHYSICIAN ASSISTANT

## 2023-12-06 RX ORDER — CELECOXIB 100 MG/1
100 CAPSULE ORAL 2 TIMES DAILY
Qty: 180 CAPSULE | Refills: 1 | Status: SHIPPED | OUTPATIENT
Start: 2023-12-06 | End: 2024-03-05

## 2023-12-06 NOTE — PROGRESS NOTES
Assessment and Plan:   Ms. Mikael Holstein is a 64-year-old female who presents for rheumatology follow-up of psoriatic arthritis. The patient has been experiencing pain from calcaneal enthesitis on the left side as well as occasional pain in the right knee which sounds more non-inflammatory. She does not have any active psoriasis plaques at this time. She has experienced GI upset with ibuprofen and no improvement with meloxicam.  We discussed that she should avoid the use of both of those medications and trial Celebrex 100 mg twice daily. I recommend that she remain hydrated and we will monitor renal function closely. We discussed that if no improvement from Celebrex, can consider DMARDs. Most recent DEXA scan from 6/20/2023 was overall stable with LS T -1.1, left total hip T-0.3, left FN T -0.0, left forearm T-0.3. This falls slightly into the osteopenia range. She may continue dietary sources of calcium and vitamin D for now. Follow-up in 5 months with updated labs including inflammation markers. Plan:  Diagnoses and all orders for this visit:    Psoriatic arthritis ([address])  -     celecoxib (CeleBREX) 100 mg capsule; Take 1 capsule (100 mg total) by mouth 2 (two) times a day  -     C-reactive protein; Future  -     Sedimentation rate, automated; Future  -     Comprehensive metabolic panel; Future    Enthesitis    Heel pain, chronic, left    Chronic pain of right knee    NSAID long-term use        I have personally reviewed prior notes, recent laboratory results, and pertinent films in PACS. Activities as tolerated. Exercise: try to maintain a low impact exercise regimen as much as possible. Walk for 30 minutes a day for at least 3 days a week. Continue other medications as prescribed by PCP and other specialists. RTC in 5 months    Rheumatic Disease Summary:  Psoriatic arthritis  -Initial visit 6/5/23: Has historic diagnosis of arthropathic psoriasis dating back to 2018.  She has no active synovitis or psoriasis on exam today. Hx left plantar fasciitis, for which she has done PT, and there is concern for enthesitis of left Achilles tendon. A few labs have been ordered by the primary team including HLA-B27 and inflammation markers. I have added some additional blood work for the patient to obtain as well as left foot and ankle x-rays to evaluate for evidence of enthesitis or changes that would represent h/o inflammatory arthritis. Cont ibuprofen for now  -Labs 6/2023: CRP 6, ESR 21. Negative LACEY, RF, CCP, Sjogren's antibodies, HLA-B27, TB, Smith, RNP  -6/2023 L foot/ankle XR: Moderate size calcaneal spur and enthesopathy.   -Started Meloxicam 15mg QD as of 6/2023  -Visit 12/6/23: DC meloxicam/ibuprofen. Trial Celebrex 100 mg twice daily for calcaneal enthesopathy. No psoriasis. Psoriasis  Osteopenia   -DEXA 6/2023: LS T -1.1, left total hip T-0.3, left FN T -0.0, left forearm T-0.3. Continue dietary sources of calcium and vitamin D.  -Next DEXA due 6/2025  4. Hepatitis screening  -6/2023: Hep B core reactive, hep B DNA not detected  -6/2023: Hep C reactive (apparently treated in the past), HCV PCR not detected  5. Other comorbidities: combined CHF, CKD stage III, LUIZA, chronic bronchitis, HTN, Hx hep C       HPI  Ms. Felipa Arauz is a 55-year-old female who presents for rheumatology follow-up of psoriatic arthritis. The patient complains of pain on her left heel area. This can happen at rest but is exacerbated by walking. She tried meloxicam which was not helpful. She experiences stomach upset with ibuprofen. AM stiffness approximately 15 minutes. No specific swelling. Occasional pain in the right knee. She puts a salve on her elbows nightly and has not experienced recent psoriasis.     The following portions of the patient's history were reviewed and updated as appropriate: allergies, current medications, past family history, past medical history, past social history, past surgical history and problem list.      Review of Systems  Constitutional: Negative for weight change, fevers, chills, night sweats, fatigue. ENT/Mouth: Negative for hearing changes, ear pain, nasal congestion, sinus pain, hoarseness, sore throat, rhinorrhea, swallowing difficulty. Eyes: Negative for pain, redness, discharge, vision changes. Cardiovascular: Negative for chest pain, SOB, palpitations. Respiratory: Negative for cough, sputum, wheezing, dyspnea. Gastrointestinal: Negative for nausea, vomiting, diarrhea, constipation, pain, heartburn. Genitourinary: Negative for dysuria, urinary frequency, hematuria. Musculoskeletal: As per HPI. Skin: Negative for skin rash, color changes. Neuro: Negative for weakness, numbness, tingling, loss of consciousness. Psych: Negative for anxiety, depression. Heme/Lymph: Negative for easy bruising, bleeding, lymphadenopathy. Past Medical History:   Diagnosis Date    Chronic ankle pain     Unspec laterality, Last assessed - 6/22/16    Chronic hepatitis C (HCC)     Complete left bundle branch block (LBBB)     COPD (chronic obstructive pulmonary disease) (HCC)     Eczema     B/L elbows, start Hydrocortisone; Last assessed - 8/5/15    HLD (hyperlipidemia)     Hydrocodone use disorder, moderate, in controlled environment ([address]) 3/11/2015    Violated CSMA, stopped    Hypertension     Osteoarthritis, knee     Rectal polyp     Sleep apnea     Violation of controlled substance agreement 10/23/2019    See notes of 10-       Past Surgical History:   Procedure Laterality Date    COLONOSCOPY      EYE SURGERY         Social History     Socioeconomic History    Marital status:       Spouse name: Not on file    Number of children: Not on file    Years of education: Not on file    Highest education level: Not on file   Occupational History    Not on file   Tobacco Use    Smoking status: Former     Types: Cigarettes     Quit date: 2/25/1999     Years since quittin.7    Smokeless tobacco: Never   Vaping Use    Vaping Use: Never used   Substance and Sexual Activity    Alcohol use: Yes     Comment: social    Drug use: No    Sexual activity: Yes   Other Topics Concern    Not on file   Social History Narrative    Home environment-safe         Social Determinants of Health     Financial Resource Strain: Low Risk  (2023)    Overall Financial Resource Strain (CARDIA)     Difficulty of Paying Living Expenses: Not hard at all   Food Insecurity: No Food Insecurity (2023)    Hunger Vital Sign     Worried About Running Out of Food in the Last Year: Never true     Ran Out of Food in the Last Year: Never true   Transportation Needs: No Transportation Needs (2023)    PRAPARE - Transportation     Lack of Transportation (Medical): No     Lack of Transportation (Non-Medical):  No   Physical Activity: Sufficiently Active (8/10/2021)    Exercise Vital Sign     Days of Exercise per Week: 7 days     Minutes of Exercise per Session: 60 min   Recent Concern: Physical Activity - Inactive (2021)    Exercise Vital Sign     Days of Exercise per Week: 0 days     Minutes of Exercise per Session: 0 min   Stress: No Stress Concern Present (8/10/2021)    [address] [place] [address]     Feeling of Stress : Not at all   Social Connections: Not on file   Intimate Partner Violence: Not At Risk (8/10/2021)    Humiliation, Afraid, Rape, and Kick questionnaire     Fear of Current or Ex-Partner: No     Emotionally Abused: No     Physically Abused: No     Sexually Abused: No   Housing Stability: Low Risk  (10/17/2022)    Housing Stability Vital Sign     Unable to Pay for Housing in the Last Year: No     Number of Places Lived in the Last Year: 1     Unstable Housing in the Last Year: No       Family History   Problem Relation Age of Onset    Heart disease Mother         Pacemaker    Hypertension Mother     Heart disease Father     Heart disease Sister     Hypertension Sister     No Known Problems Sister     No Known Problems Daughter     No Known Problems Daughter     No Known Problems Maternal Grandmother     No Known Problems Maternal Grandfather     No Known Problems Paternal Grandmother     No Known Problems Paternal Grandfather     Alcohol abuse Brother     Heart disease Brother     Cirrhosis Brother         Liver     Hypertension Brother     No Known Problems Brother     No Known Problems Maternal Aunt     No Known Problems Cousin        No Known Allergies      Current Outpatient Medications:     acetaminophen (TYLENOL) 650 mg CR tablet, Take 650 mg by mouth every 8 (eight) hours as needed , Disp: , Rfl:     atorvastatin (LIPITOR) 40 mg tablet, TAKE 1 TABLET (40 MG TOTAL) BY MOUTH DAILY FOR CHOLESTEROL, Disp: 90 tablet, Rfl: 3    Blood Pressure Monitoring (Blood Pressure Monitor/M Cuff) MISC, Use daily, Disp: 1 each, Rfl: 0    celecoxib (CeleBREX) 100 mg capsule, Take 1 capsule (100 mg total) by mouth 2 (two) times a day, Disp: 180 capsule, Rfl: 1    furosemide (LASIX) 20 mg tablet, Take 1 tablet (20 mg total) by mouth daily, Disp: 30 tablet, Rfl: 11    Jardiance 10 MG TABS tablet, TAKE 1 TABLET (10 MG TOTAL) BY MOUTH EVERY MORNING., Disp: 30 tablet, Rfl: 11    Klor-Con M20 20 MEQ tablet, TAKE 1 TABLET BY MOUTH TWICE A DAY, Disp: 180 tablet, Rfl: 1    metoprolol succinate (TOPROL-XL) 50 mg 24 hr tablet, Take 1 tablet (50 mg total) by mouth 2 (two) times a day, Disp: 180 tablet, Rfl: 2    nitroglycerin (NITROSTAT) 0.4 mg SL tablet, Place 1 tablet (0.4 mg total) under the tongue every 5 (five) minutes as needed for chest pain (take every 5 minutes for chest pain with max 3 doses), Disp: 9 tablet, Rfl: 0    omeprazole (PriLOSEC) 40 MG capsule, Take 40 mg by mouth daily, Disp: , Rfl:     spironolactone (ALDACTONE) 25 mg tablet, TAKE 1 TABLET BY MOUTH TWICE A DAY, Disp: 180 tablet, Rfl: 1    Diclofenac Sodium (VOLTAREN) 1 %, Apply 2 g topically 4 (four) times a day as needed (neck pain) (Patient not taking: Reported on 12/6/2023), Disp: 100 g, Rfl: 1      Objective:    Vitals:    12/06/23 0853   BP: 140/70   Weight: 83 kg (183 lb)   Height: 5' 2" (1.575 m)       Physical Exam  General: Well appearing, well nourished, in no acute distress. Oriented x 3, normal mood and affect. Ambulating without difficulty. Skin: Mild dryness of the bilateral elbows, but no psoriatic plaques  Hair: Normal texture and distribution. Nails: Normal color, no deformities. HEENT:  Head: Normocephalic, atraumatic. Eyes: Conjunctiva clear, sclera non-icteric, EOM intact. Nose: No external lesions, mucosa non-inflamed. Mouth: Mucous membranes moist, no mucosal lesions. Neck: Supple, thyroid non-enlarged and non-tender. No lymphadenopathy. Heart: Regular rate and rhythm, no murmur or gallop. Lungs: Clear to auscultation, no crackles or wheezing. Abdomen: Soft, non-tender, non-distended, bowel sounds normal.   Extremities: No amputations or deformities, cyanosis, edema. Musculoskeletal:   + Tenderness to palpation of left Achilles insertion  No tenderness to palpation or swelling of joints bilaterally to include: shoulder, elbow, wrist, MCP I-V, PIP I-V, knee  Neurologic: Alert and oriented. No focal neurological deficits appreciated. Psychiatric: Normal mood and affect.        Rishi Jordan PA-C  Rheumatology

## 2023-12-09 DIAGNOSIS — I50.32 CHRONIC DIASTOLIC CONGESTIVE HEART FAILURE (HCC): ICD-10-CM

## 2023-12-11 RX ORDER — ATORVASTATIN CALCIUM 40 MG/1
40 TABLET, FILM COATED ORAL DAILY
Qty: 90 TABLET | Refills: 3 | Status: SHIPPED | OUTPATIENT
Start: 2023-12-11

## 2023-12-21 DIAGNOSIS — I50.22 HEART FAILURE WITH MID-RANGE EJECTION FRACTION (HCC): ICD-10-CM

## 2023-12-21 RX ORDER — POTASSIUM CHLORIDE 1500 MG/1
TABLET, EXTENDED RELEASE ORAL
Qty: 180 TABLET | Refills: 1 | Status: SHIPPED | OUTPATIENT
Start: 2023-12-21

## 2023-12-21 NOTE — TELEPHONE ENCOUNTER
Group Therapy Note    Date: 3/25/2022    Group Start Time: 10:00 AM  Group End Time: 10:45 AM  Group Topic: Psychoeducation    [facility]    JENNIFFER De La O        Group Therapy Note  Provider introduced the group members to a guided meditation to help them be able to make a connection to their mind and heart. They described the focus of psychotherapy as they need to learn how to apply the tools they are learning in IOP to use in their lives outside of the group. Clinician did a guided meditation to a calm state and had them draw what they visualized as their calm state in the guided meditation. Each group member vic their calm, space and discussed it with the group. Attendees: 10         Patient's Goal:      Notes:   Patient was cooperative and fully engaged in the activity and discussion. He was able to draw his visualized calm plan and discuss it with the group. Status After Intervention:  Improved    Participation Level:  Active Listener and Interactive    Participation Quality: Appropriate, Attentive and Sharing      Speech:  normal      Thought Process/Content: Logical      Affective Functioning: Congruent      Mood: depressed      Level of consciousness:  Oriented x4      Response to Learning: Able to verbalize/acknowledge new learning      Endings: None Reported    Modes of Intervention: Education, Exploration and Activity      Discipline Responsible: /Counselor      Signature:  JENNIFFER De La O Requested medication(s) are due for refill today: Yes  Patient has already received a courtesy refill: No  Other reason request has been forwarded to provider:

## 2024-03-12 DIAGNOSIS — I50.22 HEART FAILURE WITH MID-RANGE EJECTION FRACTION (HCC): ICD-10-CM

## 2024-03-12 DIAGNOSIS — I50.32 CHRONIC DIASTOLIC CONGESTIVE HEART FAILURE (HCC): ICD-10-CM

## 2024-03-12 RX ORDER — ATORVASTATIN CALCIUM 40 MG/1
40 TABLET, FILM COATED ORAL DAILY
Qty: 90 TABLET | Refills: 3 | Status: SHIPPED | OUTPATIENT
Start: 2024-03-12

## 2024-03-14 RX ORDER — SPIRONOLACTONE 25 MG/1
TABLET ORAL
Qty: 180 TABLET | Refills: 1 | Status: SHIPPED | OUTPATIENT
Start: 2024-03-14

## 2024-04-05 ENCOUNTER — RA CDI HCC (OUTPATIENT)
Dept: OTHER | Facility: HOSPITAL | Age: 69
End: 2024-04-05

## 2024-04-19 ENCOUNTER — OFFICE VISIT (OUTPATIENT)
Dept: INTERNAL MEDICINE CLINIC | Facility: CLINIC | Age: 69
End: 2024-04-19

## 2024-04-19 ENCOUNTER — TELEPHONE (OUTPATIENT)
Age: 69
End: 2024-04-19

## 2024-04-19 ENCOUNTER — APPOINTMENT (OUTPATIENT)
Dept: LAB | Facility: CLINIC | Age: 69
End: 2024-04-19
Payer: MEDICARE

## 2024-04-19 VITALS
WEIGHT: 195 LBS | DIASTOLIC BLOOD PRESSURE: 82 MMHG | TEMPERATURE: 97.1 F | HEART RATE: 72 BPM | SYSTOLIC BLOOD PRESSURE: 159 MMHG | BODY MASS INDEX: 35.67 KG/M2

## 2024-04-19 DIAGNOSIS — L40.50 PSORIATIC ARTHRITIS (HCC): ICD-10-CM

## 2024-04-19 DIAGNOSIS — J44.9 CHRONIC OBSTRUCTIVE PULMONARY DISEASE, UNSPECIFIED COPD TYPE (HCC): ICD-10-CM

## 2024-04-19 DIAGNOSIS — I50.22 HEART FAILURE WITH MID-RANGE EJECTION FRACTION (HCC): ICD-10-CM

## 2024-04-19 DIAGNOSIS — N18.30 STAGE 3 CHRONIC KIDNEY DISEASE, UNSPECIFIED WHETHER STAGE 3A OR 3B CKD (HCC): ICD-10-CM

## 2024-04-19 DIAGNOSIS — I50.42 CHRONIC COMBINED SYSTOLIC AND DIASTOLIC CONGESTIVE HEART FAILURE (HCC): ICD-10-CM

## 2024-04-19 DIAGNOSIS — Z00.00 ENCOUNTER FOR ANNUAL WELLNESS EXAM IN MEDICARE PATIENT: ICD-10-CM

## 2024-04-19 DIAGNOSIS — I10 PRIMARY HYPERTENSION: Primary | ICD-10-CM

## 2024-04-19 DIAGNOSIS — Z00.00 VISIT FOR ANNUAL HEALTH EXAMINATION: ICD-10-CM

## 2024-04-19 LAB
ALBUMIN SERPL BCP-MCNC: 4.6 G/DL (ref 3.5–5)
ALP SERPL-CCNC: 65 U/L (ref 34–104)
ALT SERPL W P-5'-P-CCNC: 14 U/L (ref 7–52)
ANION GAP SERPL CALCULATED.3IONS-SCNC: 9 MMOL/L (ref 4–13)
AST SERPL W P-5'-P-CCNC: 16 U/L (ref 13–39)
BASOPHILS # BLD AUTO: 0.07 THOUSANDS/ÂΜL (ref 0–0.1)
BASOPHILS NFR BLD AUTO: 1 % (ref 0–1)
BILIRUB SERPL-MCNC: 0.48 MG/DL (ref 0.2–1)
BUN SERPL-MCNC: 10 MG/DL (ref 5–25)
CALCIUM SERPL-MCNC: 9.5 MG/DL (ref 8.4–10.2)
CHLORIDE SERPL-SCNC: 108 MMOL/L (ref 96–108)
CHOLEST SERPL-MCNC: 191 MG/DL
CO2 SERPL-SCNC: 23 MMOL/L (ref 21–32)
CREAT SERPL-MCNC: 0.82 MG/DL (ref 0.6–1.3)
CRP SERPL QL: 3.5 MG/L
EOSINOPHIL # BLD AUTO: 0.25 THOUSAND/ÂΜL (ref 0–0.61)
EOSINOPHIL NFR BLD AUTO: 3 % (ref 0–6)
ERYTHROCYTE [DISTWIDTH] IN BLOOD BY AUTOMATED COUNT: 14.4 % (ref 11.6–15.1)
ERYTHROCYTE [SEDIMENTATION RATE] IN BLOOD: 19 MM/HOUR (ref 0–29)
EST. AVERAGE GLUCOSE BLD GHB EST-MCNC: 114 MG/DL
GFR SERPL CREATININE-BSD FRML MDRD: 72 ML/MIN/1.73SQ M
GLUCOSE P FAST SERPL-MCNC: 103 MG/DL (ref 65–99)
HBA1C MFR BLD: 5.6 %
HCT VFR BLD AUTO: 46.4 % (ref 34.8–46.1)
HDLC SERPL-MCNC: 53 MG/DL
HGB BLD-MCNC: 14.5 G/DL (ref 11.5–15.4)
IMM GRANULOCYTES # BLD AUTO: 0.03 THOUSAND/UL (ref 0–0.2)
IMM GRANULOCYTES NFR BLD AUTO: 0 % (ref 0–2)
LDLC SERPL CALC-MCNC: 103 MG/DL (ref 0–100)
LYMPHOCYTES # BLD AUTO: 1.57 THOUSANDS/ÂΜL (ref 0.6–4.47)
LYMPHOCYTES NFR BLD AUTO: 19 % (ref 14–44)
MCH RBC QN AUTO: 30.9 PG (ref 26.8–34.3)
MCHC RBC AUTO-ENTMCNC: 31.3 G/DL (ref 31.4–37.4)
MCV RBC AUTO: 99 FL (ref 82–98)
MONOCYTES # BLD AUTO: 0.63 THOUSAND/ÂΜL (ref 0.17–1.22)
MONOCYTES NFR BLD AUTO: 8 % (ref 4–12)
NEUTROPHILS # BLD AUTO: 5.9 THOUSANDS/ÂΜL (ref 1.85–7.62)
NEUTS SEG NFR BLD AUTO: 69 % (ref 43–75)
NRBC BLD AUTO-RTO: 0 /100 WBCS
PLATELET # BLD AUTO: 322 THOUSANDS/UL (ref 149–390)
PMV BLD AUTO: 11.3 FL (ref 8.9–12.7)
POTASSIUM SERPL-SCNC: 4.3 MMOL/L (ref 3.5–5.3)
PROT SERPL-MCNC: 7.2 G/DL (ref 6.4–8.4)
RBC # BLD AUTO: 4.7 MILLION/UL (ref 3.81–5.12)
SODIUM SERPL-SCNC: 140 MMOL/L (ref 135–147)
TRIGL SERPL-MCNC: 175 MG/DL
WBC # BLD AUTO: 8.45 THOUSAND/UL (ref 4.31–10.16)

## 2024-04-19 PROCEDURE — G2211 COMPLEX E/M VISIT ADD ON: HCPCS | Performed by: INTERNAL MEDICINE

## 2024-04-19 PROCEDURE — 83036 HEMOGLOBIN GLYCOSYLATED A1C: CPT

## 2024-04-19 PROCEDURE — 99213 OFFICE O/P EST LOW 20 MIN: CPT | Performed by: INTERNAL MEDICINE

## 2024-04-19 PROCEDURE — 36415 COLL VENOUS BLD VENIPUNCTURE: CPT

## 2024-04-19 PROCEDURE — 85652 RBC SED RATE AUTOMATED: CPT

## 2024-04-19 PROCEDURE — 85025 COMPLETE CBC W/AUTO DIFF WBC: CPT

## 2024-04-19 PROCEDURE — 80053 COMPREHEN METABOLIC PANEL: CPT

## 2024-04-19 PROCEDURE — 86140 C-REACTIVE PROTEIN: CPT

## 2024-04-19 PROCEDURE — 80061 LIPID PANEL: CPT

## 2024-04-19 RX ORDER — SACUBITRIL AND VALSARTAN 24; 26 MG/1; MG/1
1 TABLET, FILM COATED ORAL 2 TIMES DAILY
Qty: 30 TABLET | Refills: 3 | Status: SHIPPED | OUTPATIENT
Start: 2024-04-19

## 2024-04-19 RX ORDER — ALBUTEROL SULFATE 90 UG/1
2 AEROSOL, METERED RESPIRATORY (INHALATION) EVERY 6 HOURS PRN
Qty: 8.5 G | Refills: 3 | Status: SHIPPED | OUTPATIENT
Start: 2024-04-19

## 2024-04-19 RX ORDER — SACUBITRIL AND VALSARTAN 49; 51 MG/1; MG/1
1 TABLET, FILM COATED ORAL 2 TIMES DAILY
Qty: 60 TABLET | Refills: 0 | Status: CANCELLED | OUTPATIENT
Start: 2024-04-19 | End: 2024-05-19

## 2024-04-19 NOTE — ASSESSMENT & PLAN NOTE
Wt Readings from Last 3 Encounters:   04/19/24 88.5 kg (195 lb)   12/06/23 83 kg (183 lb)   10/26/23 85.7 kg (189 lb)     Patient has history of HFmrEF, currently on GDMT with the following medications:  Entresto 24-26  Aldactone 25 mg  Jardiance 10 mg  Metoprolol 50 mg  Lasix 20 mg    Overall, patient was clinically euvolemic, however, did endorse mildly worsening exertional dyspnea.  Given prolonged smoking history and is certainly possible patient has some component of PAH.    Her last echo in 2022, peak tricuspid regurg velocity was 2.4, and RV size was normal --thus patient did not have sonographic signs of PAH.    Plan  Obtain echocardiogram, most recent LVEF was 50% in 2022  Continue GDMT

## 2024-04-19 NOTE — PROGRESS NOTES
"Name: Stacie Rockwell      : 1955      MRN: [id number]  Encounter Provider: Mendel Fierro MD  Encounter Date: 2024   Encounter department: [facility]    Assessment & Plan     1. Primary hypertension  Assessment & Plan:  Patient has known history of hypertension, which is currently managed with Aldactone 25 mg, metoprolol 50 mg, Lasix 20 mg and Entresto 24-26.    There seems to have been some discrepancy regarding patient's Entresto, as it did not appear in her medication list, despite me refilling it during our last encounter.  Per heart failure, therapy was \"completed.\"    Despite this, patient is adamant she is still taking Entresto.  During this encounter, her blood pressure was measured twice, and both readings had SBP over 150.  Thus, I will prescribe Entresto 24-26 mg again.    Plan  Continue Entresto 24-26 mg, Aldactone 25 mg, Lasix 20 mg, metoprolol 50 mg    Orders:  -     sacubitril-valsartan (Entresto) 24-26 MG TABS; Take 1 tablet by mouth 2 (two) times a day    2. Visit for annual health examination  -     Echo complete w/ contrast if indicated; Future; Expected date: 2024  -     albuterol (ProAir HFA) 90 mcg/act inhaler; Inhale 2 puffs every 6 (six) hours as needed for wheezing  -     Ambulatory Referral to Gastroenterology; Future  -     Hemoglobin A1C; Future    3. Chronic obstructive pulmonary disease, unspecified COPD type (HCC)  Assessment & Plan:  Patient is a former smoker, quitting over 10 years ago, and previously smoking 1 to 1.5 packs/day for 40 years.    Patient has never had spirometry/PFTs done, however, several providers have included COPD and her diagnoses.     There is audible upper respiratory wheezing, noted simply with patient speaking, thus, I asked if she has been diagnosed with COPD, to which she answered no.  However, she endorses an intermittent nonproductive cough, mainly with activity.  Will prescribe as needed " albuterol    Plan  As needed albuterol inhaler  Consider referring to pulmonology for PFTs and formal COPD diagnosis during next visit  Given patient's history of HFpEF, she will need an echo, evaluate for PAH given prolonged smoking history    Orders:  -     albuterol (ProAir HFA) 90 mcg/act inhaler; Inhale 2 puffs every 6 (six) hours as needed for wheezing    4. Psoriatic arthritis (HCC)    5. Chronic combined systolic and diastolic congestive heart failure (HCC)  Assessment & Plan:  Wt Readings from Last 3 Encounters:   04/19/24 88.5 kg (195 lb)   12/06/23 83 kg (183 lb)   10/26/23 85.7 kg (189 lb)     Patient has history of HFmrEF, currently on GDMT with the following medications:  Entresto 24-26  Aldactone 25 mg  Jardiance 10 mg  Metoprolol 50 mg  Lasix 20 mg    Overall, patient was clinically euvolemic, however, did endorse mildly worsening exertional dyspnea.  Given prolonged smoking history and is certainly possible patient has some component of PAH.    Her last echo in 2022, peak tricuspid regurg velocity was 2.4, and RV size was normal --thus patient did not have sonographic signs of PAH.    Plan  Obtain echocardiogram, most recent LVEF was 50% in 2022  Continue GDMT            6. Stage 3 chronic kidney disease, unspecified whether stage 3a or 3b CKD ([facility] [place])      BMI Counseling: Body mass index is 35.67 kg/m². The BMI is above normal. Nutrition recommendations include decreasing portion sizes and moderation in carbohydrate intake. Exercise recommendations include exercising 3-5 times per week. Rationale for BMI follow-up plan is due to patient being overweight or obese.     Depression Screening and Follow-up Plan: Patient was screened for depression during today's encounter. They screened negative with a PHQ-2 score of 0.      Subjective      68-year-old female with past medical history of hyperlipidemia, hypertension, HFmrEF LVEF 50% per last echo in 2022, LUIZA on CPAP, prediabetes, and former smoker with  "40+ pack year history and chronic bronchitis/COPD presents for routine follow-up to discuss echo and routine labs prescribed on last visit.    Patient has known history of hypertension, which is currently managed with Aldactone 25 mg, metoprolol 50 mg, Lasix 20 mg and Entresto 24-26.  There seems to have been some discrepancy regarding patient's Entresto, as it did not appear in her medication list, despite me refilling it during our last encounter.  Per heart failure, therapy was \"completed.\" Despite this, patient is adamant she is still taking Entresto.  During this encounter, her blood pressure was measured twice, and both readings had SBP over 150.  Thus, I will prescribe Entresto 24-26 mg again.    Patient is a former smoker, quitting over 10 years ago, and previously smoking 1 to 1.5 packs/day for 40 years. Patient has never had spirometry/PFTs done, however, several providers have included COPD and her diagnoses. There is audible upper respiratory wheezing, noted simply with patient speaking, thus, I asked if she has been diagnosed with COPD, to which she answered no.  However, she endorses an intermittent nonproductive cough, mainly with activity.  Will prescribe as needed albuterol, and evaluate for PAH on echo.    Regarding care gaps, I asked patient to please obtain colonoscopy.  Patient was not agreeable to obtaining cervical cancer screening with Pap smear.    Asked patient to please follow up in 3 months for Medicare AWV with me.    Review of Systems   Constitutional: Negative.    HENT: Negative.     Eyes: Negative.    Respiratory:  Positive for cough, shortness of breath and wheezing.    Cardiovascular: Negative.    Gastrointestinal: Negative.    Endocrine: Negative.    Genitourinary: Negative.    Musculoskeletal:  Positive for arthralgias.   Skin: Negative.    Neurological: Negative.        Current Outpatient Medications on File Prior to Visit   Medication Sig   • acetaminophen (TYLENOL) 650 mg CR " tablet Take 650 mg by mouth every 8 (eight) hours as needed    • atorvastatin (LIPITOR) 40 mg tablet Take 1 tablet (40 mg total) by mouth daily For cholesterol   • Blood Pressure Monitoring (Blood Pressure Monitor/M Cuff) MISC Use daily   • celecoxib (CeleBREX) 100 mg capsule Take 1 capsule (100 mg total) by mouth 2 (two) times a day   • Diclofenac Sodium (VOLTAREN) 1 % Apply 2 g topically 4 (four) times a day as needed (neck pain) (Patient not taking: Reported on 12/6/2023)   • furosemide (LASIX) 20 mg tablet Take 1 tablet (20 mg total) by mouth daily   • Jardiance 10 MG TABS tablet TAKE 1 TABLET (10 MG TOTAL) BY MOUTH EVERY MORNING.   • Klor-Con M20 20 MEQ tablet TAKE 1 TABLET BY MOUTH TWICE A DAY   • metoprolol succinate (TOPROL-XL) 50 mg 24 hr tablet Take 1 tablet (50 mg total) by mouth 2 (two) times a day   • nitroglycerin (NITROSTAT) 0.4 mg SL tablet Place 1 tablet (0.4 mg total) under the tongue every 5 (five) minutes as needed for chest pain (take every 5 minutes for chest pain with max 3 doses)   • omeprazole (PriLOSEC) 40 MG capsule Take 40 mg by mouth daily   • spironolactone (ALDACTONE) 25 mg tablet TAKE 1 TABLET BY MOUTH TWICE A DAY       Objective     /82 (BP Location: Left arm, Patient Position: Sitting)   Pulse 72   Temp (!) 97.1 °F (36.2 °C) (Temporal)   Wt 88.5 kg (195 lb)   BMI 35.67 kg/m²     Physical Exam  Vitals and nursing note reviewed.   Constitutional:       General: She is not in acute distress.     Appearance: Normal appearance. She is not ill-appearing, toxic-appearing or diaphoretic.   Cardiovascular:      Rate and Rhythm: Normal rate and regular rhythm.      Pulses: Normal pulses.      Heart sounds: Normal heart sounds. No murmur heard.     No friction rub. No gallop.   Pulmonary:      Effort: Pulmonary effort is normal. No respiratory distress.      Breath sounds: No stridor. No wheezing, rhonchi or rales.   Chest:      Chest wall: No tenderness.   Abdominal:      General:  Abdomen is flat. Bowel sounds are normal. There is no distension.      Palpations: Abdomen is soft. There is no mass.      Tenderness: There is no abdominal tenderness. There is no guarding or rebound.      Hernia: No hernia is present.   Musculoskeletal:      Right lower leg: No edema.      Left lower leg: No edema.   Neurological:      Mental Status: She is alert.     Mendel Fierro MD

## 2024-04-19 NOTE — ASSESSMENT & PLAN NOTE
"Patient has known history of hypertension, which is currently managed with Aldactone 25 mg, metoprolol 50 mg, Lasix 20 mg and Entresto 24-26.    There seems to have been some discrepancy regarding patient's Entresto, as it did not appear in her medication list, despite me refilling it during our last encounter.  Per heart failure, therapy was \"completed.\"    Despite this, patient is adamant she is still taking Entresto.  During this encounter, her blood pressure was measured twice, and both readings had SBP over 150.  Thus, I will prescribe Entresto 24-26 mg again.    Plan  Continue Entresto 24-26 mg, Aldactone 25 mg, Lasix 20 mg, metoprolol 50 mg  "

## 2024-04-19 NOTE — ASSESSMENT & PLAN NOTE
Patient is a former smoker, quitting over 10 years ago, and previously smoking 1 to 1.5 packs/day for 40 years.    Patient has never had spirometry/PFTs done, however, several providers have included COPD and her diagnoses.     There is audible upper respiratory wheezing, noted simply with patient speaking, thus, I asked if she has been diagnosed with COPD, to which she answered no.  However, she endorses an intermittent nonproductive cough, mainly with activity.  Will prescribe as needed albuterol    Plan  As needed albuterol inhaler  Consider referring to pulmonology for PFTs and formal COPD diagnosis during next visit  Given patient's history of HFpEF, she will need an echo, evaluate for PAH given prolonged smoking history

## 2024-04-19 NOTE — TELEPHONE ENCOUNTER
04/19/24  Screened by: Kellen Fox    Referring Provider lolita Null    Pre- Screening:     There is no height or weight on file to calculate BMI.  Has patient been referred for a routine screening Colonoscopy? yes  Is the patient between 45-75 years old? yes      Previous Colonoscopy yes   If yes:    Date: 10 yrs    Facility:     Reason:       Does the patient want to see a Gastroenterologist prior to their procedure OR are they having any GI symptoms? no    Has the patient been hospitalized or had abdominal surgery in the past 6 months? no    Does the patient use supplemental oxygen? no    Does the patient take Coumadin, Lovenox, Plavix, Elliquis, Xarelto, or other blood thinning medication? no    Has the patient had a stroke, cardiac event, or stent placed in the past year? no

## 2024-04-22 ENCOUNTER — PREP FOR PROCEDURE (OUTPATIENT)
Age: 69
End: 2024-04-22

## 2024-04-22 ENCOUNTER — TELEPHONE (OUTPATIENT)
Age: 69
End: 2024-04-22

## 2024-04-22 DIAGNOSIS — Z12.11 SPECIAL SCREENING FOR MALIGNANT NEOPLASMS, COLON: Primary | ICD-10-CM

## 2024-04-22 DIAGNOSIS — I50.22 HEART FAILURE WITH MID-RANGE EJECTION FRACTION (HCC): ICD-10-CM

## 2024-04-22 RX ORDER — EMPAGLIFLOZIN 10 MG/1
TABLET, FILM COATED ORAL
Qty: 30 TABLET | Refills: 1 | Status: SHIPPED | OUTPATIENT
Start: 2024-04-22

## 2024-04-22 NOTE — TELEPHONE ENCOUNTER
04/22/24  Screened by: Linda Medrano MA    Referring Provider: DR REHMAN    Pre- Screening:     There is no height or weight on file to calculate BMI.  Has patient been referred for a routine screening Colonoscopy? yes  Is the patient between 45-75 years old? yes      Previous Colonoscopy yes   If yes:    Date: 04/10/2014    Facility: [facility]    Reason: SCREENING        Does the patient want to see a Gastroenterologist prior to their procedure OR are they having any GI symptoms? no    Has the patient been hospitalized or had abdominal surgery in the past 6 months? no    Does the patient use supplemental oxygen? no    Does the patient take Coumadin, Lovenox, Plavix, Elliquis, Xarelto, or other blood thinning medication? no    Has the patient had a stroke, cardiac event, or stent placed in the past year? no        If patient is between 45yrs - 49yrs, please advise patient that we will have to confirm benefits & coverage with their insurance company for a routine screening colonoscopy.

## 2024-04-22 NOTE — TELEPHONE ENCOUNTER
ASC Screening    ASC Screening  BMI > than 45: No  Are you currently pregnant?: No  Do you rely on a wheelchair for mobility?: No  Do you need oxygen during the day?: No  Have you ever been informed by anesthesia that you have a difficult airway?: No  Have you been diagnosed with End Stage Renal Disease (ESRD)?: No  Are you actively on dialysis?: No  Have you been diagnosed with Pulmonary Hypertension?: No  Do you have a pacemaker or an Automatic Implantable Cardioverter Defibrillator (AICD)?: No  Have you ever had an organ transplant?: No  Have you had a stroke, heart attack, myocardial infarction (MI) within the last 6 months?: No  Have you ever been diagnosed with Aortic Stenosis?: No  Have you ever been diagnosed  with Congestive Heart Failure?: Yes  Have you ever been diagnosed with a heart valve disease?: No  Are you Diabetic?: No  If you are Diabetic, has your A1C been greater than 12 within the last six months?: N/A

## 2024-04-22 NOTE — TELEPHONE ENCOUNTER
Scheduled date of colonoscopy (as of today):07/03/2024  Physician performing colonoscopy: DR LAY  Location of colonoscopy: [facility]  Bowel prep reviewed with patient: MIRALAX/DULCOLAX  Instructions reviewed with patient by: Linda via telephone. Procedure directions sent via email to [email or web address]  Clearances: n/a

## 2024-04-25 ENCOUNTER — OFFICE VISIT (OUTPATIENT)
Dept: CARDIOLOGY CLINIC | Facility: CLINIC | Age: 69
End: 2024-04-25
Payer: MEDICARE

## 2024-04-25 VITALS
SYSTOLIC BLOOD PRESSURE: 118 MMHG | HEIGHT: 62 IN | DIASTOLIC BLOOD PRESSURE: 72 MMHG | HEART RATE: 74 BPM | WEIGHT: 192.6 LBS | OXYGEN SATURATION: 96 % | BODY MASS INDEX: 35.44 KG/M2

## 2024-04-25 DIAGNOSIS — E66.01 OBESITY, MORBID (HCC): ICD-10-CM

## 2024-04-25 DIAGNOSIS — E78.5 DYSLIPIDEMIA: ICD-10-CM

## 2024-04-25 DIAGNOSIS — G47.33 OBSTRUCTIVE SLEEP APNEA, ADULT: ICD-10-CM

## 2024-04-25 DIAGNOSIS — I50.22 HEART FAILURE WITH MID-RANGE EJECTION FRACTION (HCC): Primary | ICD-10-CM

## 2024-04-25 DIAGNOSIS — I10 ESSENTIAL HYPERTENSION: ICD-10-CM

## 2024-04-25 PROCEDURE — 99214 OFFICE O/P EST MOD 30 MIN: CPT | Performed by: INTERNAL MEDICINE

## 2024-04-25 PROCEDURE — G2211 COMPLEX E/M VISIT ADD ON: HCPCS | Performed by: INTERNAL MEDICINE

## 2024-04-25 RX ORDER — POTASSIUM CHLORIDE 20 MEQ/1
TABLET, EXTENDED RELEASE ORAL
Qty: 270 TABLET | Refills: 1 | Status: SHIPPED | OUTPATIENT
Start: 2024-04-25

## 2024-04-25 RX ORDER — FUROSEMIDE 20 MG/1
TABLET ORAL
Qty: 270 TABLET | Refills: 1 | Status: SHIPPED | OUTPATIENT
Start: 2024-04-25

## 2024-04-25 NOTE — PATIENT INSTRUCTIONS
Increase furosemide to 40mg in AM and 20mg in PM and potassium to 40mg in AM and 20mg in PM  Obtain BMP in 2 weeks  2g sodium diet  2L fluid diet  Daily weights  Physical activities/exercise as tolerated

## 2024-04-25 NOTE — PROGRESS NOTES
Advanced Heart Failure Outpatient Progress Note - Stacie Rockwell 68 y.o. female MRN: [id number]    Encounter: [id number]      Assessment/Plan:    Patient Active Problem List    Diagnosis Date Noted    Prediabetes 12/03/2021    Decreased hearing of both ears 12/30/2020    Cataract 10/23/2020    Arthropathic psoriasis (HCC) 09/11/2017    Chronic combined systolic and diastolic congestive heart failure (HCC) 09/11/2017    Obesity (BMI 30-39.9) 09/11/2017    Allergic rhinitis 10/26/2015    Obstructive sleep apnea, adult 11/13/2014    Dyslipidemia 04/24/2013    Hypertension 11/08/2012    Chronic obstructive pulmonary disease (HCC) 04/19/2024    Simple chronic bronchitis (HCC) 11/01/2023    Plantar fasciitis 11/01/2023    History of hepatitis C 01/30/2023    Stage 3 chronic kidney disease, unspecified whether stage 3a or 3b CKD (HCC) 03/31/2022     # Heart failure with mid range ejection fraction, Stage C, NYHA II  Etiology: Unclear. No definitive evidence of ischemia on nuclear stress test. Hypertension controlled. LBBB likely chronic. Dyscynchrony noted on echo since 2016. No heavy alcohol or drug use  Volume up     Weight: 202 lbs 5/27/22; 201 lbs 8/25/22; 202 lbs 11/22/22; 201 lbs 2/22/23; 198 lbs 6/26/23; 192 pounds 4/25/2024  NT proBNP:      Studies- personally reviewed by me    Echo 1/3/22:  LVEF: 50%  LVIDd: 5.9cm  RV: normal size and systolic function  MR: mild  PASP:  24 mmHg  RVOT: mid systolic notching suggesting elevated pulmonary vascular resistance  Other: grade 2 diastology, no regional wall motion abnormalities seen, abnormal septal motion consistent with bundle branch block  IVC normal with normal respirophasic variation    Pharm Nuc Stress 9/27/21: Abnormal study after pharmacologic vasodilation without reproduction of symptoms. Inferior defect improved with prone imaging likely diaphragmatic attenuation. Partially reversible anteroapical defect likely shifting breast, cannot exclude small area of  distal LAD ischemia. Left ventricular systolic function was reduced, without distinct regional wall motion abnormalities. LVEF 47%    Echocardiogram 7/8/21  LVEF: 40-45%  LVIDd: 5cm  RV: normal size and systolic function  MR: mild  PASP: 30mmHg  RVOT: mid systolic notching suggesting elevated pulmonary vascular resistance  Other: hypokinesis of inferior and septal walls, mild to moderately increased wall thickness, mild LAE    TTE 5/6/2016: LVEF 50%. Moderate concentric hypertrophy. Hypokinesis of mid anteroseptal, basal inferior and basal to mid inferolateral walls. Grade 1 diastology. Normal RV size and systolic function    Diet:  2 g sodium diet  2000 mL fluid restriction    Neurohormonal Blockade:  --Beta-Blocker: metoprolol succinate 50mg BID  --ACEi, ARB or ARNi: entresto 24-26mg BID  --Aldosterone Receptor Blocker: spironolactone 25mg BID  --SGLT2 Inhibitor: empagliflozin 10mg daily  --Diuretic: furosemide 20mg daily with potassium 20 meq BID    Sudden Cardiac Death Risk Reduction:  --ICD:  LVEF >35%    Electrical Resynchronization:  --Candidacy for BiV device:    Advanced Therapies (if appropriate):  --We will continue to monitor, no indication at this time    # Hypertension, controlled  # Hyperlipidemia  4/19/2024  HDL 53   Rx: atorvastatin 40mg daily, patient will try to do better with diet and exercise  # LUIZA on CPAP, not tolerating all night  # Obesity  # LBBB, likely chronic as noted above    Today's Plan:  Volume up on exam, weight trended up  Increase furosemide to 40mg in AM and 20mg in PM and potassium to 40mg in AM and 20mg in PM  Obtain BMP in 2 weeks  2g sodium diet  2L fluid diet  Daily weights  Physical activities/exercise as tolerated        HPI:   66-year-old female with past medical history of hypertension, hyperlipidemia, obstructive sleep apnea on CPAP, obesity who presents for evaluation of heart failure.  Patient with chronic heart failure with preserved ejection fraction.   Echocardiogram back in 2015 showed LVEF of 50%.  Most recent echocardiogram in 7/8/2021 showed LVEF of 40-45% with hypokinesis of the inferior and septal walls.  Patient notes shortness of breath since the pandemic.  Attributed to weight gain  And inactivity.  She short of breath walking uphill and walking up steps for couple of years.  She recently started exercising and walks on treadmill for 45 mins to an hour, no shortness of breath or chest pain. Occasional lightheadedness.  Uses CPAP at night.  No PND orthopnea.  She has intermittent leg swelling and was on hydrochlorothiazide. Remote smoking history > 20 years ago. Rare alcohol intake. No drug use    11/1/21: Here for follow up. Nuclear stress test results as above. Overall doing well. Walking 6 blocks almost everyday, no issues. Exercises and walks treadmill for 40 mins without chest pain or shortness of breath. Notes some shortness of breath walking uphill. Has been limiting salt in her diet. Adherent to medications.    2/7/22: Here for follow up.  Reports progressive shortness of breath on exertion over past few weeks.  Weight up almost 10 lb since last visit in November.  Weight up to 201 lb today.  Also with abdominal fullness, leg swelling and orthopnea.  Not been sleeping well night.  Denies chest pain on exertion.  No palpitations or lightheadedness.  Has bendopnea    2/15/22: Here for follow up. Still with shortness of breath on exertion but overall improved. Weight down 5 lbs since last visit. 2/15/22 Na 139 K 3.9 creatinine 0.97    3/31/22: Here for follow up. Started spironolactone 25mg daily and decreased potassium to 20 meq daily on last visit. 3/3/21 Na 136 K4.1 creatinine 1.01.  Eating overall okay.  Still with shortness of breath on exertion.  Not much change in weight.  Having knee pains and mostly sedentary.  No PND, orthopnea or lower extremity edema.    5/27/22: Here for follow up. Increased spironolactone to 25mg two times a day. Bikes  15-20 mins x 2. No symptoms. Having right leg cramping with new exercise. No palpitations or lightheadedness    8/25/22: here for follow up. Started on jardiance 10mg daily since last visit. 6/3/22 Na 136 K 3.7 and creatinine 1.36 from 1.01. Lasix dose reduced to 20mg daily PRN for weight gain. Home scale weight 202 at home. Intermittent episodes of shortness of breath. No PND, orthopnea or worsening lower extremity edema. No palpitations. Occasional lightheadedness with getting up.      11/22/22: Here for follow up. Resumed furosemide 20mg once a day and started potassium 20 meq once a day on last visit. 10/17/22 Na 137 K 4.8 and creatinine 1.13. Exercising on the elliptical for 10 minutes. No worsening shortness of breath, short of breath walking uphill. Lightheadedness better.    2/22/23: Here for follow up. Overall doing well. Trying to lose weight. Short of breath walking up hill. Still trying to do elliptical but recently having left ankle pain. No leg swelling, PND, or orthopnea. No abdominal fullness. No chest pain or lightheadedness    6/26/23: Here for follow up. Continues to do well. No chest pain or shortness of breath. Mainly limited by foot pain and seen recently by rheumatology.  Still continues to stay active walking uphill. No leg swelling or abdominal distension. Occasional PND when not using CPAP. Cannot tolerate CPAP all night. 6/5/23 Na 137 K 4.5 creatinine 0.95.    Per [facility] 10/26/23. Presents for follow up. Missed her visit with Dr. Rowe this week due to transportation issues. She is feeling well. Thrilled that she lost 10 pounds. Says she did this by cutting out sweets and walking more. She is feeling well functionally. Exercise/activity tolerance improved. She has noticed some scapular pain with occasional chest pressure at rest. This occurs infrequently, maybe twice per month. Not sure if it's muscular or otherwise. Usually subsides over a few minutes.     4/25/24: here for follow up.  Has  gained weight back since last office visit as above with Milvia.  Likely due to increased caloric intake.  Reports some shortness of breath and leg swelling.  Saw PCP in April 19 with wheezing.  Repeat echocardiogram was ordered.  She denies any chest pain, palpitations, dizziness or lightheadedness.      Past Medical History:   Diagnosis Date    Chronic ankle pain     Unspec laterality, Last assessed - 6/22/16    Chronic hepatitis C (HCC)     Complete left bundle branch block (LBBB)     COPD (chronic obstructive pulmonary disease) ([facility])     Eczema     B/L elbows, start Hydrocortisone; Last assessed - 8/5/15    HLD (hyperlipidemia)     Hydrocodone use disorder, moderate, in controlled environment ([facility]) 3/11/2015    Violated CSMA, stopped    Hypertension     Osteoarthritis, knee     Rectal polyp     Sleep apnea     Violation of controlled substance agreement 10/23/2019    See notes of 10-       Review of Systems   Constitutional:  Negative for chills, fatigue and fever.   HENT:  Negative for ear pain and sore throat.    Eyes:  Negative for pain and visual disturbance.   Respiratory:  Negative for cough and chest tightness.    Cardiovascular:  Negative for chest pain and palpitations.   Gastrointestinal:  Negative for abdominal distention, abdominal pain and vomiting.   Genitourinary:  Negative for dysuria and hematuria.   Musculoskeletal:  Negative for arthralgias and back pain.   Skin:  Negative for color change and rash.   Neurological:  Negative for dizziness, seizures and syncope.   All other systems reviewed and are negative.       No Known Allergies  .    Current Outpatient Medications:     acetaminophen (TYLENOL) 650 mg CR tablet, Take 650 mg by mouth every 8 (eight) hours as needed , Disp: , Rfl:     albuterol (ProAir HFA) 90 mcg/act inhaler, Inhale 2 puffs every 6 (six) hours as needed for wheezing, Disp: 8.5 g, Rfl: 3    atorvastatin (LIPITOR) 40 mg tablet, Take 1 tablet (40 mg total) by mouth daily  For cholesterol, Disp: 90 tablet, Rfl: 3    Blood Pressure Monitoring (Blood Pressure Monitor/M Cuff) MISC, Use daily, Disp: 1 each, Rfl: 0    celecoxib (CeleBREX) 100 mg capsule, Take 1 capsule (100 mg total) by mouth 2 (two) times a day, Disp: 180 capsule, Rfl: 1    Diclofenac Sodium (VOLTAREN) 1 %, Apply 2 g topically 4 (four) times a day as needed (neck pain), Disp: 100 g, Rfl: 1    Empagliflozin (Jardiance) 10 MG TABS tablet, TAKE 1 TABLET (10 MG TOTAL) BY MOUTH EVERY MORNING., Disp: 30 tablet, Rfl: 1    furosemide (LASIX) 20 mg tablet, Take 1 tablet (20 mg total) by mouth daily, Disp: 30 tablet, Rfl: 11    Klor-Con M20 20 MEQ tablet, TAKE 1 TABLET BY MOUTH TWICE A DAY, Disp: 180 tablet, Rfl: 1    metoprolol succinate (TOPROL-XL) 50 mg 24 hr tablet, Take 1 tablet (50 mg total) by mouth 2 (two) times a day, Disp: 180 tablet, Rfl: 2    nitroglycerin (NITROSTAT) 0.4 mg SL tablet, Place 1 tablet (0.4 mg total) under the tongue every 5 (five) minutes as needed for chest pain (take every 5 minutes for chest pain with max 3 doses), Disp: 9 tablet, Rfl: 0    omeprazole (PriLOSEC) 40 MG capsule, Take 40 mg by mouth daily, Disp: , Rfl:     sacubitril-valsartan (Entresto) 24-26 MG TABS, Take 1 tablet by mouth 2 (two) times a day, Disp: 30 tablet, Rfl: 3    spironolactone (ALDACTONE) 25 mg tablet, TAKE 1 TABLET BY MOUTH TWICE A DAY, Disp: 180 tablet, Rfl: 1    Social History     Socioeconomic History    Marital status:      Spouse name: Not on file    Number of children: Not on file    Years of education: Not on file    Highest education level: Not on file   Occupational History    Not on file   Tobacco Use    Smoking status: Former     Current packs/day: 0.00     Types: Cigarettes     Quit date: 1999     Years since quittin.1    Smokeless tobacco: Never   Vaping Use    Vaping status: Never Used   Substance and Sexual Activity    Alcohol use: Yes     Comment: social    Drug use: No    Sexual activity: Yes    Other Topics Concern    Not on file   Social History Narrative    Home environment-safe         Social Determinants of Health     Financial Resource Strain: Low Risk  (4/19/2024)    Overall Financial Resource Strain (CARDIA)     Difficulty of Paying Living Expenses: Not hard at all   Food Insecurity: No Food Insecurity (4/19/2024)    Hunger Vital Sign     Worried About Running Out of Food in the Last Year: Never true     Ran Out of Food in the Last Year: Never true   Transportation Needs: No Transportation Needs (4/19/2024)    PRAPARE - Transportation     Lack of Transportation (Medical): No     Lack of Transportation (Non-Medical): No   Physical Activity: Sufficiently Active (8/10/2021)    Exercise Vital Sign     Days of Exercise per Week: 7 days     Minutes of Exercise per Session: 60 min   Recent Concern: Physical Activity - Inactive (5/19/2021)    Exercise Vital Sign     Days of Exercise per Week: 0 days     Minutes of Exercise per Session: 0 min   Stress: No Stress Concern Present (8/10/2021)    [employer] [place] - Occupational Stress Questionnaire     Feeling of Stress : Not at all   Social Connections: Not on file   Intimate Partner Violence: Not At Risk (8/10/2021)    Humiliation, Afraid, Rape, and Kick questionnaire     Fear of Current or Ex-Partner: No     Emotionally Abused: No     Physically Abused: No     Sexually Abused: No   Housing Stability: Low Risk  (4/19/2024)    Housing Stability Vital Sign     Unable to Pay for Housing in the Last Year: No     Number of Places Lived in the Last Year: 1     Unstable Housing in the Last Year: No       Family History   Problem Relation Age of Onset    Heart disease Mother         Pacemaker    Hypertension Mother     Heart disease Father     Heart disease Sister     Hypertension Sister     No Known Problems Sister     No Known Problems Daughter     No Known Problems Daughter     No Known Problems Maternal Grandmother     No Known Problems  "Maternal Grandfather     No Known Problems Paternal Grandmother     No Known Problems Paternal Grandfather     Alcohol abuse Brother     Heart disease Brother     Cirrhosis Brother         Liver     Hypertension Brother     No Known Problems Brother     No Known Problems Maternal Aunt     No Known Problems Cousin        Physical Exam:    Vitals: Blood pressure 118/72, pulse 74, height 5' 2\" (1.575 m), weight 87.4 kg (192 lb 9.6 oz), SpO2 96%, not currently breastfeeding., Body mass index is 35.23 kg/m².,   Wt Readings from Last 3 Encounters:   04/25/24 87.4 kg (192 lb 9.6 oz)   04/19/24 88.5 kg (195 lb)   12/06/23 83 kg (183 lb)       Physical Exam  Constitutional:       General: She is not in acute distress.     Appearance: Normal appearance.   HENT:      Head: Normocephalic and atraumatic.      Mouth/Throat:      Mouth: Mucous membranes are moist.   Eyes:      Extraocular Movements: Extraocular movements intact.      Conjunctiva/sclera: Conjunctivae normal.   Cardiovascular:      Rate and Rhythm: Normal rate and regular rhythm.      Pulses: Normal pulses.      Heart sounds: No murmur heard.     No friction rub. No gallop.      Comments: Elevated JVP  Pulmonary:      Effort: Pulmonary effort is normal. No respiratory distress.      Breath sounds: No wheezing, rhonchi or rales.   Abdominal:      General: There is no distension.      Palpations: Abdomen is soft.      Tenderness: There is no abdominal tenderness. There is no guarding.   Musculoskeletal:         General: No deformity or signs of injury.      Cervical back: Neck supple.      Right lower leg: Edema present.      Left lower leg: Edema present.   Skin:     General: Skin is warm and dry.      Capillary Refill: Capillary refill takes less than 2 seconds.   Neurological:      General: No focal deficit present.      Mental Status: She is alert and oriented to person, place, and time.   Psychiatric:         Mood and Affect: Mood normal.         Labs & " "Results:    Lab Results   Component Value Date    WBC 8.45 04/19/2024    HGB 14.5 04/19/2024    HCT 46.4 (H) 04/19/2024    MCV 99 (H) 04/19/2024     04/19/2024     Lab Results   Component Value Date    SODIUM 140 04/19/2024    K 4.3 04/19/2024     04/19/2024    CO2 23 04/19/2024    BUN 10 04/19/2024    CREATININE 0.82 04/19/2024    GLUC 105 12/06/2016    CALCIUM 9.5 04/19/2024     No results found for: \"NTBNP\"   Lab Results   Component Value Date    CHOLESTEROL 191 04/19/2024    CHOLESTEROL 159 08/10/2021    CHOLESTEROL 155 08/16/2019     Lab Results   Component Value Date    HDL 53 04/19/2024    HDL 50 08/10/2021    HDL 44 08/16/2019     Lab Results   Component Value Date    TRIG 175 (H) 04/19/2024    TRIG 134 08/10/2021    TRIG 115 08/16/2019     No results found for: \"NONHDLC\"    EKG personally reviewed by Marietta Rowe.     Counseling / Coordination of Care  Time spent today 25 minutes.  Greater than 50% of total time was spent with the patient and / or family counseling and / or coordination of care.  We discussed diagnoses, most recent studies and any changes in treatment    Thank you for the opportunity to participate in the care of this patient.    MARIETTA ROWE MD  ADVANCED HEART FAILURE AND MECHANICAL CIRCULATORY SUPPORT  [facility]    "

## 2024-04-26 ENCOUNTER — OFFICE VISIT (OUTPATIENT)
Dept: SLEEP CENTER | Facility: CLINIC | Age: 69
End: 2024-04-26
Payer: MEDICARE

## 2024-04-26 VITALS
HEART RATE: 76 BPM | WEIGHT: 195 LBS | DIASTOLIC BLOOD PRESSURE: 70 MMHG | HEIGHT: 62 IN | OXYGEN SATURATION: 96 % | SYSTOLIC BLOOD PRESSURE: 122 MMHG | BODY MASS INDEX: 35.88 KG/M2

## 2024-04-26 DIAGNOSIS — G47.33 OBSTRUCTIVE SLEEP APNEA, ADULT: Primary | ICD-10-CM

## 2024-04-26 PROCEDURE — 99214 OFFICE O/P EST MOD 30 MIN: CPT | Performed by: NURSE PRACTITIONER

## 2024-04-26 PROCEDURE — G2211 COMPLEX E/M VISIT ADD ON: HCPCS | Performed by: NURSE PRACTITIONER

## 2024-04-26 NOTE — Clinical Note
Hi Dr. Rowe,  I see that you just saw Stacie.  Her CPAP compliance has improved since her last visit and residual AHI is down to 3.8 events which are all hypopneas.  I noted on her past sleep study it was mentioned that she was in a-fib during the study.  Do you want to check a holter or Zio, just to be sure? Thanks, EMEKA Patrick

## 2024-04-26 NOTE — PATIENT INSTRUCTIONS
1.  Continue use of CPAP equipment nightly  2.  Continue to clean your equipment, as discussed  3.  Contact the [facility] with any questions or concerns prior to your next visit, as needed  4.  Schedule visit for follow-up in 1 year       Nursing Support:  When: Monday through Friday 7A-5PM except holidays  Where: Our direct line is [phone].    If you are having a true emergency please call 911.  In the event that the line is busy or it is after hours please leave a voice message and we will return your call.  Please speak clearly, leaving your full name, birth date, best number to reach you and the reason for your call.   Medication refills: We will need the name of the medication, the dosage, the ordering provider, whether you get a 30 or 90 day refill, and the pharmacy name and address.  Medications will be ordered by the provider only.  Nurses cannot call in prescriptions.  Please allow 7 days for medication refills.  Physician requested updates: If your provider requested that you call with an update after starting medication, please be ready to provide us the medication and dosage, what time you take your medication, the time you attempt to fall asleep, time you fall asleep, when you wake up, and what time you get out of bed.  Sleep Study Results: We will contact you with sleep study results and/or next steps after the physician has reviewed your testing.

## 2024-04-26 NOTE — PROGRESS NOTES
Progress Note - [facility]   Stacie Rockwell 68 y.o. female   :1955, MRN: [id number]  2024      Follow Up Evaluation / Problem:     Mild Obstructive Sleep Apnea      Thank you for the opportunity of participating in the evaluation and care of this patient in the Sleep Clinic at [facility].      HPI: Stacie Rockwell is a 68 y.o. year old female, who is new to me, previously treated by Dr. England.  She presents for follow up of mild obstructive sleep apnea.  She completed a diagnostic sleep study in , scored to 4%.  AHI was 5.  Oxygen casper was noted at 88% with most of the study at saturations >90%.  ECG noted atrial fibrillation.  A CPAP titration study was then completed with titration to 6cm of water pressure.  She continues to use CPAP and presents to review annual compliance and effectiveness of treatment.      Current Outpatient Medications:     acetaminophen (TYLENOL) 650 mg CR tablet, Take 650 mg by mouth every 8 (eight) hours as needed , Disp: , Rfl:     albuterol (ProAir HFA) 90 mcg/act inhaler, Inhale 2 puffs every 6 (six) hours as needed for wheezing, Disp: 8.5 g, Rfl: 3    atorvastatin (LIPITOR) 40 mg tablet, Take 1 tablet (40 mg total) by mouth daily For cholesterol, Disp: 90 tablet, Rfl: 3    Blood Pressure Monitoring (Blood Pressure Monitor/M Cuff) MISC, Use daily, Disp: 1 each, Rfl: 0    Diclofenac Sodium (VOLTAREN) 1 %, Apply 2 g topically 4 (four) times a day as needed (neck pain), Disp: 100 g, Rfl: 1    Empagliflozin (Jardiance) 10 MG TABS tablet, TAKE 1 TABLET (10 MG TOTAL) BY MOUTH EVERY MORNING., Disp: 30 tablet, Rfl: 1    furosemide (LASIX) 20 mg tablet, Take 2 tablets in AM and 1mg in PM, Disp: 270 tablet, Rfl: 1    metoprolol succinate (TOPROL-XL) 50 mg 24 hr tablet, Take 1 tablet (50 mg total) by mouth 2 (two) times a day, Disp: 180 tablet, Rfl: 2    omeprazole (PriLOSEC) 40 MG capsule, Take 40 mg by mouth  "daily, Disp: , Rfl:     potassium chloride (Klor-Con M20) 20 mEq tablet, Take 2 tablets in AM and 1 tablet in PM, Disp: 270 tablet, Rfl: 1    spironolactone (ALDACTONE) 25 mg tablet, TAKE 1 TABLET BY MOUTH TWICE A DAY, Disp: 180 tablet, Rfl: 1    celecoxib (CeleBREX) 100 mg capsule, Take 1 capsule (100 mg total) by mouth 2 (two) times a day, Disp: 180 capsule, Rfl: 1    nitroglycerin (NITROSTAT) 0.4 mg SL tablet, Place 1 tablet (0.4 mg total) under the tongue every 5 (five) minutes as needed for chest pain (take every 5 minutes for chest pain with max 3 doses) (Patient not taking: Reported on 4/26/2024), Disp: 9 tablet, Rfl: 0    sacubitril-valsartan (Entresto) 24-26 MG TABS, Take 1 tablet by mouth 2 (two) times a day (Patient not taking: Reported on 4/26/2024), Disp: 30 tablet, Rfl: 3    How likely are you to doze off or fall asleep in the following situations, in contrast to feeling just tired?  Sitting and reading: Would never doze  Watching TV: Would never doze  Sitting, inactive in a public place (e.g. a theatre or a meeting): Would never doze  As a passenger in a car for an hour without a break: Would never doze  Lying down to rest in the afternoon when circumstances permit: Slight chance of dozing  Sitting and talking to someone: Would never doze  Sitting quietly after a lunch without alcohol: Would never doze  In a car, while stopped for a few minutes in traffic: Would never doze  Total score: 1              Vitals:    04/26/24 0923   BP: 122/70   BP Location: Left arm   Patient Position: Sitting   Cuff Size: Adult   Pulse: 76   SpO2: 96%   Weight: 88.5 kg (195 lb)   Height: 5' 2\" (1.575 m)       Body mass index is 35.67 kg/m².            EPWORTH SLEEPINESS SCORE  Total score: 1      Past History Since Last Sleep Center Visit:   She denies any significant changes to her health since her last visit.  Unfortunately, she regained weight she had lost last year.  She continues to use CPAP equipment most nights.  " She states that she usually watches TV with it on and falls asleep at some point.  She admits that some nights she forgets to put it on.  She states that if she doesn't use it, she feels that she has less energy and even feels short of breath the following day.  She feels the pressure could start stronger (ramp currently on at 6cm), but otherwise, she feels comfortable at the current setting.          The review of systems and following portions of the patient's history were reviewed and updated as appropriate: allergies, current medications, past family history, past medical history, past social history, past surgical history, and problem list.        OBJECTIVE  Equipment set up date:  8/16/18, received DreamStation 2 recall replacement  PAP Pressure: CPAP 7cm - ramp turned off today  Type of mask used: full face  DME Provider: [employer]    Physical Exam:     General Appearance:   Alert, cooperative, no distress, appears stated age, obese     Lungs:    Heart:  Clear to auscultation bilaterally, respirations unlabored      Regular rate and rhythm, S1 and S2 normal, no murmur, rub or gallop              ASSESSMENT / PLAN    1. Obstructive sleep apnea, adult  CPAP Study    PAP DME Resupply/Reorder                Counseling / Coordination of Care  I have spent a total time of 30 minutes on 04/26/24 in caring for this patient including Risks and benefits of tx options, Instructions for management, Patient and family education, Importance of tx compliance, Risk factor reductions, Impressions, Counseling / Coordination of care, Documenting in the medical record and Reviewing / ordering tests, medicine, procedures  .      Today I reviewed the patient's compliance data.  she has been able to use the equipment 80% of all days recorded.  Average usage was 4 or more hours 60% of all days recorded.  The patient uses the equipment for an average of 5 hours and 1 minute per night.  The estimated AHI is 3.8 abnormal breathing  events per hour, including 0.6 OA, 0.3 CA, 2.9 hypopneas.  Hours of use and effectiveness of treatment are at goal and improved from her last visit.  Ramp has been turned off. The patient feels they benefit from the use of PAP equipment and would like to continue PAP therapy.  Response to treatment has been good.  A prescription for supplies has been provided to last for the next year.    She will continue using this equipment at the settings noted above for the next 12 months.  At that timeshe will then return for a routine follow-up evaluation. I have asked the patient to contact the Sleep Disorders Center if she encounters any difficulties prior to that time.    The following instructions have been given to the patient today:    Patient Instructions   1.  Schedule CPAP titration study   2.  Plan to get new equipment after study  3.  Continue using CPAP at current settings for now  4.  Schedule appointment to bring your CPAP equipment in to have report run and pressure adjusted      Nursing Support:  When: Monday through Friday 7A-5PM except holidays  Where: Our direct line is [phone].    If you are having a true emergency please call 911.  In the event that the line is busy or it is after hours please leave a voice message and we will return your call.  Please speak clearly, leaving your full name, birth date, best number to reach you and the reason for your call.   Medication refills: We will need the name of the medication, the dosage, the ordering provider, whether you get a 30 or 90 day refill, and the pharmacy name and address.  Medications will be ordered by the provider only.  Nurses cannot call in prescriptions.  Please allow 7 days for medication refills.  Physician requested updates: If your provider requested that you call with an update after starting medication, please be ready to provide us the medication and dosage, what time you take your medication, the time you attempt to fall asleep, time  "you fall asleep, when you wake up, and what time you get out of bed.  Sleep Study Results: We will contact you with sleep study results and/or next steps after the physician has reviewed your testing.      EMEKA Patrick  [facility]      Portions of the record may have been created with voice recognition software. Occasional wrong word or \"sound a like\" substitutions may have occurred due to the inherent limitations of voice recognition software. Read the chart carefully and recognize, using context, where substitutions have occurred.       "

## 2024-04-29 ENCOUNTER — TELEPHONE (OUTPATIENT)
Dept: CARDIOLOGY CLINIC | Facility: CLINIC | Age: 69
End: 2024-04-29

## 2024-04-29 ENCOUNTER — TELEPHONE (OUTPATIENT)
Dept: SLEEP CENTER | Facility: CLINIC | Age: 69
End: 2024-04-29

## 2024-04-29 DIAGNOSIS — I50.22 HEART FAILURE WITH MID-RANGE EJECTION FRACTION (HCC): Primary | ICD-10-CM

## 2024-04-29 NOTE — TELEPHONE ENCOUNTER
Call Started: 1:06 pm  Call Ended: 1:13 pm    Per  Med. Prior authorization for cpt code 48750 is not required. (Ref: Angela 4/29/24 at 1:12 pm).

## 2024-04-29 NOTE — TELEPHONE ENCOUNTER
Spoke with Stacie and relayed that Dr. Rowe has ordered a 7 day Zio patch and that she will receive it in the mail in the next 4-6 days.     I asked that that Stacie call us once she receives it that if she is unable to place the patch onto herself to please call us so we can bring her in to the office so we can apply if for her.     Stacie is aware and understood. 7 day Zio XT has been ordered and will be sent to pt's home address.

## 2024-05-09 ENCOUNTER — OFFICE VISIT (OUTPATIENT)
Dept: RHEUMATOLOGY | Facility: CLINIC | Age: 69
End: 2024-05-09
Payer: MEDICARE

## 2024-05-09 ENCOUNTER — HOSPITAL ENCOUNTER (OUTPATIENT)
Dept: RADIOLOGY | Age: 69
Discharge: HOME | End: 2024-05-09
Payer: MEDICARE

## 2024-05-09 VITALS
WEIGHT: 192 LBS | BODY MASS INDEX: 35.33 KG/M2 | TEMPERATURE: 98.9 F | SYSTOLIC BLOOD PRESSURE: 124 MMHG | HEIGHT: 62 IN | OXYGEN SATURATION: 96 % | HEART RATE: 63 BPM | DIASTOLIC BLOOD PRESSURE: 78 MMHG

## 2024-05-09 DIAGNOSIS — Z12.31 VISIT FOR SCREENING MAMMOGRAM: ICD-10-CM

## 2024-05-09 DIAGNOSIS — M54.2 NECK PAIN: ICD-10-CM

## 2024-05-09 DIAGNOSIS — L40.50 PSORIATIC ARTHRITIS (HCC): Primary | ICD-10-CM

## 2024-05-09 DIAGNOSIS — M79.672 HEEL PAIN, CHRONIC, LEFT: ICD-10-CM

## 2024-05-09 DIAGNOSIS — G89.29 HEEL PAIN, CHRONIC, LEFT: ICD-10-CM

## 2024-05-09 DIAGNOSIS — Z79.1 NSAID LONG-TERM USE: ICD-10-CM

## 2024-05-09 DIAGNOSIS — M77.9 ENTHESITIS: ICD-10-CM

## 2024-05-09 DIAGNOSIS — M62.838 MUSCLE SPASM: ICD-10-CM

## 2024-05-09 PROCEDURE — 77063 BREAST TOMOSYNTHESIS BI: CPT

## 2024-05-09 PROCEDURE — 99214 OFFICE O/P EST MOD 30 MIN: CPT | Performed by: PHYSICIAN ASSISTANT

## 2024-05-09 PROCEDURE — 77067 SCR MAMMO BI INCL CAD: CPT

## 2024-05-09 RX ORDER — CELECOXIB 200 MG/1
200 CAPSULE ORAL 2 TIMES DAILY
Qty: 180 CAPSULE | Refills: 1 | Status: SHIPPED | OUTPATIENT
Start: 2024-05-09 | End: 2024-08-07

## 2024-05-09 NOTE — PROGRESS NOTES
Assessment and Plan:   Ms. Rockwell is a 69-year-old female who presents for rheumatology follow-up of psoriatic arthritis.     Overall, Stacie has experienced improvement with the initiation of Celebrex 100 mg twice daily and her only manifestation continues to be left calcaneal enthesitis.  No active psoriasis or inflammatory joints.  Since she has had improvement with this with the addition of Celebrex, we will increase the dose to 200 mg twice daily, since she prefers to avoid DMARDs for now.  Provided exercises and conservative management for the right-sided neck pain which is likely due to muscle tension/spasm.  She would prefer to hold off on physical therapy for now.    Follow-up in 6 months    Plan:  Diagnoses and all orders for this visit:    Psoriatic arthritis (HCC)  -     celecoxib (CeleBREX) 200 mg capsule; Take 1 capsule (200 mg total) by mouth 2 (two) times a day    Enthesitis    Heel pain, chronic, left    NSAID long-term use    Neck pain    Muscle spasm        I have personally reviewed prior notes, recent laboratory results, and pertinent films in PACS.     Activities as tolerated.   Exercise: try to maintain a low impact exercise regimen as much as possible. Walk for 30 minutes a day for at least 3 days a week.   Continue other medications as prescribed by PCP and other specialists.       RTC in 6 months    Rheumatic Disease Summary:  Psoriatic arthritis  -Initial visit 6/5/23: Has historic diagnosis of arthropathic psoriasis dating back to 2018. She has no active synovitis or psoriasis on exam today.  Hx left plantar fasciitis, for which she has done PT, and there is concern for enthesitis of left Achilles tendon.  A few labs have been ordered by the primary team including HLA-B27 and inflammation markers.  I have added some additional blood work for the patient to obtain as well as left foot and ankle x-rays to evaluate for evidence of enthesitis or changes that would represent h/o inflammatory  arthritis. Cont ibuprofen for now  -Labs 6/2023: CRP 6, ESR 21.  Negative LACEY, RF, CCP, Sjogren's antibodies, HLA-B27, TB, Smith, RNP  -6/2023 L foot/ankle XR: Moderate size calcaneal spur and enthesopathy.   -Started Meloxicam 15mg QD as of 6/2023  -Visit 12/6/23: DC meloxicam/ibuprofen.  Trial Celebrex 100 mg twice daily for calcaneal enthesopathy.  No psoriasis.  Psoriasis  Osteopenia   -DEXA 6/2023: LS T -1.1, left total hip T-0.3, left FN T -0.0, left forearm T-0.3.  Continue dietary sources of calcium and vitamin D.  -Next DEXA due 6/2025  4.  Hepatitis screening  -6/2023: Hep B core reactive, hep B DNA not detected  -6/2023: Hep C reactive (apparently treated in the past), HCV PCR not detected  5. Other comorbidities: combined CHF, CKD stage III, LUIZA, chronic bronchitis, HTN, Hx hep C       HPI  Ms. Rockwell is a 69-year-old female who presents for rheumatology follow-up of psoriatic arthritis.     Overall doing well aside from some pain on the right side of the neck with tightness of the muscle and sometimes gets headaches.  She also complains of pain in the left heel area especially when she is doing a lot of walking.  Denies any other joint specific pain, swelling, prolonged morning stiffness.  She did notice an improvement in the heel pain since starting Celebrex, but a few times a week will notice that the pain is worse    The following portions of the patient's history were reviewed and updated as appropriate: allergies, current medications, past family history, past medical history, past social history, past surgical history and problem list.      Review of Systems  Constitutional: Negative for weight change, fevers, chills, night sweats, fatigue.  ENT/Mouth: Negative for hearing changes, ear pain, nasal congestion, sinus pain, hoarseness, sore throat, rhinorrhea, swallowing difficulty.   Eyes: Negative for pain, redness, discharge, vision changes.   Cardiovascular: Negative for chest pain, SOB,  palpitations.   Respiratory: Negative for cough, sputum, wheezing, dyspnea.   Gastrointestinal: Negative for nausea, vomiting, diarrhea, constipation, pain, heartburn.  Genitourinary: Negative for dysuria, urinary frequency, hematuria.   Musculoskeletal: As per HPI.  Skin: Negative for skin rash, color changes.   Neuro: Negative for weakness, numbness, tingling, loss of consciousness.   Psych: Negative for anxiety, depression.   Heme/Lymph: Negative for easy bruising, bleeding, lymphadenopathy.        Past Medical History:   Diagnosis Date    Chronic ankle pain     Unspec laterality, Last assessed - 16    Chronic hepatitis C (HCC)     Complete left bundle branch block (LBBB)     COPD (chronic obstructive pulmonary disease) ([facility])     Eczema     B/L elbows, start Hydrocortisone; Last assessed - 8/5/15    HLD (hyperlipidemia)     Hydrocodone use disorder, moderate, in controlled environment ([facility]) 3/11/2015    Violated CSMA, stopped    Hypertension     Osteoarthritis, knee     Rectal polyp     Sleep apnea     Violation of controlled substance agreement 10/23/2019    See notes of 10-       Past Surgical History:   Procedure Laterality Date    COLONOSCOPY      EYE SURGERY         Social History     Socioeconomic History    Marital status:      Spouse name: Not on file    Number of children: Not on file    Years of education: Not on file    Highest education level: Not on file   Occupational History    Not on file   Tobacco Use    Smoking status: Former     Current packs/day: 0.00     Types: Cigarettes     Quit date: 1999     Years since quittin.2    Smokeless tobacco: Never   Vaping Use    Vaping status: Never Used   Substance and Sexual Activity    Alcohol use: Yes     Comment: social    Drug use: No    Sexual activity: Yes   Other Topics Concern    Not on file   Social History Narrative    Home environment-safe         Social Determinants of Health     Financial Resource Strain: Low Risk   (4/19/2024)    Overall Financial Resource Strain (CARDIA)     Difficulty of Paying Living Expenses: Not hard at all   Food Insecurity: No Food Insecurity (4/19/2024)    Hunger Vital Sign     Worried About Running Out of Food in the Last Year: Never true     Ran Out of Food in the Last Year: Never true   Transportation Needs: No Transportation Needs (4/19/2024)    PRAPARE - Transportation     Lack of Transportation (Medical): No     Lack of Transportation (Non-Medical): No   Physical Activity: Sufficiently Active (8/10/2021)    Exercise Vital Sign     Days of Exercise per Week: 7 days     Minutes of Exercise per Session: 60 min   Recent Concern: Physical Activity - Inactive (5/19/2021)    Exercise Vital Sign     Days of Exercise per Week: 0 days     Minutes of Exercise per Session: 0 min   Stress: No Stress Concern Present (8/10/2021)    Citizen of the [place] of Occupational Health - Occupational Stress Questionnaire     Feeling of Stress : Not at all   Social Connections: Not on file   Intimate Partner Violence: Not At Risk (8/10/2021)    Humiliation, Afraid, Rape, and Kick questionnaire     Fear of Current or Ex-Partner: No     Emotionally Abused: No     Physically Abused: No     Sexually Abused: No   Housing Stability: Low Risk  (4/19/2024)    Housing Stability Vital Sign     Unable to Pay for Housing in the Last Year: No     Number of Places Lived in the Last Year: 1     Unstable Housing in the Last Year: No       Family History   Problem Relation Age of Onset    Heart disease Mother         Pacemaker    Hypertension Mother     Heart disease Father     Heart disease Sister     Hypertension Sister     No Known Problems Sister     No Known Problems Daughter     No Known Problems Daughter     No Known Problems Maternal Grandmother     No Known Problems Maternal Grandfather     No Known Problems Paternal Grandmother     No Known Problems Paternal Grandfather     Alcohol abuse Brother     Heart disease Brother      Cirrhosis Brother         Liver     Hypertension Brother     No Known Problems Brother     No Known Problems Maternal Aunt     No Known Problems Cousin        No Known Allergies      Current Outpatient Medications:     celecoxib (CeleBREX) 200 mg capsule, Take 1 capsule (200 mg total) by mouth 2 (two) times a day, Disp: 180 capsule, Rfl: 1    acetaminophen (TYLENOL) 650 mg CR tablet, Take 650 mg by mouth every 8 (eight) hours as needed , Disp: , Rfl:     albuterol (ProAir HFA) 90 mcg/act inhaler, Inhale 2 puffs every 6 (six) hours as needed for wheezing, Disp: 8.5 g, Rfl: 3    atorvastatin (LIPITOR) 40 mg tablet, Take 1 tablet (40 mg total) by mouth daily For cholesterol, Disp: 90 tablet, Rfl: 3    Blood Pressure Monitoring (Blood Pressure Monitor/M Cuff) MISC, Use daily, Disp: 1 each, Rfl: 0    Diclofenac Sodium (VOLTAREN) 1 %, Apply 2 g topically 4 (four) times a day as needed (neck pain), Disp: 100 g, Rfl: 1    Empagliflozin (Jardiance) 10 MG TABS tablet, TAKE 1 TABLET (10 MG TOTAL) BY MOUTH EVERY MORNING., Disp: 30 tablet, Rfl: 1    furosemide (LASIX) 20 mg tablet, Take 2 tablets in AM and 1mg in PM, Disp: 270 tablet, Rfl: 1    metoprolol succinate (TOPROL-XL) 50 mg 24 hr tablet, Take 1 tablet (50 mg total) by mouth 2 (two) times a day, Disp: 180 tablet, Rfl: 2    nitroglycerin (NITROSTAT) 0.4 mg SL tablet, Place 1 tablet (0.4 mg total) under the tongue every 5 (five) minutes as needed for chest pain (take every 5 minutes for chest pain with max 3 doses) (Patient not taking: Reported on 4/26/2024), Disp: 9 tablet, Rfl: 0    omeprazole (PriLOSEC) 40 MG capsule, Take 40 mg by mouth daily, Disp: , Rfl:     potassium chloride (Klor-Con M20) 20 mEq tablet, Take 2 tablets in AM and 1 tablet in PM, Disp: 270 tablet, Rfl: 1    sacubitril-valsartan (Entresto) 24-26 MG TABS, Take 1 tablet by mouth 2 (two) times a day (Patient not taking: Reported on 4/26/2024), Disp: 30 tablet, Rfl: 3    spironolactone (ALDACTONE) 25 mg  "tablet, TAKE 1 TABLET BY MOUTH TWICE A DAY, Disp: 180 tablet, Rfl: 1      Objective:    Vitals:    05/09/24 0922   BP: 124/78   Pulse: 63   Temp: 98.9 °F (37.2 °C)   SpO2: 96%   Weight: 87.1 kg (192 lb)   Height: 5' 2\" (1.575 m)       Physical Exam  General: Well appearing, well nourished, in no acute distress. Oriented x 3, normal mood and affect.  Ambulating without difficulty.  Skin: Good turgor, no rash, unusual bruising or prominent lesions.  Hair: Normal texture and distribution.  Nails: Normal color, no deformities.  HEENT:  Head: Normocephalic, atraumatic.  Eyes: Conjunctiva clear, sclera non-icteric, EOM intact.  Nose: No external lesions, mucosa non-inflamed.  Mouth: Mucous membranes moist, no mucosal lesions.  Neck: Supple   Musculoskeletal:   No tenderness to palpation or swelling of joints bilaterally to include: shoulder, elbow, wrist, MCP I-V, PIP I-V, knee  + Tenderness to palpation of the left calcaneus/Achilles tendon  + Muscle tension appreciated of the right suprascapular area  Neurologic: Alert and oriented. No focal neurological deficits appreciated.   Psychiatric: Normal mood and affect.       Jean Paul Hodges PA-C  Rheumatology  "

## 2024-05-10 ENCOUNTER — HOSPITAL ENCOUNTER (OUTPATIENT)
Dept: NON INVASIVE DIAGNOSTICS | Facility: HOSPITAL | Age: 69
Discharge: HOME | End: 2024-05-10
Payer: MEDICARE

## 2024-05-10 VITALS
DIASTOLIC BLOOD PRESSURE: 78 MMHG | HEART RATE: 63 BPM | WEIGHT: 192 LBS | HEIGHT: 62 IN | SYSTOLIC BLOOD PRESSURE: 124 MMHG | BODY MASS INDEX: 35.33 KG/M2

## 2024-05-10 DIAGNOSIS — Z00.00 VISIT FOR ANNUAL HEALTH EXAMINATION: ICD-10-CM

## 2024-05-10 LAB
AORTIC ROOT: 3.1 CM
APICAL FOUR CHAMBER EJECTION FRACTION: 48 %
BSA FOR ECHO PROCEDURE: 1.88 M2
E WAVE DECELERATION TIME: 320 MS
E/A RATIO: 0.65
FRACTIONAL SHORTENING: 29 (ref 28–44)
INTERVENTRICULAR SEPTUM IN DIASTOLE (PARASTERNAL SHORT AXIS VIEW): 1.2 CM
INTERVENTRICULAR SEPTUM: 1.2 CM (ref 0.6–1.1)
IVC: 13 MM
LAAS-AP2: 20.9 CM2
LAAS-AP4: 23.1 CM2
LEFT ATRIUM SIZE: 4.6 CM
LEFT ATRIUM VOLUME (MOD BIPLANE): 71 ML
LEFT ATRIUM VOLUME INDEX (MOD BIPLANE): 37.8 ML/M2
LEFT INTERNAL DIMENSION IN SYSTOLE: 3.7 CM (ref 2.1–4)
LEFT VENTRICULAR INTERNAL DIMENSION IN DIASTOLE: 5.2 CM (ref 3.5–6)
LEFT VENTRICULAR POSTERIOR WALL IN END DIASTOLE: 1.5 CM
LEFT VENTRICULAR STROKE VOLUME: 69 ML
LVSV (TEICH): 69 ML
MV E'TISSUE VEL-SEP: 6 CM/S
MV PEAK A VEL: 0.94 M/S
MV PEAK E VEL: 61 CM/S
MV STENOSIS PRESSURE HALF TIME: 93 MS
MV VALVE AREA P 1/2 METHOD: 2.37
RA PRESSURE ESTIMATED: 3 MMHG
RIGHT ATRIUM AREA SYSTOLE A4C: 13.8 CM2
RIGHT VENTRICLE ID DIMENSION: 3.7 CM
SL CV LEFT ATRIUM LENGTH A2C: 5.4 CM
SL CV LV EF: 50
SL CV PED ECHO LEFT VENTRICLE DIASTOLIC VOLUME (MOD BIPLANE) 2D: 127 ML
SL CV PED ECHO LEFT VENTRICLE SYSTOLIC VOLUME (MOD BIPLANE) 2D: 58 ML
TRICUSPID ANNULAR PLANE SYSTOLIC EXCURSION: 2.1 CM

## 2024-05-10 PROCEDURE — 93306 TTE W/DOPPLER COMPLETE: CPT | Performed by: INTERNAL MEDICINE

## 2024-05-10 PROCEDURE — 93306 TTE W/DOPPLER COMPLETE: CPT

## 2024-05-15 ENCOUNTER — CLINICAL SUPPORT (OUTPATIENT)
Dept: CARDIOLOGY CLINIC | Facility: CLINIC | Age: 69
End: 2024-05-15
Payer: MEDICARE

## 2024-05-15 DIAGNOSIS — I50.22 HEART FAILURE WITH MID-RANGE EJECTION FRACTION (HCC): ICD-10-CM

## 2024-05-15 PROCEDURE — 93244 EXT ECG>48HR<7D REV&INTERPJ: CPT | Performed by: INTERNAL MEDICINE

## 2024-05-22 ENCOUNTER — TELEPHONE (OUTPATIENT)
Dept: CARDIOLOGY CLINIC | Facility: CLINIC | Age: 69
End: 2024-05-22

## 2024-05-22 ENCOUNTER — TELEPHONE (OUTPATIENT)
Age: 69
End: 2024-05-22

## 2024-05-22 DIAGNOSIS — I50.22 HEART FAILURE WITH MID-RANGE EJECTION FRACTION (HCC): ICD-10-CM

## 2024-05-22 RX ORDER — METOPROLOL SUCCINATE 50 MG/1
75 TABLET, EXTENDED RELEASE ORAL 2 TIMES DAILY
Qty: 270 TABLET | Refills: 2 | Status: SHIPPED | OUTPATIENT
Start: 2024-05-22 | End: 2025-02-16

## 2024-05-22 NOTE — TELEPHONE ENCOUNTER
Caller: Patient (Stacie)     Doctor: Dr Rowe     Reason for call: Patient is returning phone call to Bernadette Huang. Message was left for patient this morning. Please call her back.    Call back#: [phone]

## 2024-05-22 NOTE — TELEPHONE ENCOUNTER
Called pt back and relayed message as given, pt verbalized understanding. Pt aware new script is available at pharmacy to .

## 2024-05-22 NOTE — TELEPHONE ENCOUNTER
----- Message from Lauren Rowe MD sent at 5/22/2024  9:25 AM EDT -----  Please let her know heart monitor showed she was predominantly in the normal heart rhythm. There were episodes of fast heart rates from the top chamber of the heart. Rare premature beats from the top and bottom chambers of the heart. No afib detected  . Increase metoprolol to 75mg BID. Thanks      Patient had a min HR of 54 bpm, max HR of 197 bpm, and avg HR of 72 bpm. Predominant underlying rhythm was Sinus Rhythm. Bundle Branch Block/IVCD was present. 7 Supraventricular Tachycardia runs occurred, the run with the fastest interval lasting 7 b  eats with a max rate of 197 bpm, the longest lasting 17 beats with an avg rate of 115 bpm. Isolated SVEs were rare (<1.0%), SVE Couplets were rare (<1.0%), and SVE Triplets were rare (<1.0%). Isolated VEs were rare (<1.0%), VE Couplets were rare (<1.  0%), and no VE Triplets were present. Ventricular Bigeminy was present.

## 2024-06-10 ENCOUNTER — NURSE TRIAGE (OUTPATIENT)
Age: 69
End: 2024-06-10

## 2024-06-10 NOTE — TELEPHONE ENCOUNTER
Regarding: Medication Issue, heel&leg pain  ----- Message from Dona NINO sent at 6/10/2024  8:25 AM EDT -----  Patient called as her (CeleBREX) was increased from 100 mg to 200 mg on 5/9/24. She feels like her Arthritis is getting worse. She has to walk a lot of places and even going to the grocery store she begins to have a very sharp pain in her heel and up the back of her leg. She was hoping the increase in the dosage would help but it is not getting any better. She is asking for a return call to discuss next steps. Please advise

## 2024-06-10 NOTE — TELEPHONE ENCOUNTER
"Patient call:  Pt stated provider: ARVIND Hodges    Actionable item: Routing for provider review+recommendation     What is the reason for the call/chief complaint?    Reports ongoing left heel and ankle pain for over a month but had progressively worsened over the past week with increased walking. 4/10 at rest and 8/10 when walking. States she is also having intermittent shooting pain up posterior left leg. Mild swelling but denies any other symptoms.    Believed that the increased Celebrex dose would help but symptoms have persisted.      Dispo: Routing to provider for review. Home care for now. Continue medication regime and try ice/warm compress for comfort.   [facility].  Agrees with plan.   All questions answered.     Reason for Disposition   Caused by overuse from recent vigorous activity (e.g., running, sports)    Answer Assessment - Initial Assessment Questions  1. ONSET: \"When did the pain start?\"       Ongoing but gradually worsened over the past week  2. LOCATION: \"Where is the pain located?\"       Left heel and ankle. Intermitted shooting pain through the back of the left leg.  3. PAIN: \"How bad is the pain?\"    (Scale 1-10; or mild, moderate, severe)   - MILD (1-3): doesn't interfere with normal activities.    - MODERATE (4-7): interferes with normal activities (e.g., work or school) or awakens from sleep, limping.    - SEVERE (8-10): excruciating pain, unable to do any normal activities, unable to walk.       4/10 at rest and 8/10 when walking  4. WORK OR EXERCISE: \"Has there been any recent work or exercise that involved this part of the body?\"       Increased walking  5. CAUSE: \"What do you think is causing the ankle pain?\"      Increased walking  6. OTHER SYMPTOMS: \"Do you have any other symptoms?\" (e.g., calf pain, rash, fever, swelling)      Slight swelling. Denies other symptoms    Protocols used: Ankle Pain-ADULT-[place]    "

## 2024-06-12 ENCOUNTER — TELEPHONE (OUTPATIENT)
Dept: RHEUMATOLOGY | Facility: CLINIC | Age: 69
End: 2024-06-12

## 2024-06-12 DIAGNOSIS — L40.50 PSORIATIC ARTHRITIS (HCC): Primary | ICD-10-CM

## 2024-06-12 RX ORDER — METHYLPREDNISOLONE 4 MG/1
TABLET ORAL
Qty: 21 TABLET | Refills: 0 | Status: SHIPPED | OUTPATIENT
Start: 2024-06-12

## 2024-06-12 NOTE — TELEPHONE ENCOUNTER
Can let the pt know that I sent a steroid pack to her pharmacy to help with symptoms. If things persist, we can consider adding immunosuppressive medications to help long term. Thanks

## 2024-06-12 NOTE — TELEPHONE ENCOUNTER
The patient was called and told that a prednisone pack was sent into her pharmacy for her to help with her symptoms. Also that if they continue immunosuppressants could be considered to help for the long run.

## 2024-06-19 DIAGNOSIS — I50.22 HEART FAILURE WITH MID-RANGE EJECTION FRACTION (HCC): ICD-10-CM

## 2024-06-19 RX ORDER — FUROSEMIDE 20 MG/1
TABLET ORAL
Qty: 270 TABLET | Refills: 1 | Status: SHIPPED | OUTPATIENT
Start: 2024-06-19

## 2024-06-19 RX ORDER — SPIRONOLACTONE 25 MG/1
TABLET ORAL
Qty: 180 TABLET | Refills: 1 | Status: SHIPPED | OUTPATIENT
Start: 2024-06-19

## 2024-07-03 ENCOUNTER — HOSPITAL ENCOUNTER (OUTPATIENT)
Dept: GASTROENTEROLOGY | Facility: HOSPITAL | Age: 69
Setting detail: OUTPATIENT SURGERY
Discharge: HOME | End: 2024-07-03
Attending: INTERNAL MEDICINE | Admitting: INTERNAL MEDICINE
Payer: MEDICARE

## 2024-07-03 ENCOUNTER — ANESTHESIA (OUTPATIENT)
Dept: GASTROENTEROLOGY | Facility: HOSPITAL | Age: 69
End: 2024-07-03

## 2024-07-03 ENCOUNTER — ANESTHESIA EVENT (OUTPATIENT)
Dept: GASTROENTEROLOGY | Facility: HOSPITAL | Age: 69
End: 2024-07-03

## 2024-07-03 VITALS
WEIGHT: 195 LBS | SYSTOLIC BLOOD PRESSURE: 129 MMHG | TEMPERATURE: 96.9 F | OXYGEN SATURATION: 96 % | BODY MASS INDEX: 35.67 KG/M2 | RESPIRATION RATE: 16 BRPM | DIASTOLIC BLOOD PRESSURE: 72 MMHG | HEART RATE: 85 BPM

## 2024-07-03 DIAGNOSIS — Z12.11 SPECIAL SCREENING FOR MALIGNANT NEOPLASMS, COLON: ICD-10-CM

## 2024-07-03 PROCEDURE — 88305 TISSUE EXAM BY PATHOLOGIST: CPT | Performed by: PATHOLOGY

## 2024-07-03 PROCEDURE — 45380 COLONOSCOPY AND BIOPSY: CPT | Performed by: INTERNAL MEDICINE

## 2024-07-03 RX ORDER — SODIUM CHLORIDE, SODIUM LACTATE, POTASSIUM CHLORIDE, CALCIUM CHLORIDE 600; 310; 30; 20 MG/100ML; MG/100ML; MG/100ML; MG/100ML
INJECTION, SOLUTION INTRAVENOUS CONTINUOUS PRN
Status: DISCONTINUED | OUTPATIENT
Start: 2024-07-03 | End: 2024-07-03

## 2024-07-03 RX ORDER — SODIUM CHLORIDE 9 MG/ML
INJECTION, SOLUTION INTRAVENOUS CONTINUOUS PRN
Status: DISCONTINUED | OUTPATIENT
Start: 2024-07-03 | End: 2024-07-03

## 2024-07-03 RX ORDER — PROPOFOL 10 MG/ML
INJECTION, EMULSION INTRAVENOUS CONTINUOUS PRN
Status: DISCONTINUED | OUTPATIENT
Start: 2024-07-03 | End: 2024-07-03

## 2024-07-03 RX ORDER — PROPOFOL 10 MG/ML
INJECTION, EMULSION INTRAVENOUS AS NEEDED
Status: DISCONTINUED | OUTPATIENT
Start: 2024-07-03 | End: 2024-07-03

## 2024-07-03 RX ADMIN — SODIUM CHLORIDE: 0.9 INJECTION, SOLUTION INTRAVENOUS at 09:59

## 2024-07-03 RX ADMIN — PROPOFOL 100 MCG/KG/MIN: 10 INJECTION, EMULSION INTRAVENOUS at 10:22

## 2024-07-03 RX ADMIN — PROPOFOL 40 MG: 10 INJECTION, EMULSION INTRAVENOUS at 10:17

## 2024-07-03 RX ADMIN — PROPOFOL 50 MG: 10 INJECTION, EMULSION INTRAVENOUS at 10:15

## 2024-07-03 RX ADMIN — PROPOFOL 40 MG: 10 INJECTION, EMULSION INTRAVENOUS at 10:20

## 2024-07-03 RX ADMIN — Medication 40 MG: at 10:22

## 2024-07-03 NOTE — H&P
History and Physical - [facility]  Stacie Rockwell 69 y.o. female MRN: [id number]                  HPI: Stacie Rockwell is a 69 y.o. year old female who presents for colon cancer screening.      REVIEW OF SYSTEMS: Per the HPI, and otherwise unremarkable.    Historical Information   Past Medical History:   Diagnosis Date    Chronic ankle pain     Unspec laterality, Last assessed - 16    Chronic hepatitis C (HCC)     Complete left bundle branch block (LBBB)     COPD (chronic obstructive pulmonary disease) (HCC)     Eczema     B/L elbows, start Hydrocortisone; Last assessed - 8/5/15    HLD (hyperlipidemia)     Hydrocodone use disorder, moderate, in controlled environment ([facility]) 3/11/2015    Violated CSMA, stopped    Hypertension     Osteoarthritis, knee     Rectal polyp     Sleep apnea     Violation of controlled substance agreement 10/23/2019    See notes of 10-     Past Surgical History:   Procedure Laterality Date    COLONOSCOPY      EYE SURGERY       Social History   Social History     Substance and Sexual Activity   Alcohol Use Yes    Comment: social     Social History     Substance and Sexual Activity   Drug Use No     Social History     Tobacco Use   Smoking Status Former    Current packs/day: 0.00    Types: Cigarettes    Quit date: 1999    Years since quittin.3   Smokeless Tobacco Never     Family History   Problem Relation Age of Onset    Heart disease Mother         Pacemaker    Hypertension Mother     Heart disease Father     Heart disease Sister     Hypertension Sister     No Known Problems Sister     No Known Problems Daughter     No Known Problems Daughter     No Known Problems Maternal Grandmother     No Known Problems Maternal Grandfather     No Known Problems Paternal Grandmother     No Known Problems Paternal Grandfather     Alcohol abuse Brother     Heart disease Brother     Cirrhosis Brother         Liver     Hypertension Brother     No Known Problems  Brother     No Known Problems Maternal Aunt     No Known Problems Cousin        Meds/Allergies       Current Outpatient Medications:     acetaminophen (TYLENOL) 650 mg CR tablet    albuterol (ProAir HFA) 90 mcg/act inhaler    atorvastatin (LIPITOR) 40 mg tablet    Blood Pressure Monitoring (Blood Pressure Monitor/M Cuff) MISC    celecoxib (CeleBREX) 200 mg capsule    Diclofenac Sodium (VOLTAREN) 1 %    Empagliflozin (Jardiance) 10 MG TABS tablet    furosemide (LASIX) 20 mg tablet    methylPREDNISolone 4 MG tablet therapy pack    metoprolol succinate (TOPROL-XL) 50 mg 24 hr tablet    nitroglycerin (NITROSTAT) 0.4 mg SL tablet    omeprazole (PriLOSEC) 40 MG capsule    potassium chloride (Klor-Con M20) 20 mEq tablet    sacubitril-valsartan (Entresto) 24-26 MG TABS    spironolactone (ALDACTONE) 25 mg tablet    No Known Allergies    Objective     There were no vitals taken for this visit.      PHYSICAL EXAM    Gen: NAD  Head: NCAT  CV: RRR  CHEST: Clear  ABD: soft, NT/ND  EXT: no edema      ASSESSMENT/PLAN:  This is a 69 y.o. year old female here for colonoscopy, and she is stable and optimized for her procedure.

## 2024-07-03 NOTE — ANESTHESIA POSTPROCEDURE EVALUATION
Post-Op Assessment Note    CV Status:  Stable  Pain Score: 0    Pain management: adequate       Mental Status:  Alert and awake   Hydration Status:  Euvolemic   PONV Controlled:  Controlled   Airway Patency:  Patent     Post Op Vitals Reviewed: Yes    No anethesia notable event occurred.    Staff: Anesthesiologist, CRNA               /64 (07/03/24 1040)    Temp (!) 96.9 °F (36.1 °C) (07/03/24 1040)    Pulse 83 (07/03/24 1040)   Resp 16 (07/03/24 1040)    SpO2 97 % (07/03/24 1040)

## 2024-07-03 NOTE — ANESTHESIA PREPROCEDURE EVALUATION
Procedure:  COLONOSCOPY    Relevant Problems   CARDIO   (+) Chronic combined systolic and diastolic congestive heart failure (HCC)   (+) Hypertension      /RENAL   (+) Stage 3 chronic kidney disease, unspecified whether stage 3a or 3b CKD (HCC)      MUSCULOSKELETAL   (+) Arthropathic psoriasis (HCC)      PULMONARY   (+) Chronic obstructive pulmonary disease (HCC)   (+) Obstructive sleep apnea, adult   (+) Simple chronic bronchitis (HCC)        Physical Exam    Airway    Mallampati score: II  TM Distance: >3 FB  Neck ROM: full     Dental    lower dentures and upper dentures    Cardiovascular  Rhythm: regular, Rate: normal, Cardiovascular exam normal    Pulmonary  Pulmonary exam normal Breath sounds clear to auscultation    Other Findings  post-pubertal.      Anesthesia Plan  ASA Score- 3     Anesthesia Type- IV sedation with anesthesia with ASA Monitors.         Additional Monitors:     Airway Plan: NTT.           Plan Factors-Exercise tolerance (METS): >4 METS.    Chart reviewed. EKG reviewed. Imaging results reviewed. Existing labs reviewed. Patient summary reviewed.    Patient is not a current smoker.  Patient did not smoke on day of surgery.    Obstructive sleep apnea risk education given perioperatively.        Induction- intravenous.    Postoperative Plan-     Perioperative Resuscitation Plan - Level 1 - Full Code.       Informed Consent- Anesthetic plan and risks discussed with patient.  I personally reviewed this patient with the CRNA. Discussed and agreed on the Anesthesia Plan with the CRNA..

## 2024-07-04 DIAGNOSIS — J44.9 CHRONIC OBSTRUCTIVE PULMONARY DISEASE, UNSPECIFIED COPD TYPE (HCC): ICD-10-CM

## 2024-07-04 DIAGNOSIS — Z00.00 VISIT FOR ANNUAL HEALTH EXAMINATION: ICD-10-CM

## 2024-07-05 PROCEDURE — 88305 TISSUE EXAM BY PATHOLOGIST: CPT | Performed by: PATHOLOGY

## 2024-07-05 RX ORDER — ALBUTEROL SULFATE 90 UG/1
AEROSOL, METERED RESPIRATORY (INHALATION)
Qty: 8.5 G | Refills: 3 | Status: SHIPPED | OUTPATIENT
Start: 2024-07-05

## 2024-07-10 ENCOUNTER — TELEPHONE (OUTPATIENT)
Age: 69
End: 2024-07-10

## 2024-07-10 NOTE — TELEPHONE ENCOUNTER
Called patient who stated she had held taking Jaurdiance 4 days prior to her Colonoscopy  on 7/3.     Stated she started taking Jaurdiance again on July 4.

## 2024-07-10 NOTE — TELEPHONE ENCOUNTER
Received call from Diana with [employer].  She calls to ask if Jardiance 10 mg daily was discontinued. Advised no, order still in chart.    She states pt has not been taking it since 6/20 as there were no refills.    Please reach out to pt to discuss.

## 2024-07-25 ENCOUNTER — HOSPITAL ENCOUNTER (EMERGENCY)
Facility: HOSPITAL | Age: 69
Discharge: HOME | End: 2024-07-25
Attending: EMERGENCY MEDICINE
Payer: MEDICARE

## 2024-07-25 ENCOUNTER — APPOINTMENT (OUTPATIENT)
Dept: LAB | Facility: CLINIC | Age: 69
End: 2024-07-25
Payer: MEDICARE

## 2024-07-25 ENCOUNTER — OFFICE VISIT (OUTPATIENT)
Dept: CARDIOLOGY CLINIC | Facility: CLINIC | Age: 69
End: 2024-07-25
Payer: MEDICARE

## 2024-07-25 ENCOUNTER — TELEPHONE (OUTPATIENT)
Dept: OTHER | Facility: OTHER | Age: 69
End: 2024-07-25

## 2024-07-25 ENCOUNTER — DOCUMENTATION (OUTPATIENT)
Dept: CARDIOLOGY CLINIC | Facility: CLINIC | Age: 69
End: 2024-07-25

## 2024-07-25 ENCOUNTER — APPOINTMENT (EMERGENCY)
Dept: RADIOLOGY | Facility: HOSPITAL | Age: 69
End: 2024-07-25
Payer: MEDICARE

## 2024-07-25 VITALS
DIASTOLIC BLOOD PRESSURE: 86 MMHG | HEART RATE: 81 BPM | RESPIRATION RATE: 22 BRPM | OXYGEN SATURATION: 98 % | SYSTOLIC BLOOD PRESSURE: 185 MMHG | TEMPERATURE: 97.8 F

## 2024-07-25 VITALS
BODY MASS INDEX: 36.23 KG/M2 | WEIGHT: 196.9 LBS | OXYGEN SATURATION: 98 % | DIASTOLIC BLOOD PRESSURE: 80 MMHG | HEART RATE: 69 BPM | SYSTOLIC BLOOD PRESSURE: 126 MMHG | HEIGHT: 62 IN

## 2024-07-25 DIAGNOSIS — E78.5 DYSLIPIDEMIA: ICD-10-CM

## 2024-07-25 DIAGNOSIS — I50.22 HEART FAILURE WITH MID-RANGE EJECTION FRACTION (HCC): Primary | ICD-10-CM

## 2024-07-25 DIAGNOSIS — G47.33 OBSTRUCTIVE SLEEP APNEA, ADULT: ICD-10-CM

## 2024-07-25 DIAGNOSIS — I10 ESSENTIAL HYPERTENSION: ICD-10-CM

## 2024-07-25 DIAGNOSIS — I50.22 HEART FAILURE WITH MID-RANGE EJECTION FRACTION (HCC): ICD-10-CM

## 2024-07-25 DIAGNOSIS — J44.9 COPD (CHRONIC OBSTRUCTIVE PULMONARY DISEASE) (HCC): ICD-10-CM

## 2024-07-25 DIAGNOSIS — I50.9 CHF (CONGESTIVE HEART FAILURE) (HCC): ICD-10-CM

## 2024-07-25 DIAGNOSIS — E87.5 HYPERKALEMIA: Primary | ICD-10-CM

## 2024-07-25 LAB
ALBUMIN SERPL BCG-MCNC: 4.6 G/DL (ref 3.5–5)
ALP SERPL-CCNC: 60 U/L (ref 34–104)
ALT SERPL W P-5'-P-CCNC: 16 U/L (ref 7–52)
ANION GAP SERPL CALCULATED.3IONS-SCNC: 10 MMOL/L (ref 4–13)
ANION GAP SERPL CALCULATED.3IONS-SCNC: 11 MMOL/L (ref 4–13)
AST SERPL W P-5'-P-CCNC: 31 U/L (ref 13–39)
BASOPHILS # BLD AUTO: 0.08 THOUSANDS/ÂΜL (ref 0–0.1)
BASOPHILS NFR BLD AUTO: 1 % (ref 0–1)
BILIRUB SERPL-MCNC: 0.45 MG/DL (ref 0.2–1)
BILIRUB UR QL STRIP: NEGATIVE
BNP SERPL-MCNC: 43 PG/ML (ref 0–100)
BUN SERPL-MCNC: 19 MG/DL (ref 5–25)
BUN SERPL-MCNC: 19 MG/DL (ref 5–25)
CALCIUM SERPL-MCNC: 9.5 MG/DL (ref 8.4–10.2)
CALCIUM SERPL-MCNC: 9.6 MG/DL (ref 8.4–10.2)
CHLORIDE SERPL-SCNC: 106 MMOL/L (ref 96–108)
CHLORIDE SERPL-SCNC: 107 MMOL/L (ref 96–108)
CLARITY UR: CLEAR
CO2 SERPL-SCNC: 21 MMOL/L (ref 21–32)
CO2 SERPL-SCNC: 21 MMOL/L (ref 21–32)
COLOR UR: COLORLESS
CREAT SERPL-MCNC: 1.33 MG/DL (ref 0.6–1.3)
CREAT SERPL-MCNC: 1.52 MG/DL (ref 0.6–1.3)
EOSINOPHIL # BLD AUTO: 0.39 THOUSAND/ÂΜL (ref 0–0.61)
EOSINOPHIL NFR BLD AUTO: 5 % (ref 0–6)
ERYTHROCYTE [DISTWIDTH] IN BLOOD BY AUTOMATED COUNT: 14.6 % (ref 11.6–15.1)
GFR SERPL CREATININE-BSD FRML MDRD: 34 ML/MIN/1.73SQ M
GFR SERPL CREATININE-BSD FRML MDRD: 40 ML/MIN/1.73SQ M
GLUCOSE P FAST SERPL-MCNC: 91 MG/DL (ref 65–99)
GLUCOSE SERPL-MCNC: 91 MG/DL (ref 65–140)
GLUCOSE UR STRIP-MCNC: NEGATIVE MG/DL
HCT VFR BLD AUTO: 43 % (ref 34.8–46.1)
HGB BLD-MCNC: 13.9 G/DL (ref 11.5–15.4)
HGB UR QL STRIP.AUTO: NEGATIVE
IMM GRANULOCYTES # BLD AUTO: 0.02 THOUSAND/UL (ref 0–0.2)
IMM GRANULOCYTES NFR BLD AUTO: 0 % (ref 0–2)
KETONES UR STRIP-MCNC: NEGATIVE MG/DL
LEUKOCYTE ESTERASE UR QL STRIP: NEGATIVE
LYMPHOCYTES # BLD AUTO: 2.46 THOUSANDS/ÂΜL (ref 0.6–4.47)
LYMPHOCYTES NFR BLD AUTO: 33 % (ref 14–44)
MCH RBC QN AUTO: 31.4 PG (ref 26.8–34.3)
MCHC RBC AUTO-ENTMCNC: 32.3 G/DL (ref 31.4–37.4)
MCV RBC AUTO: 97 FL (ref 82–98)
MONOCYTES # BLD AUTO: 0.96 THOUSAND/ÂΜL (ref 0.17–1.22)
MONOCYTES NFR BLD AUTO: 13 % (ref 4–12)
NEUTROPHILS # BLD AUTO: 3.62 THOUSANDS/ÂΜL (ref 1.85–7.62)
NEUTS SEG NFR BLD AUTO: 48 % (ref 43–75)
NITRITE UR QL STRIP: NEGATIVE
NRBC BLD AUTO-RTO: 0 /100 WBCS
PH UR STRIP.AUTO: 5.5 [PH]
PLATELET # BLD AUTO: 386 THOUSANDS/UL (ref 149–390)
PMV BLD AUTO: 9.9 FL (ref 8.9–12.7)
POTASSIUM SERPL-SCNC: 6.2 MMOL/L (ref 3.5–5.3)
POTASSIUM SERPL-SCNC: 6.3 MMOL/L (ref 3.5–5.3)
PROT SERPL-MCNC: 7.9 G/DL (ref 6.4–8.4)
PROT UR STRIP-MCNC: NEGATIVE MG/DL
RBC # BLD AUTO: 4.43 MILLION/UL (ref 3.81–5.12)
SODIUM SERPL-SCNC: 137 MMOL/L (ref 135–147)
SODIUM SERPL-SCNC: 139 MMOL/L (ref 135–147)
SP GR UR STRIP.AUTO: 1.01 (ref 1–1.03)
UROBILINOGEN UR STRIP-ACNC: <2 MG/DL
WBC # BLD AUTO: 7.53 THOUSAND/UL (ref 4.31–10.16)

## 2024-07-25 PROCEDURE — 36415 COLL VENOUS BLD VENIPUNCTURE: CPT

## 2024-07-25 PROCEDURE — G2211 COMPLEX E/M VISIT ADD ON: HCPCS | Performed by: INTERNAL MEDICINE

## 2024-07-25 PROCEDURE — 99285 EMERGENCY DEPT VISIT HI MDM: CPT | Performed by: EMERGENCY MEDICINE

## 2024-07-25 PROCEDURE — 85025 COMPLETE CBC W/AUTO DIFF WBC: CPT | Performed by: STUDENT IN AN ORGANIZED HEALTH CARE EDUCATION/TRAINING PROGRAM

## 2024-07-25 PROCEDURE — 99214 OFFICE O/P EST MOD 30 MIN: CPT | Performed by: INTERNAL MEDICINE

## 2024-07-25 PROCEDURE — 93005 ELECTROCARDIOGRAM TRACING: CPT

## 2024-07-25 PROCEDURE — 99285 EMERGENCY DEPT VISIT HI MDM: CPT

## 2024-07-25 PROCEDURE — 83880 ASSAY OF NATRIURETIC PEPTIDE: CPT | Performed by: STUDENT IN AN ORGANIZED HEALTH CARE EDUCATION/TRAINING PROGRAM

## 2024-07-25 PROCEDURE — 80053 COMPREHEN METABOLIC PANEL: CPT | Performed by: STUDENT IN AN ORGANIZED HEALTH CARE EDUCATION/TRAINING PROGRAM

## 2024-07-25 PROCEDURE — 80048 BASIC METABOLIC PNL TOTAL CA: CPT

## 2024-07-25 PROCEDURE — 71046 X-RAY EXAM CHEST 2 VIEWS: CPT

## 2024-07-25 PROCEDURE — 81003 URINALYSIS AUTO W/O SCOPE: CPT | Performed by: STUDENT IN AN ORGANIZED HEALTH CARE EDUCATION/TRAINING PROGRAM

## 2024-07-25 RX ORDER — FUROSEMIDE 20 MG/1
40 TABLET ORAL 2 TIMES DAILY
Qty: 360 TABLET | Refills: 1 | Status: SHIPPED | OUTPATIENT
Start: 2024-07-25

## 2024-07-25 RX ORDER — POTASSIUM CHLORIDE 20 MEQ/1
20 TABLET, EXTENDED RELEASE ORAL 2 TIMES DAILY
Qty: 360 TABLET | Refills: 1 | Status: SHIPPED | OUTPATIENT
Start: 2024-07-25

## 2024-07-25 NOTE — PROGRESS NOTES
Advanced Heart Failure Outpatient Progress Note - Stacie Rockwell 69 y.o. female MRN: [id number]    Encounter: [id number]      Assessment/Plan:    Patient Active Problem List    Diagnosis Date Noted    Prediabetes 12/03/2021    Decreased hearing of both ears 12/30/2020    Cataract 10/23/2020    Arthropathic psoriasis (HCC) 09/11/2017    Chronic combined systolic and diastolic congestive heart failure (HCC) 09/11/2017    Obesity (BMI 30-39.9) 09/11/2017    Allergic rhinitis 10/26/2015    Obstructive sleep apnea, adult 11/13/2014    Dyslipidemia 04/24/2013    Hypertension 11/08/2012    Obesity, morbid (HCC) 04/25/2024    Chronic obstructive pulmonary disease (HCC) 04/19/2024    Simple chronic bronchitis ([facility]) 11/01/2023    Plantar fasciitis 11/01/2023    History of hepatitis C 01/30/2023    Stage 3 chronic kidney disease, unspecified whether stage 3a or 3b CKD ([facility]) 03/31/2022     # Heart failure with mid range ejection fraction, Stage C, NYHA II  Etiology: Unclear. No definitive evidence of ischemia on nuclear stress test. Hypertension controlled. LBBB likely chronic. Dyscynchrony noted on echo since 2016. No heavy alcohol or drug use  Mildly volume up on exam     Weight: 192 pounds 4/25/2024; 196 lbs 7/25/24        Studies- personally reviewed by me    Echo 1/3/22:  LVEF: 50%  LVIDd: 5.9cm  RV: normal size and systolic function  MR: mild  PASP:  24 mmHg  RVOT: mid systolic notching suggesting elevated pulmonary vascular resistance  Other: grade 2 diastology, no regional wall motion abnormalities seen, abnormal septal motion consistent with bundle branch block  IVC normal with normal respirophasic variation    Pharm Nuc Stress 9/27/21: Abnormal study after pharmacologic vasodilation without reproduction of symptoms. Inferior defect improved with prone imaging likely diaphragmatic attenuation. Partially reversible anteroapical defect likely shifting breast, cannot exclude small area of distal LAD ischemia. Left  ventricular systolic function was reduced, without distinct regional wall motion abnormalities. LVEF 47%    Echocardiogram 7/8/21  LVEF: 40-45%  LVIDd: 5cm  RV: normal size and systolic function  MR: mild  PASP: 30mmHg  RVOT: mid systolic notching suggesting elevated pulmonary vascular resistance  Other: hypokinesis of inferior and septal walls, mild to moderately increased wall thickness, mild LAE    TTE 5/6/2016: LVEF 50%. Moderate concentric hypertrophy. Hypokinesis of mid anteroseptal, basal inferior and basal to mid inferolateral walls. Grade 1 diastology. Normal RV size and systolic function    Diet:  2 g sodium diet  2000 mL fluid restriction    Neurohormonal Blockade:  --Beta-Blocker: metoprolol succinate 75mg BID  --ACEi, ARB or ARNi: entresto 24-26mg BID  --Aldosterone Receptor Blocker: spironolactone 25mg BID  --SGLT2 Inhibitor: empagliflozin 10mg daily  --Diuretic: furosemide 40mg in AM and 20mg in PM with potassium 40meq in AM and 20 meq in PM    Sudden Cardiac Death Risk Reduction:  --ICD:  LVEF >35%    Electrical Resynchronization:  --Candidacy for BiV device:    Advanced Therapies (if appropriate):  --We will continue to monitor, no indication at this time    # Hypertension, controlled  # Hyperlipidemia  4/19/2024  HDL 53   Continue atorvastatin 40mg daily, patient will try to do better with diet and exercise  # LUIZA on CPAP, tolerating better  # Obesity  # LBBB, likely chronic as noted above    Today's Plan:  Increase lasix to 40mg two times a day  Increase potassium to 40 meq two times a day  Obtain BMP today  Continue rest of cardiac medications  2g sodium diet  2L fluid diet  Daily weights  Physical activities/exercise as tolerated        HPI:   66-year-old female with past medical history of hypertension, hyperlipidemia, obstructive sleep apnea on CPAP, obesity who presents for evaluation of heart failure.  Patient with chronic heart failure with preserved ejection fraction.   Echocardiogram back in 2015 showed LVEF of 50%.  Most recent echocardiogram in 7/8/2021 showed LVEF of 40-45% with hypokinesis of the inferior and septal walls.  Patient notes shortness of breath since the pandemic.  Attributed to weight gain  And inactivity.  She short of breath walking uphill and walking up steps for couple of years.  She recently started exercising and walks on treadmill for 45 mins to an hour, no shortness of breath or chest pain. Occasional lightheadedness.  Uses CPAP at night.  No PND orthopnea.  She has intermittent leg swelling and was on hydrochlorothiazide. Remote smoking history > 20 years ago. Rare alcohol intake. No drug use    11/1/21: Here for follow up. Nuclear stress test results as above. Overall doing well. Walking 6 blocks almost everyday, no issues. Exercises and walks treadmill for 40 mins without chest pain or shortness of breath. Notes some shortness of breath walking uphill. Has been limiting salt in her diet. Adherent to medications.    2/7/22: Here for follow up.  Reports progressive shortness of breath on exertion over past few weeks.  Weight up almost 10 lb since last visit in November.  Weight up to 201 lb today.  Also with abdominal fullness, leg swelling and orthopnea.  Not been sleeping well night.  Denies chest pain on exertion.  No palpitations or lightheadedness.  Has bendopnea    2/15/22: Here for follow up. Still with shortness of breath on exertion but overall improved. Weight down 5 lbs since last visit. 2/15/22 Na 139 K 3.9 creatinine 0.97    3/31/22: Here for follow up. Started spironolactone 25mg daily and decreased potassium to 20 meq daily on last visit. 3/3/21 Na 136 K4.1 creatinine 1.01.  Eating overall okay.  Still with shortness of breath on exertion.  Not much change in weight.  Having knee pains and mostly sedentary.  No PND, orthopnea or lower extremity edema.    5/27/22: Here for follow up. Increased spironolactone to 25mg two times a day. Bikes  15-20 mins x 2. No symptoms. Having right leg cramping with new exercise. No palpitations or lightheadedness    8/25/22: here for follow up. Started on jardiance 10mg daily since last visit. 6/3/22 Na 136 K 3.7 and creatinine 1.36 from 1.01. Lasix dose reduced to 20mg daily PRN for weight gain. Home scale weight 202 at home. Intermittent episodes of shortness of breath. No PND, orthopnea or worsening lower extremity edema. No palpitations. Occasional lightheadedness with getting up.      11/22/22: Here for follow up. Resumed furosemide 20mg once a day and started potassium 20 meq once a day on last visit. 10/17/22 Na 137 K 4.8 and creatinine 1.13. Exercising on the elliptical for 10 minutes. No worsening shortness of breath, short of breath walking uphill. Lightheadedness better.    2/22/23: Here for follow up. Overall doing well. Trying to lose weight. Short of breath walking up hill. Still trying to do elliptical but recently having left ankle pain. No leg swelling, PND, or orthopnea. No abdominal fullness. No chest pain or lightheadedness    6/26/23: Here for follow up. Continues to do well. No chest pain or shortness of breath. Mainly limited by foot pain and seen recently by rheumatology.  Still continues to stay active walking uphill. No leg swelling or abdominal distension. Occasional PND when not using CPAP. Cannot tolerate CPAP all night. 6/5/23 Na 137 K 4.5 creatinine 0.95.    Per [facility] 10/26/23. Presents for follow up. Missed her visit with Dr. Rowe this week due to transportation issues. She is feeling well. Thrilled that she lost 10 pounds. Says she did this by cutting out sweets and walking more. She is feeling well functionally. Exercise/activity tolerance improved. She has noticed some scapular pain with occasional chest pressure at rest. This occurs infrequently, maybe twice per month. Not sure if it's muscular or otherwise. Usually subsides over a few minutes.     4/25/24: here for follow up.  Has  gained weight back since last office visit as above with Milvia.  Likely due to increased caloric intake.  Reports some shortness of breath and leg swelling.  Saw PCP in April 19 with wheezing.  Repeat echocardiogram was ordered.  She denies any chest pain, palpitations, dizziness or lightheadedness.    7/25/24: Here for follow up. Increased lasix and potassium dose on last visit. Due for BMP ordered.  Continues to gain weight.  Has not been active lately due to left foot or ankle pain.  Stable shortness of breath with more walking and walking uphill.  No chest pain on current level of exertion.  No palpitations, dizziness or lightheadedness.  Adherent to medications.  Doing her best to adhere with diet and overall eat better        Past Medical History:   Diagnosis Date    Chronic ankle pain     Unspec laterality, Last assessed - 6/22/16    Chronic hepatitis C (HCC)     Complete left bundle branch block (LBBB)     COPD (chronic obstructive pulmonary disease) ([facility])     Eczema     B/L elbows, start Hydrocortisone; Last assessed - 8/5/15    HLD (hyperlipidemia)     Hydrocodone use disorder, moderate, in controlled environment ([facility]) 3/11/2015    Violated CSMA, stopped    Hypertension     Osteoarthritis, knee     Rectal polyp     Sleep apnea     Violation of controlled substance agreement 10/23/2019    See notes of 10-       Review of Systems   Constitutional:  Negative for chills, fatigue and fever.   HENT:  Negative for ear pain and sore throat.    Eyes:  Negative for pain and visual disturbance.   Respiratory:  Negative for cough and chest tightness.    Cardiovascular:  Negative for chest pain, palpitations and leg swelling.   Gastrointestinal:  Negative for abdominal distention, abdominal pain and vomiting.   Genitourinary:  Negative for dysuria and hematuria.   Musculoskeletal:  Negative for arthralgias and back pain.   Skin:  Negative for color change and rash.   Neurological:  Negative for dizziness,  seizures and syncope.   All other systems reviewed and are negative.       No Known Allergies  .    Current Outpatient Medications:     acetaminophen (TYLENOL) 650 mg CR tablet, Take 650 mg by mouth every 8 (eight) hours as needed , Disp: , Rfl:     albuterol (PROVENTIL HFA,VENTOLIN HFA) 90 mcg/act inhaler, INHALE 2 PUFFS EVERY 6 HOURS AS NEEDED FOR WHEEZING, Disp: 8.5 g, Rfl: 3    atorvastatin (LIPITOR) 40 mg tablet, Take 1 tablet (40 mg total) by mouth daily For cholesterol, Disp: 90 tablet, Rfl: 3    Blood Pressure Monitoring (Blood Pressure Monitor/M Cuff) MISC, Use daily, Disp: 1 each, Rfl: 0    celecoxib (CeleBREX) 200 mg capsule, Take 1 capsule (200 mg total) by mouth 2 (two) times a day, Disp: 180 capsule, Rfl: 1    Empagliflozin (Jardiance) 10 MG TABS tablet, TAKE 1 TABLET (10 MG TOTAL) BY MOUTH EVERY MORNING., Disp: 30 tablet, Rfl: 1    furosemide (LASIX) 20 mg tablet, TAKE 2 TABLETS IN THE MORNING AND 1MG IN THE EVENING, Disp: 270 tablet, Rfl: 1    metoprolol succinate (TOPROL-XL) 50 mg 24 hr tablet, Take 1.5 tablets (75 mg total) by mouth 2 (two) times a day, Disp: 270 tablet, Rfl: 2    nitroglycerin (NITROSTAT) 0.4 mg SL tablet, Place 1 tablet (0.4 mg total) under the tongue every 5 (five) minutes as needed for chest pain (take every 5 minutes for chest pain with max 3 doses), Disp: 9 tablet, Rfl: 0    omeprazole (PriLOSEC) 40 MG capsule, Take 40 mg by mouth daily, Disp: , Rfl:     potassium chloride (Klor-Con M20) 20 mEq tablet, Take 2 tablets in AM and 1 tablet in PM, Disp: 270 tablet, Rfl: 1    spironolactone (ALDACTONE) 25 mg tablet, TAKE 1 TABLET BY MOUTH TWICE A DAY, Disp: 180 tablet, Rfl: 1    Diclofenac Sodium (VOLTAREN) 1 %, Apply 2 g topically 4 (four) times a day as needed (neck pain) (Patient not taking: Reported on 7/25/2024), Disp: 100 g, Rfl: 1    methylPREDNISolone 4 MG tablet therapy pack, Use as directed on package (Patient not taking: Reported on 7/25/2024), Disp: 21 tablet, Rfl: 0     sacubitril-valsartan (Entresto) 24-26 MG TABS, Take 1 tablet by mouth 2 (two) times a day (Patient not taking: Reported on 2024), Disp: 30 tablet, Rfl: 3    Social History     Socioeconomic History    Marital status:      Spouse name: Not on file    Number of children: Not on file    Years of education: Not on file    Highest education level: Not on file   Occupational History    Not on file   Tobacco Use    Smoking status: Former     Current packs/day: 0.00     Types: Cigarettes     Quit date: 1999     Years since quittin.4    Smokeless tobacco: Never   Vaping Use    Vaping status: Never Used   Substance and Sexual Activity    Alcohol use: Yes     Comment: social    Drug use: No    Sexual activity: Yes   Other Topics Concern    Not on file   Social History Narrative    Home environment-safe         Social Determinants of Health     Financial Resource Strain: Low Risk  (2024)    Overall Financial Resource Strain (CARDIA)     Difficulty of Paying Living Expenses: Not hard at all   Food Insecurity: No Food Insecurity (2024)    Hunger Vital Sign     Worried About Running Out of Food in the Last Year: Never true     Ran Out of Food in the Last Year: Never true   Transportation Needs: No Transportation Needs (2024)    PRAPARE - Transportation     Lack of Transportation (Medical): No     Lack of Transportation (Non-Medical): No   Physical Activity: Sufficiently Active (8/10/2021)    Exercise Vital Sign     Days of Exercise per Week: 7 days     Minutes of Exercise per Session: 60 min   Recent Concern: Physical Activity - Inactive (2021)    Exercise Vital Sign     Days of Exercise per Week: 0 days     Minutes of Exercise per Session: 0 min   Stress: No Stress Concern Present (8/10/2021)    [employer] - Occupational Stress Questionnaire     Feeling of Stress : Not at all   Social Connections: Not on file   Intimate Partner Violence: Not At Risk  "(8/10/2021)    Humiliation, Afraid, Rape, and Kick questionnaire     Fear of Current or Ex-Partner: No     Emotionally Abused: No     Physically Abused: No     Sexually Abused: No   Housing Stability: Low Risk  (4/19/2024)    Housing Stability Vital Sign     Unable to Pay for Housing in the Last Year: No     Number of Times Moved in the Last Year: 1     Homeless in the Last Year: No       Family History   Problem Relation Age of Onset    Heart disease Mother         Pacemaker    Hypertension Mother     Heart disease Father     Heart disease Sister     Hypertension Sister     No Known Problems Sister     No Known Problems Daughter     No Known Problems Daughter     No Known Problems Maternal Grandmother     No Known Problems Maternal Grandfather     No Known Problems Paternal Grandmother     No Known Problems Paternal Grandfather     Alcohol abuse Brother     Heart disease Brother     Cirrhosis Brother         Liver     Hypertension Brother     No Known Problems Brother     No Known Problems Maternal Aunt     No Known Problems Cousin        Physical Exam:    Vitals: Blood pressure 126/80, pulse 69, height 5' 2\" (1.575 m), weight 89.3 kg (196 lb 14.4 oz), SpO2 98%, not currently breastfeeding., Body mass index is 36.01 kg/m².,   Wt Readings from Last 3 Encounters:   07/25/24 89.3 kg (196 lb 14.4 oz)   07/03/24 88.5 kg (195 lb)   05/10/24 87.1 kg (192 lb)       Physical Exam  Constitutional:       General: She is not in acute distress.     Appearance: Normal appearance.   HENT:      Head: Normocephalic and atraumatic.      Mouth/Throat:      Mouth: Mucous membranes are moist.   Eyes:      Extraocular Movements: Extraocular movements intact.      Conjunctiva/sclera: Conjunctivae normal.   Cardiovascular:      Rate and Rhythm: Normal rate and regular rhythm.      Pulses: Normal pulses.      Heart sounds: No murmur heard.     No friction rub. No gallop.      Comments: Elevated JVP  Pulmonary:      Effort: Pulmonary effort " "is normal. No respiratory distress.      Breath sounds: No wheezing, rhonchi or rales.   Abdominal:      General: There is no distension.      Palpations: Abdomen is soft.      Tenderness: There is no abdominal tenderness. There is no guarding.   Musculoskeletal:         General: No deformity or signs of injury.      Cervical back: Neck supple.      Right lower leg: Edema present.      Left lower leg: Edema present.   Skin:     General: Skin is warm and dry.      Capillary Refill: Capillary refill takes less than 2 seconds.   Neurological:      General: No focal deficit present.      Mental Status: She is alert and oriented to person, place, and time.   Psychiatric:         Mood and Affect: Mood normal.         Labs & Results:    Lab Results   Component Value Date    WBC 8.45 04/19/2024    HGB 14.5 04/19/2024    HCT 46.4 (H) 04/19/2024    MCV 99 (H) 04/19/2024     04/19/2024     Lab Results   Component Value Date    SODIUM 140 04/19/2024    K 4.3 04/19/2024     04/19/2024    CO2 23 04/19/2024    BUN 10 04/19/2024    CREATININE 0.82 04/19/2024    GLUC 105 12/06/2016    CALCIUM 9.5 04/19/2024     No results found for: \"NTBNP\"   Lab Results   Component Value Date    CHOLESTEROL 191 04/19/2024    CHOLESTEROL 159 08/10/2021    CHOLESTEROL 155 08/16/2019     Lab Results   Component Value Date    HDL 53 04/19/2024    HDL 50 08/10/2021    HDL 44 08/16/2019     Lab Results   Component Value Date    TRIG 175 (H) 04/19/2024    TRIG 134 08/10/2021    TRIG 115 08/16/2019     No results found for: \"NONHDLC\"    EKG personally reviewed by Lauren Rowe.     Counseling / Coordination of Care  I have spent a total time of 30 minutes in caring for this patient on the day of the visit/encounter including Instructions for management, Documenting in the medical record, Reviewing / ordering tests, medicine, procedures  , and Obtaining or reviewing history  .      Thank you for the opportunity to participate in the care of " this patient.    MARIETTA VILLA MD  ADVANCED HEART FAILURE AND MECHANICAL CIRCULATORY SUPPORT  [facility]

## 2024-07-25 NOTE — TELEPHONE ENCOUNTER
Kim with [facility] lab called in with critical lab value. Potassium 6.3 7/25/24 at 8:39 am      Lab Result: Potassium 6.3   Date/Time Drawn: 7/25/24   Ordering Provider: Lauren Rowe MD    Lab Personnel's Name: Kim       Read back to the lab as documented above     [x]     Secure Chat message sent to on-call provider  [x]     Provider confirmed receipt of message     [x]

## 2024-07-25 NOTE — PROGRESS NOTES
Received [facility] about abnormal labs that were drawn this morning. Labs reviewed and significant for acute kidney injury and hyperkalemia. Unclear what time these labs were resulted. Advised [facility] RN to call the patient and advise to hold evening doses of Spironolactone, Entresto, and potassium; and go to the emergency department.    RN states per her protocol, she is not allowed to call the patient.    I called and spoke to Ms. Torres. She has already taken her evening Spironolactone and potassium doses. Patient advised to hold Entresto and go to ED. Patient states she has no transportation. Advised to all EMS now. Patient understands, agrees, and will call EMS.

## 2024-07-25 NOTE — PATIENT INSTRUCTIONS
Increase lasix to 40mg two times a day  Increase potassium to 40 meq two times a day  Obtain BMP today  Continue rest of cardiac medications  2g sodium diet  2L fluid diet  Daily weights  Physical activities/exercise as tolerated

## 2024-07-26 LAB
ATRIAL RATE: 86 BPM
P AXIS: 68 DEGREES
PR INTERVAL: 162 MS
QRS AXIS: 269 DEGREES
QRSD INTERVAL: 132 MS
QT INTERVAL: 390 MS
QTC INTERVAL: 466 MS
T WAVE AXIS: 70 DEGREES
VENTRICULAR RATE: 86 BPM

## 2024-07-26 PROCEDURE — 93010 ELECTROCARDIOGRAM REPORT: CPT | Performed by: INTERNAL MEDICINE

## 2024-07-26 NOTE — ED PROVIDER NOTES
History  Chief Complaint   Patient presents with    Abnormal Lab     Pt got lab work done this morning and potassium was 6.3. Pt denies CP or other sx. Pt does have cramping in legs      69-year-old female with history of diabetes, COPD, CHF, presents to the emergency department for evaluation of abnormal lab.  She was at her primary care's office and had a potassium reading of 6.3.  They called her at home and told her to come to the hospital for evaluation.  Patient states she is asymptomatic.  Upon further questioning she tells that she has had increased weight gain and swelling in the legs as well as increasing shortness of breath over the past couple days.  She tells me that her doctor just increased her Lasix dose today and prescribed potassium supplementation to go along with it but then called her back and told her to come to the hospital due to her high potassium level. No chest pain, flank pain, fever, chills, or urinary symptoms.         Prior to Admission Medications   Prescriptions Last Dose Informant Patient Reported? Taking?   Blood Pressure Monitoring (Blood Pressure Monitor/M Cuff) MISC  Self No No   Sig: Use daily   Diclofenac Sodium (VOLTAREN) 1 %  Self No No   Sig: Apply 2 g topically 4 (four) times a day as needed (neck pain)   Patient not taking: Reported on 7/25/2024   Empagliflozin (Jardiance) 10 MG TABS tablet   No No   Sig: Take 1 tablet (10 mg total) by mouth every morning   acetaminophen (TYLENOL) 650 mg CR tablet  Self Yes No   Sig: Take 650 mg by mouth every 8 (eight) hours as needed    albuterol (PROVENTIL HFA,VENTOLIN HFA) 90 mcg/act inhaler  Self No No   Sig: INHALE 2 PUFFS EVERY 6 HOURS AS NEEDED FOR WHEEZING   atorvastatin (LIPITOR) 40 mg tablet  Self No No   Sig: Take 1 tablet (40 mg total) by mouth daily For cholesterol   celecoxib (CeleBREX) 200 mg capsule  Self No No   Sig: Take 1 capsule (200 mg total) by mouth 2 (two) times a day   furosemide (LASIX) 20 mg tablet   No No    Sig: Take 2 tablets (40 mg total) by mouth 2 (two) times a day   methylPREDNISolone 4 MG tablet therapy pack  Self No No   Sig: Use as directed on package   Patient not taking: Reported on 7/25/2024   metoprolol succinate (TOPROL-XL) 50 mg 24 hr tablet  Self No No   Sig: Take 1.5 tablets (75 mg total) by mouth 2 (two) times a day   nitroglycerin (NITROSTAT) 0.4 mg SL tablet  Self No No   Sig: Place 1 tablet (0.4 mg total) under the tongue every 5 (five) minutes as needed for chest pain (take every 5 minutes for chest pain with max 3 doses)   omeprazole (PriLOSEC) 40 MG capsule  Self Yes No   Sig: Take 40 mg by mouth daily   potassium chloride (Klor-Con M20) 20 mEq tablet   No No   Sig: Take 1 tablet (20 mEq total) by mouth 2 (two) times a day   sacubitril-valsartan (Entresto) 24-26 MG TABS  Self No No   Sig: Take 1 tablet by mouth 2 (two) times a day   Patient not taking: Reported on 4/26/2024   spironolactone (ALDACTONE) 25 mg tablet  Self No No   Sig: TAKE 1 TABLET BY MOUTH TWICE A DAY      Facility-Administered Medications: None       Past Medical History:   Diagnosis Date    Chronic ankle pain     Unspec laterality, Last assessed - 6/22/16    Chronic hepatitis C (HCC)     Complete left bundle branch block (LBBB)     COPD (chronic obstructive pulmonary disease) ([facility])     Eczema     B/L elbows, start Hydrocortisone; Last assessed - 8/5/15    HLD (hyperlipidemia)     Hydrocodone use disorder, moderate, in controlled environment ([facility]) 3/11/2015    Violated CSMA, stopped    Hypertension     Osteoarthritis, knee     Rectal polyp     Sleep apnea     Violation of controlled substance agreement 10/23/2019    See notes of 10-       Past Surgical History:   Procedure Laterality Date    COLONOSCOPY      EYE SURGERY         Family History   Problem Relation Age of Onset    Heart disease Mother         Pacemaker    Hypertension Mother     Heart disease Father     Heart disease Sister     Hypertension Sister     No  Known Problems Sister     No Known Problems Daughter     No Known Problems Daughter     No Known Problems Maternal Grandmother     No Known Problems Maternal Grandfather     No Known Problems Paternal Grandmother     No Known Problems Paternal Grandfather     Alcohol abuse Brother     Heart disease Brother     Cirrhosis Brother         Liver     Hypertension Brother     No Known Problems Brother     No Known Problems Maternal Aunt     No Known Problems Cousin      I have reviewed and agree with the history as documented.    E-Cigarette/Vaping    E-Cigarette Use Never User      E-Cigarette/Vaping Substances    Nicotine No     THC No     CBD No     Flavoring No     Other No     Unknown No      Social History     Tobacco Use    Smoking status: Former     Current packs/day: 0.00     Types: Cigarettes     Quit date: 1999     Years since quittin.4    Smokeless tobacco: Never   Vaping Use    Vaping status: Never Used   Substance Use Topics    Alcohol use: Yes     Comment: social    Drug use: No        Review of Systems   Constitutional:  Negative for activity change, chills, fatigue and fever.   Eyes:  Negative for visual disturbance.   Respiratory:  Positive for shortness of breath. Negative for cough and chest tightness.    Cardiovascular:  Negative for chest pain.   Gastrointestinal:  Negative for abdominal pain, nausea and vomiting.   Musculoskeletal:  Negative for back pain.   Skin:  Negative for rash and wound.   Neurological:  Negative for dizziness, tremors, syncope, weakness, light-headedness and headaches.   Psychiatric/Behavioral:  Negative for agitation and confusion.        Physical Exam  ED Triage Vitals   Temperature Pulse Respirations Blood Pressure SpO2   24   97.8 °F (36.6 °C) 90 18 (!) 186/115 96 %      Temp Source Heart Rate Source Patient Position - Orthostatic VS BP Location FiO2 (%)   24  2245 07/25/24 2015 --   Temporal Monitor Lying Left arm       Pain Score       --                    Orthostatic Vital Signs  Vitals:    07/25/24 2015 07/25/24 2245   BP: (!) 186/115 (!) 185/86   Pulse: 90 81   Patient Position - Orthostatic VS:  Lying       Physical Exam  Vitals reviewed.   Constitutional:       General: She is not in acute distress.     Appearance: Normal appearance. She is not ill-appearing.   HENT:      Head: Normocephalic and atraumatic.   Cardiovascular:      Rate and Rhythm: Normal rate and regular rhythm.      Pulses: Normal pulses.      Heart sounds: Normal heart sounds. No murmur heard.  Pulmonary:      Effort: Pulmonary effort is normal. No respiratory distress.      Breath sounds: Wheezing present.      Comments: Faint end expiratory wheeze auscultated in all lung fields  Abdominal:      General: Abdomen is flat.      Palpations: Abdomen is soft.      Tenderness: There is no abdominal tenderness. There is no right CVA tenderness, left CVA tenderness, guarding or rebound.   Musculoskeletal:      Right lower leg: Edema present.      Left lower leg: Edema present.      Comments: +1 pitting edema in the bilateral lower extremities   Skin:     General: Skin is warm and dry.      Findings: No rash.   Neurological:      Mental Status: She is alert and oriented to person, place, and time.   Psychiatric:         Behavior: Behavior normal.         ED Medications  Medications - No data to display    Diagnostic Studies  Results Reviewed       Procedure Component Value Units Date/Time    UA w Reflex to Microscopic w Reflex to Culture [[id number]] Collected: 07/25/24 2246    Lab Status: Final result Specimen: Urine, Clean Catch Updated: 07/25/24 2253     Color, UA Colorless     Clarity, UA Clear     Specific Gravity, UA 1.006     pH, UA 5.5     Leukocytes, UA Negative     Nitrite, UA Negative     Protein, UA Negative mg/dl      Glucose, UA Negative mg/dl      Ketones, UA Negative mg/dl       Urobilinogen, UA <2.0 mg/dl      Bilirubin, UA Negative     Occult Blood, UA Negative    Comprehensive metabolic panel [[id number]]  (Abnormal) Collected: 07/25/24 2208    Lab Status: Final result Specimen: Blood from Arm, Right Updated: 07/25/24 2250     Sodium 137 mmol/L      Potassium 6.2 mmol/L      Chloride 106 mmol/L      CO2 21 mmol/L      ANION GAP 10 mmol/L      BUN 19 mg/dL      Creatinine 1.33 mg/dL      Glucose 91 mg/dL      Calcium 9.5 mg/dL      AST 31 U/L      ALT 16 U/L      Alkaline Phosphatase 60 U/L      Total Protein 7.9 g/dL      Albumin 4.6 g/dL      Total Bilirubin 0.45 mg/dL      eGFR 40 ml/min/1.73sq m     Narrative:      [employer] guidelines for Chronic Kidney Disease (CKD):     Stage 1 with normal or high GFR (GFR > 90 mL/min/1.73 square meters)    Stage 2 Mild CKD (GFR = 60-89 mL/min/1.73 square meters)    Stage 3A Moderate CKD (GFR = 45-59 mL/min/1.73 square meters)    Stage 3B Moderate CKD (GFR = 30-44 mL/min/1.73 square meters)    Stage 4 Severe CKD (GFR = 15-29 mL/min/1.73 square meters)    Stage 5 End Stage CKD (GFR <15 mL/min/1.73 square meters)  Note: GFR calculation is accurate only with a steady state creatinine    B-Type Natriuretic Peptide(BNP) [[id number]]  (Normal) Collected: 07/25/24 2140    Lab Status: Final result Specimen: Blood from Arm, Right Updated: 07/25/24 2239     BNP 43 pg/mL     CBC and differential [[id number]]  (Abnormal) Collected: 07/25/24 2140    Lab Status: Final result Specimen: Blood from Arm, Right Updated: 07/25/24 2150     WBC 7.53 Thousand/uL      RBC 4.43 Million/uL      Hemoglobin 13.9 g/dL      Hematocrit 43.0 %      MCV 97 fL      MCH 31.4 pg      MCHC 32.3 g/dL      RDW 14.6 %      MPV 9.9 fL      Platelets 386 Thousands/uL      nRBC 0 /100 WBCs      Segmented % 48 %      Immature Grans % 0 %      Lymphocytes % 33 %      Monocytes % 13 %      Eosinophils Relative 5 %      Basophils Relative 1 %      Absolute Neutrophils 3.62  Thousands/µL      Absolute Immature Grans 0.02 Thousand/uL      Absolute Lymphocytes 2.46 Thousands/µL      Absolute Monocytes 0.96 Thousand/µL      Eosinophils Absolute 0.39 Thousand/µL      Basophils Absolute 0.08 Thousands/µL                    XR chest 2 views   Final Result by Sara Bansal MD (07/25 2235)      No acute cardiopulmonary disease.               Workstation performed: [id number]               Procedures  Procedures      ED Course                                       Medical Decision Making  69-year-old female with history of CHF, COPD, presents to the emergency department for evaluation of abnormal lab.  She had a potassium of 6.3 on initial blood work.  She is asymptomatic other than symptoms that are consistent with a CHF exacerbation versus COPD exacerbation which include shortness of breath weight gain and pitting edema of the legs which is slightly worse than normal.  Physical exam is largely reassuring other than some faint end expiratory wheezes in the bilateral lower lobes.  Workup was ordered to include CBC CMP EKG BNP and chest x-ray.  EKG interpreted by myself [place] physician revealed a heart rate of 86 with no significant ST depressions/elevations or peaked T waves.  Repeat potassium was 6.2 but sample was hemolyzed which indicates potassium is much lower and likely closer to normal range.  Patient is asymptomatic with no EKG changes.  Chest x-ray was largely unremarkable.  Patient would like to be discharged at this time.  Advised to follow-up with primary care physician in 5 to 7 days and she will need a repeat BMP in 48 hours which is been ordered for.  Clear discharge instructions reviewed with the patient and all questions were answered.  She remained hemodynamically stable while under my care and is appropriate discharge home with close outpatient follow-up.    Amount and/or Complexity of Data Reviewed  Labs: ordered.  Radiology: ordered.          Disposition  Final  diagnoses:   Hyperkalemia   CHF (congestive heart failure) (HCC)   COPD (chronic obstructive pulmonary disease) ([facility])     Time reflects when diagnosis was documented in both MDM as applicable and the Disposition within this note       Time User Action Codes Description Comment    7/25/2024 11:40 PM Christiano Broderick Add [E87.5] Hyperkalemia     7/25/2024 11:40 PM Christiano Broderick Add [I50.9] CHF (congestive heart failure) ([facility])     7/25/2024 11:40 PM Christiano Broderick Add [J44.9] COPD (chronic obstructive pulmonary disease) ([facility])           ED Disposition       ED Disposition   Discharge    Condition   Stable    Date/Time   Thu Jul 25, 2024 11:40 PM    Comment   Stacie Rockwell discharge to home/self care.                   Follow-up Information       Follow up With Specialties Details Why Contact Info    Mendel Whittaker MD Internal Medicine In 1 week  [address] [facility] [address]  [phone]              Patient's Medications   Discharge Prescriptions    No medications on file     Outpatient Discharge Orders   Basic metabolic panel   Standing Status: Future Standing Exp. Date: 07/25/25       PDMP Review       None             ED Provider  Attending physically available and evaluated Stacie Rockwell. I managed the patient along with the ED Attending.    Electronically Signed by           Christiano Broderick MD  07/25/24 5347

## 2024-07-26 NOTE — ED ATTENDING ATTESTATION
Final Diagnoses:     1. Hyperkalemia    2. CHF (congestive heart failure) ([facility])    3. COPD (chronic obstructive pulmonary disease) ([facility])      ED Course as of 07/27/24 2211   Thu Jul 25, 2024 2119 This is a 70 y/o F. Had a routine blood work done as outpatient.  Her K+ was 6.3 and Creatinine was elevated.  She offers no complaints.  She's on lasix + potassium supplements.  No f/ch/n/v/cp/sob.  No falls/trauma  Feels otherwise.   No dizziness/LH  No falls/trauma  Denies any urinary tract infection symptoms (burning, itching, pain, blood, frequency).   2119 General: VSS, NAD, awake, alert.   Well-nourished, well-developed.   Appears stated age.   Head: Normocephalic, atraumatic, nontender.  Eyes: PERRL, EOM-I. No diplopia.   No hyphema.   No subconjunctival hemorrhages.  Symmetrical lids.   ENTAtraumatic external nose and ears.    MMM  No stridor.   Normal phonation.   No drooling.   Base of mouth is soft. No mastoid tenderness.   Neck: Symmetric, trachea midline. No JVD.  CV: Peripheral pulses +2 throughout.   No chest wall tenderness.   Lungs:   Unlabored   No retractions  No crepitus.   No tachypnea.   No paradoxical motion.  Abd: +BS, soft, NT/ND.   MSK:   FROM   No lower extremity edema.   Back:   No CVAT  Skin: Dry, intact.   Neuro: AAOx3, GCS 15, CN II-XII grossly intact.   Motor grossly intact.  Psychiatric/Behavioral: Appropriate mood and affect   Exam: deferred   2120 A/P:  - Hyperkalemia. On lasix for HF. Likely due to excessive lasix dosing causing it. No weight gain / loss. Stable at dry weight.   - given elevated creatinine, I think it's reasonable that hte the K+ is real.  - Will continue to monitor.     - Patient as we were leaving (examined concurrently with resident) complained of weeks of weight gain (gradually), weakness, dyspnea when flat / orthopnea. Doesn't do daily weights but thinks over several weeks is +20 lbs from dry weight. It's b/c of this she had her dose of lasix increased. Also  wanted that evaluated.   Also has exertional dyspnea symptoms.   2121 Procedure Note: EKG  Date/Time: 07/25/24 9:21 PM   Interpreted by: JULIA BOOKER   Indications / Diagnosis: hyperkalemia  ECG reviewed by me, the ED Provider: yes   The EKG demonstrates:   Rhythm: 86 normal sinus  Intervals: normal intervals  Axis: normal axis  QRS/Blocks: RBBB  ST Changes: No acute ST Changes, no STD/FRANCISCO.  Similar to previous.   2300 Potassium(!): 6.2  Hemolyzed sample.       I, Julia Booker MD, saw and evaluated the patient. All available labs and X-rays were ordered by me or the resident / non-physician and have been reviewed by myself. I discussed the patient with the resident / non-physician and agree with the resident's / non-physician practitioner's findings and plan as documented in the resident's / non-physician practicitioner's note, except where noted.   At this point, I agree with the current assessment done in the ED.   I was present during key portions of all procedures performed unless otherwise stated.     Nursing Triage:     Chief Complaint   Patient presents with    Abnormal Lab     Pt got lab work done this morning and potassium was 6.3. Pt denies CP or other sx. Pt does have cramping in legs        HPI:   As above     ASSESSMENT + PLAN:   As per ED course     Physical:     Vitals:    07/25/24 2015 07/25/24 2245   BP: (!) 186/115 (!) 185/86   BP Location: Left arm Right arm   Pulse: 90 81   Resp: 18 22   Temp: 97.8 °F (36.6 °C)    TempSrc: Temporal    SpO2: 96% 98%       - There are no obvious limitations to social determinants of care.   - Nursing note reviewed.   - Vitals reviewed.   - Orders placed by myself and/or advanced practitioner / resident.    - Previous chart was reviewed  - No language barrier.   - History obtained from patient.    - There are no limitations to the history obtained:     Past Medical:    has a past medical history of Chronic ankle pain, Chronic hepatitis C (HCC), Complete  left bundle branch block (LBBB), COPD (chronic obstructive pulmonary disease) ([facility]), Eczema, HLD (hyperlipidemia), Hydrocodone use disorder, moderate, in controlled environment ([facility]) (3/11/2015), Hypertension, Osteoarthritis, knee, Rectal polyp, Sleep apnea, and Violation of controlled substance agreement (10/23/2019).    Past Surgical:    has a past surgical history that includes Colonoscopy and Eye surgery.    Social:     Social History     Substance and Sexual Activity   Alcohol Use Yes    Comment: social     Social History     Tobacco Use   Smoking Status Former    Current packs/day: 0.00    Types: Cigarettes    Quit date: 1999    Years since quittin.4   Smokeless Tobacco Never     Social History     Substance and Sexual Activity   Drug Use No       Echo:   Results for orders placed during the hospital encounter of 21    Echo complete with contrast if indicated    Narrative  [address] [id number] [phone]    Transthoracic Echocardiogram  2D, M-mode, Doppler, and Color Doppler    Study date:  2021    Patient: JUAN PABLO STONE  MR number: [id number]  Account number: [id number]  : 1955  Age: 66 years  Gender: Female  Status: Outpatient  Location: [address] [place] [address] [facility] [place]  Height: 62 in  Weight: 210 lb  BP: 156/ 85 mmHg    Indications: Congestive heart failure    Diagnoses: I50.32 - Chronic diastolic (congestive) heart failure    Sonographer:  Ariel Solitario RDCS  Referring Physician:  Amanda Cooper DO  Group:  [facility]  Interpreting Physician:  Mert Rebolledo MD    SUMMARY    LEFT VENTRICLE:  Systolic function was mildly to moderately reduced. Ejection fraction was estimated in the range of 40 % to 45 %.  There was hypokinesis of the basal inferior wall(s).  Wall thickness was mildly to moderately increased.    VENTRICULAR SEPTUM:  There was moderate dyssynergic motion.    LEFT  ATRIUM:  The atrium was mildly dilated.    MITRAL VALVE:  There was mild regurgitation.    TRICUSPID VALVE:  There was mild regurgitation.  Pulmonary artery systolic pressure was at the upper limits of normal.    PULMONIC VALVE:  There was trace regurgitation.    HISTORY: PRIOR HISTORY: Hypertension; Dyslipidemia; COPD; LUIZA; LBBB; Congestive heart failure    PROCEDURE: The study was performed in the [address] [place] [address] [facility]. This was a routine study. The transthoracic approach was used. The study included complete 2D imaging, M-mode, complete spectral Doppler, and color Doppler. The  heart rate was 69 bpm, at the start of the study. Images were obtained from the parasternal, apical, subcostal, and suprasternal notch acoustic windows. Image quality was adequate.    LEFT VENTRICLE: Size was normal. Systolic function was mildly to moderately reduced. Ejection fraction was estimated in the range of 40 % to 45 %. There was hypokinesis of the basal inferior wall(s). Wall thickness was mildly to moderately  increased. No evidence of apical thrombus. DOPPLER: Left ventricular diastolic function parameters were normal.    VENTRICULAR SEPTUM: There was moderate dyssynergic motion.    RIGHT VENTRICLE: The size was normal. Systolic function was normal. Wall thickness was normal.    LEFT ATRIUM: The atrium was mildly dilated.    RIGHT ATRIUM: Size was normal.    MITRAL VALVE: Valve structure was normal. There was mild thickening. There was normal leaflet separation. DOPPLER: The transmitral velocity was within the normal range. There was no evidence for stenosis. There was mild regurgitation.    AORTIC VALVE: The valve was trileaflet. Leaflets exhibited normal thickness and normal cuspal separation. DOPPLER: Transaortic velocity was within the normal range. There was no evidence for stenosis. There was no significant  regurgitation.    TRICUSPID VALVE: The valve structure was normal. There was normal leaflet separation.  DOPPLER: The transtricuspid velocity was within the normal range. There was no evidence for stenosis. There was mild regurgitation. Pulmonary artery  systolic pressure was at the upper limits of normal.    PULMONIC VALVE: Leaflets exhibited normal thickness, no calcification, and normal cuspal separation. DOPPLER: The transpulmonic velocity was within the normal range. There was trace regurgitation.    PERICARDIUM: There was no pericardial effusion. The pericardium was normal in appearance.    AORTA: The root exhibited normal size.    SYSTEMIC VEINS: IVC: The inferior vena cava was normal in size.    SYSTEM MEASUREMENT TABLES    2D  %FS: 24.44 %  Ao Diam: 3.28 cm  Ao asc: 3.68 cm  EDV(Teich): 115.71 ml  EF Biplane: 43.05 %  EF(Teich): 48.35 %  ESV(Teich): 59.76 ml  IVSd: 1.39 cm  LA Area: 18.13 cm2  LA Diam: 4.42 cm  LVEDV MOD A2C: 124.59 ml  LVEDV MOD A4C: 129.12 ml  LVEDV MOD BP: 129.38 ml  LVEF MOD A2C: 42.4 %  LVEF MOD A4C: 43.95 %  LVESV MOD A2C: 71.76 ml  LVESV MOD A4C: 72.37 ml  LVESV MOD BP: 73.68 ml  LVIDd: 4.95 cm  LVIDs: 3.74 cm  LVLd A2C: 8.93 cm  LVLd A4C: 9.34 cm  LVLs A2C: 7.26 cm  LVLs A4C: 7.6 cm  LVPWd: 1.36 cm  RA Area: 15.96 cm2  RVIDd: 3.18 cm  SV MOD A2C: 52.83 ml  SV MOD A4C: 56.75 ml  SV(Teich): 55.95 ml    CW  TR Vmax: 2.6 m/s  TR maxP.98 mmHg    MM  TAPSE: 2.3 cm    PW  E' Sept: 0.04 m/s  E/E' Sept: 23.32  MV A Parveen: 1.15 m/s  MV Dec Bowie: 5.46 m/s2  MV DecT: 171.39 ms  MV E Parveen: 0.94 m/s  MV E/A Ratio: 0.82  MV PHT: 49.7 ms  MVA By PHT: 4.43 cm2    In[employer]oci[employer] Commission Accredited Echocardiography Laboratory    Prepared and electronically signed by    Mert Rebolledo MD  Signed 2021 09:02:38    No results found for this or any previous visit.      Cath:    No results found for this or any previous visit.      Code Status: No Order  Advance Directive and Living Will:      Power of :    POLST:    Medications - No data to display  XR chest 2 views   Final Result       No acute cardiopulmonary disease.               Workstation performed: [id number]           Orders Placed This Encounter   Procedures    XR chest 2 views    Comprehensive metabolic panel    B-Type Natriuretic Peptide(BNP)    CBC and differential    UA w Reflex to Microscopic w Reflex to Culture    Basic metabolic panel    ECG 12 lead     Labs Reviewed   COMPREHENSIVE METABOLIC PANEL - Abnormal       Result Value Ref Range Status    Sodium 137  135 - 147 mmol/L Final    Potassium 6.2 (*) 3.5 - 5.3 mmol/L Final    Comment: Moderately Hemolyzed:Results may be affected.    Chloride 106  96 - 108 mmol/L Final    CO2 21  21 - 32 mmol/L Final    ANION GAP 10  4 - 13 mmol/L Final    BUN 19  5 - 25 mg/dL Final    Creatinine 1.33 (*) 0.60 - 1.30 mg/dL Final    Comment: Standardized to IDMS reference method    Glucose 91  65 - 140 mg/dL Final    Comment: If the patient is fasting, the ADA then defines impaired fasting glucose as > 100 mg/dL and diabetes as > or equal to 123 mg/dL.    Calcium 9.5  8.4 - 10.2 mg/dL Final    AST 31  13 - 39 U/L Final    Comment: Moderately Hemolyzed:Results may be affected.    ALT 16  7 - 52 U/L Final    Comment: Specimen collection should occur prior to Sulfasalazine administration due to the potential for falsely depressed results.     Alkaline Phosphatase 60  34 - 104 U/L Final    Total Protein 7.9  6.4 - 8.4 g/dL Final    Comment: Moderately Hemolyzed:Results may be affected.    Albumin 4.6  3.5 - 5.0 g/dL Final    Comment: Moderately Hemolyzed:Results may be affected.    Total Bilirubin 0.45  0.20 - 1.00 mg/dL Final    Comment: Use of this assay is not recommended for patients undergoing treatment with eltrombopag due to the potential for falsely elevated results.  N-acetyl-p-benzoquinone imine (metabolite of Acetaminophen) will generate erroneously low results in samples for patients that have taken an overdose of Acetaminophen.    eGFR 40  ml/min/1.73sq m Final    Narrative:     National  Kidney Disease Foundation guidelines for Chronic Kidney Disease (CKD):     Stage 1 with normal or high GFR (GFR > 90 mL/min/1.73 square meters)    Stage 2 Mild CKD (GFR = 60-89 mL/min/1.73 square meters)    Stage 3A Moderate CKD (GFR = 45-59 mL/min/1.73 square meters)    Stage 3B Moderate CKD (GFR = 30-44 mL/min/1.73 square meters)    Stage 4 Severe CKD (GFR = 15-29 mL/min/1.73 square meters)    Stage 5 End Stage CKD (GFR <15 mL/min/1.73 square meters)  Note: GFR calculation is accurate only with a steady state creatinine   CBC AND DIFFERENTIAL - Abnormal    WBC 7.53  4.31 - 10.16 Thousand/uL Final    RBC 4.43  3.81 - 5.12 Million/uL Final    Hemoglobin 13.9  11.5 - 15.4 g/dL Final    Hematocrit 43.0  34.8 - 46.1 % Final    MCV 97  82 - 98 fL Final    MCH 31.4  26.8 - 34.3 pg Final    MCHC 32.3  31.4 - 37.4 g/dL Final    RDW 14.6  11.6 - 15.1 % Final    MPV 9.9  8.9 - 12.7 fL Final    Platelets 386  149 - 390 Thousands/uL Final    nRBC 0  /100 WBCs Final    Segmented % 48  43 - 75 % Final    Immature Grans % 0  0 - 2 % Final    Lymphocytes % 33  14 - 44 % Final    Monocytes % 13 (*) 4 - 12 % Final    Eosinophils Relative 5  0 - 6 % Final    Basophils Relative 1  0 - 1 % Final    Absolute Neutrophils 3.62  1.85 - 7.62 Thousands/µL Final    Absolute Immature Grans 0.02  0.00 - 0.20 Thousand/uL Final    Absolute Lymphocytes 2.46  0.60 - 4.47 Thousands/µL Final    Absolute Monocytes 0.96  0.17 - 1.22 Thousand/µL Final    Eosinophils Absolute 0.39  0.00 - 0.61 Thousand/µL Final    Basophils Absolute 0.08  0.00 - 0.10 Thousands/µL Final   B-TYPE NATRIURETIC PEPTIDE (BNP) - Normal    BNP 43  0 - 100 pg/mL Final   UA W REFLEX TO MICROSCOPIC WITH REFLEX TO CULTURE    Color, UA Colorless   Final    Clarity, UA Clear   Final    Specific Gravity, UA 1.006  1.003 - 1.030 Final    pH, UA 5.5  4.5, 5.0, 5.5, 6.0, 6.5, 7.0, 7.5, 8.0 Final    Leukocytes, UA Negative  Negative Final    Nitrite, UA Negative  Negative Final    Protein,  UA Negative  Negative mg/dl Final    Glucose, UA Negative  Negative mg/dl Final    Ketones, UA Negative  Negative mg/dl Final    Urobilinogen, UA <2.0  <2.0 mg/dl mg/dl Final    Bilirubin, UA Negative  Negative Final    Occult Blood, UA Negative  Negative Final     Time reflects when diagnosis was documented in both MDM as applicable and the Disposition within this note       Time User Action Codes Description Comment    7/25/2024 11:40 PM Christiano Broderick [E87.5] Hyperkalemia     7/25/2024 11:40 PM Christiano Broderick Add [I50.9] CHF (congestive heart failure) ([facility])     7/25/2024 11:40 PM Christiano Broderick Add [J44.9] COPD (chronic obstructive pulmonary disease) ([facility])           ED Disposition       ED Disposition   Discharge    Condition   Stable    Date/Time   Th Jul 25, 2024 11:40 PM    Comment   Stacie Rockwell discharge to home/self care.                   Follow-up Information       Follow up With Specialties Details Why Contact Info    Mendel Whittaker MD Internal Medicine In 1 week  [address] [place] [id number]  [phone]            Discharge Medication List as of 7/25/2024 11:41 PM        CONTINUE these medications which have NOT CHANGED    Details   acetaminophen (TYLENOL) 650 mg CR tablet Take 650 mg by mouth every 8 (eight) hours as needed , Historical Med      albuterol (PROVENTIL HFA,VENTOLIN HFA) 90 mcg/act inhaler INHALE 2 PUFFS EVERY 6 HOURS AS NEEDED FOR WHEEZING, Normal      atorvastatin (LIPITOR) 40 mg tablet Take 1 tablet (40 mg total) by mouth daily For cholesterol, Starting Tue 3/12/2024, Normal      Blood Pressure Monitoring (Blood Pressure Monitor/M Cuff) MISC Use daily, Starting Wed 12/30/2020, Normal      celecoxib (CeleBREX) 200 mg capsule Take 1 capsule (200 mg total) by mouth 2 (two) times a day, Starting Thu 5/9/2024, Until Wed 8/7/2024, Normal      Diclofenac Sodium (VOLTAREN) 1 % Apply 2 g topically 4 (four) times a day as needed (neck pain), Starting Fri 12/3/2021, Normal       Empagliflozin (Jardiance) 10 MG TABS tablet Take 1 tablet (10 mg total) by mouth every morning, Starting Thu 7/25/2024, Normal      furosemide (LASIX) 20 mg tablet Take 2 tablets (40 mg total) by mouth 2 (two) times a day, Starting Thu 7/25/2024, Normal      methylPREDNISolone 4 MG tablet therapy pack Use as directed on package, Normal      metoprolol succinate (TOPROL-XL) 50 mg 24 hr tablet Take 1.5 tablets (75 mg total) by mouth 2 (two) times a day, Starting Wed 5/22/2024, Until Sun 2/16/2025, Normal      nitroglycerin (NITROSTAT) 0.4 mg SL tablet Place 1 tablet (0.4 mg total) under the tongue every 5 (five) minutes as needed for chest pain (take every 5 minutes for chest pain with max 3 doses), Starting Fri 12/3/2021, Normal      omeprazole (PriLOSEC) 40 MG capsule Take 40 mg by mouth daily, Historical Med      potassium chloride (Klor-Con M20) 20 mEq tablet Take 1 tablet (20 mEq total) by mouth 2 (two) times a day, Starting u 7/25/2024, Normal      sacubitril-valsartan (Entresto) 24-26 MG TABS Take 1 tablet by mouth 2 (two) times a day, Starting Fri 4/19/2024, Normal      spironolactone (ALDACTONE) 25 mg tablet TAKE 1 TABLET BY MOUTH TWICE A DAY, Normal           Outpatient Discharge Orders   Basic metabolic panel   Standing Status: Future Standing Exp. Date: 07/25/25     Prior to Admission Medications   Prescriptions Last Dose Informant Patient Reported? Taking?   Blood Pressure Monitoring (Blood Pressure Monitor/M Cuff) MISC  Self No No   Sig: Use daily   Diclofenac Sodium (VOLTAREN) 1 %  Self No No   Sig: Apply 2 g topically 4 (four) times a day as needed (neck pain)   Patient not taking: Reported on 7/25/2024   Empagliflozin (Jardiance) 10 MG TABS tablet   No No   Sig: Take 1 tablet (10 mg total) by mouth every morning   acetaminophen (TYLENOL) 650 mg CR tablet  Self Yes No   Sig: Take 650 mg by mouth every 8 (eight) hours as needed    albuterol (PROVENTIL HFA,VENTOLIN HFA) 90 mcg/act inhaler  Self No No  "  Sig: INHALE 2 PUFFS EVERY 6 HOURS AS NEEDED FOR WHEEZING   atorvastatin (LIPITOR) 40 mg tablet  Self No No   Sig: Take 1 tablet (40 mg total) by mouth daily For cholesterol   celecoxib (CeleBREX) 200 mg capsule  Self No No   Sig: Take 1 capsule (200 mg total) by mouth 2 (two) times a day   furosemide (LASIX) 20 mg tablet   No No   Sig: Take 2 tablets (40 mg total) by mouth 2 (two) times a day   methylPREDNISolone 4 MG tablet therapy pack  Self No No   Sig: Use as directed on package   Patient not taking: Reported on 7/25/2024   metoprolol succinate (TOPROL-XL) 50 mg 24 hr tablet  Self No No   Sig: Take 1.5 tablets (75 mg total) by mouth 2 (two) times a day   nitroglycerin (NITROSTAT) 0.4 mg SL tablet  Self No No   Sig: Place 1 tablet (0.4 mg total) under the tongue every 5 (five) minutes as needed for chest pain (take every 5 minutes for chest pain with max 3 doses)   omeprazole (PriLOSEC) 40 MG capsule  Self Yes No   Sig: Take 40 mg by mouth daily   potassium chloride (Klor-Con M20) 20 mEq tablet   No No   Sig: Take 1 tablet (20 mEq total) by mouth 2 (two) times a day   sacubitril-valsartan (Entresto) 24-26 MG TABS  Self No No   Sig: Take 1 tablet by mouth 2 (two) times a day   Patient not taking: Reported on 4/26/2024   spironolactone (ALDACTONE) 25 mg tablet  Self No No   Sig: TAKE 1 TABLET BY MOUTH TWICE A DAY      Facility-Administered Medications: None                        Portions of the record may have been created with voice recognition software. Occasional wrong word or \"sound a like\" substitutions may have occurred due to the inherent limitations of voice recognition software. Read the chart carefully and recognize, using context, where substitutions have occurred.    Electronically signed by:  Davonte Vance  "

## 2024-07-31 ENCOUNTER — APPOINTMENT (OUTPATIENT)
Dept: LAB | Facility: CLINIC | Age: 69
End: 2024-07-31
Payer: MEDICARE

## 2024-07-31 DIAGNOSIS — E87.5 HYPERKALEMIA: ICD-10-CM

## 2024-07-31 DIAGNOSIS — I50.22 HEART FAILURE WITH MID-RANGE EJECTION FRACTION (HCC): ICD-10-CM

## 2024-07-31 LAB
ANION GAP SERPL CALCULATED.3IONS-SCNC: 10 MMOL/L (ref 4–13)
BUN SERPL-MCNC: 19 MG/DL (ref 5–25)
CALCIUM SERPL-MCNC: 9.3 MG/DL (ref 8.4–10.2)
CHLORIDE SERPL-SCNC: 108 MMOL/L (ref 96–108)
CO2 SERPL-SCNC: 19 MMOL/L (ref 21–32)
CREAT SERPL-MCNC: 1.15 MG/DL (ref 0.6–1.3)
GFR SERPL CREATININE-BSD FRML MDRD: 47 ML/MIN/1.73SQ M
GLUCOSE P FAST SERPL-MCNC: 99 MG/DL (ref 65–99)
POTASSIUM SERPL-SCNC: 4.3 MMOL/L (ref 3.5–5.3)
SODIUM SERPL-SCNC: 137 MMOL/L (ref 135–147)

## 2024-07-31 PROCEDURE — 80048 BASIC METABOLIC PNL TOTAL CA: CPT

## 2024-07-31 PROCEDURE — 36415 COLL VENOUS BLD VENIPUNCTURE: CPT

## 2024-08-01 ENCOUNTER — TELEPHONE (OUTPATIENT)
Dept: CARDIOLOGY CLINIC | Facility: CLINIC | Age: 69
End: 2024-08-01

## 2024-08-01 NOTE — TELEPHONE ENCOUNTER
----- Message from Lauren Rowe MD sent at 7/31/2024  4:10 PM EDT -----  Please let her know potassium is normal and kidney function back to baseline. Stay off potassium and continue lasix 40mg in AM and 20mg in PM

## 2024-08-01 NOTE — TELEPHONE ENCOUNTER
Called pt and left a detailed VM with results and med instructions. Call back number was provided incase they have any questions.

## 2024-08-05 ENCOUNTER — TELEPHONE (OUTPATIENT)
Dept: CARDIOLOGY CLINIC | Facility: CLINIC | Age: 69
End: 2024-08-05

## 2024-09-08 DIAGNOSIS — I10 PRIMARY HYPERTENSION: ICD-10-CM

## 2024-09-09 RX ORDER — SACUBITRIL AND VALSARTAN 24; 26 MG/1; MG/1
1 TABLET, FILM COATED ORAL 2 TIMES DAILY
Qty: 90 TABLET | Refills: 1 | Status: SHIPPED | OUTPATIENT
Start: 2024-09-09

## 2024-10-17 ENCOUNTER — OFFICE VISIT (OUTPATIENT)
Dept: CARDIOLOGY CLINIC | Facility: CLINIC | Age: 69
End: 2024-10-17
Payer: MEDICARE

## 2024-10-17 VITALS
WEIGHT: 198 LBS | HEART RATE: 84 BPM | BODY MASS INDEX: 36.44 KG/M2 | OXYGEN SATURATION: 97 % | DIASTOLIC BLOOD PRESSURE: 76 MMHG | SYSTOLIC BLOOD PRESSURE: 128 MMHG | HEIGHT: 62 IN

## 2024-10-17 DIAGNOSIS — I50.22 HEART FAILURE WITH MID-RANGE EJECTION FRACTION (HCC): Primary | ICD-10-CM

## 2024-10-17 DIAGNOSIS — E78.5 DYSLIPIDEMIA: ICD-10-CM

## 2024-10-17 DIAGNOSIS — I10 ESSENTIAL HYPERTENSION: ICD-10-CM

## 2024-10-17 DIAGNOSIS — G47.33 OBSTRUCTIVE SLEEP APNEA, ADULT: ICD-10-CM

## 2024-10-17 PROCEDURE — 99214 OFFICE O/P EST MOD 30 MIN: CPT | Performed by: INTERNAL MEDICINE

## 2024-10-17 PROCEDURE — G2211 COMPLEX E/M VISIT ADD ON: HCPCS | Performed by: INTERNAL MEDICINE

## 2024-10-17 NOTE — PROGRESS NOTES
Advanced Heart Failure Outpatient Progress Note - Stacie Rockwell 69 y.o. female MRN: [id number]    Encounter: [id number]      Assessment/Plan:    Patient Active Problem List    Diagnosis Date Noted    Prediabetes 12/03/2021    Decreased hearing of both ears 12/30/2020    Cataract 10/23/2020    Arthropathic psoriasis (HCC) 09/11/2017    Chronic combined systolic and diastolic congestive heart failure (HCC) 09/11/2017    Obesity (BMI 30-39.9) 09/11/2017    Allergic rhinitis 10/26/2015    Obstructive sleep apnea, adult 11/13/2014    Dyslipidemia 04/24/2013    Hypertension 11/08/2012    Obesity, morbid (HCC) 04/25/2024    Chronic obstructive pulmonary disease (HCC) 04/19/2024    Simple chronic bronchitis ([facility]) 11/01/2023    Plantar fasciitis 11/01/2023    History of hepatitis C 01/30/2023    Stage 3 chronic kidney disease, unspecified whether stage 3a or 3b CKD ([facility]) 03/31/2022     # Heart failure with mid range ejection fraction, Stage C, NYHA II  Etiology: Unclear. No definitive evidence of ischemia on nuclear stress test. Hypertension controlled. LBBB likely chronic. Dyscynchrony noted on echo since 2016. No heavy alcohol or drug use  Euvolemic to mildly volume up on exam    Weight: 192 pounds 4/25/2024; 196 lbs 7/25/24; 198 lbs 10/17/24  BNP: 43 7/25/24      Studies- personally reviewed by me    Echo 5/10/24:   LVEF 50%  LVIDD , mildly increased wall thickness  Trace TR    Echo 1/3/22:  LVEF: 50%  LVIDd: 5.9cm  RV: normal size and systolic function  MR: mild  PASP:  24 mmHg  RVOT: mid systolic notching suggesting elevated pulmonary vascular resistance  Other: grade 2 diastology, no regional wall motion abnormalities seen, abnormal septal motion consistent with bundle branch block  IVC normal with normal respirophasic variation    Pharm Nuc Stress 9/27/21: Abnormal study after pharmacologic vasodilation without reproduction of symptoms. Inferior defect improved with prone imaging likely diaphragmatic attenuation.  Partially reversible anteroapical defect likely shifting breast, cannot exclude small area of distal LAD ischemia. Left ventricular systolic function was reduced, without distinct regional wall motion abnormalities. LVEF 47%    Echocardiogram 7/8/21  LVEF: 40-45%  LVIDd: 5cm  RV: normal size and systolic function  MR: mild  PASP: 30mmHg  RVOT: mid systolic notching suggesting elevated pulmonary vascular resistance  Other: hypokinesis of inferior and septal walls, mild to moderately increased wall thickness, mild LAE    TTE 5/6/2016: LVEF 50%. Moderate concentric hypertrophy. Hypokinesis of mid anteroseptal, basal inferior and basal to mid inferolateral walls. Grade 1 diastology. Normal RV size and systolic function    Diet:  2 g sodium diet  2000 mL fluid restriction    Neurohormonal Blockade:  --Beta-Blocker: metoprolol succinate 75mg BID  --ACEi, ARB or ARNi: entresto 24-26mg BID  --Aldosterone Receptor Blocker: spironolactone 25mg BID  --SGLT2 Inhibitor: empagliflozin 10mg daily  --Diuretic: furosemide 40mg in AM and 20mg in PM     Sudden Cardiac Death Risk Reduction:  --ICD:  LVEF >35%    Electrical Resynchronization:  --Candidacy for BiV device:    Advanced Therapies (if appropriate):  --We will continue to monitor, no indication at this time    # Hypertension, controlled  # Hyperlipidemia  4/19/2024  HDL 53   Continue atorvastatin 40mg daily, patient will try to do better with diet and exercise  # LUIZA on CPAP, tolerating better  # Obesity  # LBBB, likely chronic as noted above    Today's Plan:  Continue current medications  2g sodium diet  2L fluid diet  Daily weights  Physical activities/exercise as tolerated  Weight loss        HPI:   69 y.o. female with past medical history as above who is here for follow-up.    2/22/23: Here for follow up. Overall doing well. Trying to lose weight. Short of breath walking up hill. Still trying to do elliptical but recently having left ankle pain. No leg  swelling, PND, or orthopnea. No abdominal fullness. No chest pain or lightheadedness    6/26/23: Here for follow up. Continues to do well. No chest pain or shortness of breath. Mainly limited by foot pain and seen recently by rheumatology.  Still continues to stay active walking uphill. No leg swelling or abdominal distension. Occasional PND when not using CPAP. Cannot tolerate CPAP all night. 6/5/23 Na 137 K 4.5 creatinine 0.95.    Per TH 10/26/23. Presents for follow up. Missed her visit with Dr. Rowe this week due to transportation issues. She is feeling well. Thrilled that she lost 10 pounds. Says she did this by cutting out sweets and walking more. She is feeling well functionally. Exercise/activity tolerance improved. She has noticed some scapular pain with occasional chest pressure at rest. This occurs infrequently, maybe twice per month. Not sure if it's muscular or otherwise. Usually subsides over a few minutes.     4/25/24: here for follow up.  Has gained weight back since last office visit as above with Milvia.  Likely due to increased caloric intake.  Reports some shortness of breath and leg swelling.  Saw PCP in April 19 with wheezing.  Repeat echocardiogram was ordered.  She denies any chest pain, palpitations, dizziness or lightheadedness.    7/25/24: Here for follow up. Increased lasix and potassium dose on last visit. Due for BMP ordered.  Continues to gain weight.  Has not been active lately due to left foot or ankle pain.  Stable shortness of breath with more walking and walking uphill.  No chest pain on current level of exertion.  No palpitations, dizziness or lightheadedness.  Adherent to medications.  Doing her best to adhere with diet and overall eat better    10/17/24: Here for follow up. Increased lasix and potassium on last visit. Repeat BMP showed hyperkalemia. Sent to the ED for eval but sample hemolyzed and potassium still elevated at 6.2. She was discharged home. Continued on prior dose  of lasix 40mg in AM and 20mg in PM and stopped potassium. 7/31/24 Na 137 K 4.3 and creatinine 1.15.  Reports overall doing well.  No shortness of breath on current level of exertion.  She is not doing as much walking but is able to do do her daily activities including food shopping.  She is also limited by foot pain.  Denies leg swelling, PND orthopnea.  No palpitations, dizziness or lightheadedness.        Past Medical History:   Diagnosis Date    Chronic ankle pain     Unspec laterality, Last assessed - 6/22/16    Chronic hepatitis C ([facility])     Complete left bundle branch block (LBBB)     COPD (chronic obstructive pulmonary disease) ([facility])     Eczema     B/L elbows, start Hydrocortisone; Last assessed - 8/5/15    HLD (hyperlipidemia)     Hydrocodone use disorder, moderate, in controlled environment ([facility]) 3/11/2015    Violated CSMA, stopped    Hypertension     Osteoarthritis, knee     Rectal polyp     Sleep apnea     Violation of controlled substance agreement 10/23/2019    See notes of 10-       Review of Systems   Constitutional:  Negative for chills, fatigue and fever.   HENT:  Negative for ear pain and sore throat.    Eyes:  Negative for pain and visual disturbance.   Respiratory:  Negative for cough and chest tightness.    Cardiovascular:  Negative for chest pain, palpitations and leg swelling.   Gastrointestinal:  Negative for abdominal distention, abdominal pain and vomiting.   Genitourinary:  Negative for dysuria and hematuria.   Musculoskeletal:  Negative for arthralgias and back pain.   Skin:  Negative for color change and rash.   Neurological:  Negative for dizziness, seizures and syncope.   All other systems reviewed and are negative.       No Known Allergies  .    Current Outpatient Medications:     acetaminophen (TYLENOL) 650 mg CR tablet, Take 650 mg by mouth every 8 (eight) hours as needed , Disp: , Rfl:     albuterol (PROVENTIL HFA,VENTOLIN HFA) 90 mcg/act inhaler, INHALE 2 PUFFS EVERY 6 HOURS  AS NEEDED FOR WHEEZING, Disp: 8.5 g, Rfl: 3    atorvastatin (LIPITOR) 40 mg tablet, Take 1 tablet (40 mg total) by mouth daily For cholesterol, Disp: 90 tablet, Rfl: 3    Blood Pressure Monitoring (Blood Pressure Monitor/M Cuff) MISC, Use daily, Disp: 1 each, Rfl: 0    celecoxib (CeleBREX) 200 mg capsule, Take 1 capsule (200 mg total) by mouth 2 (two) times a day, Disp: 180 capsule, Rfl: 1    Empagliflozin (Jardiance) 10 MG TABS tablet, Take 1 tablet (10 mg total) by mouth every morning, Disp: 30 tablet, Rfl: 5    Entresto 24-26 MG TABS, TAKE 1 TABLET BY MOUTH TWICE A DAY, Disp: 90 tablet, Rfl: 1    furosemide (LASIX) 20 mg tablet, Take 2 tablets (40 mg total) by mouth 2 (two) times a day, Disp: 360 tablet, Rfl: 1    metoprolol succinate (TOPROL-XL) 50 mg 24 hr tablet, Take 1.5 tablets (75 mg total) by mouth 2 (two) times a day, Disp: 270 tablet, Rfl: 2    nitroglycerin (NITROSTAT) 0.4 mg SL tablet, Place 1 tablet (0.4 mg total) under the tongue every 5 (five) minutes as needed for chest pain (take every 5 minutes for chest pain with max 3 doses), Disp: 9 tablet, Rfl: 0    omeprazole (PriLOSEC) 40 MG capsule, Take 40 mg by mouth daily, Disp: , Rfl:     spironolactone (ALDACTONE) 25 mg tablet, TAKE 1 TABLET BY MOUTH TWICE A DAY, Disp: 180 tablet, Rfl: 1    Diclofenac Sodium (VOLTAREN) 1 %, Apply 2 g topically 4 (four) times a day as needed (neck pain) (Patient not taking: Reported on 7/25/2024), Disp: 100 g, Rfl: 1    methylPREDNISolone 4 MG tablet therapy pack, Use as directed on package (Patient not taking: Reported on 7/25/2024), Disp: 21 tablet, Rfl: 0    Social History     Socioeconomic History    Marital status:      Spouse name: Not on file    Number of children: Not on file    Years of education: Not on file    Highest education level: Not on file   Occupational History    Not on file   Tobacco Use    Smoking status: Former     Current packs/day: 0.00     Types: Cigarettes     Quit date: 2/25/1999      Years since quittin.6    Smokeless tobacco: Never   Vaping Use    Vaping status: Never Used   Substance and Sexual Activity    Alcohol use: Yes     Comment: social    Drug use: No    Sexual activity: Yes   Other Topics Concern    Not on file   Social History Narrative    Home environment-safe         Social Determinants of Health     Financial Resource Strain: Low Risk  (2024)    Overall Financial Resource Strain (CARDIA)     Difficulty of Paying Living Expenses: Not hard at all   Food Insecurity: No Food Insecurity (2024)    Hunger Vital Sign     Worried About Running Out of Food in the Last Year: Never true     Ran Out of Food in the Last Year: Never true   Transportation Needs: No Transportation Needs (2024)    PRAPARE - Transportation     Lack of Transportation (Medical): No     Lack of Transportation (Non-Medical): No   Physical Activity: Sufficiently Active (8/10/2021)    Exercise Vital Sign     Days of Exercise per Week: 7 days     Minutes of Exercise per Session: 60 min   Recent Concern: Physical Activity - Inactive (2021)    Exercise Vital Sign     Days of Exercise per Week: 0 days     Minutes of Exercise per Session: 0 min   Stress: No Stress Concern Present (8/10/2021)    [employer] - Occupational Stress Questionnaire     Feeling of Stress : Not at all   Social Connections: Not on file   Intimate Partner Violence: Not At Risk (8/10/2021)    Humiliation, Afraid, Rape, and Kick questionnaire     Fear of Current or Ex-Partner: No     Emotionally Abused: No     Physically Abused: No     Sexually Abused: No   Housing Stability: Low Risk  (2024)    Housing Stability Vital Sign     Unable to Pay for Housing in the Last Year: No     Number of Times Moved in the Last Year: 1     Homeless in the Last Year: No       Family History   Problem Relation Age of Onset    Heart disease Mother         Pacemaker    Hypertension Mother     Heart disease Father      "Heart disease Sister     Hypertension Sister     No Known Problems Sister     No Known Problems Daughter     No Known Problems Daughter     No Known Problems Maternal Grandmother     No Known Problems Maternal Grandfather     No Known Problems Paternal Grandmother     No Known Problems Paternal Grandfather     Alcohol abuse Brother     Heart disease Brother     Cirrhosis Brother         Liver     Hypertension Brother     No Known Problems Brother     No Known Problems Maternal Aunt     No Known Problems Cousin        Physical Exam:    Vitals: Blood pressure 128/76, pulse 84, height 5' 2\" (1.575 m), weight 89.8 kg (198 lb), SpO2 97%, not currently breastfeeding., Body mass index is 36.21 kg/m².,   Wt Readings from Last 3 Encounters:   10/17/24 89.8 kg (198 lb)   07/25/24 89.3 kg (196 lb 14.4 oz)   07/03/24 88.5 kg (195 lb)       Physical Exam  Constitutional:       General: She is not in acute distress.     Appearance: Normal appearance.   HENT:      Head: Normocephalic and atraumatic.      Mouth/Throat:      Mouth: Mucous membranes are moist.   Eyes:      Extraocular Movements: Extraocular movements intact.      Conjunctiva/sclera: Conjunctivae normal.   Neck:      Vascular: No JVD.   Cardiovascular:      Rate and Rhythm: Normal rate and regular rhythm.      Pulses: Normal pulses.      Heart sounds: No murmur heard.     No friction rub. No gallop.   Pulmonary:      Effort: Pulmonary effort is normal. No respiratory distress.      Breath sounds: No wheezing, rhonchi or rales.   Abdominal:      General: There is no distension.      Palpations: Abdomen is soft.      Tenderness: There is no abdominal tenderness. There is no guarding.   Musculoskeletal:         General: No deformity or signs of injury.      Cervical back: Neck supple.      Right lower leg: Edema (trace) present.      Left lower leg: Edema (trace) present.   Skin:     General: Skin is warm and dry.      Capillary Refill: Capillary refill takes less than 2 " "seconds.   Neurological:      General: No focal deficit present.      Mental Status: She is alert and oriented to person, place, and time.   Psychiatric:         Mood and Affect: Mood normal.         Labs & Results:    Lab Results   Component Value Date    WBC 7.53 07/25/2024    HGB 13.9 07/25/2024    HCT 43.0 07/25/2024    MCV 97 07/25/2024     07/25/2024     Lab Results   Component Value Date    SODIUM 137 07/31/2024    K 4.3 07/31/2024     07/31/2024    CO2 19 (L) 07/31/2024    BUN 19 07/31/2024    CREATININE 1.15 07/31/2024    GLUC 91 07/25/2024    CALCIUM 9.3 07/31/2024     No results found for: \"NTBNP\"   Lab Results   Component Value Date    CHOLESTEROL 191 04/19/2024    CHOLESTEROL 159 08/10/2021    CHOLESTEROL 155 08/16/2019     Lab Results   Component Value Date    HDL 53 04/19/2024    HDL 50 08/10/2021    HDL 44 08/16/2019     Lab Results   Component Value Date    TRIG 175 (H) 04/19/2024    TRIG 134 08/10/2021    TRIG 115 08/16/2019     No results found for: \"NONHDLC\"    EKG personally reviewed by Marietta Rowe.     Counseling / Coordination of Care  I have spent a total time of 30 minutes in caring for this patient on the day of the visit/encounter including Instructions for management, Documenting in the medical record, Reviewing / ordering tests, medicine, procedures  , and Obtaining or reviewing history  .      Thank you for the opportunity to participate in the care of this patient.    MARIETTA ROWE MD  ADVANCED HEART FAILURE AND MECHANICAL CIRCULATORY SUPPORT  [facility]    "

## 2024-10-17 NOTE — PATIENT INSTRUCTIONS
Continue current medications  2g sodium diet  2L fluid diet  Daily weights  Physical activities/exercise as tolerated  Weight loss

## 2024-10-26 DIAGNOSIS — J44.9 CHRONIC OBSTRUCTIVE PULMONARY DISEASE, UNSPECIFIED COPD TYPE (HCC): ICD-10-CM

## 2024-10-26 DIAGNOSIS — Z00.00 VISIT FOR ANNUAL HEALTH EXAMINATION: ICD-10-CM

## 2024-10-28 RX ORDER — ALBUTEROL SULFATE 90 UG/1
INHALANT RESPIRATORY (INHALATION)
Qty: 8.5 G | Refills: 3 | Status: SHIPPED | OUTPATIENT
Start: 2024-10-28

## 2024-11-02 DIAGNOSIS — L40.50 PSORIATIC ARTHRITIS (HCC): ICD-10-CM

## 2024-11-04 RX ORDER — CELECOXIB 200 MG/1
200 CAPSULE ORAL 2 TIMES DAILY
Qty: 180 CAPSULE | Refills: 1 | Status: SHIPPED | OUTPATIENT
Start: 2024-11-04 | End: 2024-11-07

## 2024-11-04 NOTE — PROGRESS NOTES
Rheumatology Follow-up Visit  Name: Stacie Rockwell      : 1955      MRN: [id number]  Encounter Provider: Keerthi Schmitt MD  Encounter Date: 2024   Encounter department: [facility] [address]    Assessment & Plan  Achilles tendinitis of left lower extremity  69-year-old female who presents for follow-up of possible psoriatic arthritis.  I am unsure of this diagnosis at this time.  Her only symptom is left Achilles tendinitis but it is difficult to say that this is truly enthesitis related to an inflammatory process.  She has a large spur on the posterior calcaneus.  It would be unusual for a 68-year-old female to develop sudden onset psoriatic arthritis.  Suspect that she probably just has more mechanical Achilles tendinitis rather than a true inflammatory enthesitis.  No evidence of psoriasis on exam today.  Reviewed her labs from July which show she had an acute kidney injury.  I would recommend updating labs today.  If she has any persistent kidney dysfunction, discussed with patient that it would not be safe to continue with Celebrex.  In that case, would recommend patient follow back up with podiatry for possible other interventions as she has been in the past.  Follow-up in 6 months.       Encounter for long-term (current) use of NSAIDs    Orders:    Basic metabolic panel; Future      History of Present Illness     Stacie Rockwell is a 69 y.o. female who presents for follow-up.     Interval History:  Patient reports she is doing okay.  Patient reports that sometimes she still gets pain in the left ankle.  She reports that the pain is more persistently in the heel when she is walking.  Sometimes she will get pain in the Achilles area.  She continues to take the Celebrex which she finds helpful.  More recently she has had a flareup of her left-sided neck pain.  She has had this problem before.    Permanent History:  Patient diagnosed with psoriatic arthritis around 2018 by PCP.  " Negative HLA-B27.  X-rays without any clear findings of inflammatory arthritis other than R calcaneal spur.  She was started on scheduled Celebrex 12/2023 for possible Achilles enthesitis.    Review of Systems  Complete ROS conducted as per HPI.   + White discoloration of fingers in the cold weather  In addition, denies:  Muscle weakness  Uveitis  Dactylitis  Chest pain  SOB  Gross hematuria  Foamy urine  Joint issues other than noted above      Objective     /78 (BP Location: Left arm, Patient Position: Sitting)   Resp (!) 78   Ht 5' 2\" (1.575 m)   Wt 91.2 kg (201 lb)   SpO2 98%   BMI 36.76 kg/m²     Physical Exam  Physical Exam  Constitutional: well appearing, no acute distress  HEENT: normocephalic, sclera clear, no visible oral or nasal ulcers  Neck: supple, no palpable cervical adenopathy  CV: regular rate and rhythm, no murmur  Pulm: normal respiratory effort, lungs clear to auscultation b/l  Skin: no rashes, no skin thickening  Extremities: warm and well perfused, no edema  MSK:  - Observation: Slight fullness of the left Achilles tendon  - Palpation: No significant pain with palpation of the Achilles insertion  - Synovitis: Absent  - Effusion: Absent  - ROM: Normal    Labs and Imaging  I have personally reviewed pertinent labs and imaging.     "

## 2024-11-06 ENCOUNTER — APPOINTMENT (OUTPATIENT)
Dept: LAB | Facility: CLINIC | Age: 69
End: 2024-11-06
Payer: MEDICARE

## 2024-11-06 ENCOUNTER — OFFICE VISIT (OUTPATIENT)
Age: 69
End: 2024-11-06
Payer: MEDICARE

## 2024-11-06 VITALS
HEART RATE: 78 BPM | DIASTOLIC BLOOD PRESSURE: 78 MMHG | BODY MASS INDEX: 36.99 KG/M2 | WEIGHT: 201 LBS | HEIGHT: 62 IN | OXYGEN SATURATION: 98 % | SYSTOLIC BLOOD PRESSURE: 124 MMHG

## 2024-11-06 DIAGNOSIS — Z79.1 ENCOUNTER FOR LONG-TERM (CURRENT) USE OF NSAIDS: ICD-10-CM

## 2024-11-06 DIAGNOSIS — M76.62 ACHILLES TENDINITIS OF LEFT LOWER EXTREMITY: Primary | ICD-10-CM

## 2024-11-06 LAB
ANION GAP SERPL CALCULATED.3IONS-SCNC: 12 MMOL/L (ref 4–13)
BUN SERPL-MCNC: 26 MG/DL (ref 5–25)
CALCIUM SERPL-MCNC: 9.4 MG/DL (ref 8.4–10.2)
CHLORIDE SERPL-SCNC: 107 MMOL/L (ref 96–108)
CO2 SERPL-SCNC: 18 MMOL/L (ref 21–32)
CREAT SERPL-MCNC: 1.55 MG/DL (ref 0.6–1.3)
GFR SERPL CREATININE-BSD FRML MDRD: 33 ML/MIN/1.73SQ M
GLUCOSE P FAST SERPL-MCNC: 104 MG/DL (ref 65–99)
POTASSIUM SERPL-SCNC: 4.2 MMOL/L (ref 3.5–5.3)
SODIUM SERPL-SCNC: 137 MMOL/L (ref 135–147)

## 2024-11-06 PROCEDURE — 99214 OFFICE O/P EST MOD 30 MIN: CPT | Performed by: STUDENT IN AN ORGANIZED HEALTH CARE EDUCATION/TRAINING PROGRAM

## 2024-11-06 PROCEDURE — G2211 COMPLEX E/M VISIT ADD ON: HCPCS | Performed by: STUDENT IN AN ORGANIZED HEALTH CARE EDUCATION/TRAINING PROGRAM

## 2024-11-06 PROCEDURE — 80048 BASIC METABOLIC PNL TOTAL CA: CPT

## 2024-11-06 PROCEDURE — 36415 COLL VENOUS BLD VENIPUNCTURE: CPT

## 2024-11-07 ENCOUNTER — TELEPHONE (OUTPATIENT)
Age: 69
End: 2024-11-07

## 2024-11-07 DIAGNOSIS — M76.62 ACHILLES TENDINITIS OF LEFT LOWER EXTREMITY: Primary | ICD-10-CM

## 2024-11-07 DIAGNOSIS — N18.30 STAGE 3 CHRONIC KIDNEY DISEASE, UNSPECIFIED WHETHER STAGE 3A OR 3B CKD (HCC): ICD-10-CM

## 2024-11-07 NOTE — TELEPHONE ENCOUNTER
Please let her know referral was placed. I would also like her to get repeat labs done in 1 month to re-check kidneys. Order is placed.

## 2024-11-07 NOTE — TELEPHONE ENCOUNTER
Pt returned call.    Reviewed Dr. Schmitt instructions. Pt acknowledged instructions to stop Celebrex. Patient had no further questions and/or concerns at this time.       Pt agreeable to Podiatry referral. Please enter.

## 2024-11-07 NOTE — TELEPHONE ENCOUNTER
Please call patient and let her know her kidney function is worsened. She needs to stop taking the Celebrex as this is harmful to the kidneys. Would she like to go back to Podiatry to see if there are other non-medication treatments they can offer?

## 2024-12-06 ENCOUNTER — OFFICE VISIT (OUTPATIENT)
Dept: PODIATRY | Facility: CLINIC | Age: 69
End: 2024-12-06
Payer: MEDICARE

## 2024-12-06 ENCOUNTER — TELEPHONE (OUTPATIENT)
Age: 69
End: 2024-12-06

## 2024-12-06 DIAGNOSIS — M72.2 PLANTAR FASCIITIS: Primary | ICD-10-CM

## 2024-12-06 DIAGNOSIS — I10 PRIMARY HYPERTENSION: ICD-10-CM

## 2024-12-06 DIAGNOSIS — M76.62 ACHILLES TENDINITIS OF LEFT LOWER EXTREMITY: ICD-10-CM

## 2024-12-06 PROCEDURE — 99203 OFFICE O/P NEW LOW 30 MIN: CPT | Performed by: PODIATRIST

## 2024-12-06 NOTE — PATIENT INSTRUCTIONS
Patient Education     Plantar Fasciitis Exercises   About this topic   Plantar fasciitis is swelling of the thick tissue on the bottom of the foot. This tissue is called the plantar fascia. It connects the heel bone to the toes and supports the arch of the foot. Exercise is an important part of making this problem better.  General   Before starting with a program, ask your doctor if you are healthy enough to do these exercises. Your doctor may have you work with a  or physical therapist to make a safe exercise program to meet your needs.  Stretching Exercises   Stretching exercises keep your muscles flexible. They also stop them from getting tight. Start by doing each of these stretches 2 to 3 times. In order for your body to make changes, you will need to hold these stretches for 20 to 30 seconds. Try to do the stretches 2 to 3 times each day. Do all exercises slowly.  Calf stretches standing ? Stand about 12 to 18 inches (30 to 45 cm) away from a wall. Place your hands on the wall at shoulder level. Lean forward.  Knee straight - Stretch your left leg straight behind you. Make sure the left heel is flat on the floor and the left knee is straight. Now, bend the knee of the right leg until you feel a stretch in your left calf. Be sure that the heel does not come up. Repeat on the other side.  Knee bent - Take a small step forward with your left leg so your feet are slightly closer together. With your right leg bent, bend your left knee forward until you feel a stretch in the back of the calf of your left leg. This will feel strange, but it is the best way to stretch this calf muscle. Repeat on the other side.  Calf stretches lying down with belt or towel ? Lie on your back with both legs straight. Loop a belt or towel around the ball of one foot. Lift the leg up, keeping the knee straight until you feel a stretch in the back of your thigh. Pull back on the belt or towel to bend your foot more for a better  stretch. Repeat on the other foot. This stretch gets both your calf and the hamstrings on the back of your thigh.  Calf stretches on stairs ? Stand on a step and hold onto a rail. Position your feet so only the balls of your feet are on the step. Lower both heels until you feel a stretch in the back of your calves. Do not do this stretch if you have trouble with balance.  Crossed leg foot stretches ? Sit in a chair. Bend one leg up and rest the outside of the foot on the knee. Grab your foot and pull your toes and foot forward until you feel a stretch along the bottom of your foot. Repeat with the other foot.  Rolling foot ? Use a golf ball, tennis ball, soup can, a frozen water bottle, or a rolling pin for this exercise. Sit in a sturdy chair. Put the ball, can, or rolling pin underneath your sore foot. Push down firmly and roll it back and forth with your foot for 1 to 2 minutes. Work up to 3 to 4 minutes. If this exercise is too easy, try doing it while standing up with more pressure on the foot. Do this exercise last if you use a frozen water bottle. Heated tissue stretches better than cold tissue. Doing this last with a frozen water bottle can help lessen the pain and swelling that may happen with stretching.  Strengthening Exercises   Strengthening exercises keep your muscles firm and strong. Start by repeating each exercise 2 to 3 times. Work up to doing each exercise 10 times. Try to do the exercises 2 to 3 times each day. Do all exercises slowly.  Towel pick-ups ? Sit in a chair with your feet flat on the floor. Make sure you do not have shoes or socks on your feet. Place a towel under one foot with one end of the towel lined up with your heel. Keep your heel on the floor and try grabbing the towel with your toes. Gradually work it back until there is no more towel to move. Now, try pushing the towel back out while keeping your heel still. You can make this harder by putting a small weight on the end of the  towel.               What will the results be?   Less pain  Less pulling  Less swelling  Better flexibility and range of motion  Easier to walk and do other activities  Helpful tips   Stay active and work out to keep your muscles strong and flexible.  Keep a healthy weight to avoid putting too much stress on your joints. Eat a healthy diet to keep your muscles healthy.  Be sure you do not hold your breath when exercising. This can raise your blood pressure. If you tend to hold your breath, try counting out loud when exercising. If any exercise bothers you, stop right away.  Always warm up before stretching. Heated muscles stretch much easier than cool muscles. Stretching cool muscles can lead to injury.  Try walking or cycling at an easy pace for a few minutes to warm up your muscles. Do this again after exercising.  Never bounce when doing stretches.  Doing exercises before a meal may be a good way to get into a routine.  After exercising, it is a good idea to ice the bottom of your foot. A good way to do this is by sitting in a chair and rolling a frozen water bottle back and forth under your foot. Do not do this longer than 15 to 20 minutes.  Exercise may be slightly uncomfortable, but you should not have sharp pains. If you do get sharp pains, stop what you are doing. If the sharp pains continue, call your doctor.  Last Reviewed Date   2021-05-19  Consumer Information Use and Disclaimer   This generalized information is a limited summary of diagnosis, treatment, and/or medication information. It is not meant to be comprehensive and should be used as a tool to help the user understand and/or assess potential diagnostic and treatment options. It does NOT include all information about conditions, treatments, medications, side effects, or risks that may apply to a specific patient. It is not intended to be medical advice or a substitute for the medical advice, diagnosis, or treatment of a health care provider based  on the health care provider's examination and assessment of a patient’s specific and unique circumstances. Patients must speak with a health care provider for complete information about their health, medical questions, and treatment options, including any risks or benefits regarding use of medications. This information does not endorse any treatments or medications as safe, effective, or approved for treating a specific patient. [employer] and its affiliates disclaim any warranty or liability relating to this information or the use thereof. The use of this information is governed by the Terms of Use, available at https://www.[employer]ol[employer] Solutionsuwer.com/en/know/clinical-effectiveness-terms   Copyright   Copyright © 2024 [employer] and its affiliates and/or licensors. All rights reserved.

## 2024-12-06 NOTE — PROGRESS NOTES
Name: Stacie Rockwell      : 1955      MRN: [id number]  Encounter Provider: Steffen Sanchez DPM  Encounter Date: 2024   Encounter department: [facility] PODIATRY [facility]  :  Assessment & Plan  Achilles tendinitis of left lower extremity  Diagnosis and options discussed with patient  Patient agreeable to the plan as stated below    1. Discussed diagnosis and treatment options  2. Provided home therapy and stretching exercises  3. Educated eccentric exercises for the achilles enthesopathy.   4. Stressed compliance with home AND formal Physical Therapy  5. Purchase Tuli heel cups off [employer]. They will add a soft protection over the back of the heel  6. Voltaren gel is an over the counter anti-inflammatory. Safe to apply to the back of the heel 2-3 times per day to reduce pain/inflammation  7. RTC 6 weeks      Orders:    Ambulatory Referral to Podiatry    Ambulatory Referral to Physical Therapy; Future    Plantar fasciitis  Diagnosis and options discussed with patient  Patient agreeable to the plan as stated below    1. Provided home therapy and stretching exercises. These should be done AT MINIMUM three times per day  2. Deferred injections today, but did discuss the possibility.   3. Stressed compliance with home/formal PT and arch supports.      Orders:    Ambulatory Referral to Physical Therapy; Future        History of Present Illness   HPI  Stacie Rockwell is a 69 y.o. female who presents with left ankle pain. She says she has a lot of pain on the bottom and back of her ankle. She used to take celebrex for the pain but her doctor was worried about it hurting her kidneys. She had XRays a few months ago. She did do PT a year ago. IT helped but the pain didn't stay away.       Review of Systems  As stated in HPI, otherwise normal    Medical History Reviewed by provider this encounter:  Tobacco  Allergies  Meds  Problems  Med Hx  Surg Hx  Fam Hx           Objective   There  were no vitals taken for this visit.     Physical Exam  Vitals reviewed.   Constitutional:       Appearance: She is obese. She is not ill-appearing.   Cardiovascular:      Rate and Rhythm: Normal rate.      Pulses: Normal pulses.           Dorsalis pedis pulses are 2+ on the right side and 2+ on the left side.        Posterior tibial pulses are 2+ on the right side and 2+ on the left side.   Pulmonary:      Effort: No respiratory distress.   Musculoskeletal:         General: Tenderness and deformity present.      Left foot: Decreased range of motion (ankle equinus with insertional achilles pain left. Palapble spurring).   Feet:      Left foot:      Skin integrity: Skin integrity normal.   Neurological:      Mental Status: She is alert.       XRay 3 views of the left foot and ankle personally read by Dr. Sanchez in office today and discussed with patient:    There is no acute fracture or dislocation.  No significant degenerative changes.  No lytic or blastic osseous lesion.  Soft tissues are unremarkable.  Calcaneal spurring noted.

## 2024-12-06 NOTE — ASSESSMENT & PLAN NOTE
Diagnosis and options discussed with patient  Patient agreeable to the plan as stated below    1. Provided home therapy and stretching exercises. These should be done AT MINIMUM three times per day  2. Deferred injections today, but did discuss the possibility.   3. Stressed compliance with home/formal PT and arch supports.      Orders:    Ambulatory Referral to Physical Therapy; Future

## 2024-12-06 NOTE — ASSESSMENT & PLAN NOTE
Diagnosis and options discussed with patient  Patient agreeable to the plan as stated below    1. Discussed diagnosis and treatment options  2. Provided home therapy and stretching exercises  3. Educated eccentric exercises for the achilles enthesopathy.   4. Stressed compliance with home AND formal Physical Therapy  5. Purchase Tuli heel cups off [employer]. They will add a soft protection over the back of the heel  6. Voltaren gel is an over the counter anti-inflammatory. Safe to apply to the back of the heel 2-3 times per day to reduce pain/inflammation  7. RTC 6 weeks      Orders:    Ambulatory Referral to Podiatry    Ambulatory Referral to Physical Therapy; Future

## 2024-12-06 NOTE — TELEPHONE ENCOUNTER
Caller: Stacie     Doctor and/or Office: Dr Sanchez /[facility]#: [phone]    Escalation: Care Patient forgot he stretching exercises and asked if you can email them to her at [email or web address] and if you cannot do that she asked you to mail them to her.  Thank you

## 2024-12-09 RX ORDER — SACUBITRIL AND VALSARTAN 24; 26 MG/1; MG/1
1 TABLET, FILM COATED ORAL 2 TIMES DAILY
Qty: 90 TABLET | Refills: 1 | Status: SHIPPED | OUTPATIENT
Start: 2024-12-09

## 2024-12-12 DIAGNOSIS — I50.22 HEART FAILURE WITH MID-RANGE EJECTION FRACTION (HCC): ICD-10-CM

## 2024-12-12 RX ORDER — SPIRONOLACTONE 25 MG/1
25 TABLET ORAL 2 TIMES DAILY
Qty: 180 TABLET | Refills: 1 | Status: SHIPPED | OUTPATIENT
Start: 2024-12-12

## 2025-01-15 DIAGNOSIS — I50.22 HEART FAILURE WITH MID-RANGE EJECTION FRACTION (HCC): ICD-10-CM

## 2025-01-16 RX ORDER — EMPAGLIFLOZIN 10 MG/1
10 TABLET, FILM COATED ORAL EVERY MORNING
Qty: 30 TABLET | Refills: 5 | Status: SHIPPED | OUTPATIENT
Start: 2025-01-16

## 2025-01-19 DIAGNOSIS — I50.22 HEART FAILURE WITH MID-RANGE EJECTION FRACTION (HCC): ICD-10-CM

## 2025-01-20 RX ORDER — FUROSEMIDE 20 MG/1
40 TABLET ORAL 2 TIMES DAILY
Qty: 360 TABLET | Refills: 1 | Status: SHIPPED | OUTPATIENT
Start: 2025-01-20

## 2025-01-22 DIAGNOSIS — I50.22 HEART FAILURE WITH MID-RANGE EJECTION FRACTION (HCC): ICD-10-CM

## 2025-01-22 RX ORDER — POTASSIUM CHLORIDE 1500 MG/1
TABLET, EXTENDED RELEASE ORAL
Qty: 270 TABLET | Refills: 1 | OUTPATIENT
Start: 2025-01-22

## 2025-02-01 DIAGNOSIS — J44.9 CHRONIC OBSTRUCTIVE PULMONARY DISEASE, UNSPECIFIED COPD TYPE (HCC): ICD-10-CM

## 2025-02-01 DIAGNOSIS — Z00.00 VISIT FOR ANNUAL HEALTH EXAMINATION: ICD-10-CM

## 2025-02-03 RX ORDER — ALBUTEROL SULFATE 90 UG/1
INHALANT RESPIRATORY (INHALATION)
Qty: 8.5 G | Refills: 3 | Status: SHIPPED | OUTPATIENT
Start: 2025-02-03

## 2025-02-03 NOTE — TELEPHONE ENCOUNTER
Transferred to   [facility], [address] [place] [address], [id number] [phone]  To make MAW visit appt.

## 2025-02-28 NOTE — PROGRESS NOTES
Advanced Heart Failure Outpatient Progress Note - Stacie Rockwell 69 y.o. female MRN: [id number]    Encounter: [id number]      Assessment/Plan:    Patient Active Problem List    Diagnosis Date Noted    Prediabetes 12/03/2021    Decreased hearing of both ears 12/30/2020    Cataract 10/23/2020    Arthropathic psoriasis (HCC) 09/11/2017    Chronic combined systolic and diastolic congestive heart failure (HCC) 09/11/2017    Obesity (BMI 30-39.9) 09/11/2017    Allergic rhinitis 10/26/2015    Obstructive sleep apnea, adult 11/13/2014    Dyslipidemia 04/24/2013    Hypertension 11/08/2012    Achilles tendinitis of left lower extremity 12/06/2024    Obesity, morbid (HCC) 04/25/2024    Chronic obstructive pulmonary disease (HCC) 04/19/2024    Simple chronic bronchitis (HCC) 11/01/2023    Plantar fasciitis 11/01/2023    History of hepatitis C 01/30/2023    Stage 3 chronic kidney disease, unspecified whether stage 3a or 3b CKD (HCC) 03/31/2022     # Heart failure with mid range ejection fraction, Stage C, NYHA II  Etiology: Unclear. No definitive evidence of ischemia on nuclear stress test. Hypertension controlled. LBBB likely chronic. Dyscynchrony noted on echo since 2016. No heavy alcohol or drug use  Euvolemic on exam    Weight: 192 pounds 4/25/2024; 196 lbs 7/25/24; 198 lbs 10/17/24; 197 lbs 3/4/25  BNP: 43 7/25/24      Studies- personally reviewed by me    Echo 5/10/24:   LVEF 50%  LVIDD , mildly increased wall thickness  Trace TR    Echo 1/3/22:  LVEF: 50%  LVIDd: 5.9cm  RV: normal size and systolic function  MR: mild  PASP:  24 mmHg  RVOT: mid systolic notching suggesting elevated pulmonary vascular resistance  Other: grade 2 diastology, no regional wall motion abnormalities seen, abnormal septal motion consistent with bundle branch block  IVC normal with normal respirophasic variation    Pharm Nuc Stress 9/27/21: Abnormal study after pharmacologic vasodilation without reproduction of symptoms. Inferior defect improved  with prone imaging likely diaphragmatic attenuation. Partially reversible anteroapical defect likely shifting breast, cannot exclude small area of distal LAD ischemia. Left ventricular systolic function was reduced, without distinct regional wall motion abnormalities. LVEF 47%    Echocardiogram 7/8/21  LVEF: 40-45%  LVIDd: 5cm  RV: normal size and systolic function  MR: mild  PASP: 30mmHg  RVOT: mid systolic notching suggesting elevated pulmonary vascular resistance  Other: hypokinesis of inferior and septal walls, mild to moderately increased wall thickness, mild LAE    TTE 5/6/2016: LVEF 50%. Moderate concentric hypertrophy. Hypokinesis of mid anteroseptal, basal inferior and basal to mid inferolateral walls. Grade 1 diastology. Normal RV size and systolic function    Diet:  2 g sodium diet  2000 mL fluid restriction    Neurohormonal Blockade:  --Beta-Blocker: metoprolol succinate 75mg BID  --ACEi, ARB or ARNi: entresto 24-26mg BID  --Aldosterone Receptor Blocker: spironolactone 25mg BID  --SGLT2 Inhibitor: empagliflozin 10mg daily  --Diuretic: furosemide 40mg in AM and 20mg in PM     Sudden Cardiac Death Risk Reduction:  --ICD:  LVEF >35%    Electrical Resynchronization:  --Candidacy for BiV device:    Advanced Therapies (if appropriate):  --We will continue to monitor, no indication at this time    # Hypertension, controlled  Continue GDMT as above  # Hyperlipidemia  4/19/2024  HDL 53   Continue atorvastatin 40mg daily, repeat lipid panel with routine labs with PCP   # LUIZA on CPAP, tolerating better and using most nights  # Obesity  # LBBB, likely chronic as noted above    Today's Plan:  No medication changes today  Obtain BMP as ordered by rheumatology  2g sodium diet  2L fluid diet  Daily weights  Physical activities/exercise as tolerated  Weight loss        HPI:   69 y.o. female with past medical history as above who is here for follow-up.    2/22/23: Here for follow up. Overall doing well.  Trying to lose weight. Short of breath walking up hill. Still trying to do elliptical but recently having left ankle pain. No leg swelling, PND, or orthopnea. No abdominal fullness. No chest pain or lightheadedness    6/26/23: Here for follow up. Continues to do well. No chest pain or shortness of breath. Mainly limited by foot pain and seen recently by rheumatology.  Still continues to stay active walking uphill. No leg swelling or abdominal distension. Occasional PND when not using CPAP. Cannot tolerate CPAP all night. 6/5/23 Na 137 K 4.5 creatinine 0.95.    Per [facility] 10/26/23. Presents for follow up. Missed her visit with Dr. Rowe this week due to transportation issues. She is feeling well. Thrilled that she lost 10 pounds. Says she did this by cutting out sweets and walking more. She is feeling well functionally. Exercise/activity tolerance improved. She has noticed some scapular pain with occasional chest pressure at rest. This occurs infrequently, maybe twice per month. Not sure if it's muscular or otherwise. Usually subsides over a few minutes.     4/25/24: here for follow up.  Has gained weight back since last office visit as above with Milvia.  Likely due to increased caloric intake.  Reports some shortness of breath and leg swelling.  Saw PCP in April 19 with wheezing.  Repeat echocardiogram was ordered.  She denies any chest pain, palpitations, dizziness or lightheadedness.    7/25/24: Here for follow up. Increased lasix and potassium dose on last visit. Due for BMP ordered.  Continues to gain weight.  Has not been active lately due to left foot or ankle pain.  Stable shortness of breath with more walking and walking uphill.  No chest pain on current level of exertion.  No palpitations, dizziness or lightheadedness.  Adherent to medications.  Doing her best to adhere with diet and overall eat better    10/17/24: Here for follow up. Increased lasix and potassium on last visit. Repeat BMP showed hyperkalemia.  Sent to the ED for eval but sample hemolyzed and potassium still elevated at 6.2. She was discharged home. Continued on prior dose of lasix 40mg in AM and 20mg in PM and stopped potassium. 7/31/24 Na 137 K 4.3 and creatinine 1.15.  Reports overall doing well.  No shortness of breath on current level of exertion.  She is not doing as much walking but is able to do do her daily activities including food shopping.  She is also limited by foot pain.  Denies leg swelling, PND orthopnea.  No palpitations, dizziness or lightheadedness.    3/4/25: here for follow up. No med changes on last visit. 11/6/24 Na 137 K 4.2 and creatinine 1.55. Celebrex stopped by rheumatology and repeat BMP ordered.  Overall doing well from cardiac standpoint.  Denies leg swelling, abdominal distention or weight change.  Noticing shortness of breath when not using CPAP overnight.  Uses CPAP most nights except for when she falls asleep watching TV.  Denies chest pain, palpitations, dizziness or lightheadedness.  Working on losing weight.  Planning to be more active and do more walking when the weather is better.      Past Medical History:   Diagnosis Date    Chronic ankle pain     Unspec laterality, Last assessed - 6/22/16    Chronic hepatitis C (HCC)     Complete left bundle branch block (LBBB)     COPD (chronic obstructive pulmonary disease) (HCC)     Eczema     B/L elbows, start Hydrocortisone; Last assessed - 8/5/15    HLD (hyperlipidemia)     Hydrocodone use disorder, moderate, in controlled environment ([facility]) 3/11/2015    Violated CSMA, stopped    Hypertension     Osteoarthritis, knee     Rectal polyp     Sleep apnea     Violation of controlled substance agreement 10/23/2019    See notes of 10-       Review of Systems   Constitutional:  Negative for chills, fatigue and fever.   HENT:  Negative for ear pain and sore throat.    Eyes:  Negative for pain and visual disturbance.   Respiratory:  Negative for cough and chest tightness.     Cardiovascular:  Negative for chest pain, palpitations and leg swelling.   Gastrointestinal:  Negative for abdominal distention, abdominal pain and vomiting.   Genitourinary:  Negative for dysuria and hematuria.   Musculoskeletal:  Negative for arthralgias and back pain.   Skin:  Negative for color change and rash.   Neurological:  Negative for dizziness, seizures and syncope.   All other systems reviewed and are negative.       No Known Allergies  .    Current Outpatient Medications:     acetaminophen (TYLENOL) 650 mg CR tablet, Take 650 mg by mouth every 8 (eight) hours as needed , Disp: , Rfl:     albuterol (PROVENTIL HFA,VENTOLIN HFA) 90 mcg/act inhaler, INHALE 2 PUFFS BY MOUTH EVERY 6 HOURS AS NEEDED FOR WHEEZE, Disp: 8.5 g, Rfl: 3    atorvastatin (LIPITOR) 40 mg tablet, Take 1 tablet (40 mg total) by mouth daily For cholesterol, Disp: 90 tablet, Rfl: 3    Blood Pressure Monitoring (Blood Pressure Monitor/M Cuff) MISC, Use daily, Disp: 1 each, Rfl: 0    Entresto 24-26 MG TABS, TAKE 1 TABLET BY MOUTH TWICE A DAY, Disp: 90 tablet, Rfl: 1    furosemide (LASIX) 20 mg tablet, TAKE 2 TABLETS BY MOUTH 2 TIMES A DAY., Disp: 360 tablet, Rfl: 1    Jardiance 10 MG TABS tablet, TAKE 1 TABLET (10 MG TOTAL) BY MOUTH EVERY MORNING., Disp: 30 tablet, Rfl: 5    Klor-Con M20 20 MEQ tablet, Take 1 tablet by mouth 2 (two) times a day, Disp: , Rfl:     metoprolol succinate (TOPROL-XL) 50 mg 24 hr tablet, Take 1.5 tablets (75 mg total) by mouth 2 (two) times a day, Disp: 270 tablet, Rfl: 2    nitroglycerin (NITROSTAT) 0.4 mg SL tablet, Place 1 tablet (0.4 mg total) under the tongue every 5 (five) minutes as needed for chest pain (take every 5 minutes for chest pain with max 3 doses), Disp: 9 tablet, Rfl: 0    omeprazole (PriLOSEC) 40 MG capsule, Take 40 mg by mouth daily, Disp: , Rfl:     spironolactone (ALDACTONE) 25 mg tablet, TAKE 1 TABLET BY MOUTH TWICE A DAY, Disp: 180 tablet, Rfl: 1    Diclofenac Sodium (VOLTAREN) 1 %, Apply  2 g topically 4 (four) times a day as needed (neck pain) (Patient not taking: Reported on 2024), Disp: 100 g, Rfl: 1    methylPREDNISolone 4 MG tablet therapy pack, Use as directed on package (Patient not taking: Reported on 2024), Disp: 21 tablet, Rfl: 0    Social History     Socioeconomic History    Marital status:      Spouse name: Not on file    Number of children: Not on file    Years of education: Not on file    Highest education level: Not on file   Occupational History    Not on file   Tobacco Use    Smoking status: Former     Current packs/day: 0.00     Types: Cigarettes     Quit date: 1999     Years since quittin.0    Smokeless tobacco: Never   Vaping Use    Vaping status: Never Used   Substance and Sexual Activity    Alcohol use: Yes     Comment: social    Drug use: No    Sexual activity: Yes   Other Topics Concern    Not on file   Social History Narrative    Home environment-safe         Social Drivers of Health     Financial Resource Strain: Low Risk  (2024)    Overall Financial Resource Strain (CARDIA)     Difficulty of Paying Living Expenses: Not hard at all   Food Insecurity: No Food Insecurity (2024)    Nursing - Inadequate Food Risk Classification     Worried About Running Out of Food in the Last Year: Never true     Ran Out of Food in the Last Year: Never true     Ran Out of Food in the Last Year: Not on file   Transportation Needs: No Transportation Needs (2024)    PRAPARE - Transportation     Lack of Transportation (Medical): No     Lack of Transportation (Non-Medical): No   Physical Activity: Sufficiently Active (8/10/2021)    Exercise Vital Sign     Days of Exercise per Week: 7 days     Minutes of Exercise per Session: 60 min   Recent Concern: Physical Activity - Inactive (2021)    Exercise Vital Sign     Days of Exercise per Week: 0 days     Minutes of Exercise per Session: 0 min   Stress: No Stress Concern Present (8/10/2021)    [employer] - Occupational Stress Questionnaire     Feeling of Stress : Not at all   Social Connections: Not on file   Intimate Partner Violence: Not At Risk (8/10/2021)    Humiliation, Afraid, Rape, and Kick questionnaire     Fear of Current or Ex-Partner: No     Emotionally Abused: No     Physically Abused: No     Sexually Abused: No   Housing Stability: Low Risk  (4/19/2024)    Housing Stability Vital Sign     Unable to Pay for Housing in the Last Year: No     Number of Times Moved in the Last Year: 1     Homeless in the Last Year: No       Family History   Problem Relation Age of Onset    Heart disease Mother         Pacemaker    Hypertension Mother     Heart disease Father     Heart disease Sister     Hypertension Sister     No Known Problems Sister     No Known Problems Daughter     No Known Problems Daughter     No Known Problems Maternal Grandmother     No Known Problems Maternal Grandfather     No Known Problems Paternal Grandmother     No Known Problems Paternal Grandfather     Alcohol abuse Brother     Heart disease Brother     Cirrhosis Brother         Liver     Hypertension Brother     No Known Problems Brother     No Known Problems Maternal Aunt     No Known Problems Cousin        Physical Exam:    Vitals: Blood pressure 118/60, pulse 82, height 5' 2\" (1.575 m), weight 89.5 kg (197 lb 6.4 oz), SpO2 97%, not currently breastfeeding., Body mass index is 36.1 kg/m².,   Wt Readings from Last 3 Encounters:   03/04/25 89.5 kg (197 lb 6.4 oz)   11/06/24 91.2 kg (201 lb)   10/17/24 89.8 kg (198 lb)       Physical Exam  Constitutional:       General: She is not in acute distress.     Appearance: Normal appearance.   HENT:      Head: Normocephalic and atraumatic.      Mouth/Throat:      Mouth: Mucous membranes are moist.   Eyes:      Extraocular Movements: Extraocular movements intact.      Conjunctiva/sclera: Conjunctivae normal.   Neck:      Vascular: No JVD.   Cardiovascular:      Rate " "and Rhythm: Normal rate and regular rhythm.      Pulses: Normal pulses.      Heart sounds: No murmur heard.     No friction rub. No gallop.   Pulmonary:      Effort: Pulmonary effort is normal. No respiratory distress.      Breath sounds: No wheezing, rhonchi or rales.   Abdominal:      General: There is no distension.      Palpations: Abdomen is soft.      Tenderness: There is no abdominal tenderness. There is no guarding.   Musculoskeletal:         General: No deformity or signs of injury.      Cervical back: Neck supple.      Right lower leg: Edema (trace) present.      Left lower leg: Edema (trace) present.   Skin:     General: Skin is warm and dry.      Capillary Refill: Capillary refill takes less than 2 seconds.   Neurological:      General: No focal deficit present.      Mental Status: She is alert and oriented to person, place, and time.   Psychiatric:         Mood and Affect: Mood normal.         Labs & Results:    Lab Results   Component Value Date    WBC 7.53 07/25/2024    HGB 13.9 07/25/2024    HCT 43.0 07/25/2024    MCV 97 07/25/2024     07/25/2024     Lab Results   Component Value Date    SODIUM 137 11/06/2024    K 4.2 11/06/2024     11/06/2024    CO2 18 (L) 11/06/2024    BUN 26 (H) 11/06/2024    CREATININE 1.55 (H) 11/06/2024    GLUC 91 07/25/2024    CALCIUM 9.4 11/06/2024     No results found for: \"NTBNP\"   Lab Results   Component Value Date    CHOLESTEROL 191 04/19/2024    CHOLESTEROL 159 08/10/2021    CHOLESTEROL 155 08/16/2019     Lab Results   Component Value Date    HDL 53 04/19/2024    HDL 50 08/10/2021    HDL 44 08/16/2019     Lab Results   Component Value Date    TRIG 175 (H) 04/19/2024    TRIG 134 08/10/2021    TRIG 115 08/16/2019     No results found for: \"NONHDLC\"    EKG personally reviewed by Marietta Rowe.         Thank you for the opportunity to participate in the care of this patient.    MARIETTA ROWE MD  ADVANCED HEART FAILURE AND MECHANICAL CIRCULATORY SUPPORT  . " [facility]

## 2025-03-04 ENCOUNTER — APPOINTMENT (OUTPATIENT)
Dept: LAB | Facility: CLINIC | Age: 70
End: 2025-03-04
Payer: MEDICARE

## 2025-03-04 ENCOUNTER — RESULTS FOLLOW-UP (OUTPATIENT)
Age: 70
End: 2025-03-04

## 2025-03-04 ENCOUNTER — OFFICE VISIT (OUTPATIENT)
Dept: CARDIOLOGY CLINIC | Facility: CLINIC | Age: 70
End: 2025-03-04
Payer: MEDICARE

## 2025-03-04 VITALS
WEIGHT: 197.4 LBS | HEIGHT: 62 IN | OXYGEN SATURATION: 97 % | HEART RATE: 82 BPM | BODY MASS INDEX: 36.33 KG/M2 | SYSTOLIC BLOOD PRESSURE: 118 MMHG | DIASTOLIC BLOOD PRESSURE: 60 MMHG

## 2025-03-04 DIAGNOSIS — G47.33 OBSTRUCTIVE SLEEP APNEA, ADULT: ICD-10-CM

## 2025-03-04 DIAGNOSIS — I10 ESSENTIAL HYPERTENSION: ICD-10-CM

## 2025-03-04 DIAGNOSIS — N18.30 STAGE 3 CHRONIC KIDNEY DISEASE, UNSPECIFIED WHETHER STAGE 3A OR 3B CKD (HCC): ICD-10-CM

## 2025-03-04 DIAGNOSIS — E78.5 DYSLIPIDEMIA: ICD-10-CM

## 2025-03-04 DIAGNOSIS — I50.22 HEART FAILURE WITH MID-RANGE EJECTION FRACTION (HCC): Primary | ICD-10-CM

## 2025-03-04 LAB
ANION GAP SERPL CALCULATED.3IONS-SCNC: 13 MMOL/L (ref 4–13)
BUN SERPL-MCNC: 19 MG/DL (ref 5–25)
CALCIUM SERPL-MCNC: 9.5 MG/DL (ref 8.4–10.2)
CHLORIDE SERPL-SCNC: 104 MMOL/L (ref 96–108)
CO2 SERPL-SCNC: 22 MMOL/L (ref 21–32)
CREAT SERPL-MCNC: 1.32 MG/DL (ref 0.6–1.3)
GFR SERPL CREATININE-BSD FRML MDRD: 40 ML/MIN/1.73SQ M
GLUCOSE P FAST SERPL-MCNC: 106 MG/DL (ref 65–99)
POTASSIUM SERPL-SCNC: 4.1 MMOL/L (ref 3.5–5.3)
SODIUM SERPL-SCNC: 139 MMOL/L (ref 135–147)

## 2025-03-04 PROCEDURE — 36415 COLL VENOUS BLD VENIPUNCTURE: CPT

## 2025-03-04 PROCEDURE — 80048 BASIC METABOLIC PNL TOTAL CA: CPT

## 2025-03-04 PROCEDURE — 99214 OFFICE O/P EST MOD 30 MIN: CPT | Performed by: INTERNAL MEDICINE

## 2025-03-04 PROCEDURE — G2211 COMPLEX E/M VISIT ADD ON: HCPCS | Performed by: INTERNAL MEDICINE

## 2025-03-04 RX ORDER — POTASSIUM CHLORIDE 1500 MG/1
1 TABLET, EXTENDED RELEASE ORAL 2 TIMES DAILY
COMMUNITY
Start: 2025-01-21

## 2025-03-04 NOTE — PATIENT INSTRUCTIONS
No medication changes today  Obtain BMP as ordered by rheumatology  2g sodium diet  2L fluid diet  Daily weights  Physical activities/exercise as tolerated  Weight loss

## 2025-03-05 DIAGNOSIS — I10 PRIMARY HYPERTENSION: ICD-10-CM

## 2025-03-06 RX ORDER — SACUBITRIL AND VALSARTAN 24; 26 MG/1; MG/1
1 TABLET, FILM COATED ORAL 2 TIMES DAILY
Qty: 90 TABLET | Refills: 1 | Status: SHIPPED | OUTPATIENT
Start: 2025-03-06

## 2025-03-10 DIAGNOSIS — I50.32 CHRONIC DIASTOLIC CONGESTIVE HEART FAILURE (HCC): ICD-10-CM

## 2025-03-10 DIAGNOSIS — I50.22 HEART FAILURE WITH MID-RANGE EJECTION FRACTION (HCC): ICD-10-CM

## 2025-03-10 RX ORDER — ATORVASTATIN CALCIUM 40 MG/1
40 TABLET, FILM COATED ORAL DAILY
Qty: 90 TABLET | Refills: 3 | Status: SHIPPED | OUTPATIENT
Start: 2025-03-10

## 2025-03-11 RX ORDER — METOPROLOL SUCCINATE 50 MG/1
TABLET, EXTENDED RELEASE ORAL
Qty: 270 TABLET | Refills: 1 | Status: SHIPPED | OUTPATIENT
Start: 2025-03-11

## 2025-05-07 DIAGNOSIS — Z00.00 VISIT FOR ANNUAL HEALTH EXAMINATION: ICD-10-CM

## 2025-05-07 DIAGNOSIS — J44.9 CHRONIC OBSTRUCTIVE PULMONARY DISEASE, UNSPECIFIED COPD TYPE (HCC): ICD-10-CM

## 2025-05-07 RX ORDER — ALBUTEROL SULFATE 90 UG/1
2 INHALANT RESPIRATORY (INHALATION) EVERY 6 HOURS PRN
Qty: 8.5 G | Refills: 3 | Status: SHIPPED | OUTPATIENT
Start: 2025-05-07

## 2025-05-15 ENCOUNTER — HOSPITAL ENCOUNTER (OUTPATIENT)
Dept: RADIOLOGY | Age: 70
Discharge: HOME | End: 2025-05-15
Payer: MEDICARE

## 2025-05-15 VITALS — BODY MASS INDEX: 36.25 KG/M2 | HEIGHT: 62 IN | WEIGHT: 197 LBS

## 2025-05-15 DIAGNOSIS — Z12.31 VISIT FOR SCREENING MAMMOGRAM: ICD-10-CM

## 2025-05-15 PROCEDURE — 77063 BREAST TOMOSYNTHESIS BI: CPT

## 2025-05-15 PROCEDURE — 77067 SCR MAMMO BI INCL CAD: CPT

## 2025-05-16 ENCOUNTER — OFFICE VISIT (OUTPATIENT)
Dept: INTERNAL MEDICINE CLINIC | Facility: CLINIC | Age: 70
End: 2025-05-16

## 2025-05-16 VITALS
BODY MASS INDEX: 36.4 KG/M2 | DIASTOLIC BLOOD PRESSURE: 74 MMHG | SYSTOLIC BLOOD PRESSURE: 126 MMHG | TEMPERATURE: 97.9 F | WEIGHT: 199 LBS | HEART RATE: 79 BPM

## 2025-05-16 DIAGNOSIS — I10 PRIMARY HYPERTENSION: ICD-10-CM

## 2025-05-16 DIAGNOSIS — Z72.0 TOBACCO ABUSE: ICD-10-CM

## 2025-05-16 DIAGNOSIS — Z00.00 MEDICARE ANNUAL WELLNESS VISIT, SUBSEQUENT: Primary | ICD-10-CM

## 2025-05-16 DIAGNOSIS — L40.50 PSORIATIC ARTHRITIS (HCC): ICD-10-CM

## 2025-05-16 DIAGNOSIS — J43.9 PULMONARY EMPHYSEMA, UNSPECIFIED EMPHYSEMA TYPE (HCC): ICD-10-CM

## 2025-05-16 DIAGNOSIS — I50.42 CHRONIC COMBINED SYSTOLIC AND DIASTOLIC CONGESTIVE HEART FAILURE (HCC): ICD-10-CM

## 2025-05-16 DIAGNOSIS — E66.01 OBESITY, MORBID (HCC): ICD-10-CM

## 2025-05-16 DIAGNOSIS — N18.30 STAGE 3 CHRONIC KIDNEY DISEASE, UNSPECIFIED WHETHER STAGE 3A OR 3B CKD (HCC): ICD-10-CM

## 2025-05-16 PROCEDURE — G0439 PPPS, SUBSEQ VISIT: HCPCS

## 2025-05-16 RX ORDER — SACUBITRIL AND VALSARTAN 24; 26 MG/1; MG/1
1 TABLET, FILM COATED ORAL 2 TIMES DAILY
Qty: 90 TABLET | Refills: 1 | Status: SHIPPED | OUTPATIENT
Start: 2025-05-16

## 2025-05-16 RX ORDER — FLUTICASONE PROPIONATE AND SALMETEROL 100; 50 UG/1; UG/1
1 POWDER RESPIRATORY (INHALATION) 2 TIMES DAILY
Qty: 60 BLISTER | Refills: 1 | Status: SHIPPED | OUTPATIENT
Start: 2025-05-16

## 2025-05-16 NOTE — ASSESSMENT & PLAN NOTE
Lab Results   Component Value Date    EGFR 40 03/04/2025    EGFR 33 11/06/2024    EGFR 47 07/31/2024    CREATININE 1.32 (H) 03/04/2025    CREATININE 1.55 (H) 11/06/2024    CREATININE 1.15 07/31/2024

## 2025-05-16 NOTE — ASSESSMENT & PLAN NOTE
Patient's HTN is well controlled on Entresto, Lasix, Toprol, and Aldactone.    Plan:  Continue current regimen  Orders:    sacubitril-valsartan (Entresto) 24-26 MG TABS; Take 1 tablet by mouth 2 (two) times a day

## 2025-05-16 NOTE — PROGRESS NOTES
Name: Stacie Rockwell      : 1955      MRN: [id number]  Encounter Provider: Mendel Fierro MD  Encounter Date: 2025   Encounter department: [facility] [place]  :  Assessment & Plan  Medicare annual wellness visit, subsequent  Patient presents for MAWV. Overall, patient feels well, exercises regularly (just bought a new bicycle), attempts to follow a low carb, low salt diet, and feels very happy currently. She endorses that Baljeet, her son, is her legal POA, and has a living will. Stacie endorses compliance with all of her medications and her CPAP. She follows with Cardiology for her HFmrEF and sleep medicine for her LUIZA. Denies chest pain, SOB, cough, fever/chills, unintentional weight loss, night sweats, diarrhea and constipation.     Orders:    Ambulatory referral to chronic care management; Future    Primary hypertension  Patient's HTN is well controlled on Entresto, Lasix, Toprol, and Aldactone.    Plan:  Continue current regimen  Orders:    sacubitril-valsartan (Entresto) 24-26 MG TABS; Take 1 tablet by mouth 2 (two) times a day    Tobacco abuse  Patient quit smoking 26 years after 26 pack years of smoking. Today, she has some conversational dyspnea, with slight wheezing heard in RUL. On chart review, it is evident that patient has some evidence of hyperinflation. She does not have PFTs on file, but given her extensive smoking history and radiographic evidence of hyperinflation, will order PFTs and start patient on Advair to go with her albuterol inhaler PRN.     Plan:  Start Advair 100-50 mcg  Continue Albuterol inhaler PRN  Obtain PFTs  F/U in 6 weeks  Orders:    Complete PFT without post bronchodilator; Future    Fluticasone-Salmeterol (Advair Diskus) 100-50 mcg/dose inhaler; Inhale 1 puff 2 (two) times a day Rinse mouth after use.    Pulmonary emphysema, unspecified emphysema type (HCC)  As above       Chronic combined systolic and diastolic congestive heart failure  ([facility])  Wt Readings from Last 3 Encounters:   05/16/25 90.3 kg (199 lb)   05/15/25 89.4 kg (197 lb)   03/04/25 89.5 kg (197 lb 6.4 oz)     Patient has h/o HFimpEF, previously 40%, now 50%. Etiology of HF not known, Stress Test in 2021 did not show evidence of ischemia  Follows with Dr. Rowe with Heart Failure. Today, she examines clinically euvolemic.misha    Neurohormonal Blockade:  --Beta-Blocker: metoprolol succinate 75mg BID  --ACEi, ARB or ARNi: entresto 24-26mg BID  --Aldosterone Receptor Blocker: spironolactone 25mg BID  --SGLT2 Inhibitor: empagliflozin 10mg daily  --Diuretic: furosemide 40mg in AM and 20mg in PM      Sudden Cardiac Death Risk Reduction:  --ICD:  LVEF >35%     Electrical Resynchronization:  --Candidacy for BiV device:     Advanced Therapies (if appropriate):  --We will continue to monitor, no indication at this time    Plan:  Continue GDMT as above           Obesity, morbid ([facility])           Stage 3 chronic kidney disease, unspecified whether stage 3a or 3b CKD ([facility])  Lab Results   Component Value Date    EGFR 40 03/04/2025    EGFR 33 11/06/2024    EGFR 47 07/31/2024    CREATININE 1.32 (H) 03/04/2025    CREATININE 1.55 (H) 11/06/2024    CREATININE 1.15 07/31/2024            BMI Counseling: Body mass index is 36.4 kg/m². The BMI is above normal. Nutrition recommendations include decreasing fast food intake and increasing intake of lean protein. Exercise recommendations include exercising 3-5 times per week. Rationale for BMI follow-up plan is due to patient being overweight or obese.     Depression Screening and Follow-up Plan: Patient was screened for depression during today's encounter. They screened negative with a PHQ-2 score of 0.        Preventive health issues were discussed with patient, and age appropriate screening tests were ordered as noted in patient's After Visit Summary. Personalized health advice and appropriate referrals for health education or preventive services given if needed,  as noted in patient's After Visit Summary.    History of Present Illness     71 yo F w PPMHx of HFimpEF, Prediabetes, LUIZA on CPAP, HLD, and former smoker with 26 pack years presents for MAWV. Patient had mild conversational dyspnea on exam, and given evidence of hyperinflation on imaging, will order PFTs and start patient on Advair. F/U in 6 weeks to go over results of PFTs.        Patient Care Team:  Mendel Whittaker MD as PCP - General (Internal Medicine)    Review of Systems   Constitutional:  Negative for chills and fever.   HENT:  Negative for ear pain and sore throat.    Eyes:  Negative for pain and visual disturbance.   Respiratory:  Negative for cough and shortness of breath.    Cardiovascular:  Negative for chest pain and palpitations.   Gastrointestinal:  Negative for abdominal pain and vomiting.   Genitourinary:  Negative for dysuria and hematuria.   Musculoskeletal:  Negative for arthralgias and back pain.   Skin:  Negative for color change and rash.   Neurological:  Negative for seizures and syncope.   All other systems reviewed and are negative.    Medical History Reviewed by provider this encounter:  [facility]       Annual Wellness Visit Questionnaire   Stacie is here for her Subsequent Wellness visit.     Health Risk Assessment:   Patient rates overall health as good. Patient feels that their physical health rating is slightly better. Patient is satisfied with their life. Eyesight was rated as same. Hearing was rated as slightly worse. Patient feels that their emotional and mental health rating is same. Patients states they are never, rarely angry. Patient states they are sometimes unusually tired/fatigued. Pain experienced in the last 7 days has been some. Patient's pain rating has been 8/10. Patient states that she has experienced weight loss or gain in last 6 months.     Depression Screening:   PHQ-2 Score: 0      Fall Risk Screening:   In the past year, patient has experienced: no history of falling in  past year      Urinary Incontinence Screening:   Patient has not leaked urine accidently in the last six months.     Home Safety:  Patient has trouble with stairs inside or outside of their home. Patient has working smoke alarms and has working carbon monoxide detector. Home safety hazards include: none.     Nutrition:   Current diet is Regular. Tries to eat healthy    Medications:   Patient is not currently taking any over-the-counter supplements. Patient is able to manage medications.     Activities of Daily Living (ADLs)/Instrumental Activities of Daily Living (IADLs):   Walk and transfer into and out of bed and chair?: Yes  Dress and groom yourself?: Yes    Bathe or shower yourself?: Yes    Feed yourself? Yes  Do your laundry/housekeeping?: Yes  Manage your money, pay your bills and track your expenses?: Yes  Make your own meals?: Yes    Do your own shopping?: Yes    Previous Hospitalizations:   Any hospitalizations or ED visits within the last 12 months?: No      Advance Care Planning:   Living will: Yes    Durable POA for healthcare: Yes    Advanced directive: Yes    End of Life Decisions reviewed with patient: Yes      Comments: SonBaljeet, is legal POA     Preventive Screenings      Cardiovascular Screening:    General: Screening Current      Diabetes Screening:     General: Screening Current      Colorectal Cancer Screening:     General: Screening Current      Breast Cancer Screening:     General: Screening Current      Cervical Cancer Screening:    General: Screening Not Indicated      Lung Cancer Screening:     General: Screening Not Indicated      Hepatitis C Screening:    General: Screening Not Indicated and History Hepatitis C    Immunizations:  - Immunizations due: Hepatitis B    Screening, Brief Intervention, and Referral to Treatment (SBIRT)     Screening  Typical number of drinks in a day: 0  Typical number of drinks in a week: 0  Interpretation: Low risk drinking behavior.    Single Item Drug  Screening:  How often have you used an illegal drug (including marijuana) or a prescription medication for non-medical reasons in the past year? never    Single Item Drug Screen Score: 0  Interpretation: Negative screen for possible drug use disorder    Social Drivers of Health     Financial Resource Strain: Low Risk  (5/16/2025)    Overall Financial Resource Strain (CARDIA)     Difficulty of Paying Living Expenses: Not hard at all   Food Insecurity: No Food Insecurity (5/16/2025)    Nursing - Inadequate Food Risk Classification     Worried About Running Out of Food in the Last Year: Never true     Ran Out of Food in the Last Year: Never true   Transportation Needs: No Transportation Needs (5/16/2025)    PRAPARE - Transportation     Lack of Transportation (Medical): No     Lack of Transportation (Non-Medical): No   Housing Stability: Low Risk  (5/16/2025)    Housing Stability Vital Sign     Unable to Pay for Housing in the Last Year: No     Number of Times Moved in the Last Year: 1     Homeless in the Last Year: No   Utilities: Not At Risk (5/16/2025)    [facility] Utilities     Threatened with loss of utilities: No     No results found.    Objective   /74 (BP Location: Left arm, Patient Position: Sitting, Cuff Size: Large)   Pulse 79   Temp 97.9 °F (36.6 °C) (Temporal)   Wt 90.3 kg (199 lb)   BMI 36.40 kg/m²     Physical Exam  Vitals and nursing note reviewed.   Constitutional:       General: She is not in acute distress.     Appearance: She is well-developed.   HENT:      Head: Normocephalic and atraumatic.     Eyes:      Conjunctiva/sclera: Conjunctivae normal.       Cardiovascular:      Rate and Rhythm: Normal rate and regular rhythm.      Heart sounds: No murmur heard.  Pulmonary:      Effort: Pulmonary effort is normal. No respiratory distress.      Breath sounds: Wheezing present.   Abdominal:      Palpations: Abdomen is soft.      Tenderness: There is no abdominal tenderness.     Musculoskeletal:          General: No swelling.      Cervical back: Neck supple.      Right lower leg: No edema.      Left lower leg: No edema.     Skin:     General: Skin is warm and dry.      Capillary Refill: Capillary refill takes less than 2 seconds.     Neurological:      Mental Status: She is alert.     Psychiatric:         Mood and Affect: Mood normal.

## 2025-05-16 NOTE — ASSESSMENT & PLAN NOTE
Wt Readings from Last 3 Encounters:   05/16/25 90.3 kg (199 lb)   05/15/25 89.4 kg (197 lb)   03/04/25 89.5 kg (197 lb 6.4 oz)     Patient has h/o HFimpEF, previously 40%, now 50%. Etiology of HF not known, Stress Test in 2021 did not show evidence of ischemia  Follows with Dr. Rowe with Heart Failure. Today, she examines clinically euvolemic.misha    Neurohormonal Blockade:  --Beta-Blocker: metoprolol succinate 75mg BID  --ACEi, ARB or ARNi: entresto 24-26mg BID  --Aldosterone Receptor Blocker: spironolactone 25mg BID  --SGLT2 Inhibitor: empagliflozin 10mg daily  --Diuretic: furosemide 40mg in AM and 20mg in PM      Sudden Cardiac Death Risk Reduction:  --ICD:  LVEF >35%     Electrical Resynchronization:  --Candidacy for BiV device:     Advanced Therapies (if appropriate):  --We will continue to monitor, no indication at this time    Plan:  Continue GDMT as above

## 2025-05-16 NOTE — PATIENT INSTRUCTIONS
Medicare Preventive Visit Patient Instructions  Thank you for completing your Welcome to Medicare Visit or Medicare Annual Wellness Visit today. Your next wellness visit will be due in one year (5/17/2026).  The screening/preventive services that you may require over the next 5-10 years are detailed below. Some tests may not apply to you based off risk factors and/or age. Screening tests ordered at today's visit but not completed yet may show as past due. Also, please note that scanned in results may not display below.  Preventive Screenings:  Service Recommendations Previous Testing/Comments   Colorectal Cancer Screening  * Colonoscopy    * Fecal Occult Blood Test (FOBT)/Fecal Immunochemical Test (FIT)  * Fecal DNA/Cologuard Test  * Flexible Sigmoidoscopy Age: 45-75 years old   Colonoscopy: every 10 years (may be performed more frequently if at higher risk)  OR  FOBT/FIT: every 1 year  OR  Cologuard: every 3 years  OR  Sigmoidoscopy: every 5 years  Screening may be recommended earlier than age 45 if at higher risk for colorectal cancer. Also, an individualized decision between you and your healthcare provider will decide whether screening between the ages of 76-85 would be appropriate. Colonoscopy: 07/03/2024  FOBT/FIT: Not on file  Cologuard: Not on file  Sigmoidoscopy: Not on file          Breast Cancer Screening Age: 40+ years old  Frequency: every 1-2 years  Not required if history of left and right mastectomy Mammogram: 05/15/2025        Cervical Cancer Screening Between the ages of 21-29, pap smear recommended once every 3 years.   Between the ages of 30-65, can perform pap smear with HPV co-testing every 5 years.   Recommendations may differ for women with a history of total hysterectomy, cervical cancer, or abnormal pap smears in past. Pap Smear: 01/13/2017        Hepatitis C Screening Once for adults born between 1945 and 1965  More frequently in patients at high risk for Hepatitis C Hep C Antibody:  06/05/2023        Diabetes Screening 1-2 times per year if you're at risk for diabetes or have pre-diabetes Fasting glucose: 106 mg/dL (3/4/2025)  A1C: 5.6 % (4/19/2024)      Cholesterol Screening Once every 5 years if you don't have a lipid disorder. May order more often based on risk factors. Lipid panel: 04/19/2024          Other Preventive Screenings Covered by Medicare:  Abdominal Aortic Aneurysm (AAA) Screening: covered once if your at risk. You're considered to be at risk if you have a family history of AAA.  Lung Cancer Screening: covers low dose CT scan once per year if you meet all of the following conditions: (1) Age 55-77; (2) No signs or symptoms of lung cancer; (3) Current smoker or have quit smoking within the last 15 years; (4) You have a tobacco smoking history of at least 20 pack years (packs per day multiplied by number of years you smoked); (5) You get a written order from a healthcare provider.  Glaucoma Screening: covered annually if you're considered high risk: (1) You have diabetes OR (2) Family history of glaucoma OR (3)  aged 50 and older OR (4)  American aged 65 and older  Osteoporosis Screening: covered every 2 years if you meet one of the following conditions: (1) You're estrogen deficient and at risk for osteoporosis based off medical history and other findings; (2) Have a vertebral abnormality; (3) On glucocorticoid therapy for more than 3 months; (4) Have primary hyperparathyroidism; (5) On osteoporosis medications and need to assess response to drug therapy.   Last bone density test (DXA Scan): 06/20/2023.  HIV Screening: covered annually if you're between the age of 15-65. Also covered annually if you are younger than 15 and older than 65 with risk factors for HIV infection. For pregnant patients, it is covered up to 3 times per pregnancy.    Immunizations:  Immunization Recommendations   Influenza Vaccine Annual influenza vaccination during flu season is  recommended for all persons aged >= 6 months who do not have contraindications   Pneumococcal Vaccine   * Pneumococcal conjugate vaccine = PCV13 (Prevnar 13), PCV15 (Vaxneuvance), PCV20 (Prevnar 20)  * Pneumococcal polysaccharide vaccine = PPSV23 (Pneumovax) Adults 19-65 yo with certain risk factors or if 65+ yo  If never received any pneumonia vaccine: recommend Prevnar 20 (PCV20)  Give PCV20 if previously received 1 dose of PCV13 or PPSV23   Hepatitis B Vaccine 3 dose series if at intermediate or high risk (ex: diabetes, end stage renal disease, liver disease)   Respiratory syncytial virus (RSV) Vaccine - COVERED BY MEDICARE PART D  * RSVPreF3 (Arexvy) CDC recommends that adults 60 years of age and older may receive a single dose of RSV vaccine using shared clinical decision-making (SCDM)   Tetanus (Td) Vaccine - COST NOT COVERED BY MEDICARE PART B Following completion of primary series, a booster dose should be given every 10 years to maintain immunity against tetanus. Td may also be given as tetanus wound prophylaxis.   Tdap Vaccine - COST NOT COVERED BY MEDICARE PART B Recommended at least once for all adults. For pregnant patients, recommended with each pregnancy.   Shingles Vaccine (Shingrix) - COST NOT COVERED BY MEDICARE PART B  2 shot series recommended in those 19 years and older who have or will have weakened immune systems or those 50 years and older     Health Maintenance Due:      Topic Date Due   • Breast Cancer Screening: Mammogram  05/15/2027   • Hepatitis C Screening  Discontinued   • Colorectal Cancer Screening  Discontinued     Immunizations Due:      Topic Date Due   • Hepatitis B Vaccine (1 of 3 - Risk 3-dose series) Never done     Advance Directives   What are advance directives?  Advance directives are legal documents that state your wishes and plans for medical care. These plans are made ahead of time in case you lose your ability to make decisions for yourself. Advance directives can apply  to any medical decision, such as the treatments you want, and if you want to donate organs.   What are the types of advance directives?  There are many types of advance directives, and each state has rules about how to use them. You may choose a combination of any of the following:  Living will:  This is a written record of the treatment you want. You can also choose which treatments you do not want, which to limit, and which to stop at a certain time. This includes surgery, medicine, IV fluid, and tube feedings.   Durable power of  for healthcare (DPAHC):  This is a written record that states who you want to make healthcare choices for you when you are unable to make them for yourself. This person, called a proxy, is usually a family member or a friend. You may choose more than 1 proxy.  Do not resuscitate (DNR) order:  A DNR order is used in case your heart stops beating or you stop breathing. It is a request not to have certain forms of treatment, such as CPR. A DNR order may be included in other types of advance directives.  Medical directive:  This covers the care that you want if you are in a coma, near death, or unable to make decisions for yourself. You can list the treatments you want for each condition. Treatment may include pain medicine, surgery, blood transfusions, dialysis, IV or tube feedings, and a ventilator (breathing machine).  Values history:  This document has questions about your views, beliefs, and how you feel and think about life. This information can help others choose the care that you would choose.  Why are advance directives important?  An advance directive helps you control your care. Although spoken wishes may be used, it is better to have your wishes written down. Spoken wishes can be misunderstood, or not followed. Treatments may be given even if you do not want them. An advance directive may make it easier for your family to make difficult choices about your care.   Weight  Management   Why it is important to manage your weight:  Being overweight increases your risk of health conditions such as heart disease, high blood pressure, type 2 diabetes, and certain types of cancer. It can also increase your risk for osteoarthritis, sleep apnea, and other respiratory problems. Aim for a slow, steady weight loss. Even a small amount of weight loss can lower your risk of health problems.  How to lose weight safely:  A safe and healthy way to lose weight is to eat fewer calories and get regular exercise. You can lose up about 1 pound a week by decreasing the number of calories you eat by 500 calories each day.   Healthy meal plan for weight management:  A healthy meal plan includes a variety of foods, contains fewer calories, and helps you stay healthy. A healthy meal plan includes the following:  Eat whole-grain foods more often.  A healthy meal plan should contain fiber. Fiber is the part of grains, fruits, and vegetables that is not broken down by your body. Whole-grain foods are healthy and provide extra fiber in your diet. Some examples of whole-grain foods are whole-wheat breads and pastas, oatmeal, brown rice, and bulgur.  Eat a variety of vegetables every day.  Include dark, leafy greens such as spinach, kale, denae greens, and mustard greens. Eat yellow and orange vegetables such as carrots, sweet potatoes, and winter squash.   Eat a variety of fruits every day.  Choose fresh or canned fruit (canned in its own juice or light syrup) instead of juice. Fruit juice has very little or no fiber.  Eat low-fat dairy foods.  Drink fat-free (skim) milk or 1% milk. Eat fat-free yogurt and low-fat cottage cheese. Try low-fat cheeses such as mozzarella and other reduced-fat cheeses.  Choose meat and other protein foods that are low in fat.  Choose beans or other legumes such as split peas or lentils. Choose fish, skinless poultry (chicken or turkey), or lean cuts of red meat (beef or pork). Before  you cook meat or poultry, cut off any visible fat.   Use less fat and oil.  Try baking foods instead of frying them. Add less fat, such as margarine, sour cream, regular salad dressing and mayonnaise to foods. Eat fewer high-fat foods. Some examples of high-fat foods include french fries, doughnuts, ice cream, and cakes.  Eat fewer sweets.  Limit foods and drinks that are high in sugar. This includes candy, cookies, regular soda, and sweetened drinks.  Exercise:  Exercise at least 30 minutes per day on most days of the week. Some examples of exercise include walking, biking, dancing, and swimming. You can also fit in more physical activity by taking the stairs instead of the elevator or parking farther away from stores. Ask your healthcare provider about the best exercise plan for you.    © Copyright [employer] 2018 Information is for End User's use only and may not be sold, redistributed or otherwise used for commercial purposes. All illustrations and images included in CareNotes® are the copyrighted property of [employer] Inc[employer] or [employer]

## 2025-05-28 ENCOUNTER — OFFICE VISIT (OUTPATIENT)
Dept: INTERNAL MEDICINE CLINIC | Facility: CLINIC | Age: 70
End: 2025-05-28

## 2025-05-28 ENCOUNTER — TELEPHONE (OUTPATIENT)
Dept: INTERNAL MEDICINE CLINIC | Facility: CLINIC | Age: 70
End: 2025-05-28

## 2025-05-28 VITALS
SYSTOLIC BLOOD PRESSURE: 139 MMHG | OXYGEN SATURATION: 97 % | HEART RATE: 87 BPM | TEMPERATURE: 97.9 F | WEIGHT: 196.6 LBS | BODY MASS INDEX: 35.96 KG/M2 | DIASTOLIC BLOOD PRESSURE: 70 MMHG

## 2025-05-28 DIAGNOSIS — R10.32 GROIN PAIN, CHRONIC, LEFT: ICD-10-CM

## 2025-05-28 DIAGNOSIS — R10.32 GROIN PAIN, CHRONIC, LEFT: Primary | ICD-10-CM

## 2025-05-28 DIAGNOSIS — G89.29 GROIN PAIN, CHRONIC, LEFT: Primary | ICD-10-CM

## 2025-05-28 DIAGNOSIS — G89.29 GROIN PAIN, CHRONIC, LEFT: ICD-10-CM

## 2025-05-28 PROCEDURE — G2211 COMPLEX E/M VISIT ADD ON: HCPCS | Performed by: INTERNAL MEDICINE

## 2025-05-28 PROCEDURE — 99213 OFFICE O/P EST LOW 20 MIN: CPT | Performed by: INTERNAL MEDICINE

## 2025-05-28 RX ORDER — METHOCARBAMOL 500 MG/1
500 TABLET, FILM COATED ORAL
Qty: 30 TABLET | Refills: 0 | Status: SHIPPED | OUTPATIENT
Start: 2025-05-28

## 2025-05-28 NOTE — PATIENT INSTRUCTIONS
Good to see you.    I will give you something for muscle spasm, only take at night if you need it, do not need to take every night    Also can take a cream and use for  your neck too    If not improvement in 1 week, please call us .

## 2025-05-28 NOTE — PROGRESS NOTES
Name: Stacie Rockwell      : 1955      MRN: [id number]  Encounter Provider: Mable Corbett DO  Encounter Date: 2025   Encounter department: [facility]    Assessment & Plan  Groin pain, chronic, left  (ACUTE)  Orders:    methocarbamol (ROBAXIN) 500 mg tablet; Take 1 tablet (500 mg total) by mouth daily at bedtime    Diclofenac Sodium (VOLTAREN) 1 %; Apply 2 g topically 4 (four) times a day   appears to be due to MSK as worse with movement, does not appear to have any rash or urinary symptoms. Trial of mm relax at night only prn and voltaren PRN     Showed simple stretching of left groin and hip flexors in office to try at home . Maybe illopsoas, ilacus mm strain or spasm vs OA of hip or knee pain     Conservative rx for now. Discussed trial of meds, to call if no improvement in few days, can consider hip xray, PT eval or if development of new symptoms, alternative etiology.  Less likely thrombosis        History of Present Illness     Stacie presents today for left groin pain x 5 days. She was just here for [facility] last week and had no issues but 2 days later after no change in activity or trauma, experienced left groin pain described as sharp at times, pressure at times which can radiate to lower back and to left knee. She denies any dysuria, pain with urination, fevers, chills, weight loss, no numbness and tingling in leg. Denies any rash or lumps or bumps in groin. Only new med is advair from last week which is helping her a lot. Denies other SOB Or chest pain or pressure. She tried asa and tylenol for pain . Normally does not use a cane but has needed to use one to get around. Worse with getting up from seated position to standing and with walking. Never had this pain before. No ab pain or n/v diarrhea . Denies changes in her bowel habits.       Review of Systems   Constitutional:  Negative for chills, fatigue, fever and unexpected weight change.   Respiratory:  Negative  for chest tightness.    Cardiovascular:  Negative for palpitations and leg swelling.   Gastrointestinal:  Negative for abdominal pain, blood in stool, constipation, diarrhea, nausea and vomiting.   Genitourinary:  Negative for difficulty urinating, dysuria and hematuria.   Musculoskeletal:  Positive for arthralgias, back pain, gait problem, myalgias, neck pain and neck stiffness.   Skin:  Negative for color change, rash and wound.   Hematological:  Negative for adenopathy. Does not bruise/bleed easily.     Past Medical History[1]  Past Surgical History[2]  Family History[3]  Social History[4]  Medications[5]  No Known Allergies  Immunization History   Administered Date(s) Administered    COVID-19 MODERNA VACC 0.5 ML IM 04/13/2021, 05/13/2021    COVID-19 Moderna Vac BIVALENT 12 Yr+ IM 0.5 ML 01/17/2023    COVID-19 Pfizer mRNA vacc PF selma-sucrose 12 yr and older (Comirnaty) 09/11/2024, 03/13/2025    INFLUENZA 10/26/2015, 12/05/2016, 11/17/2017    Influenza Quadrivalent, 6-35 Months IM 12/05/2016, 11/17/2017    Influenza, high dose seasonal 0.7 mL 10/23/2018    Influenza, injectable, quadrivalent, preservative free 0.5 mL 08/22/2019    Influenza, seasonal, injectable 01/15/2014, 11/13/2014, 10/26/2015    Pneumococcal Conjugate 13-Valent 02/01/2019    Pneumococcal Conjugate Vaccine 20-valent (Pcv20), Polysace 01/30/2023    Tdap 04/28/2016    Zoster 10/26/2015     Objective   /70 (BP Location: Right arm, Patient Position: Sitting, Cuff Size: Large)   Pulse 87   Temp 97.9 °F (36.6 °C) (Temporal)   Wt 89.2 kg (196 lb 9.6 oz)   SpO2 97%   BMI 35.96 kg/m²     Physical Exam  Constitutional:       General: She is not in acute distress.     Appearance: She is obese. She is not ill-appearing, toxic-appearing or diaphoretic.      Comments: Walked in with cane   HENT:      Mouth/Throat:      Mouth: Mucous membranes are moist.     Eyes:      General: No scleral icterus.        Right eye: No discharge.         Left eye:  No discharge.       Cardiovascular:      Rate and Rhythm: Normal rate and regular rhythm.      Pulses: Normal pulses.      Heart sounds: Normal heart sounds. No murmur heard.  Pulmonary:      Effort: Pulmonary effort is normal.      Breath sounds: Normal breath sounds. No wheezing or rales.      Comments: Decreased BS on right Lower lobe  Abdominal:      General: There is no distension.      Palpations: Abdomen is soft. There is no mass.      Tenderness: There is no abdominal tenderness. There is no guarding.      Hernia: No hernia is present.      Comments: No bulge or pain with palpation  Strong femoral pulses bl      Musculoskeletal:         General: No swelling or tenderness. Normal range of motion.      Right lower leg: No edema.      Left lower leg: No edema.      Comments: Negative SLT  Reflexes 2+ bl  Pain with forward flexion and extension of lumbar spine   Able to stand on toes, heels   Squatting limited due to pain    Strength 5/5 on right , 4/5 LE limited due to pain at hip flexion and extension  Knee ext and flex 5/5 BL     No vesicular rash noted in groin, negative LAD, no tender points, some tenderness over left gluteus muscle. Negative bulge or hernia  Some mid erythema due to candida in ab folds on left     No spinal tenderness noted , negative CVAT      Skin:     General: Skin is dry.      Capillary Refill: Capillary refill takes less than 2 seconds.      Findings: No erythema or lesion.     Neurological:      General: No focal deficit present.      Motor: No weakness.      Gait: Gait abnormal.     Psychiatric:         Mood and Affect: Mood normal.         Behavior: Behavior normal.              [1]   Past Medical History:  Diagnosis Date    Chronic ankle pain     Unspec laterality, Last assessed - 6/22/16    Chronic hepatitis C (HCC)     Complete left bundle branch block (LBBB)     COPD (chronic obstructive pulmonary disease) (HCC)     Eczema     B/L elbows, start Hydrocortisone; Last assessed -  8/5/15    HLD (hyperlipidemia)     Hydrocodone use disorder, moderate, in controlled environment (HCC) 3/11/2015    Violated CSMA, stopped    Hypertension     Osteoarthritis, knee     Rectal polyp     Sleep apnea     Violation of controlled substance agreement 10/23/2019    See notes of 10-   [2]   Past Surgical History:  Procedure Laterality Date    COLONOSCOPY      EYE SURGERY     [3]   Family History  Problem Relation Name Age of Onset    Heart disease Mother          Pacemaker    Hypertension Mother      Heart disease Father      Heart disease Sister      Hypertension Sister      No Known Problems Sister      No Known Problems Daughter      No Known Problems Daughter      No Known Problems Maternal Grandmother      No Known Problems Maternal Grandfather      No Known Problems Paternal Grandmother      No Known Problems Paternal Grandfather      Alcohol abuse Brother      Heart disease Brother      Cirrhosis Brother          Liver     Hypertension Brother      No Known Problems Brother      No Known Problems Maternal Aunt      No Known Problems Cousin     [4]   Social History  Tobacco Use    Smoking status: Former     Current packs/day: 0.00     Types: Cigarettes     Quit date: 1999     Years since quittin.2    Smokeless tobacco: Never   Vaping Use    Vaping status: Never Used   Substance and Sexual Activity    Alcohol use: Yes     Comment: social    Drug use: No    Sexual activity: Yes   [5]   Current Outpatient Medications on File Prior to Visit   Medication Sig    acetaminophen (TYLENOL) 650 mg CR tablet Take 650 mg by mouth every 8 (eight) hours as needed     albuterol (PROVENTIL HFA,VENTOLIN HFA) 90 mcg/act inhaler INHALE 2 PUFFS BY MOUTH EVERY 6 HOURS AS NEEDED FOR WHEEZING    atorvastatin (LIPITOR) 40 mg tablet TAKE 1 TABLET BY MOUTH EVERY DAY FOR CHOLESTEROL    Blood Pressure Monitoring (Blood Pressure Monitor/M Cuff) MISC Use daily    Fluticasone-Salmeterol (Advair Diskus) 100-50  mcg/dose inhaler Inhale 1 puff 2 (two) times a day Rinse mouth after use.    furosemide (LASIX) 20 mg tablet TAKE 2 TABLETS BY MOUTH 2 TIMES A DAY.    Jardiance 10 MG TABS tablet TAKE 1 TABLET (10 MG TOTAL) BY MOUTH EVERY MORNING.    Klor-Con M20 20 MEQ tablet Take 1 tablet by mouth 2 (two) times a day    metoprolol succinate (TOPROL-XL) 50 mg 24 hr tablet TAKE 1.5 TABLETS BY MOUTH 2 TIMES A DAY.    nitroglycerin (NITROSTAT) 0.4 mg SL tablet Place 1 tablet (0.4 mg total) under the tongue every 5 (five) minutes as needed for chest pain (take every 5 minutes for chest pain with max 3 doses)    omeprazole (PriLOSEC) 40 MG capsule Take 40 mg by mouth daily    sacubitril-valsartan (Entresto) 24-26 MG TABS Take 1 tablet by mouth 2 (two) times a day    spironolactone (ALDACTONE) 25 mg tablet TAKE 1 TABLET BY MOUTH TWICE A DAY

## 2025-05-29 ENCOUNTER — PATIENT OUTREACH (OUTPATIENT)
Dept: INTERNAL MEDICINE CLINIC | Facility: CLINIC | Age: 70
End: 2025-05-29

## 2025-05-29 DIAGNOSIS — J44.9 CHRONIC OBSTRUCTIVE PULMONARY DISEASE, UNSPECIFIED COPD TYPE (HCC): Primary | ICD-10-CM

## 2025-05-29 DIAGNOSIS — I50.42 CHRONIC COMBINED SYSTOLIC AND DIASTOLIC CONGESTIVE HEART FAILURE (HCC): ICD-10-CM

## 2025-05-29 PROCEDURE — G0511 CCM/BHI BY RHC/FQHC 20MIN MO: HCPCS | Performed by: INTERNAL MEDICINE

## 2025-05-29 NOTE — PROGRESS NOTES
Outpatient Care Management Note:    Re:  chronic care management     Patient identified for chronic care management program. Chart reviewed. Patient has a history of chronic combined systolic and diastolic congestive heart failure, hypertension, dyslipidemia, CKD3, obesity, chronic obstructive pulmonary disease, cataract and decreased hearing in both ears.     Patient seen at PCP office yesterday 5/28 for left groin pain. Patient prescribed methocarbamol and Voltaren gel and shown simple stretching exercises. Patient to call if no improvement in a few days and can consider hip xray, PT evaluation. Patient was seen at PCP office on 5/16 for Medicare wellness visit but did not have the pain at that time.     See telephone encounter from 5/28 insurance does not cover Voltaren gel. Message was placed for provider to address.     Patient to complete PFT's.     I called patient and no answer. Message left with my name, role and reason for call and requested a call back. Contact number left.      stated

## 2025-05-29 NOTE — PROGRESS NOTES
Received return call from patient. I explained my role and reason for call. She is agreeable for further outreach. She reports she is independent with ADL's and walks independently.She has a cane if needed. She lives in a 2nd story apartment and is able to use the steps. She has a son that lives in the area and comes to check on her on the weekends and takes her to pharmacy, grocery store and doctor appointments as needed. She shares she does her wash at her son's house. Patient reports she is able to walk to her pharmacy and able to use the public bus. She shares that she has a bicycle that she rides at times.     We reviewed medications today and she is taking her medications as prescribed. She uses [facility] pharmacy and sets up a pill box for the week. She did get her Voltaren gel and paid out of pocket for it as insurance did not cover. She reports it is helping with her pain. She is waiting for the pharmacy to have Robaxin ready as they had to order it. She is aware to take the Robaxin at bedtime. She reports she is doing the stretches that the doctor showed her to day. She will call office if pain does not improve or would worsen.     She has CPAP machine that she wears nightly. She reports using CPAP for past 13 years. She is aware of sleep medicine appointment on 7/1. Patient reports she is using her Advair inhaler twice a day and rinsing her mouth afterwards. She has albuterol inhaler to use if needed. She reports improvement with her breathing since using the Advair. She reports not feeling so winded when going up the steps and not noticing wheezing. Patient has a history of smoking, she is not currently smoking.     I did review with her the need to complete PFT's. I reviewed what to expect when she goes for this test. She feels she can call to schedule and later called me back to let me know she is scheduled for 6/4 @ 7:15 am. Patient scheduled to follow up with PCP office on 6/27 to review PFT's. She is  aware of this.     Patient scheduled to follow up with Cardiology in 9/8/25. She reports she did receive bills from Cardiologist. She is trying to reach out to her insurance to discuss. I also encouraged her to follow up with billing department if needs to work out payment plan. She does share she is on a fixed income.     Patient does not have any further questions, concerns, or other needs at this time. Patient has my contact number [phone] and PCP office number [phone] if needed. Patient is agreeable for further outreach.     Start of care assessment completed, careplan started and medications reviewed.

## 2025-06-04 ENCOUNTER — HOSPITAL ENCOUNTER (OUTPATIENT)
Dept: PULMONOLOGY | Facility: HOSPITAL | Age: 70
Discharge: HOME | End: 2025-06-04
Payer: MEDICARE

## 2025-06-04 DIAGNOSIS — Z72.0 TOBACCO ABUSE: ICD-10-CM

## 2025-06-04 PROCEDURE — 94760 N-INVAS EAR/PLS OXIMETRY 1: CPT

## 2025-06-04 PROCEDURE — 94010 BREATHING CAPACITY TEST: CPT | Performed by: INTERNAL MEDICINE

## 2025-06-04 PROCEDURE — 94729 DIFFUSING CAPACITY: CPT | Performed by: INTERNAL MEDICINE

## 2025-06-04 PROCEDURE — 94729 DIFFUSING CAPACITY: CPT

## 2025-06-04 PROCEDURE — 94010 BREATHING CAPACITY TEST: CPT

## 2025-06-04 PROCEDURE — 94726 PLETHYSMOGRAPHY LUNG VOLUMES: CPT | Performed by: INTERNAL MEDICINE

## 2025-06-04 PROCEDURE — 94726 PLETHYSMOGRAPHY LUNG VOLUMES: CPT

## 2025-06-06 DIAGNOSIS — I50.22 HEART FAILURE WITH MID-RANGE EJECTION FRACTION (HCC): ICD-10-CM

## 2025-06-06 RX ORDER — SPIRONOLACTONE 25 MG/1
25 TABLET ORAL 2 TIMES DAILY
Qty: 180 TABLET | Refills: 1 | Status: SHIPPED | OUTPATIENT
Start: 2025-06-06

## 2025-06-07 DIAGNOSIS — I50.22 HEART FAILURE WITH MID-RANGE EJECTION FRACTION (HCC): ICD-10-CM

## 2025-06-08 RX ORDER — METOPROLOL SUCCINATE 50 MG/1
75 TABLET, EXTENDED RELEASE ORAL 2 TIMES DAILY
Qty: 270 TABLET | Refills: 1 | Status: SHIPPED | OUTPATIENT
Start: 2025-06-08

## 2025-06-11 ENCOUNTER — PATIENT OUTREACH (OUTPATIENT)
Dept: INTERNAL MEDICINE CLINIC | Facility: CLINIC | Age: 70
End: 2025-06-11

## 2025-06-11 DIAGNOSIS — J44.9 CHRONIC OBSTRUCTIVE PULMONARY DISEASE, UNSPECIFIED COPD TYPE (HCC): Primary | ICD-10-CM

## 2025-06-11 DIAGNOSIS — I50.42 CHRONIC COMBINED SYSTOLIC AND DIASTOLIC CONGESTIVE HEART FAILURE (HCC): ICD-10-CM

## 2025-06-11 NOTE — PROGRESS NOTES
Outpatient Care Management Note:    Re:  chronic care management     Chart reviewed.    Patient had PFT competed on 6/4/25 which was normal.   Patient has follow up with PCP office 6/27/25.     Wanted to follow up up left groin pain again.   Wanted to follow up on her breathing.     I called patient and no answer. Message left with reason for call and requested a call back. Contact number left.

## 2025-06-11 NOTE — PROGRESS NOTES
I spoke with patient and she reports she has been doing well. She denies any complaints with her breathing. She continues to use Advair inhaler twice a day and rinse mouth afterwards. She has her albuterol inhaler as needed. Patient shares she has not smoked in over 10 years. Reviewed pulmonary function test was normal. She is aware of upcoming PCP appointment on 6/27 @ 8 am.     Patient has all of her other medications at this time and taking as prescribed. Confirms taking her Lasix 40 mg twice a day.     Patient reports still has left groin pain but reports continues to improve. She is taking Robaxin at bedtime and using pain cream throughout the day. Patient reports taking a shower helps with the pain too and resting more in between doing tasks. Encouraged to follow up if pain would worsen.     Patient asking if we have her access card on file. She will bring it to next PCP appointment.     Patient does not have any further questions, concerns, or other needs at this time. Patient has my contact number [phone] and PCP office number [phone] if needed. Patient is agreeable for further outreach.     Careplan reviewed.

## 2025-06-26 DIAGNOSIS — G89.29 GROIN PAIN, CHRONIC, LEFT: ICD-10-CM

## 2025-06-26 DIAGNOSIS — R10.32 GROIN PAIN, CHRONIC, LEFT: ICD-10-CM

## 2025-06-26 RX ORDER — METHOCARBAMOL 500 MG/1
500 TABLET, FILM COATED ORAL
Qty: 30 TABLET | Refills: 1 | Status: SHIPPED | OUTPATIENT
Start: 2025-06-26 | End: 2025-06-27 | Stop reason: ALTCHOICE

## 2025-06-27 ENCOUNTER — OFFICE VISIT (OUTPATIENT)
Dept: INTERNAL MEDICINE CLINIC | Facility: CLINIC | Age: 70
End: 2025-06-27

## 2025-06-27 VITALS
WEIGHT: 200 LBS | TEMPERATURE: 97.4 F | OXYGEN SATURATION: 98 % | RESPIRATION RATE: 16 BRPM | HEART RATE: 83 BPM | HEIGHT: 62 IN | DIASTOLIC BLOOD PRESSURE: 83 MMHG | BODY MASS INDEX: 36.8 KG/M2 | SYSTOLIC BLOOD PRESSURE: 139 MMHG

## 2025-06-27 DIAGNOSIS — I10 PRIMARY HYPERTENSION: ICD-10-CM

## 2025-06-27 DIAGNOSIS — Z72.0 TOBACCO ABUSE: ICD-10-CM

## 2025-06-27 DIAGNOSIS — G25.81 RESTLESS LEGS: ICD-10-CM

## 2025-06-27 DIAGNOSIS — I50.32 CHRONIC DIASTOLIC CONGESTIVE HEART FAILURE (HCC): ICD-10-CM

## 2025-06-27 DIAGNOSIS — J43.9 PULMONARY EMPHYSEMA, UNSPECIFIED EMPHYSEMA TYPE (HCC): ICD-10-CM

## 2025-06-27 DIAGNOSIS — R10.32 GROIN PAIN, LEFT: Primary | ICD-10-CM

## 2025-06-27 DIAGNOSIS — R49.0 HOARSENESS: ICD-10-CM

## 2025-06-27 RX ORDER — GABAPENTIN 100 MG/1
100 CAPSULE ORAL 2 TIMES DAILY
Qty: 90 CAPSULE | Refills: 2 | Status: SHIPPED | OUTPATIENT
Start: 2025-06-27

## 2025-06-27 RX ORDER — FLUTICASONE PROPIONATE AND SALMETEROL 100; 50 UG/1; UG/1
1 POWDER RESPIRATORY (INHALATION) 2 TIMES DAILY
Qty: 60 BLISTER | Refills: 1 | Status: SHIPPED | OUTPATIENT
Start: 2025-06-27

## 2025-06-27 RX ORDER — ALBUTEROL SULFATE 90 UG/1
2 INHALANT RESPIRATORY (INHALATION) EVERY 6 HOURS PRN
Qty: 8.5 G | Refills: 3 | Status: SHIPPED | OUTPATIENT
Start: 2025-06-27

## 2025-06-27 NOTE — ASSESSMENT & PLAN NOTE
As noted during last visit with Dr. Blas, patient has a longstanding history of tobacco abuse and appears to have some evidence of hyperinflation on prior chest imaging.  Given these findings as well as reported significant wheezing, patient was sent for PFTs; interestingly, these resulted as completely normal.  However, she has continued to note significant improvement in her symptomatology with Advair and albuterol.  No significant wheezing appreciated on pulmonary exam in office today.  Although there are other potential causes for the patient's documented wheezing (i.e. cardiac wheeze), would favor ongoing treatment with inhaler regimen as ordered given reported symptomatic improvement.  Refills provided today.  Of note, we did discuss that if the patient experiences worsening throat discomfort, consideration should be given to the possibility of candidal disease (no thrush appreciated on oral examination today).    Orders:    albuterol (PROVENTIL HFA,VENTOLIN HFA) 90 mcg/act inhaler; Inhale 2 puffs every 6 (six) hours as needed for wheezing    Fluticasone-Salmeterol (Advair Diskus) 100-50 mcg/dose inhaler; Inhale 1 puff 2 (two) times a day Rinse mouth after use.

## 2025-06-27 NOTE — PROGRESS NOTES
Name: Stacie Rockwell      : 1955      MRN: [id number]  Encounter Provider: Spencer Stovall MD  Encounter Date: 2025   Encounter department: [facility] [place]  :  Assessment & Plan  Groin pain, left  Patient presents today for ongoing evaluation of left groin pain.  See original visit note from Dr. Corbett for details of initial presentation.  At that time, patient was recommended to trial a course of nightly Robaxin and conservative management for suspected muscular strain (iliopsoas versus iliacus versus other); though the patient has had some interval improvement in her pain, she remains relatively symptomatic.  See HPI below for full characterization.  GAIL noted to be grossly positive, with FADIR slightly positive; otherwise, no significant crepitus or derangements noted through left hip joint and no obvious hernias appreciated.  Based on description of symptomatology, consideration given to the possibility of meralgia paresthetica contributing to patient's current symptoms; alternatively, given age and history of active lifestyle, patient may certainly have underlying OA or other degenerative disease; less likely to be hernia related without a culprit lesion.  For now, recommend patient trial PT as well as a course of gabapentin (to hopefully address both neuropathic component of pain as well as concomitant RLS).  Will also obtain dedicated left hip imaging to evaluate for occult pathology.  Will plan to reevaluate symptoms in 1 month.    Orders:    gabapentin (Neurontin) 100 mg capsule; Take 1 capsule (100 mg total) by mouth 2 (two) times a day    Ambulatory Referral to Physical Therapy; Future    XR hip/pelv 4+ vw left if performed; Future    Pulmonary emphysema, unspecified emphysema type (HCC)  Hoarseness  Tobacco abuse  As noted during last visit with Dr. Blas, patient has a longstanding history of tobacco abuse and appears to have some evidence of hyperinflation  on prior chest imaging.  Given these findings as well as reported significant wheezing, patient was sent for PFTs; interestingly, these resulted as completely normal.  However, she has continued to note significant improvement in her symptomatology with Advair and albuterol.  No significant wheezing appreciated on pulmonary exam in office today.  Although there are other potential causes for the patient's documented wheezing (i.e. cardiac wheeze), would favor ongoing treatment with inhaler regimen as ordered given reported symptomatic improvement.  Refills provided today.  Of note, we did discuss that if the patient experiences worsening throat discomfort, consideration should be given to the possibility of candidal disease (no thrush appreciated on oral examination today).    Orders:    albuterol (PROVENTIL HFA,VENTOLIN HFA) 90 mcg/act inhaler; Inhale 2 puffs every 6 (six) hours as needed for wheezing    Fluticasone-Salmeterol (Advair Diskus) 100-50 mcg/dose inhaler; Inhale 1 puff 2 (two) times a day Rinse mouth after use.    Chronic diastolic congestive heart failure (HCC)  Wt Readings from Last 3 Encounters:   06/27/25 90.7 kg (200 lb)   05/28/25 89.2 kg (196 lb 9.6 oz)   05/16/25 90.3 kg (199 lb)     Patient with known history of heart failure with midrange ejection fraction, though underlying etiology unclear.  Prior evaluation with nuclear stress test did not reveal definitive evidence of ischemia.  Most recent echocardiogram from May 2024 demonstrated LVEF 50% with LVIDD and mildly increased wall thickness as well as trace TR.  Patient follows closely with cardiology/HF (last office visit March 2025) and has been compliant with her GDMT.  No specific cardiac complaints appreciated today, though do note approximate 3 to 4 pound weight gain since last visit 1 month ago.  As above, consideration given to the possibility of cardiac disease contributing to patient's wheezing though this appears less likely at  present.  For now, she will continue to weigh herself at home and continue with her GDMT as ordered.         Primary hypertension  Patient with history of hypertension, previously well-controlled on therapy with Entresto and spironolactone.  BP in office today noted to be slightly above goal at 139/83, although this was in the setting of acute left hip pain.  For now, we will continue to monitor this symptom; if this remains uncontrolled at subsequent visits to despite improvement in patient's pain, adjustments to pharmacotherapy may be warranted.       Restless legs  Patient notes intermittent history of restless leg phenomenon, notes that this has been occurring more frequently as of late.  No iron panel available for review in system, though last CBC without evidence of anemia.  For now, we will plan to check iron panel and trial symptomatic treatment with gabapentin; this may also help with the neuropathic component of her left groin pain as discussed above.  Patient does also follow with sleep medicine for management of her LUIZA and this could be addressed during her upcoming visit with them as well.    Orders:    gabapentin (Neurontin) 100 mg capsule; Take 1 capsule (100 mg total) by mouth 2 (two) times a day    TIBC Panel (incl. Iron, TIBC, % Iron Saturation); Future           History of Present Illness   Patient is a 70-year-old female with past medical history notable for heart failure with mid ejection fraction, LUIZA, hypertension, prior tobacco abuse with suspected underlying emphysema, CKD 3B, and a recent episode of left groin pain who presents today for follow-up.  She was last seen in May 2025 for evaluation of left groin pain, at which time she was recommended to pursue conservative management with as needed nightly Robaxin for suspected musculoskeletal strain.  She returns today for ongoing evaluation of this symptom as well as to discuss recent PFTs that were obtained given her longstanding history  of tobacco abuse.    In regards to her left groin pain, the patient does note some mild improvement since her last visit though feels that this symptom remains uncontrolled.  Current pain level in office today is rated at 6/10, which is improved from 10/10 at the time of her last visit.  She does note exacerbation of her pain with prolonged sitting as well as changing positions (sitting to standing, bending over) though denies any association with straining or any associated lumps or masses.  Pain is characterized as sharp and burning in character, with a primary focus overlying the left inguinal region and occasional radiation into the upper thigh; she denies any radiation of symptoms down the remainder of her leg.  Rest does improve her symptoms although the Robaxin that she had been prescribed previously has been relatively unhelpful.  The patient notes that she leads a relatively active lifestyle and frequently enjoyed long walks before the symptoms began, which have been relatively disabling.  She denies any inciting trauma though does note that she was carrying a bicycle up her steps to her residence in the days before her symptoms first began.  Aside from already mention, she denies any numbness, tingling, or paresthesias.    In regards to the patient's wheezing, she feels that this has remained much improved with ongoing treatment with Advair and as needed albuterol.  She does note occasional periods where her voice feels hoarse and she experiences some dry mouth/throat discomfort, though she has been appropriately utilizing her inhaler devices and washing her mouth accordingly.  She denies any matthew odynophagia or throat pain.  Her only other complaint today is of intermittent sensation of restless legs at nighttime, she is experienced this symptom on and off for some time, though notes a relatively increased rate of symptoms in recent days to weeks.  She denies any known history of iron deficiency and  "states that she has been compliant with her PAP therapy.        Review of Systems   Constitutional:  Negative for chills, fatigue and fever.   HENT:  Negative for congestion and rhinorrhea.    Eyes:  Negative for visual disturbance.   Respiratory:  Negative for cough, shortness of breath and wheezing.    Endocrine: Negative for polydipsia, polyphagia and polyuria.   Genitourinary:  Negative for dysuria.   Musculoskeletal:  Positive for arthralgias, gait problem and myalgias. Negative for joint swelling.   Skin:  Negative for rash.   Neurological:  Negative for dizziness, weakness, light-headedness and numbness.       Objective   /83 (BP Location: Left arm, Patient Position: Sitting, Cuff Size: Standard)   Pulse 83   Temp (!) 97.4 °F (36.3 °C) (Temporal)   Resp 16   Ht 5' 2\" (1.575 m)   Wt 90.7 kg (200 lb)   SpO2 98%   BMI 36.58 kg/m²      Physical Exam  Vitals reviewed.   Constitutional:       General: She is not in acute distress.     Appearance: She is obese. She is not ill-appearing.   HENT:      Head: Normocephalic and atraumatic.      Right Ear: External ear normal.      Left Ear: External ear normal.      Nose: Nose normal.      Mouth/Throat:      Mouth: Mucous membranes are moist.      Pharynx: Oropharynx is clear.     Eyes:      Extraocular Movements: Extraocular movements intact.      Conjunctiva/sclera: Conjunctivae normal.      Pupils: Pupils are equal, round, and reactive to light.       Cardiovascular:      Rate and Rhythm: Normal rate and regular rhythm.      Heart sounds: Normal heart sounds. No murmur heard.  Pulmonary:      Effort: Pulmonary effort is normal. No respiratory distress.      Breath sounds: Normal breath sounds. No wheezing.      Comments: No significant wheezing appreciated  Abdominal:      General: Abdomen is flat. There is no distension.      Palpations: Abdomen is soft.      Tenderness: There is no abdominal tenderness.      Hernia: No hernia (No hernias appreciated, " including inguinal region) is present.     Musculoskeletal:         General: Tenderness present.      Right hip: No deformity, bony tenderness or crepitus. Normal range of motion. Normal strength.      Left hip: No deformity, bony tenderness or crepitus. Decreased range of motion. Normal strength.      Right lower leg: No edema.      Left lower leg: No edema.      Comments: Left-sided GAIL positive, FADIR minimally positive as well; positive straight leg raise though pain reproduced at hip rather than back; no significant crepitus appreciated with range of motion though this is limited secondary to pain     Skin:     General: Skin is warm and dry.     Neurological:      Mental Status: She is alert and oriented to person, place, and time. Mental status is at baseline.     Psychiatric:         Mood and Affect: Mood normal.         Behavior: Behavior normal.

## 2025-06-27 NOTE — PATIENT INSTRUCTIONS
-Please try a course of PT to help with your hip; please also have x-rays obtained  -We will try gabapentin to help with your hip pain as well as your restless legs; please also have labs completed  -Continue inhalers as ordered  -Follow-up in one month to reassess

## 2025-06-27 NOTE — ASSESSMENT & PLAN NOTE
Patient with history of hypertension, previously well-controlled on therapy with Entresto and spironolactone.  BP in office today noted to be slightly above goal at 139/83, although this was in the setting of acute left hip pain.  For now, we will continue to monitor this symptom; if this remains uncontrolled at subsequent visits to despite improvement in patient's pain, adjustments to pharmacotherapy may be warranted.

## 2025-07-01 ENCOUNTER — HOSPITAL ENCOUNTER (OUTPATIENT)
Dept: RADIOLOGY | Facility: HOSPITAL | Age: 70
Discharge: HOME | End: 2025-07-01
Payer: MEDICARE

## 2025-07-01 ENCOUNTER — APPOINTMENT (OUTPATIENT)
Dept: LAB | Facility: CLINIC | Age: 70
End: 2025-07-01
Payer: MEDICARE

## 2025-07-01 ENCOUNTER — OFFICE VISIT (OUTPATIENT)
Dept: SLEEP CENTER | Facility: CLINIC | Age: 70
End: 2025-07-01
Payer: MEDICARE

## 2025-07-01 VITALS
SYSTOLIC BLOOD PRESSURE: 126 MMHG | DIASTOLIC BLOOD PRESSURE: 62 MMHG | HEIGHT: 62 IN | WEIGHT: 199 LBS | BODY MASS INDEX: 36.62 KG/M2

## 2025-07-01 DIAGNOSIS — G25.81 RESTLESS LEGS: ICD-10-CM

## 2025-07-01 DIAGNOSIS — E66.812 CLASS 2 OBESITY DUE TO EXCESS CALORIES WITHOUT SERIOUS COMORBIDITY WITH BODY MASS INDEX (BMI) OF 36.0 TO 36.9 IN ADULT: ICD-10-CM

## 2025-07-01 DIAGNOSIS — R10.32 GROIN PAIN, LEFT: ICD-10-CM

## 2025-07-01 DIAGNOSIS — E66.09 CLASS 2 OBESITY DUE TO EXCESS CALORIES WITHOUT SERIOUS COMORBIDITY WITH BODY MASS INDEX (BMI) OF 36.0 TO 36.9 IN ADULT: ICD-10-CM

## 2025-07-01 DIAGNOSIS — G25.81 RESTLESS LEG SYNDROME: Primary | ICD-10-CM

## 2025-07-01 DIAGNOSIS — G47.33 OBSTRUCTIVE SLEEP APNEA, ADULT: ICD-10-CM

## 2025-07-01 LAB
IRON SATN MFR SERPL: 15 % (ref 15–50)
IRON SERPL-MCNC: 69 UG/DL (ref 50–212)
TIBC SERPL-MCNC: 446.6 UG/DL (ref 250–450)
TRANSFERRIN SERPL-MCNC: 319 MG/DL (ref 203–362)
UIBC SERPL-MCNC: 378 UG/DL (ref 155–355)

## 2025-07-01 PROCEDURE — G2211 COMPLEX E/M VISIT ADD ON: HCPCS | Performed by: INTERNAL MEDICINE

## 2025-07-01 PROCEDURE — 83540 ASSAY OF IRON: CPT

## 2025-07-01 PROCEDURE — 73502 X-RAY EXAM HIP UNI 2-3 VIEWS: CPT

## 2025-07-01 PROCEDURE — 99214 OFFICE O/P EST MOD 30 MIN: CPT | Performed by: INTERNAL MEDICINE

## 2025-07-01 PROCEDURE — 36415 COLL VENOUS BLD VENIPUNCTURE: CPT

## 2025-07-01 PROCEDURE — 83550 IRON BINDING TEST: CPT

## 2025-07-01 NOTE — PROGRESS NOTES
Name: Stacie Rockwell      : 1955      MRN: [id number]  Encounter Provider: Ron Calderon MD  Encounter Date: 2025   Encounter department: [facility] [place]    :  Assessment & Plan  Restless leg syndrome  Iron panel has been ordered  Explained that if ferritin less than 100 would necessitate oral iron supplementation  Recommended she continue gabapentin at 100 mg at night with a possible increase to 200 mg at night if RLS symptoms are bothersome       Obstructive sleep apnea, adult  Initial AHI 5 with oxygen casper 80%    Compliance from 3/27 - 2025  DreamStation 2 on a fixed pressure of 7  Usage 57.8%, average usage 4 hours and 23 minutes  Time in large leak 3 minutes  Residual AHI 3.9    Patient forgets to put on her mask at times  She notes improvement in symptoms overall    Plan  Continue CPAP at current settings  Ordered supplies with fullface mask  Follow-up in 1 year    Orders:    PAP DME Resupply/Reorder    Class 2 obesity due to excess calories without serious comorbidity with body mass index (BMI) of 36.0 to 36.9 in adult  Counseled patient on lifestyle modifications including diet and exercise  Explained that weight loss will decrease the severity of sleep apnea           History of Present Illness     70-year-old female with past medical history as below who presents for follow-up of mild obstructive sleep apnea.  She had a diagnostic PSG in  which showed an AHI of 5.  Oxygen casper 80%.  CPAP study showed optimal pressure at 6 cm H2O.  She presents for annual compliance.    She goes to bed at 9 PM and falls asleep quickly.  She gets out of bed typically at 3 AM but sometimes 5 AM.  She gets approximately 6 hours of sleep total  .  She takes a 1 hour nap in the afternoon.  She sleeps better when she uses her CPAP.  She sometimes falls asleep without putting her CPAP on.    Current [place] score of 2.    She notes bothersome urge to move her legs when she gets into  the bed 2-3 times a week.  She was ordered blood work and got an iron panel today.                  Sitting and reading: Slight chance of dozing  Watching TV: Slight chance of dozing  Sitting, inactive in a public place (e.g. a theatre or a meeting): Would never doze  As a passenger in a car for an hour without a break: Would never doze  Lying down to rest in the afternoon when circumstances permit: Would never doze  Sitting and talking to someone: Would never doze  Sitting quietly after a lunch without alcohol: Would never doze  In a car, while stopped for a few minutes in traffic: Would never doze  Total score: 2       Review of Systems   Constitutional: Negative.    Eyes: Negative.    Respiratory: Negative.     Cardiovascular: Negative.    Gastrointestinal: Negative.    Endocrine: Negative.    Genitourinary: Negative.    Musculoskeletal: Negative.    Allergic/Immunologic: Negative.    Neurological: Negative.    Psychiatric/Behavioral: Negative.       Pertinent positives/negatives included in HPI and also as noted:       Pertinent Medical History           Medical History Reviewed by provider this encounter:  Meds     .  Past Medical History   Past Medical History[1]  Past Surgical History[2]  Family History[3]   reports that she quit smoking about 26 years ago. Her smoking use included cigarettes. She has never used smokeless tobacco. She reports current alcohol use. She reports that she does not use drugs.  Current Outpatient Medications   Medication Instructions    acetaminophen (TYLENOL) 650 mg, Every 8 hours PRN    albuterol (PROVENTIL HFA,VENTOLIN HFA) 90 mcg/act inhaler 2 puffs, Inhalation, Every 6 hours PRN    atorvastatin (LIPITOR) 40 mg, Oral, Daily, For cholesterol    Blood Pressure Monitoring (Blood Pressure Monitor/M Cuff) MISC Does not apply, Daily    Diclofenac Sodium (VOLTAREN) 2 g, Topical, 4 times daily    Fluticasone-Salmeterol (Advair Diskus) 100-50 mcg/dose inhaler 1 puff, Inhalation, 2 times  "daily, Rinse mouth after use.    furosemide (LASIX) 40 mg, Oral, 2 times daily    gabapentin (NEURONTIN) 100 mg, Oral, 2 times daily    Jardiance 10 mg, Oral, Every morning    Klor-Con M20 20 MEQ tablet 1 tablet, 2 times daily    metoprolol succinate (TOPROL-XL) 75 mg, Oral, 2 times daily    nitroglycerin (NITROSTAT) 0.4 mg, Sublingual, Every 5 minutes PRN    omeprazole (PRILOSEC) 40 mg, Daily    sacubitril-valsartan (Entresto) 24-26 MG TABS 1 tablet, Oral, 2 times daily    spironolactone (ALDACTONE) 25 mg, Oral, 2 times daily   Allergies[4]   Medications Ordered Prior to Encounter[5]   Social History[6]  Objective   /62 (BP Location: Left arm, Patient Position: Sitting, Cuff Size: Large)   Ht 5' 2\" (1.575 m)   Wt 90.3 kg (199 lb)   BMI 36.40 kg/m²        Physical Exam  Vitals reviewed.   HENT:      Head: Normocephalic and atraumatic.      Nose: Nose normal.      Mouth/Throat:      Mouth: Mucous membranes are moist.     Eyes:      Extraocular Movements: Extraocular movements intact.      Pupils: Pupils are equal, round, and reactive to light.       Cardiovascular:      Rate and Rhythm: Normal rate and regular rhythm.      Pulses: Normal pulses.   Pulmonary:      Effort: Pulmonary effort is normal.      Breath sounds: Normal breath sounds.   Abdominal:      General: Abdomen is flat. Bowel sounds are normal.      Palpations: Abdomen is soft.     Musculoskeletal:         General: Normal range of motion.      Cervical back: Normal range of motion.     Skin:     General: Skin is warm.     Neurological:      General: No focal deficit present.      Mental Status: She is alert and oriented to person, place, and time. Mental status is at baseline.       Visit Vitals  /62 (BP Location: Left arm, Patient Position: Sitting, Cuff Size: Large)   Ht 5' 2\" (1.575 m)   Wt 90.3 kg (199 lb)   BMI 36.40 kg/m²   OB Status Postmenopausal   Smoking Status Former   BSA 1.91 m²           Data  Lab Results   Component Value " "Date    HGB 13.9 07/25/2024    HCT 43.0 07/25/2024    MCV 97 07/25/2024      Lab Results   Component Value Date    GLUCOSE 103 09/23/2015    CALCIUM 9.5 03/04/2025     09/23/2015    K 4.1 03/04/2025    CO2 22 03/04/2025     03/04/2025    BUN 19 03/04/2025    CREATININE 1.32 (H) 03/04/2025     No results found for: \"IRON\", \"TIBC\", \"FERRITIN\"  Lab Results   Component Value Date    AST 31 07/25/2024    ALT 16 07/25/2024                  [1]   Past Medical History:  Diagnosis Date    Chronic ankle pain     Unspec laterality, Last assessed - 6/22/16    Chronic hepatitis C (HCC)     Complete left bundle branch block (LBBB)     COPD (chronic obstructive pulmonary disease) ([facility])     Eczema     B/L elbows, start Hydrocortisone; Last assessed - 8/5/15    HLD (hyperlipidemia)     Hydrocodone use disorder, moderate, in controlled environment ([facility]) 3/11/2015    Violated CSMA, stopped    Hypertension     Osteoarthritis, knee     Rectal polyp     Sleep apnea     Violation of controlled substance agreement 10/23/2019    See notes of 10-   [2]   Past Surgical History:  Procedure Laterality Date    COLONOSCOPY      EYE SURGERY     [3]   Family History  Problem Relation Name Age of Onset    Heart disease Mother          Pacemaker    Hypertension Mother      Heart disease Father      Heart disease Sister      Hypertension Sister      No Known Problems Sister      No Known Problems Daughter      No Known Problems Daughter      No Known Problems Maternal Grandmother      No Known Problems Maternal Grandfather      No Known Problems Paternal Grandmother      No Known Problems Paternal Grandfather      Alcohol abuse Brother      Heart disease Brother      Cirrhosis Brother          Liver     Hypertension Brother      No Known Problems Brother      No Known Problems Maternal Aunt      No Known Problems Cousin     [4] No Known Allergies  [5]   Current Outpatient Medications on File Prior to Visit   Medication Sig Dispense " Refill    acetaminophen (TYLENOL) 650 mg CR tablet Take 650 mg by mouth every 8 (eight) hours as needed      albuterol (PROVENTIL HFA,VENTOLIN HFA) 90 mcg/act inhaler Inhale 2 puffs every 6 (six) hours as needed for wheezing 8.5 g 3    atorvastatin (LIPITOR) 40 mg tablet TAKE 1 TABLET BY MOUTH EVERY DAY FOR CHOLESTEROL 90 tablet 3    Blood Pressure Monitoring (Blood Pressure Monitor/M Cuff) MISC Use daily 1 each 0    Diclofenac Sodium (VOLTAREN) 1 % Apply 2 g topically 4 (four) times a day 20 g 0    Fluticasone-Salmeterol (Advair Diskus) 100-50 mcg/dose inhaler Inhale 1 puff 2 (two) times a day Rinse mouth after use. 60 blister 1    furosemide (LASIX) 20 mg tablet TAKE 2 TABLETS BY MOUTH 2 TIMES A DAY. 360 tablet 1    gabapentin (Neurontin) 100 mg capsule Take 1 capsule (100 mg total) by mouth 2 (two) times a day 90 capsule 2    Jardiance 10 MG TABS tablet TAKE 1 TABLET (10 MG TOTAL) BY MOUTH EVERY MORNING. 30 tablet 5    Klor-Con M20 20 MEQ tablet Take 1 tablet by mouth in the morning and 1 tablet before bedtime.      metoprolol succinate (TOPROL-XL) 50 mg 24 hr tablet TAKE 1.5 TABLETS BY MOUTH 2 TIMES A DAY. 270 tablet 1    nitroglycerin (NITROSTAT) 0.4 mg SL tablet Place 1 tablet (0.4 mg total) under the tongue every 5 (five) minutes as needed for chest pain (take every 5 minutes for chest pain with max 3 doses) 9 tablet 0    omeprazole (PriLOSEC) 40 MG capsule Take 40 mg by mouth in the morning.      sacubitril-valsartan (Entresto) 24-26 MG TABS Take 1 tablet by mouth 2 (two) times a day 90 tablet 1    spironolactone (ALDACTONE) 25 mg tablet TAKE 1 TABLET BY MOUTH TWICE A  tablet 1     No current facility-administered medications on file prior to visit.   [6]   Social History  Tobacco Use    Smoking status: Former     Current packs/day: 0.00     Types: Cigarettes     Quit date: 1999     Years since quittin.3    Smokeless tobacco: Never   Vaping Use    Vaping status: Never Used   Substance and  Sexual Activity    Alcohol use: Yes     Comment: social    Drug use: No    Sexual activity: Yes

## 2025-07-01 NOTE — ASSESSMENT & PLAN NOTE
Initial AHI 5 with oxygen casper 80%    Compliance from 3/27 - 6/20/2025  DreamStation 2 on a fixed pressure of 7  Usage 57.8%, average usage 4 hours and 23 minutes  Time in large leak 3 minutes  Residual AHI 3.9    Patient forgets to put on her mask at times  She notes improvement in symptoms overall    Plan  Continue CPAP at current settings  Ordered supplies with fullface mask  Follow-up in 1 year    Orders:    PAP DME Resupply/Reorder

## 2025-07-02 ENCOUNTER — TELEPHONE (OUTPATIENT)
Dept: SLEEP CENTER | Facility: CLINIC | Age: 70
End: 2025-07-02

## 2025-07-02 LAB

## 2025-07-04 DIAGNOSIS — I50.22 HEART FAILURE WITH MID-RANGE EJECTION FRACTION (HCC): ICD-10-CM

## 2025-07-07 RX ORDER — EMPAGLIFLOZIN 10 MG/1
10 TABLET, FILM COATED ORAL EVERY MORNING
Qty: 30 TABLET | Refills: 5 | Status: SHIPPED | OUTPATIENT
Start: 2025-07-07

## 2025-07-11 ENCOUNTER — PATIENT OUTREACH (OUTPATIENT)
Dept: INTERNAL MEDICINE CLINIC | Facility: CLINIC | Age: 70
End: 2025-07-11

## 2025-07-11 DIAGNOSIS — J44.9 CHRONIC OBSTRUCTIVE PULMONARY DISEASE, UNSPECIFIED COPD TYPE (HCC): ICD-10-CM

## 2025-07-11 DIAGNOSIS — I50.42 CHRONIC COMBINED SYSTOLIC AND DIASTOLIC CONGESTIVE HEART FAILURE (HCC): Primary | ICD-10-CM

## 2025-07-11 NOTE — PROGRESS NOTES
Outpatient Care Management Note:    Re:  chronic care management     Chart reviewed. Patient seen by PCP office on 6/27/25 for left groin pain and xray ordered, recommended to trial PT and course of Gabapentin (100 mg twice a day) and reevaluated in 1 month. Xray completed on 7/1.     Patient seen by sleep medicine on 7/1/25. Patient to continue CPAP. Patient to complete iron pan and if ferritin less than 100 would recommend oral iron supplement. Patient had labwork on 7/1/25. Will have PCP further review and advise. Patient to continue 100 mg of gabapentin at night and possible increase to 200 mg if restless leg syndrome symptoms are bothersome.     I called and spoke with patient. She reports her left groin pain has improved but still gets pain at times when walking longer distances or when she sits too long and goes to get up to walk. Patient is taking the gabapentin 100 mg twice a day and feels it is helping with her pain. Patient declining PT at this time. Patient aware of follow up with PCP on 7/25. Patient asking about xray results. I did review result with her and to discuss further at upcoming PCP appointment.     Patient denies any issues with her breathing at this time. She has her inhalers and take Advair as prescribed.     Patient reports some swelling in her ankles at times. She reports she is weighing herself and was 200 lbs today. Denies any swelling at this time. Confirms she is taking Lasix 40 mg twice a day and Spironolactone 25 mg twice a day. She reports she does watch her fluid and sodium intake. Instructed to call Cardiology if she gains 3 lbs overnight or 5 lbs in a week and/or has increased swelling or shortness of breath. She voiced understanding.     Patient does not have any further questions, concerns, or other needs at this time. Patient has my contact number [phone] and PCP office number [phone] if needed. Patient is agreeable for further outreach.     Careplan reviewed.

## 2025-07-16 DIAGNOSIS — I50.22 HEART FAILURE WITH MID-RANGE EJECTION FRACTION (HCC): ICD-10-CM

## 2025-07-16 RX ORDER — FUROSEMIDE 20 MG/1
40 TABLET ORAL 2 TIMES DAILY
Qty: 360 TABLET | Refills: 1 | Status: SHIPPED | OUTPATIENT
Start: 2025-07-16

## 2025-07-25 ENCOUNTER — APPOINTMENT (OUTPATIENT)
Dept: LAB | Facility: CLINIC | Age: 70
End: 2025-07-25
Payer: MEDICARE

## 2025-07-25 ENCOUNTER — OFFICE VISIT (OUTPATIENT)
Dept: INTERNAL MEDICINE CLINIC | Facility: CLINIC | Age: 70
End: 2025-07-25

## 2025-07-25 VITALS
RESPIRATION RATE: 14 BRPM | HEIGHT: 62 IN | OXYGEN SATURATION: 97 % | BODY MASS INDEX: 35.99 KG/M2 | TEMPERATURE: 97.8 F | DIASTOLIC BLOOD PRESSURE: 74 MMHG | WEIGHT: 195.6 LBS | SYSTOLIC BLOOD PRESSURE: 123 MMHG | HEART RATE: 73 BPM

## 2025-07-25 DIAGNOSIS — Z13.9 SCREENING DUE: ICD-10-CM

## 2025-07-25 DIAGNOSIS — R10.32 GROIN PAIN, LEFT: Primary | ICD-10-CM

## 2025-07-25 DIAGNOSIS — I10 PRIMARY HYPERTENSION: ICD-10-CM

## 2025-07-25 LAB
25(OH)D3 SERPL-MCNC: 36 NG/ML (ref 30–100)
ALBUMIN SERPL BCG-MCNC: 4.4 G/DL (ref 3.5–5)
ALP SERPL-CCNC: 90 U/L (ref 34–104)
ALT SERPL W P-5'-P-CCNC: 12 U/L (ref 7–52)
ANION GAP SERPL CALCULATED.3IONS-SCNC: 12 MMOL/L (ref 4–13)
AST SERPL W P-5'-P-CCNC: 16 U/L (ref 13–39)
BASOPHILS # BLD AUTO: 0.05 THOUSANDS/ÂΜL (ref 0–0.1)
BASOPHILS NFR BLD AUTO: 1 % (ref 0–1)
BILIRUB SERPL-MCNC: 0.47 MG/DL (ref 0.2–1)
BUN SERPL-MCNC: 12 MG/DL (ref 5–25)
CALCIUM SERPL-MCNC: 9.5 MG/DL (ref 8.4–10.2)
CHLORIDE SERPL-SCNC: 109 MMOL/L (ref 96–108)
CO2 SERPL-SCNC: 19 MMOL/L (ref 21–32)
CREAT SERPL-MCNC: 1.25 MG/DL (ref 0.6–1.3)
EOSINOPHIL # BLD AUTO: 0.18 THOUSAND/ÂΜL (ref 0–0.61)
EOSINOPHIL NFR BLD AUTO: 2 % (ref 0–6)
ERYTHROCYTE [DISTWIDTH] IN BLOOD BY AUTOMATED COUNT: 13.9 % (ref 11.6–15.1)
EST. AVERAGE GLUCOSE BLD GHB EST-MCNC: 123 MG/DL
GFR SERPL CREATININE-BSD FRML MDRD: 43 ML/MIN/1.73SQ M
GLUCOSE P FAST SERPL-MCNC: 94 MG/DL (ref 65–99)
HBA1C MFR BLD: 5.9 %
HCT VFR BLD AUTO: 42.2 % (ref 34.8–46.1)
HGB BLD-MCNC: 13.4 G/DL (ref 11.5–15.4)
IMM GRANULOCYTES # BLD AUTO: 0.03 THOUSAND/UL (ref 0–0.2)
IMM GRANULOCYTES NFR BLD AUTO: 0 % (ref 0–2)
LYMPHOCYTES # BLD AUTO: 1.7 THOUSANDS/ÂΜL (ref 0.6–4.47)
LYMPHOCYTES NFR BLD AUTO: 20 % (ref 14–44)
MCH RBC QN AUTO: 30.2 PG (ref 26.8–34.3)
MCHC RBC AUTO-ENTMCNC: 31.8 G/DL (ref 31.4–37.4)
MCV RBC AUTO: 95 FL (ref 82–98)
MONOCYTES # BLD AUTO: 0.59 THOUSAND/ÂΜL (ref 0.17–1.22)
MONOCYTES NFR BLD AUTO: 7 % (ref 4–12)
NEUTROPHILS # BLD AUTO: 6.02 THOUSANDS/ÂΜL (ref 1.85–7.62)
NEUTS SEG NFR BLD AUTO: 70 % (ref 43–75)
NRBC BLD AUTO-RTO: 0 /100 WBCS
PLATELET # BLD AUTO: 402 THOUSANDS/UL (ref 149–390)
PMV BLD AUTO: 10.6 FL (ref 8.9–12.7)
POTASSIUM SERPL-SCNC: 4.1 MMOL/L (ref 3.5–5.3)
PROT SERPL-MCNC: 7.2 G/DL (ref 6.4–8.4)
RBC # BLD AUTO: 4.43 MILLION/UL (ref 3.81–5.12)
SODIUM SERPL-SCNC: 140 MMOL/L (ref 135–147)
WBC # BLD AUTO: 8.57 THOUSAND/UL (ref 4.31–10.16)

## 2025-07-25 PROCEDURE — 83036 HEMOGLOBIN GLYCOSYLATED A1C: CPT

## 2025-07-25 PROCEDURE — 80053 COMPREHEN METABOLIC PANEL: CPT

## 2025-07-25 PROCEDURE — 82306 VITAMIN D 25 HYDROXY: CPT

## 2025-07-25 PROCEDURE — G2211 COMPLEX E/M VISIT ADD ON: HCPCS | Performed by: STUDENT IN AN ORGANIZED HEALTH CARE EDUCATION/TRAINING PROGRAM

## 2025-07-25 PROCEDURE — 36415 COLL VENOUS BLD VENIPUNCTURE: CPT

## 2025-07-25 PROCEDURE — 85025 COMPLETE CBC W/AUTO DIFF WBC: CPT

## 2025-07-25 PROCEDURE — 99213 OFFICE O/P EST LOW 20 MIN: CPT | Performed by: STUDENT IN AN ORGANIZED HEALTH CARE EDUCATION/TRAINING PROGRAM

## 2025-07-25 NOTE — PROGRESS NOTES
"Name: Stacie Rockwell      : 1955      MRN: [id number]  Encounter Provider: Sage Ritter DO  Encounter Date: 2025   Encounter department: [facility] [place]  :  Assessment & Plan  Groin pain, left  Patient has been having left hip pain since May.  X-ray revealed moderate DJD as well as calcific densities in the right hemipelvis.  She has no abnormal uterine bleeding, urinary symptoms or change in bowel habits.  Reviewed x-ray myself and looks like stool. reports her pain is almost completely resolved.       Primary hypertension  Blood pressure is well-controlled on Entresto and Aldactone.  Will continue to monitor       Screening due    Orders:    CBC and differential; Future    Comprehensive metabolic panel; Future    Lipid Panel With Direct LDL; Future    Hemoglobin A1C; Future    TSH + Free T4; Future    Vitamin D 25 hydroxy; Future           History of Present Illness   70-year-old female with past medical history notable for heart failure with mid ejection fraction, LUIZA, hypertension, CKD 3B presenting for follow-up on left hip pain.  Further details as noted above.      Review of Systems   Constitutional:  Negative for chills and fever.   HENT:  Negative for ear pain and sore throat.    Eyes:  Negative for pain and visual disturbance.   Respiratory:  Negative for cough and shortness of breath.    Cardiovascular:  Negative for chest pain and palpitations.   Gastrointestinal:  Negative for abdominal pain and vomiting.   Genitourinary:  Negative for dysuria and hematuria.   Musculoskeletal:  Negative for arthralgias and back pain.   Skin:  Negative for color change and rash.   Neurological:  Negative for seizures and syncope.   All other systems reviewed and are negative.      Objective   /74 (BP Location: Left arm, Patient Position: Sitting, Cuff Size: Large)   Pulse 73   Temp 97.8 °F (36.6 °C) (Temporal)   Resp 14   Ht 5' 2\" (1.575 m)   Wt 88.7 kg (195 lb 9.6 " oz)   SpO2 97%   BMI 35.78 kg/m²      Physical Exam  Vitals and nursing note reviewed.   Constitutional:       General: She is not in acute distress.     Appearance: She is well-developed.   HENT:      Head: Normocephalic and atraumatic.     Eyes:      Conjunctiva/sclera: Conjunctivae normal.       Cardiovascular:      Rate and Rhythm: Normal rate and regular rhythm.      Heart sounds: No murmur heard.  Pulmonary:      Effort: Pulmonary effort is normal. No respiratory distress.      Breath sounds: Normal breath sounds.   Abdominal:      General: There is no distension.      Palpations: Abdomen is soft.      Tenderness: There is no abdominal tenderness.     Musculoskeletal:         General: No swelling or tenderness.      Cervical back: Neck supple.      Right lower leg: No edema.      Left lower leg: No edema.     Skin:     General: Skin is warm and dry.      Capillary Refill: Capillary refill takes less than 2 seconds.     Neurological:      General: No focal deficit present.      Mental Status: She is alert.     Psychiatric:         Mood and Affect: Mood normal.

## 2025-08-07 ENCOUNTER — PATIENT OUTREACH (OUTPATIENT)
Dept: INTERNAL MEDICINE CLINIC | Facility: CLINIC | Age: 70
End: 2025-08-07

## 2025-08-07 DIAGNOSIS — N18.30 STAGE 3 CHRONIC KIDNEY DISEASE, UNSPECIFIED WHETHER STAGE 3A OR 3B CKD (HCC): ICD-10-CM

## 2025-08-07 DIAGNOSIS — J44.9 CHRONIC OBSTRUCTIVE PULMONARY DISEASE, UNSPECIFIED COPD TYPE (HCC): ICD-10-CM

## 2025-08-07 DIAGNOSIS — I50.42 CHRONIC COMBINED SYSTOLIC AND DIASTOLIC CONGESTIVE HEART FAILURE (HCC): Primary | ICD-10-CM

## 2025-08-11 ENCOUNTER — TELEPHONE (OUTPATIENT)
Dept: INTERNAL MEDICINE CLINIC | Facility: CLINIC | Age: 70
End: 2025-08-11

## 2025-08-15 ENCOUNTER — TELEPHONE (OUTPATIENT)
Dept: INTERNAL MEDICINE CLINIC | Facility: CLINIC | Age: 70
End: 2025-08-15